# Patient Record
Sex: MALE | Race: WHITE | Employment: UNEMPLOYED | URBAN - NONMETROPOLITAN AREA
[De-identification: names, ages, dates, MRNs, and addresses within clinical notes are randomized per-mention and may not be internally consistent; named-entity substitution may affect disease eponyms.]

---

## 2017-03-29 ENCOUNTER — HOSPITAL ENCOUNTER (OUTPATIENT)
Dept: PHARMACY | Age: 42
Setting detail: THERAPIES SERIES
Discharge: HOME | End: 2017-03-29
Payer: MEDICARE

## 2017-03-29 VITALS
DIASTOLIC BLOOD PRESSURE: 90 MMHG | WEIGHT: 315 LBS | BODY MASS INDEX: 48.88 KG/M2 | HEART RATE: 81 BPM | SYSTOLIC BLOOD PRESSURE: 119 MMHG

## 2017-03-29 DIAGNOSIS — D68.61 ANTIPHOSPHOLIPID ANTIBODY SYNDROME (HCC): ICD-10-CM

## 2017-03-29 DIAGNOSIS — Z79.01 LONG TERM CURRENT USE OF ANTICOAGULANT THERAPY: ICD-10-CM

## 2017-03-29 LAB — INR BLD: 3.1

## 2017-03-29 PROCEDURE — 85610 PROTHROMBIN TIME: CPT

## 2017-03-29 PROCEDURE — G0463 HOSPITAL OUTPT CLINIC VISIT: HCPCS

## 2017-05-18 ENCOUNTER — HOSPITAL ENCOUNTER (OUTPATIENT)
Dept: PHARMACY | Age: 42
Setting detail: THERAPIES SERIES
Discharge: HOME | End: 2017-05-18
Payer: MEDICARE

## 2017-05-18 VITALS
SYSTOLIC BLOOD PRESSURE: 124 MMHG | BODY MASS INDEX: 49.23 KG/M2 | DIASTOLIC BLOOD PRESSURE: 74 MMHG | HEART RATE: 74 BPM | WEIGHT: 315 LBS

## 2017-05-18 DIAGNOSIS — Z79.01 LONG TERM CURRENT USE OF ANTICOAGULANT THERAPY: ICD-10-CM

## 2017-05-18 DIAGNOSIS — D68.61 ANTIPHOSPHOLIPID ANTIBODY SYNDROME (HCC): ICD-10-CM

## 2017-05-18 LAB — INR BLD: 3.9

## 2017-05-18 PROCEDURE — 99211 OFF/OP EST MAY X REQ PHY/QHP: CPT

## 2017-05-18 PROCEDURE — 85610 PROTHROMBIN TIME: CPT

## 2017-07-31 ENCOUNTER — OFFICE VISIT (OUTPATIENT)
Dept: PRIMARY CARE CLINIC | Age: 42
End: 2017-07-31
Payer: MEDICARE

## 2017-07-31 VITALS
DIASTOLIC BLOOD PRESSURE: 101 MMHG | HEIGHT: 72 IN | HEART RATE: 72 BPM | TEMPERATURE: 99.2 F | RESPIRATION RATE: 20 BRPM | SYSTOLIC BLOOD PRESSURE: 147 MMHG | BODY MASS INDEX: 42.66 KG/M2 | OXYGEN SATURATION: 95 % | WEIGHT: 315 LBS

## 2017-07-31 DIAGNOSIS — J20.9 ACUTE BRONCHITIS, UNSPECIFIED ORGANISM: Primary | ICD-10-CM

## 2017-07-31 PROCEDURE — G8417 CALC BMI ABV UP PARAM F/U: HCPCS | Performed by: NURSE PRACTITIONER

## 2017-07-31 PROCEDURE — G8598 ASA/ANTIPLAT THER USED: HCPCS | Performed by: NURSE PRACTITIONER

## 2017-07-31 PROCEDURE — 99202 OFFICE O/P NEW SF 15 MIN: CPT | Performed by: NURSE PRACTITIONER

## 2017-07-31 PROCEDURE — 4004F PT TOBACCO SCREEN RCVD TLK: CPT | Performed by: NURSE PRACTITIONER

## 2017-07-31 PROCEDURE — G8427 DOCREV CUR MEDS BY ELIG CLIN: HCPCS | Performed by: NURSE PRACTITIONER

## 2017-07-31 RX ORDER — AZITHROMYCIN 250 MG/1
TABLET, FILM COATED ORAL
Qty: 6 TABLET | Refills: 0 | Status: SHIPPED | OUTPATIENT
Start: 2017-07-31 | End: 2017-08-04 | Stop reason: ALTCHOICE

## 2017-07-31 ASSESSMENT — ENCOUNTER SYMPTOMS
SORE THROAT: 0
COUGH: 1
DIARRHEA: 1
RHINORRHEA: 1
HEARTBURN: 0
NAUSEA: 0
WHEEZING: 1
VOMITING: 0
HEMOPTYSIS: 0
SHORTNESS OF BREATH: 1

## 2017-08-04 ENCOUNTER — HOSPITAL ENCOUNTER (OUTPATIENT)
Dept: PHARMACY | Age: 42
Setting detail: THERAPIES SERIES
Discharge: HOME | End: 2017-08-04
Payer: MEDICARE

## 2017-08-04 VITALS
SYSTOLIC BLOOD PRESSURE: 138 MMHG | WEIGHT: 315 LBS | HEART RATE: 76 BPM | BODY MASS INDEX: 49.99 KG/M2 | DIASTOLIC BLOOD PRESSURE: 98 MMHG

## 2017-08-04 DIAGNOSIS — Z79.01 LONG TERM (CURRENT) USE OF ANTICOAGULANTS: ICD-10-CM

## 2017-08-04 DIAGNOSIS — Z79.01 LONG TERM CURRENT USE OF ANTICOAGULANT THERAPY: ICD-10-CM

## 2017-08-04 DIAGNOSIS — D68.61 ANTIPHOSPHOLIPID ANTIBODY SYNDROME (HCC): ICD-10-CM

## 2017-08-04 LAB — INR BLD: 4.1

## 2017-08-04 PROCEDURE — 99211 OFF/OP EST MAY X REQ PHY/QHP: CPT

## 2017-08-04 PROCEDURE — 85610 PROTHROMBIN TIME: CPT

## 2017-08-04 RX ORDER — WARFARIN SODIUM 10 MG/1
TABLET ORAL
Qty: 30 TABLET | Refills: 2 | Status: SHIPPED
Start: 2017-08-04 | End: 2017-09-14 | Stop reason: SDUPTHER

## 2017-08-04 RX ORDER — WARFARIN SODIUM 7.5 MG/1
TABLET ORAL
Qty: 90 TABLET | Refills: 2 | Status: SHIPPED
Start: 2017-08-04 | End: 2017-09-14 | Stop reason: DRUGHIGH

## 2017-09-14 ENCOUNTER — HOSPITAL ENCOUNTER (OUTPATIENT)
Dept: PHARMACY | Age: 42
Setting detail: THERAPIES SERIES
Discharge: HOME | End: 2017-09-14
Payer: MEDICARE

## 2017-09-14 VITALS — HEART RATE: 87 BPM | SYSTOLIC BLOOD PRESSURE: 158 MMHG | DIASTOLIC BLOOD PRESSURE: 113 MMHG

## 2017-09-14 DIAGNOSIS — D68.61 ANTIPHOSPHOLIPID ANTIBODY SYNDROME (HCC): ICD-10-CM

## 2017-09-14 DIAGNOSIS — Z79.01 LONG TERM CURRENT USE OF ANTICOAGULANT THERAPY: ICD-10-CM

## 2017-09-14 DIAGNOSIS — Z79.01 LONG TERM (CURRENT) USE OF ANTICOAGULANTS: ICD-10-CM

## 2017-09-14 LAB — INR BLD: 5.5

## 2017-09-14 PROCEDURE — 85610 PROTHROMBIN TIME: CPT

## 2017-09-14 PROCEDURE — 99211 OFF/OP EST MAY X REQ PHY/QHP: CPT

## 2017-09-14 RX ORDER — WARFARIN SODIUM 7.5 MG/1
TABLET ORAL
Qty: 90 TABLET | Refills: 2 | Status: SHIPPED
Start: 2017-09-14 | End: 2017-11-30 | Stop reason: DRUGHIGH

## 2017-09-14 RX ORDER — WARFARIN SODIUM 10 MG/1
TABLET ORAL
Qty: 90 TABLET | Refills: 1 | OUTPATIENT
Start: 2017-09-14 | End: 2017-11-30 | Stop reason: DRUGHIGH

## 2017-09-16 ENCOUNTER — HOSPITAL ENCOUNTER (EMERGENCY)
Age: 42
Discharge: HOME | End: 2017-09-16
Attending: FAMILY MEDICINE
Payer: MEDICARE

## 2017-09-16 ENCOUNTER — OFFICE VISIT (OUTPATIENT)
Dept: PRIMARY CARE CLINIC | Age: 42
End: 2017-09-16

## 2017-09-16 VITALS
HEIGHT: 72 IN | HEART RATE: 80 BPM | WEIGHT: 315 LBS | RESPIRATION RATE: 22 BRPM | TEMPERATURE: 98.2 F | DIASTOLIC BLOOD PRESSURE: 96 MMHG | OXYGEN SATURATION: 97 % | BODY MASS INDEX: 42.66 KG/M2 | SYSTOLIC BLOOD PRESSURE: 141 MMHG

## 2017-09-16 VITALS
SYSTOLIC BLOOD PRESSURE: 133 MMHG | DIASTOLIC BLOOD PRESSURE: 88 MMHG | HEIGHT: 72 IN | WEIGHT: 315 LBS | BODY MASS INDEX: 42.66 KG/M2 | OXYGEN SATURATION: 92 % | HEART RATE: 89 BPM | TEMPERATURE: 98.5 F

## 2017-09-16 DIAGNOSIS — R06.02 SHORTNESS OF BREATH: Primary | ICD-10-CM

## 2017-09-16 DIAGNOSIS — J20.9 ACUTE BRONCHITIS, UNSPECIFIED ORGANISM: Primary | ICD-10-CM

## 2017-09-16 PROCEDURE — 99282 EMERGENCY DEPT VISIT SF MDM: CPT

## 2017-09-16 PROCEDURE — 4004F PT TOBACCO SCREEN RCVD TLK: CPT | Performed by: NURSE PRACTITIONER

## 2017-09-16 PROCEDURE — 99999 PR OFFICE/OUTPT VISIT,PROCEDURE ONLY: CPT | Performed by: NURSE PRACTITIONER

## 2017-09-16 PROCEDURE — 6370000000 HC RX 637 (ALT 250 FOR IP): Performed by: FAMILY MEDICINE

## 2017-09-16 RX ORDER — PROMETHAZINE HYDROCHLORIDE AND CODEINE PHOSPHATE 6.25; 1 MG/5ML; MG/5ML
5 SYRUP ORAL ONCE
Status: COMPLETED | OUTPATIENT
Start: 2017-09-16 | End: 2017-09-16

## 2017-09-16 RX ORDER — AZITHROMYCIN 250 MG/1
500 TABLET, FILM COATED ORAL ONCE
Status: COMPLETED | OUTPATIENT
Start: 2017-09-16 | End: 2017-09-16

## 2017-09-16 RX ORDER — PROMETHAZINE HYDROCHLORIDE AND CODEINE PHOSPHATE 6.25; 1 MG/5ML; MG/5ML
5 SYRUP ORAL 4 TIMES DAILY PRN
Qty: 60 ML | Refills: 0 | Status: SHIPPED | OUTPATIENT
Start: 2017-09-16 | End: 2017-10-05 | Stop reason: ALTCHOICE

## 2017-09-16 RX ORDER — AZITHROMYCIN 250 MG/1
TABLET, FILM COATED ORAL
Qty: 4 TABLET | Refills: 0 | Status: SHIPPED | OUTPATIENT
Start: 2017-09-16 | End: 2017-09-26

## 2017-09-16 RX ADMIN — Medication 5 ML: at 20:38

## 2017-09-16 RX ADMIN — AZITHROMYCIN 500 MG: 250 TABLET, FILM COATED ORAL at 20:38

## 2017-09-16 ASSESSMENT — ENCOUNTER SYMPTOMS
HEARTBURN: 0
CHEST TIGHTNESS: 0
SORE THROAT: 1
HEMOPTYSIS: 0
WHEEZING: 1
SHORTNESS OF BREATH: 1
COUGH: 1
RHINORRHEA: 1

## 2017-09-16 ASSESSMENT — PAIN SCALES - GENERAL: PAINLEVEL_OUTOF10: 7

## 2017-09-16 ASSESSMENT — PAIN DESCRIPTION - PAIN TYPE: TYPE: ACUTE PAIN

## 2017-09-18 ENCOUNTER — TELEPHONE (OUTPATIENT)
Dept: PRIMARY CARE CLINIC | Age: 42
End: 2017-09-18

## 2017-10-05 ENCOUNTER — HOSPITAL ENCOUNTER (OUTPATIENT)
Dept: PHARMACY | Age: 42
Setting detail: THERAPIES SERIES
Discharge: HOME | End: 2017-10-05
Payer: MEDICARE

## 2017-10-05 VITALS — SYSTOLIC BLOOD PRESSURE: 109 MMHG | HEART RATE: 83 BPM | DIASTOLIC BLOOD PRESSURE: 68 MMHG

## 2017-10-05 DIAGNOSIS — D68.61 ANTIPHOSPHOLIPID ANTIBODY SYNDROME (HCC): ICD-10-CM

## 2017-10-05 DIAGNOSIS — Z79.01 LONG TERM CURRENT USE OF ANTICOAGULANT THERAPY: ICD-10-CM

## 2017-10-05 LAB — INR BLD: 4.8

## 2017-10-05 PROCEDURE — 99211 OFF/OP EST MAY X REQ PHY/QHP: CPT

## 2017-10-05 PROCEDURE — 85610 PROTHROMBIN TIME: CPT

## 2017-10-05 NOTE — PROGRESS NOTES
Fingerstick INR drawn per clinic protocol. Patient states no visible blood in urine and no black tarry stool. Jeferson Duenas missed 1 dose of warfarin on 17 as instructed because of supratherapeutic INR. Jeferson Duenas states he \"followed our directions\" for the first 2 days then went back to the way he used to which is 10 mg on  and  and warfarin 7.5mg daily all other days (9.5% higher weekly dosage than I recommended). Jeferson Duenas was scheduled for a recheck of INR 1 week after last appointment but has cancelled and rescheduled multiple times, so it has been 3 weeks since our last INR check. No change in diet. Jeferson Duenas states he is still having \"a lot of pain,\" and stopped taking ibuprofen, but is taking \"bottles of tylenol. \" Jeferson Duenas is also talking about having \"increased anxiety,\" as his dad  recently. He states he gets \"shortness of breath\" and has had a \"headache for a week\" and \"never\" gets headaches. I recommended he see his PCP or go to the emergency room or urgent care if he felt it was necessary. Since Merrick's INR is supratherapeutic again today, we will HOLD today's dosage, have him take warfarin 3.75 mg tomorrow then decrease current weekly warfarin regimen by 8.7% (warfarin 7.5 mg daily) and recheck INR in 1 week(s).

## 2017-11-30 ENCOUNTER — HOSPITAL ENCOUNTER (OUTPATIENT)
Dept: PHARMACY | Age: 42
Setting detail: THERAPIES SERIES
Discharge: HOME | End: 2017-11-30
Payer: MEDICARE

## 2017-11-30 VITALS
DIASTOLIC BLOOD PRESSURE: 88 MMHG | BODY MASS INDEX: 48.36 KG/M2 | SYSTOLIC BLOOD PRESSURE: 130 MMHG | HEART RATE: 90 BPM | WEIGHT: 315 LBS

## 2017-11-30 DIAGNOSIS — D68.61 ANTIPHOSPHOLIPID ANTIBODY SYNDROME (HCC): ICD-10-CM

## 2017-11-30 DIAGNOSIS — Z79.01 LONG TERM CURRENT USE OF ANTICOAGULANT THERAPY: ICD-10-CM

## 2017-11-30 LAB — INR BLD: 3.6

## 2017-11-30 PROCEDURE — 85610 PROTHROMBIN TIME: CPT

## 2017-11-30 PROCEDURE — 99211 OFF/OP EST MAY X REQ PHY/QHP: CPT

## 2017-11-30 RX ORDER — WARFARIN SODIUM 10 MG/1
TABLET ORAL
Qty: 90 TABLET | Refills: 1 | Status: SHIPPED
Start: 2017-11-30 | End: 2018-01-25 | Stop reason: DRUGHIGH

## 2017-11-30 RX ORDER — IBUPROFEN 200 MG
200-800 TABLET ORAL EVERY 6 HOURS PRN
COMMUNITY
End: 2018-03-23

## 2017-11-30 RX ORDER — WARFARIN SODIUM 7.5 MG/1
TABLET ORAL
Qty: 90 TABLET | Refills: 2 | Status: SHIPPED
Start: 2017-11-30 | End: 2018-01-25 | Stop reason: DRUGHIGH

## 2017-11-30 RX ORDER — OXYCODONE HYDROCHLORIDE 15 MG/1
15 TABLET ORAL 4 TIMES DAILY PRN
COMMUNITY
End: 2018-01-25 | Stop reason: DRUGHIGH

## 2017-11-30 RX ORDER — FLUTICASONE PROPIONATE 110 UG/1
1 AEROSOL, METERED RESPIRATORY (INHALATION) PRN
COMMUNITY
End: 2018-03-23 | Stop reason: ALTCHOICE

## 2017-11-30 NOTE — PROGRESS NOTES
Fingerstick INR drawn per clinic protocol. Patient states no visible blood in urine and no black tarry stool. Denies any missed doses of warfarin. Hosea Kehr was instructed to take warfarin 7.5 mg daily, but on his own decided to go back on what he is \"used to\" which is warfarin 10 mg on Monday and Friday and 7.5 mg daily all other days (9.5% higher weekly warfarin regimen). All medications reviewed for accuracy without the bottles. Hosea Kehr saw Dr. Ronald Patterson and was prescribed oxycodone 15 mg 4 times daily as needed for pain and gabapentin 400 mg was increased from twice to 4 times daily. Hosea Kehr is unsure but states he uses an orange steroid inhaler as needed (flovent? And he is unsure of the dosage). Hosea Kehr has been taking ibuprofen for shoulder pain (even though he states he knows it thins his blood). Hosea Kehr will see Dr. Ronald Patterson again on 12/10/17. Will continue current weekly warfarin regimen and recheck INR in 4 week(s).

## 2018-01-25 ENCOUNTER — HOSPITAL ENCOUNTER (OUTPATIENT)
Dept: PHARMACY | Age: 43
Setting detail: THERAPIES SERIES
Discharge: HOME | End: 2018-01-25
Payer: MEDICARE

## 2018-01-25 VITALS
DIASTOLIC BLOOD PRESSURE: 96 MMHG | HEART RATE: 92 BPM | SYSTOLIC BLOOD PRESSURE: 138 MMHG | BODY MASS INDEX: 47.22 KG/M2 | WEIGHT: 315 LBS

## 2018-01-25 DIAGNOSIS — Z79.01 LONG TERM CURRENT USE OF ANTICOAGULANT THERAPY: ICD-10-CM

## 2018-01-25 DIAGNOSIS — D68.61 ANTIPHOSPHOLIPID ANTIBODY SYNDROME (HCC): ICD-10-CM

## 2018-01-25 LAB — INR BLD: 5.9

## 2018-01-25 PROCEDURE — 85610 PROTHROMBIN TIME: CPT

## 2018-01-25 PROCEDURE — 99211 OFF/OP EST MAY X REQ PHY/QHP: CPT

## 2018-01-25 RX ORDER — OXYCODONE HYDROCHLORIDE 20 MG/1
20 TABLET ORAL 4 TIMES DAILY
COMMUNITY
End: 2019-05-16 | Stop reason: ALTCHOICE

## 2018-01-25 RX ORDER — WARFARIN SODIUM 7.5 MG/1
TABLET ORAL
Qty: 90 TABLET | Refills: 2 | Status: SHIPPED
Start: 2018-01-25 | End: 2018-06-13 | Stop reason: SDUPTHER

## 2018-03-06 ENCOUNTER — HOSPITAL ENCOUNTER (OUTPATIENT)
Dept: PHARMACY | Age: 43
Setting detail: THERAPIES SERIES
Discharge: HOME | End: 2018-03-06
Payer: MEDICARE

## 2018-03-06 VITALS
BODY MASS INDEX: 46.74 KG/M2 | DIASTOLIC BLOOD PRESSURE: 92 MMHG | SYSTOLIC BLOOD PRESSURE: 138 MMHG | HEART RATE: 88 BPM | WEIGHT: 315 LBS

## 2018-03-06 DIAGNOSIS — Z79.01 LONG TERM CURRENT USE OF ANTICOAGULANT THERAPY: ICD-10-CM

## 2018-03-06 DIAGNOSIS — D68.61 ANTIPHOSPHOLIPID ANTIBODY SYNDROME (HCC): ICD-10-CM

## 2018-03-06 LAB — INR BLD: 1.6

## 2018-03-06 PROCEDURE — 85610 PROTHROMBIN TIME: CPT

## 2018-03-06 PROCEDURE — 99211 OFF/OP EST MAY X REQ PHY/QHP: CPT

## 2018-03-23 ENCOUNTER — HOSPITAL ENCOUNTER (OUTPATIENT)
Dept: PHARMACY | Age: 43
Setting detail: THERAPIES SERIES
Discharge: HOME | End: 2018-03-23
Payer: MEDICARE

## 2018-03-23 VITALS
BODY MASS INDEX: 45.46 KG/M2 | HEART RATE: 92 BPM | SYSTOLIC BLOOD PRESSURE: 134 MMHG | WEIGHT: 315 LBS | DIASTOLIC BLOOD PRESSURE: 86 MMHG

## 2018-03-23 DIAGNOSIS — Z79.01 LONG TERM CURRENT USE OF ANTICOAGULANT THERAPY: ICD-10-CM

## 2018-03-23 DIAGNOSIS — D68.61 ANTIPHOSPHOLIPID ANTIBODY SYNDROME (HCC): ICD-10-CM

## 2018-03-23 LAB — INR BLD: 2.6

## 2018-03-23 PROCEDURE — 99211 OFF/OP EST MAY X REQ PHY/QHP: CPT

## 2018-03-23 PROCEDURE — 85610 PROTHROMBIN TIME: CPT

## 2018-03-23 NOTE — PROGRESS NOTES
Fingerstick INR drawn per clinic protocol. Patient states no visible blood in urine and no black tarry stool. Upon reviewing his warfarin dosing, Surja Green says he has been taking \"10 mg on Mondays\" and \"7.5 mg all other days of the week\". He denies any missed doses of warfarin over the course of the past 2 and 1/2 weeks since his last visit in the clinic. No change in other maintenance medications or in diet. Given his therapeutic INR today, we will continue current warfarin regimen and recheck INR in 5 weeks.

## 2018-06-04 ENCOUNTER — TELEPHONE (OUTPATIENT)
Dept: PHARMACY | Age: 43
End: 2018-06-04

## 2018-06-13 ENCOUNTER — HOSPITAL ENCOUNTER (OUTPATIENT)
Dept: PHARMACY | Age: 43
Setting detail: THERAPIES SERIES
Discharge: HOME | End: 2018-06-13
Payer: MEDICARE

## 2018-06-13 VITALS
BODY MASS INDEX: 45.49 KG/M2 | DIASTOLIC BLOOD PRESSURE: 90 MMHG | SYSTOLIC BLOOD PRESSURE: 149 MMHG | WEIGHT: 315 LBS | HEART RATE: 82 BPM

## 2018-06-13 DIAGNOSIS — D68.61 ANTIPHOSPHOLIPID ANTIBODY SYNDROME (HCC): ICD-10-CM

## 2018-06-13 DIAGNOSIS — Z79.01 LONG TERM CURRENT USE OF ANTICOAGULANT THERAPY: ICD-10-CM

## 2018-06-13 LAB — INR BLD: 1.4

## 2018-06-13 RX ORDER — WARFARIN SODIUM 7.5 MG/1
TABLET ORAL
Qty: 90 TABLET | Refills: 1 | Status: ON HOLD | OUTPATIENT
Start: 2018-06-13 | End: 2018-08-13 | Stop reason: HOSPADM

## 2018-06-13 RX ORDER — SULFAMETHOXAZOLE AND TRIMETHOPRIM 800; 160 MG/1; MG/1
1 TABLET ORAL 2 TIMES DAILY
COMMUNITY
Start: 2018-06-12 | End: 2018-06-26

## 2018-06-13 RX ORDER — ATENOLOL 50 MG/1
50 TABLET ORAL DAILY
COMMUNITY
End: 2018-07-31 | Stop reason: DRUGHIGH

## 2018-06-13 RX ORDER — GABAPENTIN 600 MG/1
600 TABLET ORAL 4 TIMES DAILY
COMMUNITY
End: 2019-05-16 | Stop reason: ALTCHOICE

## 2018-06-13 RX ORDER — WARFARIN SODIUM 10 MG/1
TABLET ORAL
Qty: 30 TABLET | Refills: 3 | Status: SHIPPED
Start: 2018-06-13 | End: 2018-07-31 | Stop reason: DRUGHIGH

## 2018-06-27 ENCOUNTER — TELEPHONE (OUTPATIENT)
Dept: PHARMACY | Age: 43
End: 2018-06-27

## 2018-06-27 LAB — INR BLD: 3.6

## 2018-06-28 LAB — INR BLD: 4.1

## 2018-06-29 LAB — INR BLD: 3.2

## 2018-06-30 LAB — INR BLD: 2.7

## 2018-07-01 LAB — INR BLD: 2.7

## 2018-07-31 ENCOUNTER — HOSPITAL ENCOUNTER (OUTPATIENT)
Dept: PHARMACY | Age: 43
Setting detail: THERAPIES SERIES
Discharge: HOME | End: 2018-07-31
Payer: MEDICARE

## 2018-07-31 DIAGNOSIS — Z79.01 LONG TERM CURRENT USE OF ANTICOAGULANT THERAPY: ICD-10-CM

## 2018-07-31 DIAGNOSIS — D68.61 ANTIPHOSPHOLIPID ANTIBODY SYNDROME (HCC): ICD-10-CM

## 2018-07-31 LAB — INR BLD: 1.6

## 2018-07-31 PROCEDURE — 85610 PROTHROMBIN TIME: CPT

## 2018-07-31 PROCEDURE — 99211 OFF/OP EST MAY X REQ PHY/QHP: CPT

## 2018-07-31 RX ORDER — WARFARIN SODIUM 10 MG/1
TABLET ORAL
Qty: 30 TABLET | Refills: 3 | Status: ON HOLD
Start: 2018-07-31 | End: 2018-08-13 | Stop reason: HOSPADM

## 2018-07-31 RX ORDER — FAMOTIDINE 40 MG/1
40 TABLET, FILM COATED ORAL DAILY PRN
COMMUNITY
End: 2019-02-08

## 2018-07-31 RX ORDER — FERROUS SULFATE 325(65) MG
325 TABLET ORAL DAILY
COMMUNITY
Start: 2018-07-01 | End: 2019-05-16 | Stop reason: ALTCHOICE

## 2018-07-31 RX ORDER — ATENOLOL 25 MG/1
25 TABLET ORAL DAILY
COMMUNITY

## 2018-08-12 ENCOUNTER — HOSPITAL ENCOUNTER (EMERGENCY)
Age: 43
Discharge: ANOTHER HOSPITAL | End: 2018-08-12
Payer: MEDICARE

## 2018-08-12 ENCOUNTER — APPOINTMENT (OUTPATIENT)
Dept: CT IMAGING | Age: 43
DRG: 917 | End: 2018-08-12
Payer: MEDICARE

## 2018-08-12 ENCOUNTER — APPOINTMENT (OUTPATIENT)
Dept: GENERAL RADIOLOGY | Age: 43
End: 2018-08-12
Payer: MEDICARE

## 2018-08-12 ENCOUNTER — HOSPITAL ENCOUNTER (INPATIENT)
Age: 43
LOS: 1 days | Discharge: HOME | DRG: 917 | End: 2018-08-13
Attending: EMERGENCY MEDICINE | Admitting: INTERNAL MEDICINE
Payer: MEDICARE

## 2018-08-12 ENCOUNTER — APPOINTMENT (OUTPATIENT)
Dept: GENERAL RADIOLOGY | Age: 43
DRG: 917 | End: 2018-08-12
Payer: MEDICARE

## 2018-08-12 VITALS — OXYGEN SATURATION: 99 %

## 2018-08-12 DIAGNOSIS — R41.82 ALTERED MENTAL STATUS, UNSPECIFIED ALTERED MENTAL STATUS TYPE: ICD-10-CM

## 2018-08-12 DIAGNOSIS — D68.61 ANTIPHOSPHOLIPID ANTIBODY SYNDROME (HCC): ICD-10-CM

## 2018-08-12 DIAGNOSIS — T50.904A DRUG OVERDOSE, UNDETERMINED INTENT, INITIAL ENCOUNTER: Primary | ICD-10-CM

## 2018-08-12 PROBLEM — R41.89 UNRESPONSIVE: Status: ACTIVE | Noted: 2018-08-12

## 2018-08-12 LAB
-: NORMAL
ABSOLUTE EOS #: 0 K/UL (ref 0–0.4)
ABSOLUTE EOS #: 0 K/UL (ref 0–0.4)
ABSOLUTE EOS #: 0.22 K/UL (ref 0–0.4)
ABSOLUTE IMMATURE GRANULOCYTE: 0 K/UL (ref 0–0.3)
ABSOLUTE IMMATURE GRANULOCYTE: 0 K/UL (ref 0–0.3)
ABSOLUTE IMMATURE GRANULOCYTE: ABNORMAL K/UL (ref 0–0.3)
ABSOLUTE LYMPH #: 0.59 K/UL (ref 1–4.8)
ABSOLUTE LYMPH #: 0.65 K/UL (ref 1–4.8)
ABSOLUTE LYMPH #: 1.03 K/UL (ref 1–4.8)
ABSOLUTE MONO #: 0.26 K/UL (ref 0.1–0.8)
ABSOLUTE MONO #: 0.65 K/UL (ref 0–1)
ABSOLUTE MONO #: 0.74 K/UL (ref 0.1–0.8)
ACETAMINOPHEN LEVEL: <5 UG/ML (ref 10–30)
ALBUMIN SERPL-MCNC: 3.6 G/DL (ref 3.5–5.2)
ALBUMIN SERPL-MCNC: 3.7 G/DL (ref 3.5–5.2)
ALBUMIN/GLOBULIN RATIO: 1.1 (ref 1–2.5)
ALBUMIN/GLOBULIN RATIO: ABNORMAL (ref 1–2.5)
ALP BLD-CCNC: 86 U/L (ref 40–129)
ALP BLD-CCNC: 91 U/L (ref 40–129)
ALT SERPL-CCNC: 22 U/L (ref 5–41)
ALT SERPL-CCNC: 23 U/L (ref 5–41)
AMORPHOUS: NORMAL
AMPHETAMINE SCREEN URINE: POSITIVE
AMPHETAMINE SCREEN URINE: POSITIVE
ANION GAP SERPL CALCULATED.3IONS-SCNC: 12 MMOL/L (ref 9–17)
ANION GAP SERPL CALCULATED.3IONS-SCNC: 15 MMOL/L (ref 9–17)
AST SERPL-CCNC: 30 U/L
AST SERPL-CCNC: 38 U/L
BACTERIA: NORMAL
BARBITURATE SCREEN URINE: NEGATIVE
BARBITURATE SCREEN URINE: NEGATIVE
BASOPHILS # BLD: 0 % (ref 0–2)
BASOPHILS ABSOLUTE: 0 K/UL (ref 0–0.2)
BENZODIAZEPINE SCREEN, URINE: NEGATIVE
BENZODIAZEPINE SCREEN, URINE: POSITIVE
BILIRUB SERPL-MCNC: 0.23 MG/DL (ref 0.3–1.2)
BILIRUB SERPL-MCNC: 0.29 MG/DL (ref 0.3–1.2)
BILIRUBIN DIRECT: 0.09 MG/DL
BILIRUBIN URINE: NEGATIVE
BILIRUBIN, INDIRECT: 0.2 MG/DL (ref 0–1)
BUN BLDV-MCNC: 16 MG/DL (ref 6–20)
BUN BLDV-MCNC: 18 MG/DL (ref 6–20)
BUN/CREAT BLD: 13 (ref 9–20)
BUN/CREAT BLD: ABNORMAL (ref 9–20)
BUPRENORPHINE URINE: ABNORMAL
BUPRENORPHINE URINE: ABNORMAL
CALCIUM IONIZED: 1.23 MMOL/L (ref 1.13–1.33)
CALCIUM SERPL-MCNC: 8.5 MG/DL (ref 8.6–10.4)
CALCIUM SERPL-MCNC: 8.5 MG/DL (ref 8.6–10.4)
CANNABINOID SCREEN URINE: NEGATIVE
CANNABINOID SCREEN URINE: NEGATIVE
CASTS UA: NORMAL /LPF (ref 0–8)
CHLORIDE BLD-SCNC: 107 MMOL/L (ref 98–107)
CHLORIDE BLD-SCNC: 112 MMOL/L (ref 98–107)
CO2: 17 MMOL/L (ref 20–31)
CO2: 23 MMOL/L (ref 20–31)
COCAINE METABOLITE, URINE: NEGATIVE
COCAINE METABOLITE, URINE: NEGATIVE
COLOR: YELLOW
COMMENT UA: ABNORMAL
CREAT SERPL-MCNC: 1.43 MG/DL (ref 0.7–1.2)
CREAT SERPL-MCNC: 1.48 MG/DL (ref 0.7–1.2)
CRYSTALS, UA: NORMAL /HPF
DIFFERENTIAL TYPE: ABNORMAL
EKG ATRIAL RATE: 91 BPM
EKG P AXIS: 63 DEGREES
EKG P-R INTERVAL: 172 MS
EKG Q-T INTERVAL: 388 MS
EKG QRS DURATION: 92 MS
EKG QTC CALCULATION (BAZETT): 477 MS
EKG R AXIS: 25 DEGREES
EKG T AXIS: 38 DEGREES
EKG VENTRICULAR RATE: 91 BPM
EOSINOPHILS RELATIVE PERCENT: 0 % (ref 1–4)
EOSINOPHILS RELATIVE PERCENT: 0 % (ref 1–4)
EOSINOPHILS RELATIVE PERCENT: 2 % (ref 0–5)
EPITHELIAL CELLS UA: NORMAL /HPF (ref 0–5)
ETHANOL PERCENT: <0.01 %
ETHANOL: <10 MG/DL
FIO2: 100
GFR AFRICAN AMERICAN: >60 ML/MIN
GFR AFRICAN AMERICAN: >60 ML/MIN
GFR NON-AFRICAN AMERICAN: 52 ML/MIN
GFR NON-AFRICAN AMERICAN: 54 ML/MIN
GFR SERPL CREATININE-BSD FRML MDRD: ABNORMAL ML/MIN/{1.73_M2}
GLOBULIN: ABNORMAL G/DL (ref 1.5–3.8)
GLUCOSE BLD-MCNC: 106 MG/DL (ref 70–99)
GLUCOSE BLD-MCNC: 78 MG/DL (ref 70–99)
GLUCOSE URINE: NEGATIVE
HCT VFR BLD CALC: 37.8 % (ref 41–53)
HCT VFR BLD CALC: 42 % (ref 40.7–50.3)
HCT VFR BLD CALC: 44.1 % (ref 40.7–50.3)
HEMOGLOBIN: 11.9 G/DL (ref 13.5–17.5)
HEMOGLOBIN: 12.1 G/DL (ref 13–17)
HEMOGLOBIN: 12.8 G/DL (ref 13–17)
IMMATURE GRANULOCYTES: 0 %
IMMATURE GRANULOCYTES: 0 %
IMMATURE GRANULOCYTES: ABNORMAL %
INR BLD: 1.5
KETONES, URINE: NEGATIVE
LACTIC ACID: 2.1 MMOL/L (ref 0.5–2.2)
LEUKOCYTE ESTERASE, URINE: NEGATIVE
LYMPHOCYTES # BLD: 4 % (ref 24–44)
LYMPHOCYTES # BLD: 6 % (ref 13–44)
LYMPHOCYTES # BLD: 8 % (ref 24–44)
MAGNESIUM: 2.1 MG/DL (ref 1.6–2.6)
MAGNESIUM: 2.4 MG/DL (ref 1.6–2.6)
MCH RBC QN AUTO: 23.3 PG (ref 25.2–33.5)
MCH RBC QN AUTO: 23.4 PG (ref 25.2–33.5)
MCH RBC QN AUTO: 23.8 PG (ref 26–34)
MCHC RBC AUTO-ENTMCNC: 28.8 G/DL (ref 28.4–34.8)
MCHC RBC AUTO-ENTMCNC: 29 G/DL (ref 28.4–34.8)
MCHC RBC AUTO-ENTMCNC: 31.5 G/DL (ref 31–37)
MCV RBC AUTO: 75.6 FL (ref 80–100)
MCV RBC AUTO: 80.5 FL (ref 82.6–102.9)
MCV RBC AUTO: 80.8 FL (ref 82.6–102.9)
MDMA URINE: ABNORMAL
MDMA URINE: ABNORMAL
METHADONE SCREEN, URINE: NEGATIVE
METHADONE SCREEN, URINE: NEGATIVE
METHAMPHETAMINE, URINE: ABNORMAL
METHAMPHETAMINE, URINE: POSITIVE
MONOCYTES # BLD: 2 % (ref 1–7)
MONOCYTES # BLD: 5 % (ref 1–7)
MONOCYTES # BLD: 6 % (ref 5–9)
MORPHOLOGY: ABNORMAL
MRSA, DNA, NASAL: NORMAL
MUCUS: NORMAL
NEGATIVE BASE EXCESS, ART: 6 (ref 0–2)
NITRITE, URINE: NEGATIVE
NRBC AUTOMATED: 0 PER 100 WBC
NRBC AUTOMATED: 0 PER 100 WBC
NRBC AUTOMATED: ABNORMAL PER 100 WBC
O2 DEVICE/FLOW/%: ABNORMAL
OPIATES, URINE: NEGATIVE
OPIATES, URINE: NEGATIVE
OTHER OBSERVATIONS UA: NORMAL
OXYCODONE SCREEN URINE: POSITIVE
OXYCODONE SCREEN URINE: POSITIVE
PARTIAL THROMBOPLASTIN TIME: 31.3 SEC (ref 20.5–30.5)
PATIENT TEMP: ABNORMAL
PDW BLD-RTO: 18.9 % (ref 11.8–14.4)
PDW BLD-RTO: 19 % (ref 11.8–14.4)
PDW BLD-RTO: 19.9 % (ref 12.1–15.2)
PH UA: 5 (ref 5–8)
PHENCYCLIDINE, URINE: NEGATIVE
PHENCYCLIDINE, URINE: NEGATIVE
PHOSPHORUS: 2.2 MG/DL (ref 2.5–4.5)
PLATELET # BLD: 204 K/UL (ref 138–453)
PLATELET # BLD: 231 K/UL (ref 138–453)
PLATELET # BLD: 235 K/UL (ref 140–450)
PLATELET ESTIMATE: ABNORMAL
PMV BLD AUTO: 11 FL (ref 8.1–13.5)
PMV BLD AUTO: 11.6 FL (ref 8.1–13.5)
PMV BLD AUTO: ABNORMAL FL (ref 6–12)
POC HCO3: 22 MMOL/L (ref 22–26)
POC O2 SATURATION: 100 % (ref 95–98)
POC PCO2 TEMP: ABNORMAL MM HG
POC PCO2: 57 MM HG (ref 35–45)
POC PH TEMP: ABNORMAL
POC PH: 7.2 (ref 7.35–7.45)
POC PO2 TEMP: ABNORMAL MM HG
POC PO2: 385 MM HG (ref 80–105)
POSITIVE BASE EXCESS, ART: ABNORMAL (ref 0–2)
POTASSIUM SERPL-SCNC: 3.9 MMOL/L (ref 3.7–5.3)
POTASSIUM SERPL-SCNC: 4.5 MMOL/L (ref 3.7–5.3)
PROPOXYPHENE, URINE: ABNORMAL
PROPOXYPHENE, URINE: NEGATIVE
PROTEIN UA: ABNORMAL
PROTHROMBIN TIME: 15.3 SEC (ref 9–12)
RBC # BLD: 4.99 M/UL (ref 4.5–5.9)
RBC # BLD: 5.2 M/UL (ref 4.21–5.77)
RBC # BLD: 5.48 M/UL (ref 4.21–5.77)
RBC # BLD: ABNORMAL 10*6/UL
RBC UA: NORMAL /HPF (ref 0–4)
RENAL EPITHELIAL, UA: NORMAL /HPF
SALICYLATE LEVEL: <1 MG/DL (ref 3–10)
SEG NEUTROPHILS: 86 % (ref 39–75)
SEG NEUTROPHILS: 90 % (ref 36–66)
SEG NEUTROPHILS: 91 % (ref 36–66)
SEGMENTED NEUTROPHILS ABSOLUTE COUNT: 11.61 K/UL (ref 1.8–7.7)
SEGMENTED NEUTROPHILS ABSOLUTE COUNT: 13.37 K/UL (ref 1.8–7.7)
SEGMENTED NEUTROPHILS ABSOLUTE COUNT: 9.38 K/UL (ref 2.1–6.5)
SODIUM BLD-SCNC: 142 MMOL/L (ref 135–144)
SODIUM BLD-SCNC: 144 MMOL/L (ref 135–144)
SPECIFIC GRAVITY UA: 1.02 (ref 1–1.03)
SPECIMEN DESCRIPTION: NORMAL
TCO2 (CALC), ART: 24 MMOL/L (ref 24–30)
TEST INFORMATION: ABNORMAL
TEST INFORMATION: ABNORMAL
TOTAL PROTEIN: 6.3 G/DL (ref 6.4–8.3)
TOTAL PROTEIN: 7 G/DL (ref 6.4–8.3)
TOXIC TRICYCLIC SC,BLOOD: NEGATIVE
TRICHOMONAS: NORMAL
TRICYCLIC ANTIDEPRESSANTS, UR: ABNORMAL
TRICYCLIC ANTIDEPRESSANTS, UR: NEGATIVE
TROPONIN INTERP: NORMAL
TROPONIN T: <0.03 NG/ML
TURBIDITY: ABNORMAL
URINE HGB: ABNORMAL
UROBILINOGEN, URINE: NORMAL
WBC # BLD: 10.9 K/UL (ref 3.5–11)
WBC # BLD: 12.9 K/UL (ref 3.5–11.3)
WBC # BLD: 14.7 K/UL (ref 3.5–11.3)
WBC # BLD: ABNORMAL 10*3/UL
WBC UA: NORMAL /HPF (ref 0–5)
YEAST: NORMAL

## 2018-08-12 PROCEDURE — 80053 COMPREHEN METABOLIC PANEL: CPT

## 2018-08-12 PROCEDURE — 31720 CLEARANCE OF AIRWAYS: CPT

## 2018-08-12 PROCEDURE — 6360000002 HC RX W HCPCS: Performed by: EMERGENCY MEDICINE

## 2018-08-12 PROCEDURE — 81001 URINALYSIS AUTO W/SCOPE: CPT

## 2018-08-12 PROCEDURE — 85025 COMPLETE CBC W/AUTO DIFF WBC: CPT

## 2018-08-12 PROCEDURE — 96365 THER/PROPH/DIAG IV INF INIT: CPT

## 2018-08-12 PROCEDURE — 87641 MR-STAPH DNA AMP PROBE: CPT

## 2018-08-12 PROCEDURE — 94770 HC ETCO2 MONITOR DAILY: CPT

## 2018-08-12 PROCEDURE — 6360000002 HC RX W HCPCS

## 2018-08-12 PROCEDURE — 2580000003 HC RX 258: Performed by: STUDENT IN AN ORGANIZED HEALTH CARE EDUCATION/TRAINING PROGRAM

## 2018-08-12 PROCEDURE — 2580000003 HC RX 258: Performed by: INTERNAL MEDICINE

## 2018-08-12 PROCEDURE — 80048 BASIC METABOLIC PNL TOTAL CA: CPT

## 2018-08-12 PROCEDURE — 85730 THROMBOPLASTIN TIME PARTIAL: CPT

## 2018-08-12 PROCEDURE — 94002 VENT MGMT INPAT INIT DAY: CPT

## 2018-08-12 PROCEDURE — 94762 N-INVAS EAR/PLS OXIMTRY CONT: CPT

## 2018-08-12 PROCEDURE — 83735 ASSAY OF MAGNESIUM: CPT

## 2018-08-12 PROCEDURE — 36600 WITHDRAWAL OF ARTERIAL BLOOD: CPT

## 2018-08-12 PROCEDURE — 82803 BLOOD GASES ANY COMBINATION: CPT

## 2018-08-12 PROCEDURE — 71045 X-RAY EXAM CHEST 1 VIEW: CPT

## 2018-08-12 PROCEDURE — 2500000003 HC RX 250 WO HCPCS

## 2018-08-12 PROCEDURE — 84484 ASSAY OF TROPONIN QUANT: CPT

## 2018-08-12 PROCEDURE — 31500 INSERT EMERGENCY AIRWAY: CPT

## 2018-08-12 PROCEDURE — 5A1935Z RESPIRATORY VENTILATION, LESS THAN 24 CONSECUTIVE HOURS: ICD-10-PCS | Performed by: INTERNAL MEDICINE

## 2018-08-12 PROCEDURE — 94640 AIRWAY INHALATION TREATMENT: CPT

## 2018-08-12 PROCEDURE — 83605 ASSAY OF LACTIC ACID: CPT

## 2018-08-12 PROCEDURE — 2580000003 HC RX 258: Performed by: EMERGENCY MEDICINE

## 2018-08-12 PROCEDURE — 2000000000 HC ICU R&B

## 2018-08-12 PROCEDURE — 6360000002 HC RX W HCPCS: Performed by: INTERNAL MEDICINE

## 2018-08-12 PROCEDURE — 6370000000 HC RX 637 (ALT 250 FOR IP): Performed by: INTERNAL MEDICINE

## 2018-08-12 PROCEDURE — 99291 CRITICAL CARE FIRST HOUR: CPT

## 2018-08-12 PROCEDURE — 6370000000 HC RX 637 (ALT 250 FOR IP): Performed by: PSYCHIATRY & NEUROLOGY

## 2018-08-12 PROCEDURE — 96376 TX/PRO/DX INJ SAME DRUG ADON: CPT

## 2018-08-12 PROCEDURE — 80307 DRUG TEST PRSMV CHEM ANLYZR: CPT

## 2018-08-12 PROCEDURE — 2580000003 HC RX 258

## 2018-08-12 PROCEDURE — 70450 CT HEAD/BRAIN W/O DYE: CPT

## 2018-08-12 PROCEDURE — 94003 VENT MGMT INPAT SUBQ DAY: CPT

## 2018-08-12 PROCEDURE — 93005 ELECTROCARDIOGRAM TRACING: CPT

## 2018-08-12 PROCEDURE — 36415 COLL VENOUS BLD VENIPUNCTURE: CPT

## 2018-08-12 PROCEDURE — 84100 ASSAY OF PHOSPHORUS: CPT

## 2018-08-12 PROCEDURE — 85610 PROTHROMBIN TIME: CPT

## 2018-08-12 PROCEDURE — 96375 TX/PRO/DX INJ NEW DRUG ADDON: CPT

## 2018-08-12 PROCEDURE — 6360000002 HC RX W HCPCS: Performed by: STUDENT IN AN ORGANIZED HEALTH CARE EDUCATION/TRAINING PROGRAM

## 2018-08-12 PROCEDURE — 80076 HEPATIC FUNCTION PANEL: CPT

## 2018-08-12 PROCEDURE — 82330 ASSAY OF CALCIUM: CPT

## 2018-08-12 PROCEDURE — 80306 DRUG TEST PRSMV INSTRMNT: CPT

## 2018-08-12 PROCEDURE — 51702 INSERT TEMP BLADDER CATH: CPT

## 2018-08-12 PROCEDURE — 99223 1ST HOSP IP/OBS HIGH 75: CPT | Performed by: PSYCHIATRY & NEUROLOGY

## 2018-08-12 PROCEDURE — 99291 CRITICAL CARE FIRST HOUR: CPT | Performed by: INTERNAL MEDICINE

## 2018-08-12 PROCEDURE — G0480 DRUG TEST DEF 1-7 CLASSES: HCPCS

## 2018-08-12 PROCEDURE — 6370000000 HC RX 637 (ALT 250 FOR IP): Performed by: STUDENT IN AN ORGANIZED HEALTH CARE EDUCATION/TRAINING PROGRAM

## 2018-08-12 PROCEDURE — 92950 HEART/LUNG RESUSCITATION CPR: CPT

## 2018-08-12 PROCEDURE — 36680 INSERT NEEDLE BONE CAVITY: CPT

## 2018-08-12 RX ORDER — IPRATROPIUM BROMIDE AND ALBUTEROL SULFATE 2.5; .5 MG/3ML; MG/3ML
1 SOLUTION RESPIRATORY (INHALATION) EVERY 4 HOURS PRN
Status: DISCONTINUED | OUTPATIENT
Start: 2018-08-12 | End: 2018-08-13 | Stop reason: HOSPADM

## 2018-08-12 RX ORDER — WARFARIN SODIUM 7.5 MG/1
7.5 TABLET ORAL
Status: DISCONTINUED | OUTPATIENT
Start: 2018-08-12 | End: 2018-08-12

## 2018-08-12 RX ORDER — WARFARIN SODIUM 10 MG/1
10 TABLET ORAL
Status: DISCONTINUED | OUTPATIENT
Start: 2018-08-13 | End: 2018-08-12

## 2018-08-12 RX ORDER — SODIUM CHLORIDE 0.9 % (FLUSH) 0.9 %
10 SYRINGE (ML) INJECTION EVERY 12 HOURS SCHEDULED
Status: DISCONTINUED | OUTPATIENT
Start: 2018-08-12 | End: 2018-08-13 | Stop reason: HOSPADM

## 2018-08-12 RX ORDER — WARFARIN SODIUM 10 MG/1
10 TABLET ORAL
Status: COMPLETED | OUTPATIENT
Start: 2018-08-12 | End: 2018-08-12

## 2018-08-12 RX ORDER — ONDANSETRON 2 MG/ML
4 INJECTION INTRAMUSCULAR; INTRAVENOUS EVERY 6 HOURS PRN
Status: DISCONTINUED | OUTPATIENT
Start: 2018-08-12 | End: 2018-08-13 | Stop reason: HOSPADM

## 2018-08-12 RX ORDER — ALBUTEROL SULFATE 2.5 MG/3ML
2.5 SOLUTION RESPIRATORY (INHALATION) EVERY 6 HOURS PRN
Status: DISCONTINUED | OUTPATIENT
Start: 2018-08-12 | End: 2018-08-13

## 2018-08-12 RX ORDER — ALBUTEROL SULFATE 2.5 MG/3ML
2.5 SOLUTION RESPIRATORY (INHALATION) 2 TIMES DAILY
Status: DISCONTINUED | OUTPATIENT
Start: 2018-08-13 | End: 2018-08-12

## 2018-08-12 RX ORDER — NALOXONE HYDROCHLORIDE 1 MG/ML
INJECTION INTRAMUSCULAR; INTRAVENOUS; SUBCUTANEOUS
Status: DISCONTINUED
Start: 2018-08-12 | End: 2018-08-12 | Stop reason: HOSPADM

## 2018-08-12 RX ORDER — ALBUTEROL SULFATE 2.5 MG/3ML
2.5 SOLUTION RESPIRATORY (INHALATION) EVERY 6 HOURS PRN
Status: DISCONTINUED | OUTPATIENT
Start: 2018-08-12 | End: 2018-08-13 | Stop reason: HOSPADM

## 2018-08-12 RX ORDER — SODIUM CHLORIDE 9 MG/ML
INJECTION, SOLUTION INTRAVENOUS CONTINUOUS
Status: DISCONTINUED | OUTPATIENT
Start: 2018-08-12 | End: 2018-08-13

## 2018-08-12 RX ORDER — SODIUM CHLORIDE 0.9 % (FLUSH) 0.9 %
10 SYRINGE (ML) INJECTION PRN
Status: DISCONTINUED | OUTPATIENT
Start: 2018-08-12 | End: 2018-08-13 | Stop reason: HOSPADM

## 2018-08-12 RX ORDER — TOPIRAMATE 100 MG/1
100 TABLET, FILM COATED ORAL EVERY 12 HOURS
Status: DISCONTINUED | OUTPATIENT
Start: 2018-08-12 | End: 2018-08-13

## 2018-08-12 RX ORDER — PROPOFOL 10 MG/ML
10 INJECTION, EMULSION INTRAVENOUS
Status: DISCONTINUED | OUTPATIENT
Start: 2018-08-12 | End: 2018-08-12

## 2018-08-12 RX ORDER — TOPIRAMATE 25 MG/1
50 TABLET ORAL EVERY 12 HOURS
Status: DISCONTINUED | OUTPATIENT
Start: 2018-08-12 | End: 2018-08-12

## 2018-08-12 RX ORDER — IPRATROPIUM BROMIDE AND ALBUTEROL SULFATE 2.5; .5 MG/3ML; MG/3ML
1 SOLUTION RESPIRATORY (INHALATION) 4 TIMES DAILY
Status: DISCONTINUED | OUTPATIENT
Start: 2018-08-12 | End: 2018-08-12

## 2018-08-12 RX ADMIN — IPRATROPIUM BROMIDE AND ALBUTEROL SULFATE 1 AMPULE: .5; 3 SOLUTION RESPIRATORY (INHALATION) at 08:30

## 2018-08-12 RX ADMIN — PROPOFOL 25 MCG/KG/MIN: 10 INJECTION, EMULSION INTRAVENOUS at 07:57

## 2018-08-12 RX ADMIN — Medication 10 ML: at 19:39

## 2018-08-12 RX ADMIN — SODIUM CHLORIDE: 9 INJECTION, SOLUTION INTRAVENOUS at 10:48

## 2018-08-12 RX ADMIN — ENOXAPARIN SODIUM 135 MG: 150 INJECTION SUBCUTANEOUS at 08:51

## 2018-08-12 RX ADMIN — ENOXAPARIN SODIUM 140 MG: 150 INJECTION SUBCUTANEOUS at 19:50

## 2018-08-12 RX ADMIN — AMPICILLIN SODIUM AND SULBACTAM SODIUM 3 G: 2; 1 INJECTION, POWDER, FOR SOLUTION INTRAMUSCULAR; INTRAVENOUS at 17:14

## 2018-08-12 RX ADMIN — TOPIRAMATE 100 MG: 100 TABLET, FILM COATED ORAL at 14:50

## 2018-08-12 RX ADMIN — SODIUM CHLORIDE 3 G: 900 INJECTION INTRAVENOUS at 06:00

## 2018-08-12 RX ADMIN — AMPICILLIN SODIUM AND SULBACTAM SODIUM 3 G: 2; 1 INJECTION, POWDER, FOR SOLUTION INTRAMUSCULAR; INTRAVENOUS at 23:25

## 2018-08-12 RX ADMIN — SODIUM CHLORIDE: 9 INJECTION, SOLUTION INTRAVENOUS at 23:20

## 2018-08-12 RX ADMIN — WARFARIN SODIUM 10 MG: 10 TABLET ORAL at 19:52

## 2018-08-12 RX ADMIN — ALBUTEROL SULFATE 2.5 MG: 2.5 SOLUTION RESPIRATORY (INHALATION) at 06:30

## 2018-08-12 RX ADMIN — AMPICILLIN SODIUM AND SULBACTAM SODIUM 3 G: 2; 1 INJECTION, POWDER, FOR SOLUTION INTRAMUSCULAR; INTRAVENOUS at 10:46

## 2018-08-12 ASSESSMENT — PULMONARY FUNCTION TESTS
PIF_VALUE: 14
PIF_VALUE: 13
PIF_VALUE: 25
PIF_VALUE: 19

## 2018-08-12 ASSESSMENT — PAIN DESCRIPTION - LOCATION: LOCATION: SHOULDER

## 2018-08-12 ASSESSMENT — PAIN SCALES - GENERAL: PAINLEVEL_OUTOF10: 6

## 2018-08-12 NOTE — H&P
Provider, MD   warfarin (COUMADIN) 10 MG tablet Take 1 tablet on Mondays, Wednesdays, and Fridays and take 7.5 mg daily all other days of the week or as directed by [facility]. 7/31/18   Jose Major MD   gabapentin (NEURONTIN) 600 MG tablet Take 600 mg by mouth 4 times daily. Mis Millan Historical Provider, MD   warfarin (COUMADIN) 7.5 MG tablet Take 1 tablet daily or as directed by [facility]. 6/13/18   Jose Major MD   oxyCODONE (ROXICODONE) 20 MG immediate release tablet Take 20 mg by mouth 4 times daily. Historical Provider, MD   COLCRYS 0.6 MG tablet Take 2 tablets by mouth as needed. 9/28/14   Jasbir Finney MD   betamethasone acetate-betamethasone sodium phosphate (CELESTONE) 6 (3-3) MG/ML injection Inject 1 mg into the articular space as needed. Prn right foot pain. Gets injections from his podiatrist, Dr. Mallory Hoff in [facility]. Historical Provider, MD   predniSONE (DELTASONE) 10 MG tablet Take 10 mg by mouth daily. Historical Provider, MD   topiramate (TOPAMAX) 200 MG tablet Take 200 mg by mouth 2 times daily     Historical Provider, MD   albuterol (PROVENTIL HFA;VENTOLIN HFA) 108 (90 BASE) MCG/ACT inhaler Inhale 1-2 puffs into the lungs every 6 hours as needed. Historical Provider, MD       Social History:   TOBACCO:   reports that he has never smoked. His smokeless tobacco use includes Chew. ETOH:   reports that he drinks about 1.2 oz of alcohol per week . DRUGS:  reports that he uses drugs, including Marijuana and Cocaine.   OCCUPATION:      Family History:   Family History   Problem Relation Age of Onset    Heart Disease Maternal Grandmother     Heart Disease Maternal Grandfather     Stroke Maternal Grandfather            REVIEW OF SYSTEMS (ROS):    General ROS: negative for - fever                              ENT ROS: negative  Respiratory ROS: no cough, shortness of breath, or wheezing    Cardiovascular ROS: no chest pain or

## 2018-08-12 NOTE — PROGRESS NOTES
amount of thin secretions []  Moderate amount of viscous secretions []  Copius, Viscious Yellow/ Secretions   CXR as reported by physician []  clear  []  Unavailable []  Infiltrates and/or consolidation  []  Unavailable []  Mucus Plugging and or lobar consolidation  []  Unavailable   Cough []  Strong, productive cough []  Weak productive cough []  No cough or weak non-productive cough   TIMI CURRY ALE  4:34 PM                            FEMALE                                  MALE                            FEV1 Predicted Normal Values                        FEV1 Predicted Normal Values          Age                                     Height in Feet and Inches       Age                                     Height in Feet and Inches       4' 11\" 5' 1\" 5' 3\" 5' 5\" 5' 7\" 5' 9\" 5' 11\" 6' 1\"  4' 11\" 5' 1\" 5' 3\" 5' 5\" 5' 7\" 5' 9\" 5' 11\" 6' 1\"   42 - 45 2.49 2.66 2.84 3.03 3.22 3.42 3.62 3.83 42 - 45 2.82 3.03 3.26 3.49 3.72 3.96 4.22 4.47   46 - 49 2.40 2.57 2.76 2.94 3.14 3.33 3.54 3.75 46 - 49 2.70 2.92 3.14 3.37 3.61 3.85 4.10 4.36   50 - 53 2.31 2.48 2.66 2.85 3.04 3.24 3.45 3.66 50 - 53 2.58 2.80 3.02 3.25 3.49 3.73 3.98 4.24   54 - 57 2.21 2.38 2.57 2.75 2.95 3.14 3.35 3.56 54 - 57 2.46 2.67 2.89 3.12 3.36 3.60 3.85 4.11   58 - 61 2.10 2.28 2.46 2.65 2.84 3.04 3.24 3.45 58 - 61 2.32 2.54 2.76 2.99 3.23 3.47 3.72 3.98   62 - 65 1.99 2.17 2.35 2.54 2.73 2.93 3.13 3.34 62 - 65 2.19 2.40 2.62 2.85 3.09 3.33 3.58 3.84   66 - 69 1.88 2.05 2.23 2.42 2.61 2.81 3.02 3.23 66 - 69 2.04 2.26 2.48 2.71 2.95 3.19 3.44 3.70   70+ 1.82 1.99 2.17 2.36 2.55 2.75 2.95 3.16 70+ 1.97 2.19 2.41 2.64 2.87 3.12 3.37 3.62             Predicted Peak Expiratory Flow Rate                                       Height (in)  Female       Height (in) Male           Age 64 62 64 63 57 71 78 74 Age            86 257 966 139 047 532 276 822 026  75 93 31 11 10 29 31 63 14   25 337 352 366 381 396 411 426 441 25 584 476 505 533 562 184 692 734 230 08 373 926 021 775 777 751 386 777 13 095 505 018 435 534 764 081 193 933   89 932 761 196 117 095 069 574 950 32 961 359 046 808 633 387 571 262 859   87 534 827 417 859 945 372 830 716 74 020 718 650 371 121 421 317 051 056   56 565 528 657 451 036 070 464 432 65 349 879 441 619 775 098 874 476 376   09 516 442 668 308 585 535 222 289 06 568 892 370 621 982 919 543 093 675   64 738 447 893 001 778 806 751 138 36 556 630 156 108 767 884 010 352 973   59 093 044 462 341 294 256 402 159 16 696 128 852 392 128 825 626 705 229   20 612 869 484 083 489 514 822 639 99 715 388 380 655 462 173 995 638 934   66 649 078 128 546 747 978 211 971 41 044 680 725 641 414 075 635 979 475   47 076 910 815 444 026 443 864 943 78 099 336 027 977 310 514 439 334 196   54 243 980 545 865 100 724 868 175 83 551 343 795 104 968 101 887 683 177

## 2018-08-12 NOTE — PLAN OF CARE
Problem: OXYGENATION/RESPIRATORY FUNCTION  Goal: Patient will maintain patent airway  Outcome: Ongoing    Goal: Patient will achieve/maintain normal respiratory rate/effort  Respiratory rate and effort will be within normal limits for the patient   Outcome: Ongoing      Problem: MECHANICAL VENTILATION  Goal: Patient will maintain patent airway  Outcome: Ongoing    Goal: Oral health is maintained or improved  Outcome: Ongoing    Goal: ET tube will be managed safely  Outcome: Ongoing    Goal: Ability to express needs and understand communication  Outcome: Ongoing    Goal: Mobility/activity is maintained at optimum level for patient  Outcome: Ongoing      Problem: SKIN INTEGRITY  Goal: Skin integrity is maintained or improved  Outcome: Ongoing      Problem: Restraint Use - Nonviolent/Non-Self-Destructive Behavior:  Goal: Absence of restraint indications  Absence of restraint indications   Outcome: Met This Shift    Goal: Absence of restraint-related injury  Absence of restraint-related injury   Outcome: Met This Shift      Problem: Risk for Impaired Skin Integrity  Goal: Tissue integrity - skin and mucous membranes  Structural intactness and normal physiological function of skin and  mucous membranes.    Outcome: Ongoing      Problem: Falls - Risk of:  Goal: Will remain free from falls  Will remain free from falls   Outcome: Ongoing    Goal: Absence of physical injury  Absence of physical injury   Outcome: Ongoing

## 2018-08-12 NOTE — CARE COORDINATION
Case Management Initial Discharge Plan  Nancy Velasquez,         Readmission Risk              Risk of Unplanned Readmission:        21               Met with:family member mother to discuss discharge plans. Information verified: address, contacts, phone number, , insurance Yes  PCP: Damien Butterfield MD  Date of last visit: Michael Rodgers (took over practice for [place]). .  Office in [place]. Insurance Provider: [facility] - mom to bring in copy of insurance card. Discharge Planning    Living Arrangements:  Spouse/Significant Other   Support Systems:  Family Members      Patient able to perform ADL's:Independent    Current Services (outpatient & in home) coumadin clinic at [facility]  DME equipment: none  DME provider:     Pharmacy: [facility] on [address] Medications:  No  Does patient want to participate in local refill/ meds to beds program?  No    Potential Assistance Needed:  Jordan Mann    Patient agreeable to home care: No  Malin of choice provided:  n/a    Prior SNF/Rehab Placement and Facility: no  Agreeable to SNF/Rehab: unsure  Malin of choice provided: yes   Evaluation: yes    Expected Discharge date:  18  Patient expects to be discharged to:  unsure ? SNF  Follow Up Appointment: Best Day/ Time:      Transportation provider: family  Transportation arrangements needed for discharge: No    Discharge Plan: Pt intubated. Spoke with pt's mom Woo Hook at bedside. Address is   [facility]  She will bring in copy of insurance card-states it is [place] of discharge needs at this time. Will see how pt progresses. ?SNF.         Electronically signed by Brantley Prader, RN on 18 at 11:19 AM

## 2018-08-12 NOTE — CONSULTS
History:        Diagnosis Date    Antiphospholipid antibody with hemorrhagic disorder (HCC)     Arthritis     Asthma     Bronchitis     [facility]erebral artery occlusion with cerebral infarction ([facility] [address]     Disease of blood and blood forming organ     Hypertension     Long-term (current) use of anticoagulants, INR goal 2.5-3.5     Pleurisy     Pneumonia     Seizures (HCC)        Past Surgical History:        Procedure Laterality Date    CARDIAC CATHETERIZATION         Social History:   TOBACCO:  Denies use of cigarettes/chewing tobacco   ETOH:  1.2 oz/week  DRUGS: marijuana, cocaine. Family History:   Family History   Problem Relation Age of Onset    Heart Disease Maternal Grandmother     Heart Disease Maternal Grandfather     Stroke Maternal Grandfather        Allergies:  Patient has no known allergies. Home Medications:  Prior to Admission medications    Medication Sig Start Date End Date Taking? Authorizing Provider   famotidine (PEPCID) 40 MG tablet Take 40 mg by mouth daily as needed    Historical Provider, MD   ferrous sulfate 325 (65 Fe) MG tablet Take 325 mg by mouth daily 7/1/18   Historical Provider, MD   atenolol (TENORMIN) 25 MG tablet Take 25 mg by mouth every evening    Historical Provider, MD   warfarin (COUMADIN) 10 MG tablet Take 1 tablet on Mondays, Wednesdays, and Fridays and take 7.5 mg daily all other days of the week or as directed by [facility]ication [facility]. 7/31/18   Amirah Hernandez MD   gabapentin (NEURONTIN) 600 MG tablet Take 600 mg by mouth 4 times daily. Guillermo Huerta Historical Provider, MD   warfarin (COUMADIN) 7.5 MG tablet Take 1 tablet daily or as directed by [facility]ication [facility]. 6/13/18   Amirah Hernandez MD   oxyCODONE (ROXICODONE) 20 MG immediate release tablet Take 20 mg by mouth 4 times daily. Historical Provider, MD   COLCRYS 0.6 MG tablet Take 2 tablets by mouth as needed.  9/28/14   Armin Sandhu MD   betamethasone other diagnostic tests with the residents. I agree with the assessment, plan and orders as documented by the resident.      Diaz Marcum MD 8/12/2018 6:40 PM

## 2018-08-12 NOTE — FLOWSHEET NOTE
SPIRITUAL CARE DEPARTMENT - [facility] [address]  PROGRESS NOTE    Shift date: 8/11/2018  Shift day: Saturday   Shift # 3    Room # 0124/0124-01   Name: Jacquelin Retana            Age: 39 y.o. Gender: male          Congregation: [employer]: Unknown    Referral: \"Unresponsive\" - ED HUC    Admit Date & Time: 8/12/2018  4:18 AM    PATIENT/EVENT DESCRIPTION:  Jacquelin Retana is a 39 y.o. male who was a transfer from [facility] to [place] due to being \"found unresponsive by his spouse. \" Per report, patient \"required intubation due to impending respiratory distress\" at [facility]. Patient arrived to [place] via [facility]. Patient remained intubated and not responsive, during  visit. SPIRITUAL ASSESSMENT/INTERVENTION:   responded to ED [facility] referral and gathered patient information from ED Nurse.  made attempts to contact patient's spouse, with no success.  called line again at [place] [address] and connected with patient's wife, Rosario Johnson ([phone]). Patient's spouse expressed feelings of anxiety and distress during call. Per spouse, she was \"unable to come with patient on flight. \" Patient stated that she will be arriving to the hospital this morning, as she is traveling with her sister. Patient's spouse indicated that she is \"too worked up\" to drive herself.  provided comfort and support to spouse over the phone.  shared ED main line phone number for her reference and encouraged patient's spouse to request  support upon her arrival to the hospital. Patient's spouse shared thankfulness for 's support over phone and expressed a relief in anxiety by end of call. SPIRITUAL CARE FOLLOW-UP PLAN:  Chaplains will remain available to offer spiritual and emotional support as needed.       Electronically signed by Ailyn Humphrey on 8/12/2018 at [address]  [phone]

## 2018-08-13 ENCOUNTER — APPOINTMENT (OUTPATIENT)
Dept: GENERAL RADIOLOGY | Age: 43
DRG: 917 | End: 2018-08-13
Payer: MEDICARE

## 2018-08-13 VITALS
RESPIRATION RATE: 17 BRPM | BODY MASS INDEX: 41.75 KG/M2 | OXYGEN SATURATION: 96 % | TEMPERATURE: 98 F | HEART RATE: 85 BPM | HEIGHT: 73 IN | WEIGHT: 315 LBS | DIASTOLIC BLOOD PRESSURE: 104 MMHG | SYSTOLIC BLOOD PRESSURE: 160 MMHG

## 2018-08-13 LAB
ABSOLUTE EOS #: <0.03 K/UL (ref 0–0.44)
ABSOLUTE IMMATURE GRANULOCYTE: 0.1 K/UL (ref 0–0.3)
ABSOLUTE LYMPH #: 1.11 K/UL (ref 1.1–3.7)
ABSOLUTE MONO #: 0.87 K/UL (ref 0.1–1.2)
ALLEN TEST: POSITIVE
ANION GAP SERPL CALCULATED.3IONS-SCNC: 11 MMOL/L (ref 9–17)
BASOPHILS # BLD: 0 % (ref 0–2)
BASOPHILS ABSOLUTE: 0.03 K/UL (ref 0–0.2)
BUN BLDV-MCNC: 18 MG/DL (ref 6–20)
BUN/CREAT BLD: ABNORMAL (ref 9–20)
CALCIUM IONIZED: 1.07 MMOL/L (ref 1.13–1.33)
CALCIUM SERPL-MCNC: 8 MG/DL (ref 8.6–10.4)
CHLORIDE BLD-SCNC: 110 MMOL/L (ref 98–107)
CO2: 21 MMOL/L (ref 20–31)
CREAT SERPL-MCNC: 1.2 MG/DL (ref 0.7–1.2)
DIFFERENTIAL TYPE: ABNORMAL
EOSINOPHILS RELATIVE PERCENT: 0 % (ref 1–4)
FIO2: 40
GFR AFRICAN AMERICAN: >60 ML/MIN
GFR NON-AFRICAN AMERICAN: >60 ML/MIN
GFR SERPL CREATININE-BSD FRML MDRD: ABNORMAL ML/MIN/{1.73_M2}
GFR SERPL CREATININE-BSD FRML MDRD: ABNORMAL ML/MIN/{1.73_M2}
GLUCOSE BLD-MCNC: 140 MG/DL (ref 70–99)
HCT VFR BLD CALC: 37.9 % (ref 40.7–50.3)
HEMOGLOBIN: 10.8 G/DL (ref 13–17)
IMMATURE GRANULOCYTES: 1 %
INR BLD: 1.2
LYMPHOCYTES # BLD: 8 % (ref 24–43)
MAGNESIUM: 1.8 MG/DL (ref 1.6–2.6)
MCH RBC QN AUTO: 23.1 PG (ref 25.2–33.5)
MCHC RBC AUTO-ENTMCNC: 28.5 G/DL (ref 28.4–34.8)
MCV RBC AUTO: 81 FL (ref 82.6–102.9)
MODE: ABNORMAL
MONOCYTES # BLD: 7 % (ref 3–12)
NEGATIVE BASE EXCESS, ART: 4 (ref 0–2)
NRBC AUTOMATED: 0 PER 100 WBC
O2 DEVICE/FLOW/%: ABNORMAL
PATIENT TEMP: ABNORMAL
PDW BLD-RTO: 19 % (ref 11.8–14.4)
PHOSPHORUS: 2.5 MG/DL (ref 2.5–4.5)
PLATELET # BLD: 207 K/UL (ref 138–453)
PLATELET ESTIMATE: ABNORMAL
PMV BLD AUTO: 11.3 FL (ref 8.1–13.5)
POC HCO3: 22.6 MMOL/L (ref 21–28)
POC O2 SATURATION: 96 % (ref 94–98)
POC PCO2 TEMP: ABNORMAL MM HG
POC PCO2: 44 MM HG (ref 35–48)
POC PH TEMP: ABNORMAL
POC PH: 7.32 (ref 7.35–7.45)
POC PO2 TEMP: ABNORMAL MM HG
POC PO2: 93.7 MM HG (ref 83–108)
POSITIVE BASE EXCESS, ART: ABNORMAL (ref 0–3)
POTASSIUM SERPL-SCNC: 3.6 MMOL/L (ref 3.7–5.3)
PROTHROMBIN TIME: 12.6 SEC (ref 9–12)
RBC # BLD: 4.68 M/UL (ref 4.21–5.77)
RBC # BLD: ABNORMAL 10*6/UL
SAMPLE SITE: ABNORMAL
SEG NEUTROPHILS: 84 % (ref 36–65)
SEGMENTED NEUTROPHILS ABSOLUTE COUNT: 11.28 K/UL (ref 1.5–8.1)
SODIUM BLD-SCNC: 142 MMOL/L (ref 135–144)
TCO2 (CALC), ART: 24 MMOL/L (ref 22–29)
WBC # BLD: 13.4 K/UL (ref 3.5–11.3)
WBC # BLD: ABNORMAL 10*3/UL

## 2018-08-13 PROCEDURE — 94762 N-INVAS EAR/PLS OXIMTRY CONT: CPT

## 2018-08-13 PROCEDURE — 36415 COLL VENOUS BLD VENIPUNCTURE: CPT

## 2018-08-13 PROCEDURE — 6360000002 HC RX W HCPCS: Performed by: INTERNAL MEDICINE

## 2018-08-13 PROCEDURE — 94640 AIRWAY INHALATION TREATMENT: CPT

## 2018-08-13 PROCEDURE — 2580000003 HC RX 258: Performed by: HOSPITALIST

## 2018-08-13 PROCEDURE — 85610 PROTHROMBIN TIME: CPT

## 2018-08-13 PROCEDURE — 71045 X-RAY EXAM CHEST 1 VIEW: CPT

## 2018-08-13 PROCEDURE — 6370000000 HC RX 637 (ALT 250 FOR IP): Performed by: STUDENT IN AN ORGANIZED HEALTH CARE EDUCATION/TRAINING PROGRAM

## 2018-08-13 PROCEDURE — 85025 COMPLETE CBC W/AUTO DIFF WBC: CPT

## 2018-08-13 PROCEDURE — 99239 HOSP IP/OBS DSCHRG MGMT >30: CPT | Performed by: INTERNAL MEDICINE

## 2018-08-13 PROCEDURE — 99232 SBSQ HOSP IP/OBS MODERATE 35: CPT | Performed by: PSYCHIATRY & NEUROLOGY

## 2018-08-13 PROCEDURE — 83735 ASSAY OF MAGNESIUM: CPT

## 2018-08-13 PROCEDURE — 2580000003 HC RX 258: Performed by: INTERNAL MEDICINE

## 2018-08-13 PROCEDURE — 6370000000 HC RX 637 (ALT 250 FOR IP): Performed by: PSYCHIATRY & NEUROLOGY

## 2018-08-13 PROCEDURE — 6360000002 HC RX W HCPCS: Performed by: HOSPITALIST

## 2018-08-13 PROCEDURE — 6370000000 HC RX 637 (ALT 250 FOR IP): Performed by: HOSPITALIST

## 2018-08-13 PROCEDURE — 6360000002 HC RX W HCPCS: Performed by: STUDENT IN AN ORGANIZED HEALTH CARE EDUCATION/TRAINING PROGRAM

## 2018-08-13 PROCEDURE — 84100 ASSAY OF PHOSPHORUS: CPT

## 2018-08-13 PROCEDURE — 82330 ASSAY OF CALCIUM: CPT

## 2018-08-13 PROCEDURE — 80048 BASIC METABOLIC PNL TOTAL CA: CPT

## 2018-08-13 RX ORDER — WARFARIN SODIUM 7.5 MG/1
15 TABLET ORAL
Status: DISCONTINUED | OUTPATIENT
Start: 2018-08-13 | End: 2018-08-13 | Stop reason: HOSPADM

## 2018-08-13 RX ORDER — TOPIRAMATE 100 MG/1
100 TABLET, FILM COATED ORAL 2 TIMES DAILY
Qty: 60 TABLET | Refills: 3 | Status: SHIPPED | OUTPATIENT
Start: 2018-08-13 | End: 2019-03-08 | Stop reason: DRUGHIGH

## 2018-08-13 RX ORDER — ALBUTEROL SULFATE 90 UG/1
2 AEROSOL, METERED RESPIRATORY (INHALATION)
Status: DISCONTINUED | OUTPATIENT
Start: 2018-08-13 | End: 2018-08-13 | Stop reason: HOSPADM

## 2018-08-13 RX ORDER — GABAPENTIN 600 MG/1
600 TABLET ORAL 4 TIMES DAILY
Status: DISCONTINUED | OUTPATIENT
Start: 2018-08-13 | End: 2018-08-13 | Stop reason: HOSPADM

## 2018-08-13 RX ORDER — ATENOLOL 25 MG/1
25 TABLET ORAL EVERY EVENING
Status: DISCONTINUED | OUTPATIENT
Start: 2018-08-13 | End: 2018-08-13

## 2018-08-13 RX ORDER — ATENOLOL 25 MG/1
25 TABLET ORAL EVERY EVENING
Status: DISCONTINUED | OUTPATIENT
Start: 2018-08-13 | End: 2018-08-13 | Stop reason: HOSPADM

## 2018-08-13 RX ORDER — PREDNISONE 10 MG/1
10 TABLET ORAL DAILY
Status: DISCONTINUED | OUTPATIENT
Start: 2018-08-13 | End: 2018-08-13 | Stop reason: HOSPADM

## 2018-08-13 RX ORDER — WARFARIN SODIUM 7.5 MG/1
TABLET ORAL
Qty: 30 TABLET | Refills: 3 | Status: SHIPPED | OUTPATIENT
Start: 2018-08-13 | End: 2018-08-22 | Stop reason: DRUGHIGH

## 2018-08-13 RX ORDER — AMOXICILLIN AND CLAVULANATE POTASSIUM 875; 125 MG/1; MG/1
1 TABLET, FILM COATED ORAL 2 TIMES DAILY
Qty: 10 TABLET | Refills: 0 | Status: SHIPPED | OUTPATIENT
Start: 2018-08-13 | End: 2018-08-18

## 2018-08-13 RX ORDER — OXYCODONE HYDROCHLORIDE 5 MG/1
20 TABLET ORAL EVERY 4 HOURS PRN
Status: DISCONTINUED | OUTPATIENT
Start: 2018-08-13 | End: 2018-08-13

## 2018-08-13 RX ORDER — IPRATROPIUM BROMIDE AND ALBUTEROL SULFATE 2.5; .5 MG/3ML; MG/3ML
3 SOLUTION RESPIRATORY (INHALATION) EVERY 4 HOURS PRN
Qty: 360 ML | Refills: 3 | Status: SHIPPED | OUTPATIENT
Start: 2018-08-13 | End: 2019-05-16 | Stop reason: ALTCHOICE

## 2018-08-13 RX ORDER — OXYCODONE HYDROCHLORIDE 5 MG/1
20 TABLET ORAL EVERY 6 HOURS PRN
Status: DISCONTINUED | OUTPATIENT
Start: 2018-08-13 | End: 2018-08-13 | Stop reason: HOSPADM

## 2018-08-13 RX ADMIN — GABAPENTIN 600 MG: 600 TABLET, FILM COATED ORAL at 13:03

## 2018-08-13 RX ADMIN — OXYCODONE HYDROCHLORIDE 20 MG: 5 TABLET ORAL at 11:07

## 2018-08-13 RX ADMIN — TOPIRAMATE 100 MG: 100 TABLET, FILM COATED ORAL at 02:32

## 2018-08-13 RX ADMIN — TOPIRAMATE 200 MG: 100 TABLET, FILM COATED ORAL at 08:03

## 2018-08-13 RX ADMIN — AMPICILLIN SODIUM AND SULBACTAM SODIUM 3 G: 2; 1 INJECTION, POWDER, FOR SOLUTION INTRAMUSCULAR; INTRAVENOUS at 05:00

## 2018-08-13 RX ADMIN — ENOXAPARIN SODIUM 140 MG: 150 INJECTION SUBCUTANEOUS at 08:03

## 2018-08-13 RX ADMIN — ALBUTEROL SULFATE 2 PUFF: 90 AEROSOL, METERED RESPIRATORY (INHALATION) at 08:43

## 2018-08-13 RX ADMIN — ATENOLOL 25 MG: 25 TABLET ORAL at 10:21

## 2018-08-13 RX ADMIN — AMPICILLIN SODIUM AND SULBACTAM SODIUM 3 G: 2; 1 INJECTION, POWDER, FOR SOLUTION INTRAMUSCULAR; INTRAVENOUS at 11:07

## 2018-08-13 RX ADMIN — CALCIUM GLUCONATE 2 G: 98 INJECTION, SOLUTION INTRAVENOUS at 08:03

## 2018-08-13 RX ADMIN — GABAPENTIN 600 MG: 600 TABLET, FILM COATED ORAL at 08:14

## 2018-08-13 RX ADMIN — PREDNISONE 10 MG: 10 TABLET ORAL at 09:53

## 2018-08-13 ASSESSMENT — PAIN SCALES - GENERAL
PAINLEVEL_OUTOF10: 6
PAINLEVEL_OUTOF10: 4
PAINLEVEL_OUTOF10: 0

## 2018-08-13 NOTE — CARE COORDINATION
Spoke with pt. He states he thinks he has regular Medicare but is not sure. He does not have card. States he has a wife but stays with his mom. He declines need for home care at this time and states his family will assist him at discharge.

## 2018-08-13 NOTE — PROGRESS NOTES
no masses or organomegaly  Neurological - alert, oriented, normal speech, no focal findings or movement disorder noted}  Extremities - peripheral pulses normal, no pedal edema, no clubbing or cyanosis  Skin - normal coloration and turgor, no rashes, no suspicious skin lesions noted      Any additional physical findings:    MEDICATIONS:    Scheduled Meds:   calcium gluconate IVPB  2 g Intravenous Once    atenolol  25 mg Oral QPM    gabapentin  600 mg Oral 4x Daily    predniSONE  10 mg Oral Daily    topiramate  100 mg Oral BID    warfarin  15 mg Oral Once    ampicillin-sulbactam  3 g Intravenous Q6H    sodium chloride flush  10 mL Intravenous 2 times per day    warfarin (COUMADIN) daily dosing (placeholder)   Other RX Placeholder    enoxaparin  140 mg Subcutaneous BID     Continuous Infusions:    PRN Meds:     albuterol sulfate HFA 2 puff As Directed RT PRN   sodium chloride flush 10 mL PRN   magnesium hydroxide 30 mL Daily PRN   ondansetron 4 mg Q6H PRN   sodium phosphate IVPB 20 mmol PRN   Or     sodium phosphate IVPB 40 mmol PRN   ipratropium-albuterol 1 ampule Q4H PRN   albuterol 2.5 mg Q6H PRN         VENT SETTINGS (Comprehensive) (if applicable):  Vent Information  Ventilator Started: Yes  Ventilator Stopped: Yes  Ventilation Day(s): 1  Vent Type: C2G5 [place]  Vent Mode: CPAP  Vt Ordered: 640 mL  Rate Set: 18 bmp  Pressure Support: 8 cmH20  FiO2 : 60 %  Sensitivity: 5  PEEP/CPAP: 5  I Time/ I Time %: 0.9 s  Additional Respiratory  Assessments  Pulse: 97  Resp: 18  SpO2: 100 %  End Tidal CO2: 34 (%)  Oral Care Completed?: Yes  Oral Care: Mouth moisturizer, Mouth suctioned, Suction toothette    ABGs:     Laboratory findings:    Complete Blood Count: Recent Labs      08/12/18   0354  08/12/18   0623  08/13/18   0441   WBC  14.7*  12.9*  13.4*   HGB  12.1*  12.8*  10.8*   HCT  42.0  44.1  37.9*   PLT  231  204  207        Last 3 Blood Glucose:   Recent Labs      08/12/18   0154  08/12/18   0354 08/13/18   0441   GLUCOSE  106*  78  140*        PT/INR:    Lab Results   Component Value Date    PROTIME 12.6 08/13/2018    INR 1.2 08/13/2018     PTT:    Lab Results   Component Value Date    APTT 31.3 08/12/2018       Comprehensive Metabolic Profile:   Recent Labs      08/12/18   0154  08/12/18   0354  08/13/18   0441   NA  144  142  142   K  3.9  4.5  3.6*   CL  112*  107  110*   CO2  17*  23  21   BUN  18  16  18   CREATININE  1.43*  1.48*  1.20   GLUCOSE  106*  78  140*   CALCIUM  8.5*  8.5*  8.0*   PROT  6.3*  7.0   --    LABALBU  3.6  3.7   --    BILITOT  0.23*  0.29*   --    ALKPHOS  91  86   --    AST  30  38   --    ALT  22  23   --       Magnesium:   Lab Results   Component Value Date    MG 1.8 08/13/2018     Phosphorus:   Lab Results   Component Value Date    PHOS 2.5 08/13/2018     Ionized Calcium:   Lab Results   Component Value Date    CAION 1.07 08/13/2018        Urinalysis:     Troponin: No results for input(s): TROPONINI in the last 72 hours. Microbiology:    Cultures during this admission:     Blood cultures:                 [] None drawn      [] Negative             []  Positive (Details:  )  Urine Culture:                   [] None drawn      [] Negative             []  Positive (Details:  )  Sputum Culture:               [] None drawn       [] Negative             []  Positive (Details:  )   Endotracheal aspirate:     [] None drawn       [] Negative             []  Positive (Details:  )     Other pertinent Labs:       Radiology/Imaging:     Chest Xray (8/13/2018):    ASSESSMENT:     · Active Problems:  ·   Unresponsive  ·   Drug overdose  · Resolved Problems:  ·   * No resolved hospital problems. *  ·   ·         PLAN:     WEAN PER PROTOCOL:  [] No   [] Yes  [x] N/A    DISCONTINUE ANY LABS:   [x] No   [] Yes    ICU PROPHYLAXIS:  Stress ulcer:  [] PPI Agent  [] [id number] [] Sucralfate  [] Other:  VTE:   [] Enoxaparin  [] Unfract.  Heparin Subcut  [] EPC Cuffs    NUTRITION:  [] NPO [] Tube Feeding (Specify: ) [] TPN  [] PO (Diet: DIET GENERAL;)    HOME MEDICATIONS RECONCILED: [] No  [x] Yes    INSULIN DRIP:   [x] No   [] Yes    CONSULTATION NEEDED:  [x] No   [] Yes    FAMILY UPDATED:    [] No   [x] Yes    TRANSFER OUT OF ICU:   [] No   [x] Yes    ADDITIONAL PLAN:    Patient eating and drinking appropriate  Discharge on PO augmentin for total of 7 days course  Patient on lovenox and coumadin bridging for APLA  Resumed home prednisone for APLA  Resumed gabapentin 600 mg 4x daily - taking for seizures per patient. Confirm dosage with pharmacy. Confirm roxycodone 20 mg 4 x daily with pharmacy. Patient having repetitive admissions with unresponsiveness and drug overdose. Checked for KIRA multiple times in the past and says has been negative per patient   Will discharge today to home. FU with PCP in office        Jeffery Marcial M.D.              Critical care resident,  Department of Internal Medicine/ Critical care  [facility]             8/13/2018, 9:20 AM

## 2018-08-13 NOTE — ED PROVIDER NOTES
[facility] ED     Emergency Department     Faculty Attestation        I performed a history and physical examination of the patient and discussed management with the resident. I reviewed the residents note and agree with the documented findings and plan of care. Any areas of disagreement are noted on the chart. I was personally present for the key portions of any procedures. I have documented in the chart those procedures where I was not present during the key portions. I have reviewed the emergency nurses triage note. I agree with the chief complaint, past medical history, past surgical history, allergies, medications, social and family history as documented unless otherwise noted below. For Physician Assistant/ Nurse Practitioner cases/documentation I have personally evaluated this patient and have completed at least one if not all key elements of the E/M (history, physical exam, and MDM). Additional findings are as noted. PCP:  Yolande Daley MD    Pertinent Comments:     Patient is a 41-year-old male transfer from [facility] emergency room status post being found unresponsive likely overdose given long history of drug use apparently. Patient received IO line upon arrival in [facility] and had regaining of consciousness however complained of shortness of breath as well as then having a seizure. Was very \"tight\" and required intubation with apparently history of asthma and there was vomiting involved with probable aspiration. He received Ativan but no other antiepileptic given. Here chest x-ray shows adequate placement and possible early infiltrate on the right consistent with aspiration. CT head done and no obvious acute but only preliminary read so far.     EKG with no obvious ischemia this time and appears unchanged from previous EKG prior to arrival.   Consulting critical care for admission      This patient was seen during an [address] [facility]
partners. REVIEW OF SYSTEMS    (2-9 systems for level 4, 10 or more for level 5)      Review of Systems   Unable to perform ROS: Intubated       PHYSICAL EXAM   (up to 7 for level 4, 8 or more for level 5)      Initial Vitals   ED Triage Vitals   BP Temp Temp Source Pulse Resp SpO2 Height Weight   08/12/18 0424 08/12/18 0600 08/12/18 0600 08/12/18 0424 08/12/18 0424 08/12/18 0424 08/12/18 0429 08/12/18 0554   125/81 97.9 °F (36.6 °C) Oral 79 18 100 % 6' 1\" (1.854 m) (!) 312 lb 13.3 oz (141.9 kg)       Physical Exam   Constitutional:   Obese male nondiaphoretic   HENT:   Head: Normocephalic and atraumatic. Right Ear: External ear normal.   Left Ear: External ear normal.   Mouth/Throat: Oropharynx is clear and moist.   Eyes:   Pupils are 3 mm equal and reactive bilaterally. Neck: Neck supple. No tracheal deviation present. Cardiovascular: Normal rate and regular rhythm. Exam reveals no gallop and no friction rub. No murmur heard. Pulmonary/Chest:   Intubated with bilateral breath sounds, intermittent wheezing, but no rhonchi or rales. Abdominal: Soft. He exhibits no distension. There is no tenderness. There is no rebound. Musculoskeletal: He exhibits no edema or deformity. Lymphadenopathy:     He has no cervical adenopathy. Neurological:   Patient is intubated. On propofol. However does have strong gag reflex and is moving all extremities within doing suctioning. Skin: Skin is warm and dry. Nursing note and vitals reviewed. DIFFERENTIAL DIAGNOSIS/IMPRESSION     DDX: drug overdose, intracranial bleed, electrolyte abnormality     Impression: 39 y.o. male who presents with evidence of unresponsiveness. Patient was subsequently intubated. Question will seizure-like activity and did receive Ativan. Patient's currently intubated on propofol. Patient has a IO in place. We'll need to get other axis.   Nursing working on IVs.  We'll get full lab workup, cardiac workup, CT head, and chest

## 2018-08-13 NOTE — DISCHARGE SUMMARY
Internal Medicine   Discharge Summary         Patient Identification:  Ailyn Monroy is a 39 y.o. male. :  1975  MRN: [id number]     Acct: [de-identified]   Admit Date:  2018  Discharge date and time: 18     Attending Provider: Elias Kamara DO                                     Reason for Admission: unresponsive     Discharge Diagnoses: Active Problems:    Unresponsive    Drug overdose  Resolved Problems:    * No resolved hospital problems. *        Consults:  Neurology     Procedures: None     Hospital Course:   39 y. o. male patient who has past history of asthma antiphospholipid antibody disorder, cerebral artery occlusion with infarction, hypertension, who was found down and was seen at [facility] ED. Patient regained consciousness while at [facility] ED but required intubation due to impending respiratory failure. Patient was sedated using propofol. Patient placed on Unasyn for suspected aspiration. Extubated on 18. Has repetitive admissions for unresponsiveness and overdose. Getting high doses of neurontin and oxycodone. Needs to follow OP with PCP for dosage adjustment.       Disposition:   Home     Discharged Condition:  Stable     Discharge Medications:       Dillon Rico   Home Medication Instructions ORK:[id number]    Printed on:18 114   Medication Information                      albuterol (PROVENTIL HFA;VENTOLIN HFA) 108 (90 BASE) MCG/ACT inhaler  Inhale 1-2 puffs into the lungs every 6 hours as needed. amoxicillin-clavulanate (AUGMENTIN) 875-125 MG per tablet  Take 1 tablet by mouth 2 times daily for 5 days             atenolol (TENORMIN) 25 MG tablet  Take 25 mg by mouth every evening             COLCRYS 0.6 MG tablet  Take 2 tablets by mouth as needed.              enoxaparin (LOVENOX) 150 MG/ML injection  Inject 0.93 mLs into the skin 2 times daily for 7 days             famotidine (PEPCID) 40 MG tablet  Take 40 mg by mouth daily as needed

## 2018-08-13 NOTE — PROGRESS NOTES
Neurology Resident Progress Note      SUBJECTIVE:  This is a 39 y.o.  male admitted 8/12/2018 for Unresponsive [R41.89]  We are c/s for seizure like event. No new seizures  Sleeping unless waken, but off intubation, alert and oriented to person place and time      HPI:  The patient is a 39 y.o. 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 We are c/s for seizure like event. Vague report of another  seizure at [facility] that can no be further qualified at this time. Went about 15 years seizure free, then 3 in the lat 8 months. Admiits noncompliance, and UDS positive amphetamines,methamphetamine, h/o cocaine use. At [facility], regained consciousness, developed respiratory insufficiency with chest tightness thought to be assoc. with his asthma and vomiting and required intubation. CXR revealed possible aspiration. Was put on Unasyn for aspiration pna coverage. WBC 12.9 (14.7). INR subtherapeutic 1.5, and he is being bridged with Lovenox. Of note, was on bactrim for 6 mos h/o left scrotal pain assoc with pain top of left first 3 toes. Has weakness associated with this foot pain, and started using a walker at home last couple months.     MRI Brain 2002 - small remote infarcts b/l thalami, R PICA distribution. White matter changes suggest watershed infarcts.   EEG 1999 at [address] [place] [address] - left frontal epileptogenic focus, no EEG seizures      calcium gluconate IVPB  2 g Intravenous Once    atenolol  25 mg Oral QPM    gabapentin  600 mg Oral 4x Daily    predniSONE  10 mg Oral Daily    topiramate  200 mg Oral BID    ampicillin-sulbactam  3 g Intravenous Q6H    sodium chloride flush  10 mL Intravenous 2 times per day    warfarin (COUMADIN) daily dosing (placeholder)   Other RX Placeholder    enoxaparin  140 mg assessment, plan and orders as documented by the resident.      [address] [place] [address], DO 8/13/2018 2:12 PM

## 2018-08-15 LAB
EKG ATRIAL RATE: 84 BPM
EKG P AXIS: 62 DEGREES
EKG P-R INTERVAL: 176 MS
EKG Q-T INTERVAL: 398 MS
EKG QRS DURATION: 104 MS
EKG QTC CALCULATION (BAZETT): 470 MS
EKG R AXIS: 23 DEGREES
EKG T AXIS: 46 DEGREES
EKG VENTRICULAR RATE: 84 BPM

## 2018-08-16 NOTE — PROGRESS NOTES
For purpose of clinical documentation clarification  - Patient had ELIE - treated with IVF  - patient was treated with antibiotics for aspiration pneumonia    Electronically signed by Muriel Simon MD on 8/16/2018 at 10:01 AM

## 2018-08-22 ENCOUNTER — HOSPITAL ENCOUNTER (OUTPATIENT)
Dept: PHARMACY | Age: 43
Setting detail: THERAPIES SERIES
Discharge: HOME | End: 2018-08-22
Payer: MEDICARE

## 2018-08-22 VITALS
SYSTOLIC BLOOD PRESSURE: 135 MMHG | DIASTOLIC BLOOD PRESSURE: 85 MMHG | WEIGHT: 315 LBS | HEART RATE: 72 BPM | BODY MASS INDEX: 41.93 KG/M2

## 2018-08-22 DIAGNOSIS — D68.61 ANTIPHOSPHOLIPID ANTIBODY SYNDROME (HCC): ICD-10-CM

## 2018-08-22 DIAGNOSIS — Z79.01 LONG TERM CURRENT USE OF ANTICOAGULANT THERAPY: ICD-10-CM

## 2018-08-22 LAB — INR BLD: 3.1

## 2018-08-22 PROCEDURE — 85610 PROTHROMBIN TIME: CPT

## 2018-08-22 PROCEDURE — 99211 OFF/OP EST MAY X REQ PHY/QHP: CPT

## 2018-08-22 RX ORDER — WARFARIN SODIUM 10 MG/1
TABLET ORAL
Qty: 30 TABLET | Refills: 3 | Status: SHIPPED
Start: 2018-08-22 | End: 2018-09-27 | Stop reason: DRUGHIGH

## 2018-08-22 RX ORDER — IBUPROFEN 200 MG
600 TABLET ORAL EVERY 8 HOURS PRN
COMMUNITY
End: 2018-12-08

## 2018-08-22 RX ORDER — WARFARIN SODIUM 7.5 MG/1
TABLET ORAL
Qty: 30 TABLET | Refills: 3 | Status: SHIPPED
Start: 2018-08-22 | End: 2018-09-27 | Stop reason: DRUGHIGH

## 2018-09-25 ENCOUNTER — TELEPHONE (OUTPATIENT)
Dept: PHARMACY | Age: 43
End: 2018-09-25

## 2018-09-27 ENCOUNTER — HOSPITAL ENCOUNTER (OUTPATIENT)
Dept: PHARMACY | Age: 43
Setting detail: THERAPIES SERIES
Discharge: HOME | End: 2018-09-27
Payer: MEDICARE

## 2018-09-27 VITALS — SYSTOLIC BLOOD PRESSURE: 158 MMHG | DIASTOLIC BLOOD PRESSURE: 99 MMHG | HEART RATE: 112 BPM

## 2018-09-27 DIAGNOSIS — D68.61 ANTIPHOSPHOLIPID ANTIBODY SYNDROME (HCC): ICD-10-CM

## 2018-09-27 DIAGNOSIS — Z79.01 LONG TERM CURRENT USE OF ANTICOAGULANT THERAPY: ICD-10-CM

## 2018-09-27 LAB — INR BLD: 5.7

## 2018-09-27 PROCEDURE — 85610 PROTHROMBIN TIME: CPT

## 2018-09-27 PROCEDURE — 99212 OFFICE O/P EST SF 10 MIN: CPT

## 2018-09-27 RX ORDER — WARFARIN SODIUM 10 MG/1
TABLET ORAL
Qty: 30 TABLET | Refills: 3 | Status: SHIPPED
Start: 2018-09-27 | End: 2018-10-29 | Stop reason: DRUGHIGH

## 2018-09-27 RX ORDER — WARFARIN SODIUM 7.5 MG/1
TABLET ORAL
Qty: 30 TABLET | Refills: 3 | Status: SHIPPED
Start: 2018-09-27 | End: 2018-10-29 | Stop reason: DRUGHIGH

## 2018-09-27 NOTE — PATIENT INSTRUCTIONS
HOLD today and take 1/2 tablet tomorrow, then continue current dose of warfarin as instructed on dosing calendar provided. Continue to monitor urine and stool for signs and symptoms of bleeding. Please notify the clinic of any medication changes. Please remember to bring all medications (both prescription and OTC) to your next visit. Kindly notify the clinic if you are unable to make to your next appointment.

## 2018-10-29 ENCOUNTER — HOSPITAL ENCOUNTER (OUTPATIENT)
Dept: PHARMACY | Age: 43
Setting detail: THERAPIES SERIES
Discharge: HOME | End: 2018-10-29
Payer: MEDICARE

## 2018-10-29 VITALS
HEART RATE: 98 BPM | BODY MASS INDEX: 42.9 KG/M2 | DIASTOLIC BLOOD PRESSURE: 100 MMHG | SYSTOLIC BLOOD PRESSURE: 149 MMHG | WEIGHT: 315 LBS

## 2018-10-29 DIAGNOSIS — D68.61 ANTIPHOSPHOLIPID ANTIBODY SYNDROME (HCC): ICD-10-CM

## 2018-10-29 DIAGNOSIS — Z79.01 LONG TERM CURRENT USE OF ANTICOAGULANT THERAPY: ICD-10-CM

## 2018-10-29 LAB — INR BLD: 1.5

## 2018-10-29 PROCEDURE — 85610 PROTHROMBIN TIME: CPT

## 2018-10-29 PROCEDURE — 99212 OFFICE O/P EST SF 10 MIN: CPT

## 2018-10-29 RX ORDER — WARFARIN SODIUM 7.5 MG/1
TABLET ORAL
Qty: 30 TABLET | Refills: 3 | Status: SHIPPED
Start: 2018-10-29 | End: 2019-01-11 | Stop reason: SDUPTHER

## 2018-10-29 RX ORDER — WARFARIN SODIUM 10 MG/1
TABLET ORAL
Qty: 30 TABLET | Refills: 3 | Status: SHIPPED
Start: 2018-10-29 | End: 2019-04-26

## 2018-11-03 ENCOUNTER — HOSPITAL ENCOUNTER (OUTPATIENT)
Age: 43
Discharge: HOME | End: 2018-11-05
Payer: MEDICARE

## 2018-11-03 ENCOUNTER — HOSPITAL ENCOUNTER (OUTPATIENT)
Dept: GENERAL RADIOLOGY | Age: 43
Discharge: HOME | End: 2018-11-05
Payer: MEDICARE

## 2018-11-03 DIAGNOSIS — G89.29 CHRONIC RIGHT-SIDED LOW BACK PAIN WITH RIGHT-SIDED SCIATICA: ICD-10-CM

## 2018-11-03 DIAGNOSIS — M54.41 CHRONIC RIGHT-SIDED LOW BACK PAIN WITH RIGHT-SIDED SCIATICA: ICD-10-CM

## 2018-11-03 PROCEDURE — 73502 X-RAY EXAM HIP UNI 2-3 VIEWS: CPT

## 2018-11-03 PROCEDURE — 72110 X-RAY EXAM L-2 SPINE 4/>VWS: CPT

## 2018-12-08 ENCOUNTER — APPOINTMENT (OUTPATIENT)
Dept: CT IMAGING | Age: 43
End: 2018-12-08
Payer: MEDICARE

## 2018-12-08 ENCOUNTER — HOSPITAL ENCOUNTER (EMERGENCY)
Age: 43
Discharge: HOME | End: 2018-12-08
Attending: FAMILY MEDICINE
Payer: MEDICARE

## 2018-12-08 VITALS
HEART RATE: 95 BPM | TEMPERATURE: 98.6 F | OXYGEN SATURATION: 99 % | RESPIRATION RATE: 18 BRPM | SYSTOLIC BLOOD PRESSURE: 133 MMHG | WEIGHT: 315 LBS | DIASTOLIC BLOOD PRESSURE: 79 MMHG | BODY MASS INDEX: 42.22 KG/M2

## 2018-12-08 DIAGNOSIS — L03.115 CELLULITIS OF RIGHT LOWER LEG: ICD-10-CM

## 2018-12-08 DIAGNOSIS — M54.41 ACUTE LEFT-SIDED LOW BACK PAIN WITH RIGHT-SIDED SCIATICA: ICD-10-CM

## 2018-12-08 DIAGNOSIS — M79.604 RIGHT LEG PAIN: Primary | ICD-10-CM

## 2018-12-08 LAB
ABSOLUTE EOS #: 0.3 K/UL (ref 0–0.4)
ABSOLUTE IMMATURE GRANULOCYTE: ABNORMAL K/UL (ref 0–0.3)
ABSOLUTE LYMPH #: 0.8 K/UL (ref 1–4.8)
ABSOLUTE MONO #: 0.7 K/UL (ref 0–1)
ALBUMIN SERPL-MCNC: 3.8 G/DL (ref 3.5–5.2)
ALBUMIN/GLOBULIN RATIO: ABNORMAL (ref 1–2.5)
ALP BLD-CCNC: 71 U/L (ref 40–129)
ALT SERPL-CCNC: 17 U/L (ref 5–41)
AMYLASE: 82 U/L (ref 28–100)
ANION GAP SERPL CALCULATED.3IONS-SCNC: 11 MMOL/L (ref 9–17)
AST SERPL-CCNC: 31 U/L
BASOPHILS # BLD: 0 % (ref 0–2)
BASOPHILS ABSOLUTE: 0 K/UL (ref 0–0.2)
BILIRUB SERPL-MCNC: 0.35 MG/DL (ref 0.3–1.2)
BUN BLDV-MCNC: 30 MG/DL (ref 6–20)
BUN/CREAT BLD: 19 (ref 9–20)
CALCIUM SERPL-MCNC: 9 MG/DL (ref 8.6–10.4)
CHLORIDE BLD-SCNC: 107 MMOL/L (ref 98–107)
CO2: 20 MMOL/L (ref 20–31)
CREAT SERPL-MCNC: 1.57 MG/DL (ref 0.7–1.2)
D-DIMER QUANTITATIVE: 0.92 MG/L FEU (ref 0–0.5)
DIFFERENTIAL TYPE: YES
EOSINOPHILS RELATIVE PERCENT: 4 % (ref 0–5)
GFR AFRICAN AMERICAN: 58 ML/MIN
GFR NON-AFRICAN AMERICAN: 48 ML/MIN
GFR SERPL CREATININE-BSD FRML MDRD: ABNORMAL ML/MIN/{1.73_M2}
GFR SERPL CREATININE-BSD FRML MDRD: ABNORMAL ML/MIN/{1.73_M2}
GLUCOSE BLD-MCNC: 95 MG/DL (ref 70–99)
HCT VFR BLD CALC: 35.1 % (ref 41–53)
HEMOGLOBIN: 10.7 G/DL (ref 13.5–17.5)
IMMATURE GRANULOCYTES: ABNORMAL %
INR BLD: 1.4
LIPASE: 30 U/L (ref 13–60)
LYMPHOCYTES # BLD: 11 % (ref 13–44)
MCH RBC QN AUTO: 22.8 PG (ref 26–34)
MCHC RBC AUTO-ENTMCNC: 30.6 G/DL (ref 31–37)
MCV RBC AUTO: 74.6 FL (ref 80–100)
MONOCYTES # BLD: 9 % (ref 5–9)
NRBC AUTOMATED: ABNORMAL PER 100 WBC
PARTIAL THROMBOPLASTIN TIME: 43.1 SEC (ref 21–33)
PDW BLD-RTO: 17.5 % (ref 12.1–15.2)
PLATELET # BLD: 162 K/UL (ref 140–450)
PLATELET ESTIMATE: ABNORMAL
PMV BLD AUTO: ABNORMAL FL (ref 6–12)
POTASSIUM SERPL-SCNC: 4.1 MMOL/L (ref 3.7–5.3)
PROTHROMBIN TIME: 13.1 SEC (ref 9–11.6)
RBC # BLD: 4.7 M/UL (ref 4.5–5.9)
RBC # BLD: ABNORMAL 10*6/UL
SEG NEUTROPHILS: 76 % (ref 39–75)
SEGMENTED NEUTROPHILS ABSOLUTE COUNT: 6.1 K/UL (ref 2.1–6.5)
SODIUM BLD-SCNC: 138 MMOL/L (ref 135–144)
TOTAL PROTEIN: 6.8 G/DL (ref 6.4–8.3)
WBC # BLD: 7.9 K/UL (ref 3.5–11)
WBC # BLD: ABNORMAL 10*3/UL

## 2018-12-08 PROCEDURE — 82150 ASSAY OF AMYLASE: CPT

## 2018-12-08 PROCEDURE — 83690 ASSAY OF LIPASE: CPT

## 2018-12-08 PROCEDURE — 80053 COMPREHEN METABOLIC PANEL: CPT

## 2018-12-08 PROCEDURE — 85379 FIBRIN DEGRADATION QUANT: CPT

## 2018-12-08 PROCEDURE — 6360000002 HC RX W HCPCS: Performed by: FAMILY MEDICINE

## 2018-12-08 PROCEDURE — 99284 EMERGENCY DEPT VISIT MOD MDM: CPT

## 2018-12-08 PROCEDURE — 36415 COLL VENOUS BLD VENIPUNCTURE: CPT

## 2018-12-08 PROCEDURE — 96372 THER/PROPH/DIAG INJ SC/IM: CPT

## 2018-12-08 PROCEDURE — 85610 PROTHROMBIN TIME: CPT

## 2018-12-08 PROCEDURE — 72131 CT LUMBAR SPINE W/O DYE: CPT

## 2018-12-08 PROCEDURE — 85025 COMPLETE CBC W/AUTO DIFF WBC: CPT

## 2018-12-08 PROCEDURE — 85730 THROMBOPLASTIN TIME PARTIAL: CPT

## 2018-12-08 PROCEDURE — 74176 CT ABD & PELVIS W/O CONTRAST: CPT

## 2018-12-08 RX ORDER — ORPHENADRINE CITRATE 30 MG/ML
60 INJECTION INTRAMUSCULAR; INTRAVENOUS ONCE
Status: COMPLETED | OUTPATIENT
Start: 2018-12-08 | End: 2018-12-08

## 2018-12-08 RX ORDER — KETOROLAC TROMETHAMINE 30 MG/ML
30 INJECTION, SOLUTION INTRAMUSCULAR; INTRAVENOUS ONCE
Status: COMPLETED | OUTPATIENT
Start: 2018-12-08 | End: 2018-12-08

## 2018-12-08 RX ORDER — ORPHENADRINE CITRATE 100 MG/1
100 TABLET, EXTENDED RELEASE ORAL 2 TIMES DAILY
Qty: 20 TABLET | Refills: 0 | Status: SHIPPED | OUTPATIENT
Start: 2018-12-08 | End: 2018-12-18

## 2018-12-08 RX ORDER — CEPHALEXIN 500 MG/1
500 CAPSULE ORAL 2 TIMES DAILY
Qty: 20 CAPSULE | Refills: 0 | Status: SHIPPED | OUTPATIENT
Start: 2018-12-08 | End: 2019-02-08 | Stop reason: ALTCHOICE

## 2018-12-08 RX ADMIN — ORPHENADRINE CITRATE 60 MG: 30 INJECTION INTRAMUSCULAR; INTRAVENOUS at 11:56

## 2018-12-08 RX ADMIN — KETOROLAC TROMETHAMINE 30 MG: 30 INJECTION INTRAMUSCULAR; INTRAVENOUS at 11:57

## 2018-12-08 RX ADMIN — ENOXAPARIN SODIUM 150 MG: 150 INJECTION SUBCUTANEOUS at 15:15

## 2018-12-08 ASSESSMENT — PAIN DESCRIPTION - PROGRESSION: CLINICAL_PROGRESSION: GRADUALLY WORSENING

## 2018-12-08 ASSESSMENT — PAIN DESCRIPTION - PAIN TYPE
TYPE: CHRONIC PAIN
TYPE: CHRONIC PAIN

## 2018-12-08 ASSESSMENT — PAIN SCALES - GENERAL
PAINLEVEL_OUTOF10: 10
PAINLEVEL_OUTOF10: 10
PAINLEVEL_OUTOF10: 2

## 2018-12-08 ASSESSMENT — PAIN DESCRIPTION - ONSET: ONSET: ON-GOING

## 2018-12-08 ASSESSMENT — PAIN DESCRIPTION - FREQUENCY: FREQUENCY: CONTINUOUS

## 2018-12-08 ASSESSMENT — PAIN DESCRIPTION - ORIENTATION: ORIENTATION: RIGHT

## 2018-12-08 ASSESSMENT — PAIN DESCRIPTION - LOCATION: LOCATION: LEG

## 2018-12-08 ASSESSMENT — PAIN DESCRIPTION - DESCRIPTORS: DESCRIPTORS: CONSTANT

## 2018-12-08 NOTE — ED PROVIDER NOTES
ipratropium-albuterol (DUONEB) 0.5-2.5 (3) MG/3ML SOLN nebulizer solution Inhale 3 mLs into the lungs every 4 hours as needed for Shortness of Breath 360 mL 3    topiramate (TOPAMAX) 100 MG tablet Take 1 tablet by mouth 2 times daily 60 tablet 3    famotidine (PEPCID) 40 MG tablet Take 40 mg by mouth daily as needed      ferrous sulfate 325 (65 Fe) MG tablet Take 325 mg by mouth daily      atenolol (TENORMIN) 25 MG tablet Take 25 mg by mouth daily       gabapentin (NEURONTIN) 600 MG tablet Take 600 mg by mouth 4 times daily. Kristine Butts oxyCODONE (ROXICODONE) 20 MG immediate release tablet Take 20 mg by mouth 4 times daily.  COLCRYS 0.6 MG tablet Take 2 tablets by mouth as needed. 0    predniSONE (DELTASONE) 10 MG tablet Take 10 mg by mouth daily.  albuterol (PROVENTIL HFA;VENTOLIN HFA) 108 (90 BASE) MCG/ACT inhaler Inhale 1-2 puffs into the lungs every 6 hours as needed.            ALLERGIES    No Known Allergies    FAMILY HISTORY    Family History   Problem Relation Age of Onset    Heart Disease Maternal Grandmother     Heart Disease Maternal Grandfather     Stroke Maternal Grandfather        SOCIAL HISTORY    Social History     Social History    Marital status:      Spouse name: N/A    Number of children: N/A    Years of education: N/A     Social History Main Topics    Smoking status: Never Smoker    Smokeless tobacco: Current User     Types: Chew    Alcohol use 1.2 oz/week     2 Cans of beer per week      Comment: occasional    Drug use: No    Sexual activity: Not Currently     Partners: Female     Other Topics Concern    None     Social History Narrative    None       PHYSICAL EXAM    VITAL SIGNS: /79   Pulse 95   Temp 98.6 °F (37 °C) (Temporal)   Resp 18   Wt (!) 320 lb (145.2 kg)   SpO2 99%   BMI 42.22 kg/m²    Constitutional:  Well developed, well nourished, moderate acute distress, non-toxic appearance HENT:  Atraumatic, external ears normal, nose normal, lower leg    3.  Acute left-sided low back pain with right-sided sciatica               (Please note that portions of this note were completed with a voice recognition program.  Efforts were made to edit the dictations but occasionally words are mis-transcribed.)       Mike Wright MD  12/08/18 7046

## 2018-12-09 ENCOUNTER — HOSPITAL ENCOUNTER (OUTPATIENT)
Dept: VASCULAR LAB | Age: 43
Discharge: HOME | End: 2018-12-11
Payer: MEDICARE

## 2018-12-09 ENCOUNTER — HOSPITAL ENCOUNTER (OUTPATIENT)
Age: 43
Discharge: HOME | End: 2018-12-11
Payer: MEDICARE

## 2018-12-09 DIAGNOSIS — M79.604 RIGHT LEG PAIN: ICD-10-CM

## 2018-12-09 PROCEDURE — 93971 EXTREMITY STUDY: CPT

## 2018-12-10 ENCOUNTER — TELEPHONE (OUTPATIENT)
Dept: PHARMACY | Age: 43
End: 2018-12-10

## 2018-12-11 ENCOUNTER — HOSPITAL ENCOUNTER (OUTPATIENT)
Dept: PHARMACY | Age: 43
Setting detail: THERAPIES SERIES
Discharge: HOME | End: 2018-12-11
Payer: MEDICARE

## 2018-12-11 VITALS — BODY MASS INDEX: 41.59 KG/M2 | DIASTOLIC BLOOD PRESSURE: 73 MMHG | SYSTOLIC BLOOD PRESSURE: 123 MMHG | WEIGHT: 315 LBS

## 2018-12-11 DIAGNOSIS — D68.61 ANTIPHOSPHOLIPID ANTIBODY SYNDROME (HCC): ICD-10-CM

## 2018-12-11 DIAGNOSIS — Z79.01 LONG TERM CURRENT USE OF ANTICOAGULANT THERAPY: ICD-10-CM

## 2018-12-11 LAB — INR BLD: 2.8

## 2018-12-11 PROCEDURE — 99211 OFF/OP EST MAY X REQ PHY/QHP: CPT

## 2018-12-11 PROCEDURE — 85610 PROTHROMBIN TIME: CPT

## 2018-12-13 ENCOUNTER — HOSPITAL ENCOUNTER (OUTPATIENT)
Dept: MRI IMAGING | Age: 43
Discharge: HOME | End: 2018-12-15
Payer: MEDICARE

## 2018-12-13 DIAGNOSIS — M54.16 LUMBAR RADICULOPATHY: ICD-10-CM

## 2018-12-13 PROCEDURE — 72148 MRI LUMBAR SPINE W/O DYE: CPT

## 2019-01-11 RX ORDER — WARFARIN SODIUM 7.5 MG/1
TABLET ORAL
Qty: 30 TABLET | Refills: 0 | OUTPATIENT
Start: 2019-01-11 | End: 2019-03-08

## 2019-02-08 ENCOUNTER — HOSPITAL ENCOUNTER (OUTPATIENT)
Dept: PHARMACY | Age: 44
Setting detail: THERAPIES SERIES
Discharge: HOME | End: 2019-02-08
Payer: MEDICARE

## 2019-02-08 VITALS
WEIGHT: 313.6 LBS | SYSTOLIC BLOOD PRESSURE: 133 MMHG | HEART RATE: 93 BPM | DIASTOLIC BLOOD PRESSURE: 90 MMHG | BODY MASS INDEX: 41.37 KG/M2

## 2019-02-08 DIAGNOSIS — D68.61 ANTIPHOSPHOLIPID ANTIBODY SYNDROME (HCC): ICD-10-CM

## 2019-02-08 DIAGNOSIS — Z79.01 LONG TERM CURRENT USE OF ANTICOAGULANT THERAPY: ICD-10-CM

## 2019-02-08 LAB — INR BLD: 2

## 2019-02-08 PROCEDURE — 85610 PROTHROMBIN TIME: CPT

## 2019-02-08 PROCEDURE — 99211 OFF/OP EST MAY X REQ PHY/QHP: CPT

## 2019-02-24 ENCOUNTER — APPOINTMENT (OUTPATIENT)
Dept: GENERAL RADIOLOGY | Age: 44
End: 2019-02-24
Payer: MEDICARE

## 2019-02-24 ENCOUNTER — HOSPITAL ENCOUNTER (EMERGENCY)
Age: 44
Discharge: HOME | End: 2019-02-24
Attending: EMERGENCY MEDICINE
Payer: MEDICARE

## 2019-02-24 VITALS
WEIGHT: 310.6 LBS | OXYGEN SATURATION: 98 % | TEMPERATURE: 98.6 F | BODY MASS INDEX: 40.98 KG/M2 | RESPIRATION RATE: 18 BRPM | SYSTOLIC BLOOD PRESSURE: 134 MMHG | HEART RATE: 78 BPM | DIASTOLIC BLOOD PRESSURE: 65 MMHG

## 2019-02-24 DIAGNOSIS — R07.89 CHEST WALL PAIN: Primary | ICD-10-CM

## 2019-02-24 LAB
ABSOLUTE EOS #: 0.5 K/UL (ref 0–0.4)
ABSOLUTE IMMATURE GRANULOCYTE: ABNORMAL K/UL (ref 0–0.3)
ABSOLUTE LYMPH #: 1 K/UL (ref 1–4.8)
ABSOLUTE MONO #: 0.7 K/UL (ref 0–1)
ANION GAP SERPL CALCULATED.3IONS-SCNC: 13 MMOL/L (ref 9–17)
BASOPHILS # BLD: 0 % (ref 0–2)
BASOPHILS ABSOLUTE: 0 K/UL (ref 0–0.2)
BUN BLDV-MCNC: 27 MG/DL (ref 6–20)
BUN/CREAT BLD: 16 (ref 9–20)
CALCIUM SERPL-MCNC: 9.2 MG/DL (ref 8.6–10.4)
CHLORIDE BLD-SCNC: 102 MMOL/L (ref 98–107)
CO2: 23 MMOL/L (ref 20–31)
CREAT SERPL-MCNC: 1.67 MG/DL (ref 0.7–1.2)
D-DIMER QUANTITATIVE: 0.44 MG/L FEU (ref 0–0.5)
DIFFERENTIAL TYPE: ABNORMAL
EOSINOPHILS RELATIVE PERCENT: 4 % (ref 0–5)
GFR AFRICAN AMERICAN: 54 ML/MIN
GFR NON-AFRICAN AMERICAN: 45 ML/MIN
GFR SERPL CREATININE-BSD FRML MDRD: ABNORMAL ML/MIN/{1.73_M2}
GFR SERPL CREATININE-BSD FRML MDRD: ABNORMAL ML/MIN/{1.73_M2}
GLUCOSE BLD-MCNC: 137 MG/DL (ref 70–99)
HCT VFR BLD CALC: 33.4 % (ref 41–53)
HEMOGLOBIN: 10.1 G/DL (ref 13.5–17.5)
IMMATURE GRANULOCYTES: ABNORMAL %
INR BLD: 1.8
LYMPHOCYTES # BLD: 10 % (ref 13–44)
MCH RBC QN AUTO: 21.8 PG (ref 26–34)
MCHC RBC AUTO-ENTMCNC: 30.4 G/DL (ref 31–37)
MCV RBC AUTO: 71.8 FL (ref 80–100)
MONOCYTES # BLD: 7 % (ref 5–9)
MORPHOLOGY: ABNORMAL
MORPHOLOGY: ABNORMAL
NRBC AUTOMATED: ABNORMAL PER 100 WBC
PDW BLD-RTO: 17.2 % (ref 12.1–15.2)
PLATELET # BLD: 151 K/UL (ref 140–450)
PLATELET ESTIMATE: ABNORMAL
PMV BLD AUTO: ABNORMAL FL (ref 6–12)
POTASSIUM SERPL-SCNC: 4.1 MMOL/L (ref 3.7–5.3)
PROTHROMBIN TIME: 16.7 SEC (ref 9–11.6)
RBC # BLD: 4.65 M/UL (ref 4.5–5.9)
RBC # BLD: ABNORMAL 10*6/UL
SEG NEUTROPHILS: 79 % (ref 39–75)
SEGMENTED NEUTROPHILS ABSOLUTE COUNT: 8.3 K/UL (ref 2.1–6.5)
SODIUM BLD-SCNC: 138 MMOL/L (ref 135–144)
TROPONIN INTERP: NORMAL
TROPONIN T: <0.03 NG/ML
TROPONIN, HIGH SENSITIVITY: NORMAL NG/L (ref 0–22)
WBC # BLD: 10.4 K/UL (ref 3.5–11)
WBC # BLD: ABNORMAL 10*3/UL

## 2019-02-24 PROCEDURE — 93005 ELECTROCARDIOGRAM TRACING: CPT

## 2019-02-24 PROCEDURE — 84484 ASSAY OF TROPONIN QUANT: CPT

## 2019-02-24 PROCEDURE — 6360000002 HC RX W HCPCS: Performed by: EMERGENCY MEDICINE

## 2019-02-24 PROCEDURE — 85610 PROTHROMBIN TIME: CPT

## 2019-02-24 PROCEDURE — 71046 X-RAY EXAM CHEST 2 VIEWS: CPT

## 2019-02-24 PROCEDURE — 96374 THER/PROPH/DIAG INJ IV PUSH: CPT

## 2019-02-24 PROCEDURE — 85379 FIBRIN DEGRADATION QUANT: CPT

## 2019-02-24 PROCEDURE — 80048 BASIC METABOLIC PNL TOTAL CA: CPT

## 2019-02-24 PROCEDURE — 85025 COMPLETE CBC W/AUTO DIFF WBC: CPT

## 2019-02-24 PROCEDURE — 2580000003 HC RX 258: Performed by: EMERGENCY MEDICINE

## 2019-02-24 PROCEDURE — 99285 EMERGENCY DEPT VISIT HI MDM: CPT

## 2019-02-24 RX ORDER — KETOROLAC TROMETHAMINE 30 MG/ML
15 INJECTION, SOLUTION INTRAMUSCULAR; INTRAVENOUS ONCE
Status: COMPLETED | OUTPATIENT
Start: 2019-02-24 | End: 2019-02-24

## 2019-02-24 RX ORDER — 0.9 % SODIUM CHLORIDE 0.9 %
500 INTRAVENOUS SOLUTION INTRAVENOUS ONCE
Status: COMPLETED | OUTPATIENT
Start: 2019-02-24 | End: 2019-02-24

## 2019-02-24 RX ADMIN — KETOROLAC TROMETHAMINE 15 MG: 30 INJECTION, SOLUTION INTRAMUSCULAR at 15:09

## 2019-02-24 RX ADMIN — SODIUM CHLORIDE 500 ML: 9 INJECTION, SOLUTION INTRAVENOUS at 15:08

## 2019-02-24 ASSESSMENT — PAIN DESCRIPTION - PAIN TYPE: TYPE: ACUTE PAIN

## 2019-02-24 ASSESSMENT — ENCOUNTER SYMPTOMS
VOMITING: 0
COUGH: 0
COLOR CHANGE: 0
NAUSEA: 0
ABDOMINAL PAIN: 0
SHORTNESS OF BREATH: 0
EYE PAIN: 0
SORE THROAT: 0
TROUBLE SWALLOWING: 0
DIARRHEA: 0
BACK PAIN: 0
EYE REDNESS: 0

## 2019-02-24 ASSESSMENT — PAIN SCALES - GENERAL
PAINLEVEL_OUTOF10: 3
PAINLEVEL_OUTOF10: 8
PAINLEVEL_OUTOF10: 8

## 2019-02-24 ASSESSMENT — PAIN DESCRIPTION - LOCATION: LOCATION: CHEST;BACK

## 2019-02-24 ASSESSMENT — PAIN DESCRIPTION - ORIENTATION: ORIENTATION: LEFT;UPPER

## 2019-02-24 ASSESSMENT — PAIN DESCRIPTION - DESCRIPTORS: DESCRIPTORS: SHARP

## 2019-02-24 ASSESSMENT — PAIN DESCRIPTION - FREQUENCY: FREQUENCY: INTERMITTENT

## 2019-02-25 LAB
EKG ATRIAL RATE: 72 BPM
EKG ATRIAL RATE: 87 BPM
EKG P AXIS: 31 DEGREES
EKG P AXIS: 37 DEGREES
EKG P-R INTERVAL: 164 MS
EKG P-R INTERVAL: 168 MS
EKG Q-T INTERVAL: 376 MS
EKG Q-T INTERVAL: 400 MS
EKG QRS DURATION: 90 MS
EKG QRS DURATION: 94 MS
EKG QTC CALCULATION (BAZETT): 438 MS
EKG QTC CALCULATION (BAZETT): 452 MS
EKG R AXIS: 19 DEGREES
EKG R AXIS: 8 DEGREES
EKG T AXIS: 42 DEGREES
EKG T AXIS: 51 DEGREES
EKG VENTRICULAR RATE: 72 BPM
EKG VENTRICULAR RATE: 87 BPM

## 2019-03-08 ENCOUNTER — HOSPITAL ENCOUNTER (OUTPATIENT)
Dept: PHARMACY | Age: 44
Setting detail: THERAPIES SERIES
Discharge: HOME | End: 2019-03-08
Payer: MEDICARE

## 2019-03-08 VITALS
SYSTOLIC BLOOD PRESSURE: 169 MMHG | BODY MASS INDEX: 42.22 KG/M2 | WEIGHT: 315 LBS | HEART RATE: 96 BPM | DIASTOLIC BLOOD PRESSURE: 97 MMHG

## 2019-03-08 DIAGNOSIS — D68.61 ANTIPHOSPHOLIPID ANTIBODY SYNDROME (HCC): ICD-10-CM

## 2019-03-08 DIAGNOSIS — Z79.01 LONG TERM CURRENT USE OF ANTICOAGULANT THERAPY: ICD-10-CM

## 2019-03-08 LAB — INR BLD: 3.8

## 2019-03-08 PROCEDURE — 85610 PROTHROMBIN TIME: CPT

## 2019-03-08 PROCEDURE — 99211 OFF/OP EST MAY X REQ PHY/QHP: CPT

## 2019-03-08 RX ORDER — WARFARIN SODIUM 7.5 MG/1
TABLET ORAL
Qty: 30 TABLET | Refills: 2 | OUTPATIENT
Start: 2019-03-08 | End: 2019-05-16 | Stop reason: DRUGHIGH

## 2019-04-26 ENCOUNTER — TELEPHONE (OUTPATIENT)
Dept: PHARMACY | Age: 44
End: 2019-04-26

## 2019-04-26 RX ORDER — WARFARIN SODIUM 10 MG/1
TABLET ORAL
Qty: 30 TABLET | Refills: 2 | OUTPATIENT
Start: 2019-04-26 | End: 2019-05-16 | Stop reason: DRUGHIGH

## 2019-05-16 ENCOUNTER — HOSPITAL ENCOUNTER (OUTPATIENT)
Dept: PHARMACY | Age: 44
Setting detail: THERAPIES SERIES
Discharge: HOME | End: 2019-05-16
Payer: MEDICARE

## 2019-05-16 VITALS
HEART RATE: 94 BPM | DIASTOLIC BLOOD PRESSURE: 112 MMHG | WEIGHT: 309.2 LBS | BODY MASS INDEX: 40.79 KG/M2 | SYSTOLIC BLOOD PRESSURE: 159 MMHG

## 2019-05-16 DIAGNOSIS — Z79.01 LONG TERM CURRENT USE OF ANTICOAGULANT THERAPY: ICD-10-CM

## 2019-05-16 DIAGNOSIS — D68.61 ANTIPHOSPHOLIPID ANTIBODY SYNDROME (HCC): ICD-10-CM

## 2019-05-16 LAB — INR BLD: 2.1

## 2019-05-16 PROCEDURE — 85610 PROTHROMBIN TIME: CPT

## 2019-05-16 PROCEDURE — 99212 OFFICE O/P EST SF 10 MIN: CPT

## 2019-05-16 RX ORDER — WARFARIN SODIUM 10 MG/1
TABLET ORAL
Qty: 30 TABLET | Refills: 2 | Status: ON HOLD
Start: 2019-05-16 | End: 2019-06-21 | Stop reason: SDUPTHER

## 2019-05-16 RX ORDER — WARFARIN SODIUM 7.5 MG/1
TABLET ORAL
Qty: 30 TABLET | Refills: 2 | Status: ON HOLD
Start: 2019-05-16 | End: 2019-06-21 | Stop reason: HOSPADM

## 2019-06-11 ENCOUNTER — APPOINTMENT (OUTPATIENT)
Dept: ULTRASOUND IMAGING | Age: 44
DRG: 557 | End: 2019-06-11
Attending: INTERNAL MEDICINE
Payer: MEDICARE

## 2019-06-11 ENCOUNTER — APPOINTMENT (OUTPATIENT)
Dept: CT IMAGING | Age: 44
End: 2019-06-11
Payer: MEDICARE

## 2019-06-11 ENCOUNTER — HOSPITAL ENCOUNTER (INPATIENT)
Age: 44
LOS: 10 days | Discharge: HOME | DRG: 557 | End: 2019-06-21
Attending: INTERNAL MEDICINE | Admitting: INTERNAL MEDICINE
Payer: MEDICARE

## 2019-06-11 ENCOUNTER — APPOINTMENT (OUTPATIENT)
Dept: GENERAL RADIOLOGY | Age: 44
DRG: 557 | End: 2019-06-11
Attending: INTERNAL MEDICINE
Payer: MEDICARE

## 2019-06-11 ENCOUNTER — HOSPITAL ENCOUNTER (EMERGENCY)
Age: 44
Discharge: ANOTHER HOSPITAL | End: 2019-06-11
Attending: EMERGENCY MEDICINE
Payer: MEDICARE

## 2019-06-11 ENCOUNTER — APPOINTMENT (OUTPATIENT)
Dept: GENERAL RADIOLOGY | Age: 44
End: 2019-06-11
Payer: MEDICARE

## 2019-06-11 VITALS
WEIGHT: 302 LBS | TEMPERATURE: 98.2 F | DIASTOLIC BLOOD PRESSURE: 48 MMHG | HEART RATE: 87 BPM | OXYGEN SATURATION: 98 % | RESPIRATION RATE: 11 BRPM | SYSTOLIC BLOOD PRESSURE: 117 MMHG | HEIGHT: 72 IN | BODY MASS INDEX: 40.9 KG/M2

## 2019-06-11 DIAGNOSIS — N17.9 ACUTE RENAL FAILURE, UNSPECIFIED ACUTE RENAL FAILURE TYPE (HCC): ICD-10-CM

## 2019-06-11 DIAGNOSIS — D68.61 ANTIPHOSPHOLIPID ANTIBODY SYNDROME (HCC): Primary | ICD-10-CM

## 2019-06-11 DIAGNOSIS — A41.9 SEPTICEMIA (HCC): ICD-10-CM

## 2019-06-11 DIAGNOSIS — F19.10 POLYSUBSTANCE ABUSE (HCC): ICD-10-CM

## 2019-06-11 DIAGNOSIS — R41.82 ALTERED MENTAL STATUS, UNSPECIFIED ALTERED MENTAL STATUS TYPE: Primary | ICD-10-CM

## 2019-06-11 DIAGNOSIS — N39.0 URINARY TRACT INFECTION WITHOUT HEMATURIA, SITE UNSPECIFIED: ICD-10-CM

## 2019-06-11 DIAGNOSIS — M62.82 NON-TRAUMATIC RHABDOMYOLYSIS: ICD-10-CM

## 2019-06-11 LAB
-: ABNORMAL
ABO/RH: NORMAL
ABSOLUTE BANDS #: 2.61 K/UL (ref 0–1)
ABSOLUTE EOS #: 0 K/UL (ref 0–0.44)
ABSOLUTE EOS #: ABNORMAL K/UL (ref 0–0.4)
ABSOLUTE IMMATURE GRANULOCYTE: 0 K/UL (ref 0–0.3)
ABSOLUTE IMMATURE GRANULOCYTE: ABNORMAL K/UL (ref 0–0.3)
ABSOLUTE LYMPH #: 0.16 K/UL (ref 1.1–3.7)
ABSOLUTE LYMPH #: ABNORMAL K/UL (ref 1–4.8)
ABSOLUTE MONO #: 1.39 K/UL (ref 0–1)
ABSOLUTE MONO #: 1.43 K/UL (ref 0.1–1.2)
ACETAMINOPHEN LEVEL: <5 UG/ML (ref 10–30)
ALBUMIN SERPL-MCNC: 2.1 G/DL (ref 3.5–5.2)
ALBUMIN SERPL-MCNC: 2.6 G/DL (ref 3.5–5.2)
ALBUMIN SERPL-MCNC: 3.4 G/DL (ref 3.5–5.2)
ALBUMIN/GLOBULIN RATIO: 0.8 (ref 1–2.5)
ALBUMIN/GLOBULIN RATIO: 0.8 (ref 1–2.5)
ALBUMIN/GLOBULIN RATIO: ABNORMAL (ref 1–2.5)
ALP BLD-CCNC: 54 U/L (ref 40–129)
ALP BLD-CCNC: 74 U/L (ref 40–129)
ALP BLD-CCNC: 83 U/L (ref 40–129)
ALT SERPL-CCNC: 122 U/L (ref 5–41)
ALT SERPL-CCNC: 83 U/L (ref 5–41)
ALT SERPL-CCNC: 94 U/L (ref 5–41)
AMMONIA: 54 UMOL/L (ref 16–60)
AMORPHOUS: ABNORMAL
AMPHETAMINE SCREEN URINE: POSITIVE
ANION GAP SERPL CALCULATED.3IONS-SCNC: 18 MMOL/L (ref 9–17)
ANION GAP SERPL CALCULATED.3IONS-SCNC: 21 MMOL/L (ref 9–17)
ANION GAP SERPL CALCULATED.3IONS-SCNC: 23 MMOL/L (ref 9–17)
ANION GAP SERPL CALCULATED.3IONS-SCNC: 28 MMOL/L (ref 9–17)
ANTIBODY SCREEN: NEGATIVE
ARM BAND NUMBER: NORMAL
AST SERPL-CCNC: 166 U/L
AST SERPL-CCNC: 189 U/L
AST SERPL-CCNC: 296 U/L
BACTERIA: ABNORMAL
BANDS: 15 % (ref 0–10)
BARBITURATE SCREEN URINE: NEGATIVE
BASOPHILS # BLD: 0 % (ref 0–2)
BASOPHILS # BLD: ABNORMAL % (ref 0–2)
BASOPHILS ABSOLUTE: 0 K/UL (ref 0–0.2)
BASOPHILS ABSOLUTE: ABNORMAL K/UL (ref 0–0.2)
BENZODIAZEPINE SCREEN, URINE: NEGATIVE
BILIRUB SERPL-MCNC: 0.41 MG/DL (ref 0.3–1.2)
BILIRUB SERPL-MCNC: 0.45 MG/DL (ref 0.3–1.2)
BILIRUB SERPL-MCNC: 0.61 MG/DL (ref 0.3–1.2)
BILIRUBIN URINE: ABNORMAL
BNP INTERPRETATION: ABNORMAL
BUN BLDV-MCNC: 102 MG/DL (ref 6–20)
BUN BLDV-MCNC: 111 MG/DL (ref 6–20)
BUN BLDV-MCNC: 114 MG/DL (ref 6–20)
BUN BLDV-MCNC: 125 MG/DL (ref 6–20)
BUN/CREAT BLD: ABNORMAL (ref 9–20)
BUPRENORPHINE URINE: ABNORMAL
CALCIUM IONIZED: 0.87 MMOL/L (ref 1.13–1.33)
CALCIUM SERPL-MCNC: 5.6 MG/DL (ref 8.6–10.4)
CALCIUM SERPL-MCNC: 6.2 MG/DL (ref 8.6–10.4)
CALCIUM SERPL-MCNC: 6.4 MG/DL (ref 8.6–10.4)
CALCIUM SERPL-MCNC: 7.6 MG/DL (ref 8.6–10.4)
CANNABINOID SCREEN URINE: NEGATIVE
CASTS UA: ABNORMAL /LPF
CHLORIDE BLD-SCNC: 103 MMOL/L (ref 98–107)
CHLORIDE BLD-SCNC: 93 MMOL/L (ref 98–107)
CHLORIDE BLD-SCNC: 97 MMOL/L (ref 98–107)
CHLORIDE BLD-SCNC: 98 MMOL/L (ref 98–107)
CK MB: 101.9 NG/ML
CK MB: 121.2 NG/ML
CO2: 11 MMOL/L (ref 20–31)
CO2: 12 MMOL/L (ref 20–31)
CO2: 23 MMOL/L (ref 20–31)
CO2: 9 MMOL/L (ref 20–31)
COCAINE METABOLITE, URINE: POSITIVE
COLOR: ABNORMAL
COMMENT UA: ABNORMAL
COMPLEMENT C3: 88 MG/DL (ref 90–180)
COMPLEMENT C4: 16 MG/DL (ref 10–40)
CORTISOL COLLECTION INFO: NORMAL
CORTISOL COLLECTION INFO: NORMAL
CORTISOL: 10.3 UG/DL (ref 2.7–18.4)
CORTISOL: 11.8 UG/DL (ref 2.7–18.4)
CREAT SERPL-MCNC: 7 MG/DL (ref 0.7–1.2)
CREAT SERPL-MCNC: 8.17 MG/DL (ref 0.7–1.2)
CREAT SERPL-MCNC: 8.32 MG/DL (ref 0.7–1.2)
CREAT SERPL-MCNC: 9.6 MG/DL (ref 0.7–1.2)
CREATININE URINE: 240.2 MG/DL (ref 39–259)
CRYSTALS, UA: ABNORMAL /HPF
CULTURE: NORMAL
CULTURE: NORMAL
D-DIMER QUANTITATIVE: 1.46 MG/L FEU
DIFFERENTIAL TYPE: ABNORMAL
DIFFERENTIAL TYPE: ABNORMAL
EKG ATRIAL RATE: 96 BPM
EKG P AXIS: 58 DEGREES
EKG P-R INTERVAL: 166 MS
EKG Q-T INTERVAL: 376 MS
EKG QRS DURATION: 98 MS
EKG QTC CALCULATION (BAZETT): 475 MS
EKG R AXIS: 1 DEGREES
EKG T AXIS: 26 DEGREES
EKG VENTRICULAR RATE: 96 BPM
EOSINOPHILS RELATIVE PERCENT: 0 % (ref 1–4)
EOSINOPHILS RELATIVE PERCENT: ABNORMAL % (ref 0–5)
EPITHELIAL CELLS UA: ABNORMAL /HPF
ETHANOL PERCENT: <0.01 %
ETHANOL PERCENT: <0.01 %
ETHANOL: <10 MG/DL
ETHANOL: <10 MG/DL
EXPIRATION DATE: NORMAL
FIBRINOGEN: 788 MG/DL (ref 140–420)
FREE KAPPA/LAMBDA RATIO: 1.6 (ref 0.26–1.65)
GFR AFRICAN AMERICAN: 10 ML/MIN
GFR AFRICAN AMERICAN: 7 ML/MIN
GFR AFRICAN AMERICAN: 9 ML/MIN
GFR AFRICAN AMERICAN: 9 ML/MIN
GFR NON-AFRICAN AMERICAN: 6 ML/MIN
GFR NON-AFRICAN AMERICAN: 7 ML/MIN
GFR NON-AFRICAN AMERICAN: 7 ML/MIN
GFR NON-AFRICAN AMERICAN: 9 ML/MIN
GFR SERPL CREATININE-BSD FRML MDRD: ABNORMAL ML/MIN/{1.73_M2}
GLUCOSE BLD-MCNC: 100 MG/DL (ref 75–110)
GLUCOSE BLD-MCNC: 105 MG/DL (ref 75–110)
GLUCOSE BLD-MCNC: 125 MG/DL (ref 70–99)
GLUCOSE BLD-MCNC: 127 MG/DL (ref 70–99)
GLUCOSE BLD-MCNC: 184 MG/DL (ref 70–99)
GLUCOSE BLD-MCNC: 493 MG/DL (ref 70–99)
GLUCOSE URINE: ABNORMAL
HCT VFR BLD CALC: 37.4 % (ref 40.7–50.3)
HCT VFR BLD CALC: 41 % (ref 41–53)
HEMOGLOBIN: 11.7 G/DL (ref 13–17)
HEMOGLOBIN: 13.5 G/DL (ref 13.5–17.5)
IMMATURE GRANULOCYTES: 0 %
IMMATURE GRANULOCYTES: ABNORMAL %
INR BLD: 6.2
INR BLD: 6.2
KAPPA FREE LIGHT CHAINS QNT: 12.45 MG/DL (ref 0.37–1.94)
KETONES, URINE: ABNORMAL
LACTIC ACID, SEPSIS WHOLE BLOOD: 1.2 MMOL/L (ref 0.5–1.9)
LACTIC ACID, SEPSIS WHOLE BLOOD: 1.6 MMOL/L (ref 0.5–1.9)
LACTIC ACID, SEPSIS: NORMAL MMOL/L (ref 0.5–1.9)
LACTIC ACID, SEPSIS: NORMAL MMOL/L (ref 0.5–1.9)
LACTIC ACID, WHOLE BLOOD: 1.6 MMOL/L (ref 0.7–2.1)
LACTIC ACID, WHOLE BLOOD: ABNORMAL MMOL/L (ref 0.7–2.1)
LACTIC ACID, WHOLE BLOOD: NORMAL MMOL/L (ref 0.7–2.1)
LACTIC ACID: 2.1 MMOL/L (ref 0.5–2.2)
LACTIC ACID: 3.2 MMOL/L (ref 0.5–2.2)
LACTIC ACID: NORMAL MMOL/L
LAMBDA FREE LIGHT CHAINS QNT: 7.76 MG/DL (ref 0.57–2.63)
LEUKOCYTE ESTERASE, URINE: ABNORMAL
LYMPHOCYTES # BLD: 1 % (ref 24–43)
LYMPHOCYTES # BLD: ABNORMAL % (ref 13–44)
Lab: NORMAL
MAGNESIUM: 2.8 MG/DL (ref 1.6–2.6)
MCH RBC QN AUTO: 24.8 PG (ref 25.2–33.5)
MCH RBC QN AUTO: 25.3 PG (ref 26–34)
MCHC RBC AUTO-ENTMCNC: 31.3 G/DL (ref 28.4–34.8)
MCHC RBC AUTO-ENTMCNC: 32.9 G/DL (ref 31–37)
MCV RBC AUTO: 76.9 FL (ref 80–100)
MCV RBC AUTO: 79.2 FL (ref 82.6–102.9)
MDMA URINE: ABNORMAL
METHADONE SCREEN, URINE: NEGATIVE
METHAMPHETAMINE, URINE: POSITIVE
MONOCYTES # BLD: 8 % (ref 5–9)
MONOCYTES # BLD: 9 % (ref 3–12)
MORPHOLOGY: ABNORMAL
MUCUS: ABNORMAL
MYOGLOBIN: 9150 NG/ML (ref 28–72)
MYOGLOBIN: ABNORMAL NG/ML (ref 28–72)
NITRITE, URINE: POSITIVE
NRBC AUTOMATED: 0 PER 100 WBC
NRBC AUTOMATED: ABNORMAL PER 100 WBC
OPIATES, URINE: NEGATIVE
OTHER OBSERVATIONS UA: ABNORMAL
OXYCODONE SCREEN URINE: POSITIVE
PARTIAL THROMBOPLASTIN TIME: 57.1 SEC (ref 20.5–30.5)
PDW BLD-RTO: 17.3 % (ref 11.8–14.4)
PDW BLD-RTO: 18.3 % (ref 12.1–15.2)
PH UA: 5 (ref 5–8)
PHENCYCLIDINE, URINE: NEGATIVE
PHOSPHORUS: 9.5 MG/DL (ref 2.5–4.5)
PLATELET # BLD: 111 K/UL (ref 138–453)
PLATELET # BLD: 138 K/UL (ref 140–450)
PLATELET ESTIMATE: ABNORMAL
PLATELET ESTIMATE: ABNORMAL
PMV BLD AUTO: 10.6 FL (ref 8.1–13.5)
PMV BLD AUTO: ABNORMAL FL (ref 6–12)
POTASSIUM SERPL-SCNC: 4.4 MMOL/L (ref 3.7–5.3)
POTASSIUM SERPL-SCNC: 5.2 MMOL/L (ref 3.7–5.3)
POTASSIUM SERPL-SCNC: 5.3 MMOL/L (ref 3.7–5.3)
POTASSIUM SERPL-SCNC: 5.5 MMOL/L (ref 3.7–5.3)
PRO-BNP: 4237 PG/ML
PROPOXYPHENE, URINE: NEGATIVE
PROTEIN UA: ABNORMAL
PROTHROMBIN TIME: 55.9 SEC (ref 9–11.6)
PROTHROMBIN TIME: 56.9 SEC (ref 9–12)
RBC # BLD: 4.72 M/UL (ref 4.21–5.77)
RBC # BLD: 5.34 M/UL (ref 4.5–5.9)
RBC # BLD: ABNORMAL 10*6/UL
RBC # BLD: ABNORMAL 10*6/UL
RBC UA: ABNORMAL /HPF (ref 0–2)
RENAL EPITHELIAL, UA: ABNORMAL /HPF
SALICYLATE LEVEL: <1 MG/DL (ref 3–10)
SEG NEUTROPHILS: 77 % (ref 39–75)
SEG NEUTROPHILS: 90 % (ref 36–65)
SEGMENTED NEUTROPHILS ABSOLUTE COUNT: 13.4 K/UL (ref 2.1–6.5)
SEGMENTED NEUTROPHILS ABSOLUTE COUNT: 14.31 K/UL (ref 1.5–8.1)
SODIUM BLD-SCNC: 133 MMOL/L (ref 135–144)
SODIUM BLD-SCNC: 134 MMOL/L (ref 135–144)
SODIUM BLD-SCNC: 134 MMOL/L (ref 135–144)
SODIUM BLD-SCNC: 135 MMOL/L (ref 135–144)
SODIUM,UR: 21 MMOL/L
SPECIFIC GRAVITY UA: 1.02 (ref 1–1.03)
SPECIMEN DESCRIPTION: NORMAL
TEST INFORMATION: ABNORMAL
TOTAL CK: ABNORMAL U/L (ref 39–308)
TOTAL CK: ABNORMAL U/L (ref 39–308)
TOTAL PROTEIN, URINE: 119 MG/DL
TOTAL PROTEIN: 4.9 G/DL (ref 6.4–8.3)
TOTAL PROTEIN: 5.7 G/DL (ref 6.4–8.3)
TOTAL PROTEIN: 7.1 G/DL (ref 6.4–8.3)
TOXIC TRICYCLIC SC,BLOOD: NEGATIVE
TRICHOMONAS: ABNORMAL
TRICYCLIC ANTIDEPRESSANTS, UR: NEGATIVE
TROPONIN INTERP: ABNORMAL
TROPONIN T: 0.1 NG/ML
TROPONIN T: ABNORMAL NG/ML
TROPONIN T: ABNORMAL NG/ML
TROPONIN, HIGH SENSITIVITY: 126 NG/L (ref 0–22)
TROPONIN, HIGH SENSITIVITY: 86 NG/L (ref 0–22)
TROPONIN, HIGH SENSITIVITY: ABNORMAL NG/L (ref 0–22)
TURBIDITY: ABNORMAL
URINE HGB: ABNORMAL
UROBILINOGEN, URINE: NORMAL
WBC # BLD: 15.9 K/UL (ref 3.5–11.3)
WBC # BLD: 17.4 K/UL (ref 3.5–11)
WBC # BLD: ABNORMAL 10*3/UL
WBC # BLD: ABNORMAL 10*3/UL
WBC UA: ABNORMAL /HPF
YEAST: ABNORMAL

## 2019-06-11 PROCEDURE — 2580000003 HC RX 258: Performed by: EMERGENCY MEDICINE

## 2019-06-11 PROCEDURE — 85379 FIBRIN DEGRADATION QUANT: CPT

## 2019-06-11 PROCEDURE — G0480 DRUG TEST DEF 1-7 CLASSES: HCPCS

## 2019-06-11 PROCEDURE — 70450 CT HEAD/BRAIN W/O DYE: CPT

## 2019-06-11 PROCEDURE — 99291 CRITICAL CARE FIRST HOUR: CPT | Performed by: INTERNAL MEDICINE

## 2019-06-11 PROCEDURE — 6360000002 HC RX W HCPCS: Performed by: EMERGENCY MEDICINE

## 2019-06-11 PROCEDURE — 80307 DRUG TEST PRSMV CHEM ANLYZR: CPT

## 2019-06-11 PROCEDURE — 87641 MR-STAPH DNA AMP PROBE: CPT

## 2019-06-11 PROCEDURE — 87070 CULTURE OTHR SPECIMN AEROBIC: CPT

## 2019-06-11 PROCEDURE — 2580000003 HC RX 258: Performed by: NURSE PRACTITIONER

## 2019-06-11 PROCEDURE — 83874 ASSAY OF MYOGLOBIN: CPT

## 2019-06-11 PROCEDURE — 2500000003 HC RX 250 WO HCPCS: Performed by: EMERGENCY MEDICINE

## 2019-06-11 PROCEDURE — 85610 PROTHROMBIN TIME: CPT

## 2019-06-11 PROCEDURE — 84484 ASSAY OF TROPONIN QUANT: CPT

## 2019-06-11 PROCEDURE — 85730 THROMBOPLASTIN TIME PARTIAL: CPT

## 2019-06-11 PROCEDURE — 80048 BASIC METABOLIC PNL TOTAL CA: CPT

## 2019-06-11 PROCEDURE — 93005 ELECTROCARDIOGRAM TRACING: CPT | Performed by: EMERGENCY MEDICINE

## 2019-06-11 PROCEDURE — 6360000002 HC RX W HCPCS: Performed by: STUDENT IN AN ORGANIZED HEALTH CARE EDUCATION/TRAINING PROGRAM

## 2019-06-11 PROCEDURE — 81001 URINALYSIS AUTO W/SCOPE: CPT

## 2019-06-11 PROCEDURE — 86901 BLOOD TYPING SEROLOGIC RH(D): CPT

## 2019-06-11 PROCEDURE — 96375 TX/PRO/DX INJ NEW DRUG ADDON: CPT

## 2019-06-11 PROCEDURE — 80053 COMPREHEN METABOLIC PANEL: CPT

## 2019-06-11 PROCEDURE — 85025 COMPLETE CBC W/AUTO DIFF WBC: CPT

## 2019-06-11 PROCEDURE — 93005 ELECTROCARDIOGRAM TRACING: CPT | Performed by: NURSE PRACTITIONER

## 2019-06-11 PROCEDURE — 84100 ASSAY OF PHOSPHORUS: CPT

## 2019-06-11 PROCEDURE — 71045 X-RAY EXAM CHEST 1 VIEW: CPT

## 2019-06-11 PROCEDURE — 36415 COLL VENOUS BLD VENIPUNCTURE: CPT

## 2019-06-11 PROCEDURE — 82550 ASSAY OF CK (CPK): CPT

## 2019-06-11 PROCEDURE — 87205 SMEAR GRAM STAIN: CPT

## 2019-06-11 PROCEDURE — 85384 FIBRINOGEN ACTIVITY: CPT

## 2019-06-11 PROCEDURE — 87040 BLOOD CULTURE FOR BACTERIA: CPT

## 2019-06-11 PROCEDURE — 82947 ASSAY GLUCOSE BLOOD QUANT: CPT

## 2019-06-11 PROCEDURE — 82140 ASSAY OF AMMONIA: CPT

## 2019-06-11 PROCEDURE — 80306 DRUG TEST PRSMV INSTRMNT: CPT

## 2019-06-11 PROCEDURE — 82570 ASSAY OF URINE CREATININE: CPT

## 2019-06-11 PROCEDURE — 84165 PROTEIN E-PHORESIS SERUM: CPT

## 2019-06-11 PROCEDURE — 96365 THER/PROPH/DIAG IV INF INIT: CPT

## 2019-06-11 PROCEDURE — 86334 IMMUNOFIX E-PHORESIS SERUM: CPT

## 2019-06-11 PROCEDURE — 83605 ASSAY OF LACTIC ACID: CPT

## 2019-06-11 PROCEDURE — 83883 ASSAY NEPHELOMETRY NOT SPEC: CPT

## 2019-06-11 PROCEDURE — 82330 ASSAY OF CALCIUM: CPT

## 2019-06-11 PROCEDURE — 93010 ELECTROCARDIOGRAM REPORT: CPT | Performed by: INTERNAL MEDICINE

## 2019-06-11 PROCEDURE — 51702 INSERT TEMP BLADDER CATH: CPT

## 2019-06-11 PROCEDURE — 2500000003 HC RX 250 WO HCPCS

## 2019-06-11 PROCEDURE — 76770 US EXAM ABDO BACK WALL COMP: CPT

## 2019-06-11 PROCEDURE — 2000000000 HC ICU R&B

## 2019-06-11 PROCEDURE — 2500000003 HC RX 250 WO HCPCS: Performed by: STUDENT IN AN ORGANIZED HEALTH CARE EDUCATION/TRAINING PROGRAM

## 2019-06-11 PROCEDURE — 86160 COMPLEMENT ANTIGEN: CPT

## 2019-06-11 PROCEDURE — 84156 ASSAY OF PROTEIN URINE: CPT

## 2019-06-11 PROCEDURE — 86850 RBC ANTIBODY SCREEN: CPT

## 2019-06-11 PROCEDURE — 83735 ASSAY OF MAGNESIUM: CPT

## 2019-06-11 PROCEDURE — 84155 ASSAY OF PROTEIN SERUM: CPT

## 2019-06-11 PROCEDURE — 84300 ASSAY OF URINE SODIUM: CPT

## 2019-06-11 PROCEDURE — 86038 ANTINUCLEAR ANTIBODIES: CPT

## 2019-06-11 PROCEDURE — 83880 ASSAY OF NATRIURETIC PEPTIDE: CPT

## 2019-06-11 PROCEDURE — 96367 TX/PROPH/DG ADDL SEQ IV INF: CPT

## 2019-06-11 PROCEDURE — 2580000003 HC RX 258: Performed by: STUDENT IN AN ORGANIZED HEALTH CARE EDUCATION/TRAINING PROGRAM

## 2019-06-11 PROCEDURE — 6370000000 HC RX 637 (ALT 250 FOR IP): Performed by: EMERGENCY MEDICINE

## 2019-06-11 PROCEDURE — 99285 EMERGENCY DEPT VISIT HI MDM: CPT

## 2019-06-11 PROCEDURE — 94640 AIRWAY INHALATION TREATMENT: CPT

## 2019-06-11 PROCEDURE — 82553 CREATINE MB FRACTION: CPT

## 2019-06-11 PROCEDURE — 86900 BLOOD TYPING SEROLOGIC ABO: CPT

## 2019-06-11 PROCEDURE — 87086 URINE CULTURE/COLONY COUNT: CPT

## 2019-06-11 PROCEDURE — 82533 TOTAL CORTISOL: CPT

## 2019-06-11 RX ORDER — SODIUM CHLORIDE, SODIUM LACTATE, POTASSIUM CHLORIDE, CALCIUM CHLORIDE 600; 310; 30; 20 MG/100ML; MG/100ML; MG/100ML; MG/100ML
INJECTION, SOLUTION INTRAVENOUS ONCE
Status: DISCONTINUED | OUTPATIENT
Start: 2019-06-11 | End: 2019-06-11

## 2019-06-11 RX ORDER — ONDANSETRON 2 MG/ML
4 INJECTION INTRAMUSCULAR; INTRAVENOUS EVERY 6 HOURS PRN
Status: DISCONTINUED | OUTPATIENT
Start: 2019-06-11 | End: 2019-06-21 | Stop reason: HOSPADM

## 2019-06-11 RX ORDER — 0.9 % SODIUM CHLORIDE 0.9 %
30 INTRAVENOUS SOLUTION INTRAVENOUS ONCE
Status: COMPLETED | OUTPATIENT
Start: 2019-06-11 | End: 2019-06-11

## 2019-06-11 RX ORDER — SODIUM CHLORIDE 9 MG/ML
INJECTION, SOLUTION INTRAVENOUS CONTINUOUS
Status: DISCONTINUED | OUTPATIENT
Start: 2019-06-11 | End: 2019-06-11

## 2019-06-11 RX ORDER — SODIUM CHLORIDE 9 MG/ML
INJECTION, SOLUTION INTRAVENOUS CONTINUOUS
Status: DISCONTINUED | OUTPATIENT
Start: 2019-06-11 | End: 2019-06-11 | Stop reason: HOSPADM

## 2019-06-11 RX ORDER — SODIUM CHLORIDE, SODIUM LACTATE, POTASSIUM CHLORIDE, AND CALCIUM CHLORIDE .6; .31; .03; .02 G/100ML; G/100ML; G/100ML; G/100ML
1000 INJECTION, SOLUTION INTRAVENOUS ONCE
Status: DISCONTINUED | OUTPATIENT
Start: 2019-06-11 | End: 2019-06-11

## 2019-06-11 RX ORDER — 0.9 % SODIUM CHLORIDE 0.9 %
1000 INTRAVENOUS SOLUTION INTRAVENOUS ONCE
Status: COMPLETED | OUTPATIENT
Start: 2019-06-11 | End: 2019-06-11

## 2019-06-11 RX ORDER — GABAPENTIN 600 MG/1
600 TABLET ORAL 3 TIMES DAILY
Status: ON HOLD | COMMUNITY
End: 2019-06-21 | Stop reason: HOSPADM

## 2019-06-11 RX ORDER — DEXTROSE MONOHYDRATE 50 MG/ML
100 INJECTION, SOLUTION INTRAVENOUS PRN
Status: DISCONTINUED | OUTPATIENT
Start: 2019-06-11 | End: 2019-06-21 | Stop reason: HOSPADM

## 2019-06-11 RX ORDER — SODIUM CHLORIDE 0.9 % (FLUSH) 0.9 %
10 SYRINGE (ML) INJECTION PRN
Status: DISCONTINUED | OUTPATIENT
Start: 2019-06-11 | End: 2019-06-17

## 2019-06-11 RX ORDER — ALBUTEROL SULFATE 2.5 MG/3ML
2.5 SOLUTION RESPIRATORY (INHALATION)
Status: DISCONTINUED | OUTPATIENT
Start: 2019-06-11 | End: 2019-06-21 | Stop reason: HOSPADM

## 2019-06-11 RX ORDER — SODIUM CHLORIDE 0.9 % (FLUSH) 0.9 %
10 SYRINGE (ML) INJECTION EVERY 12 HOURS SCHEDULED
Status: DISCONTINUED | OUTPATIENT
Start: 2019-06-11 | End: 2019-06-21 | Stop reason: HOSPADM

## 2019-06-11 RX ORDER — SODIUM CHLORIDE 0.9 % (FLUSH) 0.9 %
10 SYRINGE (ML) INJECTION EVERY 12 HOURS SCHEDULED
Status: DISCONTINUED | OUTPATIENT
Start: 2019-06-11 | End: 2019-06-17

## 2019-06-11 RX ORDER — NICOTINE POLACRILEX 4 MG
15 LOZENGE BUCCAL PRN
Status: DISCONTINUED | OUTPATIENT
Start: 2019-06-11 | End: 2019-06-21 | Stop reason: HOSPADM

## 2019-06-11 RX ORDER — SODIUM CHLORIDE 0.9 % (FLUSH) 0.9 %
10 SYRINGE (ML) INJECTION PRN
Status: DISCONTINUED | OUTPATIENT
Start: 2019-06-11 | End: 2019-06-21 | Stop reason: HOSPADM

## 2019-06-11 RX ORDER — DEXTROSE MONOHYDRATE 25 G/50ML
12.5 INJECTION, SOLUTION INTRAVENOUS PRN
Status: DISCONTINUED | OUTPATIENT
Start: 2019-06-11 | End: 2019-06-21 | Stop reason: HOSPADM

## 2019-06-11 RX ORDER — IPRATROPIUM BROMIDE AND ALBUTEROL SULFATE 2.5; .5 MG/3ML; MG/3ML
1 SOLUTION RESPIRATORY (INHALATION) EVERY 4 HOURS PRN
Status: DISCONTINUED | OUTPATIENT
Start: 2019-06-11 | End: 2019-06-21 | Stop reason: HOSPADM

## 2019-06-11 RX ORDER — ACETAMINOPHEN 325 MG/1
650 TABLET ORAL EVERY 4 HOURS PRN
Status: DISCONTINUED | OUTPATIENT
Start: 2019-06-11 | End: 2019-06-21 | Stop reason: HOSPADM

## 2019-06-11 RX ADMIN — PIPERACILLIN SODIUM,TAZOBACTAM SODIUM 2.25 G: 2; .25 INJECTION, POWDER, FOR SOLUTION INTRAVENOUS at 13:44

## 2019-06-11 RX ADMIN — SODIUM CHLORIDE: 9 INJECTION, SOLUTION INTRAVENOUS at 04:36

## 2019-06-11 RX ADMIN — PIPERACILLIN SODIUM,TAZOBACTAM SODIUM 2.25 G: 2; .25 INJECTION, POWDER, FOR SOLUTION INTRAVENOUS at 21:19

## 2019-06-11 RX ADMIN — IPRATROPIUM BROMIDE AND ALBUTEROL SULFATE 1 AMPULE: .5; 3 SOLUTION RESPIRATORY (INHALATION) at 20:32

## 2019-06-11 RX ADMIN — Medication: at 12:51

## 2019-06-11 RX ADMIN — SODIUM CHLORIDE 4338 ML: 9 INJECTION, SOLUTION INTRAVENOUS at 11:20

## 2019-06-11 RX ADMIN — VANCOMYCIN HYDROCHLORIDE 1000 MG: 1 INJECTION, POWDER, LYOPHILIZED, FOR SOLUTION INTRAVENOUS at 05:14

## 2019-06-11 RX ADMIN — Medication 10 ML: at 21:24

## 2019-06-11 RX ADMIN — SODIUM CHLORIDE 1000 ML: 9 INJECTION, SOLUTION INTRAVENOUS at 03:10

## 2019-06-11 RX ADMIN — CALCIUM GLUCONATE 2 G: 98 INJECTION, SOLUTION INTRAVENOUS at 14:33

## 2019-06-11 RX ADMIN — FAMOTIDINE 20 MG: 10 INJECTION, SOLUTION INTRAVENOUS at 12:06

## 2019-06-11 RX ADMIN — Medication: at 19:09

## 2019-06-11 RX ADMIN — PIPERACILLIN AND TAZOBACTAM 3.38 G: 3; .375 INJECTION, POWDER, FOR SOLUTION INTRAVENOUS at 04:36

## 2019-06-11 RX ADMIN — CALCIUM GLUCONATE 2 G: 98 INJECTION, SOLUTION INTRAVENOUS at 21:19

## 2019-06-11 RX ADMIN — SODIUM BICARBONATE 100 MEQ: 84 INJECTION, SOLUTION INTRAVENOUS at 06:06

## 2019-06-11 ASSESSMENT — PAIN SCALES - GENERAL
PAINLEVEL_OUTOF10: 0
PAINLEVEL_OUTOF10: 0
PAINLEVEL_OUTOF10: 5
PAINLEVEL_OUTOF10: 0
PAINLEVEL_OUTOF10: 0

## 2019-06-11 ASSESSMENT — PAIN DESCRIPTION - PAIN TYPE: TYPE: OTHER (COMMENT)

## 2019-06-11 NOTE — ED NOTES
Pt makes eye contact, no facial droop noted, no tongue deviation noted, strong hand grasps and push/pulls, RLE slightly weaker than left and per squad report this is pt's normal;      Fadi Cooley RN  06/11/19 9090

## 2019-06-11 NOTE — PROGRESS NOTES
Smoking Cessation - topics covered   []  Health Risks  []  Benefits of Quitting   []  Smoking Cessation  [x]  Patient has no history of tobacco use  []  Patient is former smoker. [x]  No need for tobacco cessation education. []  Booklet given  []  Patient verbalizes understanding. []  Patient denies need for tobacco cessation education. []  Unable to meet with patient today. Will follow up as able.   AMADO ALAN  1:42 PM

## 2019-06-11 NOTE — CARE COORDINATION
Case Management Initial Discharge Plan  Lori Rosenberg,             Met with:family member mother to discuss discharge plans. Information verified: address, contacts, phone number, , insurance Yes  PCP: Marzella Homans, CNP, APRN - CNP  Date of last visit:     Insurance Provider: medicare and medicaid listed. Mom states he has [employer] medicare choice and no medicaid. Discharge Planning    Living Arrangements:  (lives with spouse or mom)   Support Systems:  Family Members    Home has  stories   stairs to climb to get into front door, stairs to climb to reach second floor  Location of bedroom/bathroom in home     Patient able to perform ADL's:Independent    Current Services (outpatient & in home) [facility]  DME equipment: none  DME provider:     Ph[facility] in [address] [facility] Medications:  No  Does patient want to participate in local refill/ meds to beds program?  No    Potential Assistance Needed:  Navneet Mcgregor (Comment)    Patient agreeable to home care:   [place] of choice provided:  yes    Prior SNF/Rehab Placement and Facility: has been at drug rehabs  Agreeable to SNF/Rehab: not sure  [place] of choice provided: no   Evaluation: yes    Expected Discharge date:  19  Patient expects to be discharged to:  unknown at this time  Follow Up Appointment: Best Day/ Time:      Transportation provider: family  Transportation arrangements needed for discharge: No    Readmission Risk              Risk of Unplanned Readmission:        21             Does patient have a readmission risk score greater than 14?: Yes  If yes, follow-up appointment must be made within 7 days of discharge. Discharge Plan: Pt unable to answer questions at this time. Spoke with pt's mom Marcos Capellan. She states she has POA. Pt is , but . Has 21 yr and 15 yr old daughters. Pt just got out of a rehab facility.   Was staying with wife. He also stays with his mom some times. Multiple medical issues at this time. Requested SW consult. Mom states pt was just clean for 30 days while in facility but then relapsed and used again. States he has been in several programs. Unknown dc needs at this time. Will follow.           Electronically signed by Kailash Lainez RN on 6/11/19 at 12:39 PM

## 2019-06-11 NOTE — PROGRESS NOTES
Pt arrived from [facility] via EMS. He is obtunded but will answer to his name and grimaces to pain. Noted to have two 20g PIV in left arm and wrist. He also has a open abrasion to right knee and a large bruise on posterior thigh that seems to be very painful. Arrival weight 144.6kg via bed scale and afebrile. He is on o2 at 2L. Tolerating well. Bilateral breath sounds diminished. Browne patent with 20ml of tea/brown sediment urine in bag. Will admit into .

## 2019-06-11 NOTE — PROGRESS NOTES
Pharmacy Note  Warfarin Consult    Kim Dooley is a 39 y.o. male for whom pharmacy has been consulted to manage warfarin therapy. Consulting Physician: Thao Corrigan MD  Reason for Admission: altered mental status    Warfarin dose prior to admission: 10mg on Mon, Wed, Fri only and 7.5mg all other days of the week. Warfarin indication: Antiphospholipid antibody syndrome  Target INR range: 2-3     Past Medical History:   Diagnosis Date    Antiphospholipid antibody with hemorrhagic disorder (HCC)     Arthritis     Asthma     Bronchitis     Cerebral artery occlusion with cerebral infarction [facility])     Disease of blood and blood forming organ     Hypertension     Long-term (current) use of anticoagulants, INR goal 2.5-3.5     Pleurisy     Pneumonia     Seizures (HCC)      Recent Labs     06/11/19  1154   INR 6.2*     Recent Labs     06/11/19  0315 06/11/19  1152   HGB 13.5 11.7*   HCT 41.0 37.4*   * 111*     Current warfarin drug-drug interactions:  acetaminophen    Date INR Dose   6/11/19 6.2 HOLD     Daily PT/INR while inpatient. Thank you for the consult. Will continue to follow.      Mini Newman RP, CACP  Clinical Pharmacist Medication Management  6/11/2019  1:38 PM

## 2019-06-11 NOTE — ED NOTES
Note that some abnormal pulse ox readings noted d/t BP cuff and pulse ox is on the same extremity;      Shahab Johnson RN  06/11/19 2069

## 2019-06-11 NOTE — PLAN OF CARE
oxygenation will improve  Outcome: Not Met This Shift     Problem: Mental Status - Impaired:  Goal: Mental status will be restored to baseline  Description  Mental status will be restored to baseline  Outcome: Not Met This Shift     Problem: Nutrition Deficit:  Goal: Ability to achieve adequate nutritional intake will improve  Description  Ability to achieve adequate nutritional intake will improve  Outcome: Not Met This Shift     Problem: Pain:  Description  Pain management should include both nonpharmacologic and pharmacologic interventions.   Goal: Recognizes and communicates pain  Description  Recognizes and communicates pain  Outcome: Not Met This Shift  Goal: Control of acute pain  Description  Control of acute pain  Outcome: Not Met This Shift  Goal: Control of chronic pain  Description  Control of chronic pain  Outcome: Not Met This Shift     Problem: Skin Integrity - Impaired:  Goal: Will show no infection signs and symptoms  Description  Will show no infection signs and symptoms  Outcome: Not Met This Shift  Goal: Absence of new skin breakdown  Description  Absence of new skin breakdown  Outcome: Not Met This Shift     Problem: Tissue Perfusion, Altered:  Goal: Circulatory function within specified parameters  Description  Circulatory function within specified parameters  Outcome: Not Met This Shift

## 2019-06-11 NOTE — ED NOTES
Report called to [facility], pt leaves in stable condition with [facility].       Amrik Luther RN  06/11/19 7564

## 2019-06-11 NOTE — ED PROVIDER NOTES
 predniSONE (DELTASONE) 10 MG tablet Take 10 mg by mouth daily.            ALLERGIES    Allergies   Allergen Reactions    Uncaria Tomentosa (Cats Claw) Other (See Comments)     itch,sneezing       FAMILY HISTORY    Family History   Problem Relation Age of Onset    Heart Disease Maternal Grandmother     Heart Disease Maternal Grandfather     Stroke Maternal Grandfather        SOCIAL HISTORY    Social History     Socioeconomic History    Marital status:      Spouse name: None    Number of children: None    Years of education: None    Highest education level: None   Occupational History    None   Social Needs    Financial resource strain: None    Food insecurity:     Worry: None     Inability: None    Transportation needs:     Medical: None     Non-medical: None   Tobacco Use    Smoking status: Never Smoker    Smokeless tobacco: Current User     Types: Chew   Substance and Sexual Activity    Alcohol use: Not Currently     Alcohol/week: 1.2 oz     Types: 2 Cans of beer per week     Comment: occasional    Drug use: No    Sexual activity: Not Currently     Partners: Female   Lifestyle    Physical activity:     Days per week: None     Minutes per session: None    Stress: None   Relationships    Social connections:     Talks on phone: None     Gets together: None     Attends Baptism service: None     Active member of club or organization: None     Attends meetings of clubs or organizations: None     Relationship status: None    Intimate partner violence:     Fear of current or ex partner: None     Emotionally abused: None     Physically abused: None     Forced sexual activity: None   Other Topics Concern    None   Social History Narrative    None       REVIEW OF SYSTEMS    Constitutional: Generalized weakness eyes:  Denies photophobia or discharge   HENT:  Denies sore throat or ear pain   Respiratory:  Denies cough or shortness of breath   Cardiovascular: Cough  GI:  Denies abdominal pain, nausea, vomiting, or diarrhea   Musculoskeletal:  Denies back pain   Skin:  Denies rash   Neurologic:  Denies headache, focal weakness or sensory changes   Endocrine:  Denies polyuria or polydypsia   Lymphatic:  Denies swollen glands   Psychiatric:  Denies depression, suicidal ideation or homicidal ideation   All systems negative except as marked. PHYSICAL EXAM    VITAL SIGNS: BP (!) 117/48   Pulse 87   Temp 98.2 °F (36.8 °C) (Oral)   Resp 11   Ht 6' (1.829 m)   Wt (!) 302 lb (137 kg)   SpO2 98%   BMI 40.96 kg/m²    Constitutional: Ill-appearing male hENT:  Normocephalic, Atraumatic, Bilateral external ears normal, Oropharynx moist, No oral exudates, Nose normal. Neck- Normal range of motion, No tenderness, Supple, No stridor. Eyes:  PERRL, EOMI, Conjunctiva normal, No discharge. Respiratory: Nonlabored respirations, rhonchi  Cardiovascular: regular tachycardic rhythm GI:  Bowel sounds normal, Soft, No tenderness, No masses, No pulsatile masses. : External genitalia appear normal, No masses or lesions. No discharge. No CVA tenderness. Musculoskeletal:  Intact distal pulses, No edema, No tenderness, No cyanosis, No clubbing. Good range of motion in all major joints. No tenderness to palpation or major deformities noted. Back- No tenderness. Integument: Mottling of the skin of his legs   lymphatic:  No lymphadenopathy noted. Neurologic: Patient answers some questions and follows commands. Patient appears to be confused to time patient is oriented to person and place No focal deficits. Psychiatric:  Affect normal, Judgment normal, Mood normal.     EKG    Sinus tachycardia rate of 96    RADIOLOGY    CT Head WO Contrast   Final Result      1. Limited examination due to patient motion artifact. Within the limitations    of the examination, no acute intracranial abnormality is seen. 2. Sinus disease. Please correlate for acute sinusitis.          XR CHEST PORTABLE   Final Result Hypoinflation with mild streaky lower lung atelectasis. The lungs are otherwise    grossly clear.                    PROCEDURES        Labs  Labs Reviewed   CBC WITH AUTO DIFFERENTIAL - Abnormal; Notable for the following components:       Result Value    WBC 17.4 (*)     MCV 76.9 (*)     MCH 25.3 (*)     RDW 18.3 (*)     Platelets 423 (*)     Seg Neutrophils 77 (*)     Bands 15 (*)     Segs Absolute 13.40 (*)     Absolute Mono # 1.39 (*)     Absolute Bands # 2.61 (*)     All other components within normal limits   COMPREHENSIVE METABOLIC PANEL - Abnormal; Notable for the following components:    Glucose 184 (*)      (*)     CREATININE 9.60 (*)     Calcium 7.6 (*)     Sodium 134 (*)     Chloride 97 (*)     CO2 9 (*)     Anion Gap 28 (*)      (*)      (*)     Alb 3.4 (*)     GFR Non- 6 (*)     GFR  7 (*)     All other components within normal limits   URINE DRUG SCREEN - Abnormal; Notable for the following components:    Amphetamine Screen, Ur POSITIVE (*)     Cocaine Metabolite, Urine POSITIVE (*)     Oxycodone Screen, Ur POSITIVE (*)     Methamphetamine, Urine POSITIVE (*)     All other components within normal limits   LACTIC ACID, PLASMA - Abnormal; Notable for the following components:    Lactic Acid 3.2 (*)     All other components within normal limits   URINALYSIS - Abnormal; Notable for the following components:    Color, UA ANANT (*)     Turbidity UA HAZY (*)     Glucose, Ur 50 mg/dL (*)     Bilirubin Urine 2+ (*)     Ketones, Urine TRACE (*)     Urine Hgb 3+ (*)     Protein, UA 2+ (*)     Nitrite, Urine POSITIVE (*)     Leukocyte Esterase, Urine 1+ (*)     All other components within normal limits   PROTIME-INR - Abnormal; Notable for the following components:    Protime 55.9 (*)     INR 6.2 (*)     All other components within normal limits   MYOGLOBIN, SERUM - Abnormal; Notable for the following components:    Myoglobin 13,187 (*)     All other components within normal limits   MICROSCOPIC URINALYSIS - Abnormal; Notable for the following components:    Bacteria, UA RARE (*)     Amorphous, UA 2+ (*)     All other components within normal limits   TROPONIN - Abnormal; Notable for the following components:    Troponin T 0.10 (*)     All other components within normal limits   CK - Abnormal; Notable for the following components: Total CK 23,000 (*)     All other components within normal limits   CULTURE BLOOD #1   URINE CULTURE   ETHANOL   LACTIC ACID, PLASMA             Summation      Patient Course: IV started. Browne catheter is placed. Patient given Zosyn. Transferred to [facility]. Vitals are stable. Patient had 500 cc of  immediate return of dark brown-colored urine  Discussed the patient with Olga Peterson. Patient will be transferred to [place] ICU. Patient will given 2 amps of bicarb in D5 NS. ED Medications administered this visit:    Medications   0.9 % sodium chloride infusion ( Intravenous Stopped 6/11/19 0616)   dextrose 5 % and 0.9 % nacl bolus ( Intravenous Canceled Entry 6/11/19 0608)   0.9 % sodium chloride bolus (0 mLs Intravenous Stopped 6/11/19 0420)   piperacillin-tazobactam (ZOSYN) 3.375 g in dextrose 5 % 50 mL IVPB (mini-bag) (0 g Intravenous Stopped 6/11/19 0510)   vancomycin 1000 mg IVPB in 250 mL D5W addavial (0 mg Intravenous Stopped 6/11/19 0615)   sodium bicarbonate 8.4 % injection 100 mEq (100 mEq Intravenous Given 6/11/19 0606)       New Prescriptions from this visit:    New Prescriptions    No medications on file       Follow-up:  No follow-up provider specified. Final Impression:   1. Altered mental status, unspecified altered mental status type    2. Septicemia ([address]    3. Acute renal failure, unspecified acute renal failure type ([address]    4. Polysubstance abuse ([address]    5. Urinary tract infection without hematuria, site unspecified    6.  Non-traumatic rhabdomyolysis (Please note that portions of this note were completed with a voice recognition program.  Efforts were made to edit the dictations but occasionally words are mis-transcribed.)        Serenity Escobar MD  06/11/19 Arsalan Shelby MD  06/13/19 6651

## 2019-06-11 NOTE — ED NOTES
Pt's wife arrived and this nurse asked what happened and she stated that pt came home Friday from doing a sentence in a community center instead of [facility] d/t his medical conditions, she stated that pt c/o weakness, she also stated that pt has fallen 3-4 times since coming home, stated that pt slept all day Sunday, this AM pt was in bathroom having difficulty standing and keeping his balance, tried to take a shower and then was crawling out of the tub/shower, family doesn't know if pt hit is head with any of his falls or not;  Pt denies any drug/alcohol use;      Kevin Douglas, RN  06/11/19 4059

## 2019-06-11 NOTE — CONSULTS
NEPHROLOGY     REASON FOR CONSULT:     ELIE (BUN/Creat  125/ 9.60 on admission     With baseline creatinine 1.4 1-.6          With   Metabolic Acidosis    Risk Factors for ELIE:rhabdomyolysis,drug abuse and poor oral intake with dehydration. HISTORY OF PRESENTING ILLNESS                 This is a 39 y.o. male who was brought to the ED with confusion and Altered mental status since the last couple of days. History was provided by patient's family, primarily mother. She stated that since the last couple of days patient has been feeling drowsy and is very somnolent. A couple of days back when she called her son treated that he was fine and then progressively had a decline in the mental status, being somnolent most of the time and poor oral intake. It is not known the last couple of days whether he had any access to IV drug abuse but his drug screen has been positive. Patient has been reportedly visiting a pain doctor from where he is procuring oxycodone and getting shots in the back. It was also told by his mother that he has a history of overdosing on 3 prior occasions. Patient's mother stated that he has used cocaine, amphetamine, benzodiazepines on past occasions also. Patient's mother also stated that he has a history of antiphospholipid antibody syndrome since his childhood and has been on prednisone for last 25 years. Patient's mother also stated that he has had history of mini strokes/TIA in the past.  Patient's mother stated that prior to being home in the last couple of days he was at a correctional facility/alf. After being released from the [facility] his health has been on the decline. As per his mother it was not known that after being released from the [facility] how and why and from where did he have access to IV drugs. On examination, patient has been responsive to commands, intermittently AAO x1-2, arousable to pain.   Patient had multiple bruises on the right lower extremity, bluish-purple toes.    In the emergency department on lab work-up, it was found that BUN of 125, creatinine 9.60, anion gap of 28, total CK 23,000, myoglobin 13, 187, proBNP 4237. Imaging of the head unremarkable for any acute trauma. , . Urine output: Scant  No history of contrast exposure  No history of hypotension  No history of NSAID/ACE/ARB/nephrotoxic agents : History of polysubstance abuse, UDS positive for cocaine, amphetamine, oxycodone, methamphetamine. No history suggestive of obstruction  No history of nausea/vomiting/diarrhoea  No history of ELIE in past.  Unknown   no history of recurrent UTI infection/surgeries to [facility]          PAST MEDICAL HISTORY         Diagnosis Date    Antiphospholipid antibody with hemorrhagic disorder ([facility]     Arthritis     Asthma     Bronchitis     Cerebral artery occlusion with cerebral infarction ([facility]     Disease of blood and blood forming organ     Hypertension     Long-term (current) use of anticoagulants, INR goal 2.5-3.5     Pleurisy     Pneumonia     Seizures ([facility]        PAST SURGICAL HISTORY          Procedure Laterality Date    CARDIAC CATHETERIZATION         MEDICATIONS     Home Meds:                Medications Prior to Admission: warfarin (COUMADIN) 10 MG tablet, Take 1 tablet on Mondays, Wednesdays and Fridays or as directed. Managed by [facility]  warfarin (COUMADIN) 7.5 MG tablet, Take 1 tablet 4 days weekly in the evening on Sundays, Tuesdays, Thursdays, and Saturdays or as directed. Managed by [facility]  topiramate (TOPAMAX) 200 MG tablet, Take 100 mg by mouth 2 times daily  atenolol (TENORMIN) 25 MG tablet, Take 25 mg by mouth daily   predniSONE (DELTASONE) 10 MG tablet, Take 10 mg by mouth daily.     Scheduled Meds:    sodium chloride flush  10 mL Intravenous 2 times per day    famotidine (PEPCID) injection  20 mg Intravenous Daily    sodium chloride flush  10 mL Intravenous 2 times per day    piperacillin-tazobactam  2.25 g Intravenous Q8H     Continuous Infusions:    IV infusion builder       PRN Meds:  sodium chloride flush, magnesium hydroxide, ondansetron, acetaminophen, sodium chloride flush    ALLERGY     Uncaria tomentosa (cats claw)       SOCIAL HISTORY     Social History     Socioeconomic History    Marital status:      Spouse name: Not on file    Number of children: Not on file    Years of education: Not on file    Highest education level: Not on file   Occupational History    Not on file   Social Needs    Financial resource strain: Not on file    Food insecurity:     Worry: Not on file     Inability: Not on file    Transportation needs:     Medical: Not on file     Non-medical: Not on file   Tobacco Use    Smoking status: Never Smoker    Smokeless tobacco: Current User     Types: Chew   Substance and Sexual Activity    Alcohol use: Not Currently     Alcohol/week: 1.2 oz     Types: 2 Cans of beer per week     Comment: occasional    Drug use: No    Sexual activity: Not Currently     Partners: Female   Lifestyle    Physical activity:     Days per week: Not on file     Minutes per session: Not on file    Stress: Not on file   Relationships    Social connections:     Talks on phone: Not on file     Gets together: Not on file     Attends [employer] service: Not on file     Active member of club or organization: Not on file     Attends meetings of clubs or organizations: Not on file     Relationship status: Not on file    Intimate partner violence:     Fear of current or ex partner: Not on file     Emotionally abused: Not on file     Physically abused: Not on file     Forced sexual activity: Not on file   Other Topics Concern    Not on file   Social History Narrative    Not on file       FAMILY HISTORY     Family History   Problem Relation Age of Onset    Heart Disease Maternal Grandmother     Heart Disease Maternal Grandfather     Stroke Maternal Grandfather REVIEW OF SYSTEM     ROS could not be performed as patient is not responsive. PHYSICAL EXAM     Vitals:    06/11/19 1115 06/11/19 1130 06/11/19 1145   BP: (!) 103/54  (!) 78/48   Pulse: 97 87 83   Resp: 17 12 17   Temp: 98.8 °F (37.1 °C)     TempSrc: Oral     SpO2:  98% 97%   Weight: (!) 318 lb 12.6 oz (144.6 kg)     Height: 6' (1.829 m)       24HR INTAKE/OUTPUT:  No intake or output data in the 24 hours ending 06/11/19 1217    General appearance: AAO x 0-1.  very somnolent  Skin: warm and dry, no rash or erythema  Eyes: conjunctivae normal and sclera anicteric  ENT: no thrush no pharyngeal congestion  orodental hygiene   Neck: No JVD, Lymphadenopathty or thyromegaly  Respiratory: vesicular breath sounds,no wheeze/crackles  Cardiovascular: S1 S2 normal,no gallop or organic murmur. No carotid bruit  Abdomen:Non tender/non distended. Bowel sounds present, Browne catheter  Extremities: Bruise marks on right lower extremity. Suggestive of trauma. Neurological:Alert and oriented X 0-1. INVESTIGATIONS      CBC:   Recent Labs     06/11/19  0315 06/11/19  1152   WBC 17.4* 15.9*   RBC 5.34 4.72   HGB 13.5 11.7*   HCT 41.0 37.4*   MCV 76.9* 79.2*   MCH 25.3* 24.8*   MCHC 32.9 31.3   RDW 18.3* 17.3*   * 111*   MPV NOT REPORTED 10.6      BMP:   Recent Labs     06/11/19  0315   *   K 5.3   CL 97*   CO2 9*   *   CREATININE 9.60*   GLUCOSE 184*   CALCIUM 7.6*        Phosphorus:  No results for input(s): PHOS in the last 72 hours. Magnesium: No results for input(s): MG in the last 72 hours. Albumin:   Recent Labs     06/11/19  0315   LABALBU 3.4*       Radiology:  Reviewed as available. ASSESSMENT     1. ELIE secondary to nephrotoxic (rhabdomyolysis) and ischemic ATN (, poor oral intake with dehydrationcontributed by drug abuse) - improving  2. Anion gap metabolic acidosis from ELIE  3.  Antiphospholipid antibody syndrome - primary  Prednisone use for the last 25 years and on anticoagulation  4. History of TIA  5. Polysubstance abuse   Positive for cocaine/amphetamine  6. Rhabdomyolysis from volume depletion/substance abuse    . PLAN:     1.T,P,RR,BP & I/O monitoring, Browne catheter  2. Avoid Nephrotoxic agents  3. Keep MAP >60 /SBP >110 mm Hg  4. Antibiotics dosed as per eGFR  5. Urine routine analysis/Random Urine protein/sodium/creatinine/albumin  6. GAGE,Complement levels,Acute hepatitis panel to exclude occult connective tissue dis  7. SPEP/UPEP/sFLC to rule out occult  Paraproteinemia  8. USS Kidneys for size,echogenicity and exclude obstruction  9. Fluids 0.9 % NS at    150 mL/h with IV bicarbonate at 200 mL/h.  10.  Sodium gluconate IV once per primary  11. Patient might need dialysis if the kidney function continues to decline or does not improve with fluid resuscitation. Same has been communicated with family. Patient's family voiced understanding and is willing to proceed with dialysis as and when needed. Thank you for the consultation. Will discuss with the attending for further recommendations. Please do not hesitate to call with questions. This note is created with the assistance of a speech-recognition program. While intending to generate a document that actually reflects the content of the visit, no guarantees can be provided that every mistake has been identified and corrected by editing. Rafael Meza M.D. Internal Medicine Resident , PGY-1  [address] [place] [address]  06/11/19 1:36 PM  Attending Physician Statement  I have discussed the care of Radha Garcia, including pertinent history and exam findings,  with the Fellow/Residentt. I have reviewed the key elements of all parts of the encounter with the Fellow/ Resident. I agree with the assessment, plan and orders as documented by the resident.     Hui Franco MD, MRCP Lisa Avilez, FACP   6/11/2019 1:45 PM    Nephrology [address] [place] [address]

## 2019-06-11 NOTE — PROGRESS NOTES
NS bolus initated following the sepsis protocol as directed in orders. Total to be given is 4338ml. Alexandro Helton paged for consult on pt. Dr. Mere Roberts arrived to bedside. Labs have been drawn and sent to lab. Will continue to monitor.

## 2019-06-11 NOTE — H&P
Critical Care - History and Physical Examination    Patient's name:  Merrick Santana  Medical Record Number: [id number]  Patient's account/billing number: [de-identified]  Patient's YOB: 1975  Age: 39 y.o. Date of Admission: 6/11/2019 11:01 AM  Reason of ICU admission:   Date of History and Physical Examination: 6/11/2019      Primary Care Physician: Adithya Avery CNP, KIESHA - CNP  Attending Physician:    Code Status: Full Code    Chief complaint: AMS    Reason for ICU admission: rhabdo, AMS      History Of Present Illness:   History was obtained from mother and chart review. Merrick Santana is a 39 y.o. Hx of antiphospholipid disorder per family and was found down. EMS originally called to assist pt due to inability to \"get up\". Pt altered on EMS exam and brought into outlying hospital. Found to be in rhabdomyolysis and continued AMS so transferred to [place] [id number]. Mother states pt has Hx of drug abuse, was clean for some time since getting out of rehab but must have gotten back into using drugs given +urine and blood tox screen. Unknown last well time and how long down for. Daughter of pt at bedside but sleeping. No Hx obtained from pt due to his condition. Past Medical History:        Diagnosis Date    [facility]iphospholipid antibody with hemorrhagic disorder ([facility] [address] [facility]     Arthritis     Asthma     Bronchitis     Cerebral artery occlusion with cerebral infarction ([facility] [address] [facility]     Disease of blood and blood forming organ     Hypertension     Long-term (current) use of anticoagulants, INR goal 2.5-3.5     Pleurisy     Pneumonia     Seizures (HCC)        Past Surgical History:        Procedure Laterality Date    CARDIAC CATHETERIZATION         Allergies: Allergies   Allergen Reactions    Uncaria Tomentosa (Cats Claw) Other (See Comments)     itch,sneezing         Home Medications:   Prior to Admission medications    Medication Sig Start Date End Date Taking?  Authorizing Provider   warfarin (COUMADIN) 10 MG tablet Take 1 tablet on Mondays, Wednesdays and Fridays or as directed. Managed by [address] 5/16/19   Taty Devries MD   warfarin (COUMADIN) 7.5 MG tablet Take 1 tablet 4 days weekly in the evening on Sundays, Tuesdays, Thursdays, and Saturdays or as directed. Managed by [address] 5/16/19   Taty Devries MD   topiramate (TOPAMAX) 200 MG tablet Take 100 mg by mouth 2 times daily    Historical Provider, MD   atenolol (TENORMIN) 25 MG tablet Take 25 mg by mouth daily     Historical Provider, MD   predniSONE (DELTASONE) 10 MG tablet Take 10 mg by mouth daily. Historical Provider, MD       Social History:   TOBACCO:   reports that he has never smoked. His smokeless tobacco use includes chew. ETOH:   reports that he drank about 1.2 oz of alcohol per week. DRUGS:  reports that he does not use drugs. OCCUPATION:      Family History:       Problem Relation Age of Onset    Heart Disease Maternal Grandmother     Heart Disease Maternal Grandfather     Stroke Maternal Grandfather      REVIEW OF SYSTEMS (ROS):  Unable to obtain due to condition       Physical Exam:    Vitals: BP (!) 78/48   Pulse 83   Temp 98.8 °F (37.1 °C) (Oral)   Resp 17   Ht 6' (1.829 m)   Wt (!) 318 lb 12.6 oz (144.6 kg)   SpO2 97%   BMI 43.24 kg/m²     Body weight:   Wt Readings from Last 3 Encounters:   06/11/19 (!) 318 lb 12.6 oz (144.6 kg)   06/11/19 (!) 302 lb (137 kg)   05/16/19 (!) 309 lb 3.2 oz (140.3 kg)     Body Mass Index : Body mass index is 43.24 kg/m². PHYSICAL EXAMINATION :  Constitutional: moderate distress, only follows simple commands, altered   EENT: PERRLA, EOMI, sclera clear, anicteric, oropharynx clear, no lesions, neck supple with midline trachea.   Neck: Supple, symmetrical, trachea midline, no adenopathy, thyroid symmetric, no jvd skin normal  Respiratory: equal breath sounds b/l, no wheezes or rales, No intercostal tenderness  Cardiovascular: regular rate and rhythm  Abdomen: soft, nontender, nondistended, no masses or organomegaly  Neurological: altered, minimally responsive, maintaining airway appropriately but confused    Extremities:  peripheral pulses normal, no pedal edema, no clubbing or cyanosis    Laboratory findings:-    CBC:   Recent Labs     06/11/19  1152   WBC 15.9*   HGB 11.7*   *     BMP:    Recent Labs     06/11/19  0315   *   K 5.3   CL 97*   CO2 9*   *   CREATININE 9.60*   GLUCOSE 184*     S. Calcium:  Recent Labs     06/11/19  0315   CALCIUM 7.6*     S. Ionized Calcium:No results for input(s): IONCA in the last 72 hours. S. Magnesium:No results for input(s): MG in the last 72 hours. S. Phosphorus:No results for input(s): PHOS in the last 72 hours. S. Glucose:No results for input(s): POCGLU in the last 72 hours. Glycosylated hemoglobin A1C: No results for input(s): LABA1C in the last 72 hours. INR:   Recent Labs     06/11/19  1154   INR 6.2*     Hepatic functions:   Recent Labs     06/11/19  0315   ALKPHOS 83   *   *   PROT 7.1   BILITOT 0.61   LABALBU 3.4*     Pancreatic functions:  Recent Labs     06/11/19  1154   LACTA NOT REPORTED     S. Lactic Acid:   Recent Labs     06/11/19  1154   LACTA NOT REPORTED     Cardiac enzymes:  Recent Labs     06/11/19  0315   CKTOTAL 23,000*     BNP:No results for input(s): BNP in the last 72 hours. Lipid profile: No results for input(s): CHOL, TRIG, HDL, LDLCALC in the last 72 hours.     Invalid input(s): LDL  Blood Gases: No results found for: PH, PCO2, PO2, HCO3, O2SAT  Thyroid functions: No results found for: TSH     Urinalysis:   Cultures during this admission:     Blood cultures:                 [] None drawn      [] Negative             []  Positive (Details:  )    Urine Culture:                   [] None drawn      [] Negative             []  Positive (Details:  )  Color, UA AMBERAbnormal   YELLOW Final 06/11/2019  4:12 AM - [facility]   Turbidity UA HAZYAbnormal   CLEAR Final 06/11/2019  4:12 AM [facility]   Glucose, Ur 50 mg/dLAbnormal   NEGATIVE Final 06/11/2019  4:12 AM [facility]   Bilirubin Urine 2+Abnormal   NEGATIVE Final 06/11/2019  4:12 AM [address] [facility]   Ketones, Urine TRACEAbnormal   NEGATIVE Final 06/11/2019  4:12 AM [facility]   Specific [place], UA 1.025  1.005 - 1.030 Final 06/11/2019  4:12 AM [address] [facility]   Urine Hgb 3+Abnormal   NEGATIVE Final 06/11/2019  4:12 AM [address] [facility]   pH, UA 5.0  5.0 - 8.0 Final 06/11/2019  4:12 AM [facility]   Protein, UA 2+Abnormal   NEGATIVE Final 06/11/2019  4:12 AM [address] [facility]   Urobilinogen, Urine Normal  Normal Final 06/11/2019  4:12  [address] [facility]   Nitrite, Urine POSITIVEAbnormal   NEGATIVE Final 06/11/2019  4:12 AM [facility]   Leukocyte Esterase, Urine 1+Abnormal   NEGATIVE Final 06/11/2019  4:12 AM [facility]   Urinalysis Comments        Final 06/11/2019  4:12  [address] [facility]     Urine drug   Amphetamine Screen, Ur POSITIVEAbnormal   NEGATIVE Final 06/11/2019  4:12 AM [facility]          (Positive cutoff 500 ng/mL)                Barbiturate Screen, Ur NEGATIVE  NEGATIVE Final 06/11/2019  4:12 AM [facility]          (Positive cutoff 200 ng/mL)                Benzodiazepine Screen, Urine NEGATIVE  NEGATIVE Final 06/11/2019  4:12 AM [facility]          (Positive cutoff 150 ng/mL)                Cocaine Metabolite, Urine POSITIVEAbnormal   NEGATIVE Final 06/11/2019  4:12 AM [facility]          (Positive cutoff 150 ng/mL)                Methadone Screen, Urine NEGATIVE  NEGATIVE Final 06/11/2019  4:12 AM [facility]          (Positive cutoff 200 ng/mL)                Opiates, Urine NEGATIVE  NEGATIVE Final 06/11/2019  4:12 AM [facility] technique.           FINDINGS: Of note, the examination is limited due to motion artifact. The    cerebral sulci and ventricles are normal in size and shape.  The density of the    cerebrum, brainstem, and cerebellum is unremarkable. Vicie Pastel is no evidence of    intracranial hemorrhage, mass, or midline shift.  No extra-axial fluid    collection is seen. The brainstem and cerebellum are normal in appearance. There is complete opacification of the right maxillary sinus with mucosal    thickening in the frontal, bilateral ethmoid, and bilateral sphenoid sinuses. Otherwise, the visualized paranasal sinuses and mastoid air cells are clear. No    skull abnormalities are identified.               Impression       1. Limited examination due to patient motion artifact. Within the limitations    of the examination, no acute intracranial abnormality is seen.       2. Sinus disease. Please correlate for acute sinusitis.          Electrocardiogram   Ventricular Rate 85  BPM Preliminary 06/11/2019 12:24    Atrial Rate 85  BPM Preliminary 06/11/2019 12:24    P-R Interval 166  ms Preliminary 06/11/2019 12:24    QRS Duration 96  ms Preliminary 06/11/2019 12:24    Q-T Interval 416  ms Preliminary 06/11/2019 12:24    QTc Calculation (Bazett) 495  ms Preliminary 06/11/2019 12:24    P Axis 56  degrees Preliminary 06/11/2019 12:24    R Axis 22  degrees Preliminary 06/11/2019 12:24    T Axis 11  degrees Preliminary 06/11/2019 12:24      Normal sinus rhythm  Inferior infarct (cited on or before 02-APR-2016)  Abnormal ECG  When compared with ECG of 12-AUG-2018 03:27,  T wave inversion now evident in Inferior leads         Last Echocardiogram findings: 2016  Order: [id number]   Status:  Edited Result - FINAL   Visible to patient:  No (Not Released) Next appt:  06/13/2019 at 03:00 PM in Pharmacy ([address] [facility])   Details     Reading Physician Reading Date Result Priority Myriam Robert MD 4/5/2016    Unknown Provider Result 4/4/2016       Narrative     Transthoracic Echocardiography Report (TTE)     Patient Name [facility]   Date of Study               04/04/2016               Lizette Luna      Date of      1975  Gender                      Male   Birth      Age          40 year(s)  Race                              Room Number  5603        Height:                     71 inch, 180.34 cm      Corporate ID [id number]  Weight:                     332 pounds, 150.6 kg   #      Patient Acct [de-identified]    BSA:          2.62 m^2      BMI:       46.3   #                                                               kg/m^2      MR #         [id number]     Sonographer                 Katheryn Huerta      Accession #  [id number]   Interpreting Physician      Jose Rafael Bellamy      Fellow                   Referring Nurse                            Practitioner      Interpreting             Referring Physician         RACHEAL SAMANO,   Ashtyn     Type of Study      TTE procedure:2D Echocardiogram, M-Mode, Doppler, Color Doppler.     Procedure Date  Date: 04/04/2016 Start: 03:37 PM    Study Location: [facility]  Technical Quality: Adequate visualization    Indications:Elevated troponin. History / Tech. Comments:  Procedure explained to patient. Morbid obesity, polysubstance abuse    Patient Status: Inpatient    Height: 71 inches Weight: 332 pounds BSA: 2.62 m^2 BMI: 46.3 kg/m^2    Allergies    - *No Known Allergies. CONCLUSIONS    Summary  Dilated left ventricle with preserved systolic function. Estimated ejection fraction is 50-55%. Mildly increased left ventricular wall thickness. Aortic valve sclerosis without stenosis. Mild tricuspid regurgitation. Trivial pericardial effusion.     Signature  ----------------------------------------------------------------------------   Electronically signed by Mary Martinez) on 04/04/2016 04:22 PM  ----------------------------------------------------------------------------    ----------------------------------------------------------------------------   Electronically signed by Jose Rafael Bellamy(Interpreting physician) on   2016 03:30 PM  ----------------------------------------------------------------------------  FINDINGS  Left Atrium  Left atrium is mildly dilated. Left Ventricle  Dilated left ventricle with preserved systolic function. Estimated ejection fraction is 50-55%. Mildly increased left ventricular wall thickness. Right Atrium  Right atrium is normal in size. Right Ventricle  Normal right ventricular size and function. Mitral Valve  Trivial mitral regurgitation. Normal mitral valve structure. Aortic Valve  No aortic insufficiency. Aortic valve sclerosis without stenosis. Tricuspid Valve  Mild tricuspid regurgitation. Estimated right ventricular systolic pressure is 35 mm Hg. Pulmonic Valve  The pulmonic valve is normal in structure. Pericardial Effusion  Trivial pericardial effusion. Miscellaneous  Normal aortic root dimension.   E/E'' = 7 .    M-mode / 2D Measurements & Calculations:      LVIDd:6.39 cm(3.7 - 5.6 cm)      Diastolic EMSBRL:039 ml   HMWBU:0.62 cm(2.2 - 4.0 cm)      Systolic TYRJBJ:92.4 ml   PJKR:9.40 cm(0.6 - 1.1 cm)       Aortic Root:3.6 cm(2.0 - 3.7 cm)   LVPWd:1.4 cm(0.6 - 1.1 cm)       LA Dimension: 4.5 cm(1.9 - 4.0 cm)   Fractional Shortenin.07 %    LA volume/Index: 67 ml /26m^2   Calculated LVEF (%): 83.45 %      Mitral:                                   Aortic      Valve Area (P1/2-Time): 3.49 cm^2         Peak Velocity: 1.41 m/s   Peak E-Wave: 0.69 m/s                     Peak Gradient: 7.95 mmHg   Peak A-Wave: 0.65 m/s   E/A Ratio: 1.05   Peak Gradient: 1.88 mmHg   Mean Gradient: 2 mmHg   Deceleration Time: 187 msec   P1/2t: 63 msec      Mean Velocity: 0.59 m/s      Tricuspid:                                Pulmonic:      Estimated RVSP: 35 mmHg                   Peak Velocity: 1.43 m/s   Peak TR Velocity: 2.52 m/s                Peak Gradient: 8.18 mmHg   Peak TR Gradient: 25.4016 mmHg   Estimated RA Pressure: 10 mmHg                                                Estimated PASP: 35.4 mmHg     Diastology / Tissue Doppler  Septal Wall E' velocity:0.11 m/s  Septal Wall E/E':7  Lateral Wall E' velocity:0.13 m/s  Lateral Wall E/E':6      Component 3yr ago   Left Ventricular Ejection Fraction 53    LVEF MODALITY ECHO               Assessment and Plan     Patient Active Problem List   Diagnosis    Antiphospholipid antibody syndrome    Long term current use of anticoagulant therapy    Polysubstance abuse [facility] [address]    ELIE (acute kidney injury) ([facility] [address]    Elevated transaminase level    Leukocytosis    Seizures (HCC)    Hypertension    Asthma    Cerebral artery occlusion with cerebral infarction (HCC)    Unresponsive episode    Seizure disorder (HCC)    Lupus anticoagulant syndrome (HCC)    Unresponsive    Drug overdose    Rhabdomyolysis       Plan:    1. Neuro- altered at this time, monitor off any sedation, neuro checks per unit, pain control PRN tylenol     2. CV- monitor, NSR currently, Hx cocaine abuse avoid unopposed beta blocker use, Hx cardiac cath, Keep MAP >60 /SBP >110 mm Hg    3. Resp- monitor, supplement as needed     4. GI- NPO, monitor bowel function, IV pepcid     5. Renal- monitor UOP, nephro following, GAGE,Complement levels,Acute hepatitis panel to exclude occult connective tissue disease, Hx of antiphospholipid disorder per family, SPEP/UPEP/sFLC to rule out occult  Paraproteinemia, USS Kidneys for size,echogenicity and exclude obstruction, Fluids 0.9 % NS at    150 mL/h with IV bicarbonate at 200 mL/h, Sodium gluconate IV once  Patient might need dialysis if the kidney function continues to decline or does not improve with fluid resuscitation. Same has been communicated with family.   Patient's family voiced understanding and is

## 2019-06-12 ENCOUNTER — APPOINTMENT (OUTPATIENT)
Dept: INTERVENTIONAL RADIOLOGY/VASCULAR | Age: 44
DRG: 557 | End: 2019-06-12
Attending: INTERNAL MEDICINE
Payer: MEDICARE

## 2019-06-12 ENCOUNTER — APPOINTMENT (OUTPATIENT)
Dept: GENERAL RADIOLOGY | Age: 44
DRG: 557 | End: 2019-06-12
Attending: INTERNAL MEDICINE
Payer: MEDICARE

## 2019-06-12 LAB
ABSOLUTE EOS #: 0.17 K/UL (ref 0–0.4)
ABSOLUTE IMMATURE GRANULOCYTE: 0 K/UL (ref 0–0.3)
ABSOLUTE LYMPH #: 0.25 K/UL (ref 1–4.8)
ABSOLUTE MONO #: 0.58 K/UL (ref 0.1–0.8)
ACTION: NORMAL
ALBUMIN SERPL-MCNC: 2.2 G/DL (ref 3.5–5.2)
ALBUMIN/GLOBULIN RATIO: 0.7 (ref 1–2.5)
ALLEN TEST: ABNORMAL
ALLEN TEST: ABNORMAL
ALP BLD-CCNC: 64 U/L (ref 40–129)
ALT SERPL-CCNC: 93 U/L (ref 5–41)
ANION GAP SERPL CALCULATED.3IONS-SCNC: 19 MMOL/L (ref 9–17)
ANION GAP SERPL CALCULATED.3IONS-SCNC: 22 MMOL/L (ref 9–17)
ANION GAP SERPL CALCULATED.3IONS-SCNC: 23 MMOL/L (ref 9–17)
ANTI-NUCLEAR ANTIBODY (ANA): ABNORMAL
AST SERPL-CCNC: 183 U/L
BASOPHILS # BLD: 0 % (ref 0–2)
BASOPHILS ABSOLUTE: 0 K/UL (ref 0–0.2)
BILIRUB SERPL-MCNC: 0.58 MG/DL (ref 0.3–1.2)
BILIRUBIN DIRECT: 0.42 MG/DL
BILIRUBIN, INDIRECT: 0.16 MG/DL (ref 0–1)
BUN BLDV-MCNC: 112 MG/DL (ref 6–20)
BUN BLDV-MCNC: 123 MG/DL (ref 6–20)
BUN BLDV-MCNC: 94 MG/DL (ref 6–20)
BUN/CREAT BLD: ABNORMAL (ref 9–20)
CALCIUM IONIZED: 0.87 MMOL/L (ref 1.13–1.33)
CALCIUM SERPL-MCNC: 6.4 MG/DL (ref 8.6–10.4)
CALCIUM SERPL-MCNC: 6.8 MG/DL (ref 8.6–10.4)
CALCIUM SERPL-MCNC: 7.3 MG/DL (ref 8.6–10.4)
CARBOXYHEMOGLOBIN: 1 % (ref 0–5)
CHLORIDE BLD-SCNC: 93 MMOL/L (ref 98–107)
CHLORIDE BLD-SCNC: 94 MMOL/L (ref 98–107)
CHLORIDE BLD-SCNC: 97 MMOL/L (ref 98–107)
CK MB: 160.8 NG/ML
CO2: 15 MMOL/L (ref 20–31)
CO2: 16 MMOL/L (ref 20–31)
CO2: 22 MMOL/L (ref 20–31)
CREAT SERPL-MCNC: 6.98 MG/DL (ref 0.7–1.2)
CREAT SERPL-MCNC: 8.33 MG/DL (ref 0.7–1.2)
CREAT SERPL-MCNC: 8.83 MG/DL (ref 0.7–1.2)
CULTURE: NO GROWTH
D-DIMER QUANTITATIVE: 2.05 MG/L FEU
DATE AND TIME: NORMAL
DIFFERENTIAL TYPE: ABNORMAL
EKG ATRIAL RATE: 85 BPM
EKG P AXIS: 56 DEGREES
EKG P-R INTERVAL: 166 MS
EKG Q-T INTERVAL: 416 MS
EKG QRS DURATION: 96 MS
EKG QTC CALCULATION (BAZETT): 495 MS
EKG R AXIS: 22 DEGREES
EKG T AXIS: 11 DEGREES
EKG VENTRICULAR RATE: 85 BPM
EOSINOPHILS RELATIVE PERCENT: 2 % (ref 1–4)
FDP: <5 UG/ML
FIBRINOGEN: 687 MG/DL (ref 140–420)
FIO2: 60
FIO2: ABNORMAL
GFR AFRICAN AMERICAN: 10 ML/MIN
GFR AFRICAN AMERICAN: 8 ML/MIN
GFR AFRICAN AMERICAN: 9 ML/MIN
GFR NON-AFRICAN AMERICAN: 7 ML/MIN
GFR NON-AFRICAN AMERICAN: 7 ML/MIN
GFR NON-AFRICAN AMERICAN: 9 ML/MIN
GFR SERPL CREATININE-BSD FRML MDRD: ABNORMAL ML/MIN/{1.73_M2}
GLOBULIN: ABNORMAL G/DL (ref 1.5–3.8)
GLUCOSE BLD-MCNC: 103 MG/DL (ref 74–100)
GLUCOSE BLD-MCNC: 105 MG/DL (ref 75–110)
GLUCOSE BLD-MCNC: 108 MG/DL (ref 70–99)
GLUCOSE BLD-MCNC: 125 MG/DL (ref 70–99)
GLUCOSE BLD-MCNC: 158 MG/DL (ref 70–99)
GLUCOSE BLD-MCNC: 91 MG/DL (ref 75–110)
GLUCOSE BLD-MCNC: 93 MG/DL (ref 75–110)
GLUCOSE BLD-MCNC: 96 MG/DL (ref 75–110)
HAV IGM SER IA-ACNC: NONREACTIVE
HCO3 VENOUS: 15.6 MMOL/L (ref 24–30)
HCT VFR BLD CALC: 35.1 % (ref 40.7–50.3)
HEMOGLOBIN: 10.6 G/DL (ref 13–17)
HEPATITIS B CORE IGM ANTIBODY: NONREACTIVE
HEPATITIS B SURFACE ANTIGEN: NONREACTIVE
HEPATITIS C ANTIBODY: NONREACTIVE
IMMATURE GRANULOCYTES: 0 %
INR BLD: 1.6
INR BLD: 1.7
INR BLD: 9.9
LV EF: 57 %
LVEF MODALITY: NORMAL
LYMPHOCYTES # BLD: 3 % (ref 24–44)
Lab: NORMAL
MAGNESIUM: 2.4 MG/DL (ref 1.6–2.6)
MCH RBC QN AUTO: 24.3 PG (ref 25.2–33.5)
MCHC RBC AUTO-ENTMCNC: 30.2 G/DL (ref 28.4–34.8)
MCV RBC AUTO: 80.3 FL (ref 82.6–102.9)
METHEMOGLOBIN: ABNORMAL % (ref 0–1.5)
MODE: ABNORMAL
MODE: ABNORMAL
MONOCYTES # BLD: 7 % (ref 1–7)
MORPHOLOGY: ABNORMAL
MRSA, DNA, NASAL: NORMAL
MYOGLOBIN: 2316 NG/ML (ref 28–72)
MYOGLOBIN: 3766 NG/ML (ref 28–72)
NEGATIVE BASE EXCESS, ART: 8 (ref 0–2)
NEGATIVE BASE EXCESS, VEN: 12.4 MMOL/L (ref 0–2)
NOTIFICATION TIME: ABNORMAL
NOTIFICATION: ABNORMAL
NOTIFY: NORMAL
NRBC AUTOMATED: 0 PER 100 WBC
O2 DEVICE/FLOW/%: ABNORMAL
O2 DEVICE/FLOW/%: ABNORMAL
O2 SAT, VEN: 54.1 % (ref 60–85)
OXYHEMOGLOBIN: ABNORMAL % (ref 95–98)
PARTIAL THROMBOPLASTIN TIME: 56.4 SEC (ref 20.5–30.5)
PATIENT TEMP: 37
PATIENT TEMP: ABNORMAL
PCO2, VEN, TEMP ADJ: ABNORMAL MMHG (ref 39–55)
PCO2, VEN: 45.6 (ref 39–55)
PDW BLD-RTO: 17.5 % (ref 11.8–14.4)
PEEP/CPAP: ABNORMAL
PH VENOUS: 7.16 (ref 7.32–7.42)
PH, VEN, TEMP ADJ: ABNORMAL (ref 7.32–7.42)
PHOSPHORUS: 9.3 MG/DL (ref 2.5–4.5)
PLATELET # BLD: 104 K/UL (ref 138–453)
PLATELET ESTIMATE: ABNORMAL
PMV BLD AUTO: 10.7 FL (ref 8.1–13.5)
PO2, VEN, TEMP ADJ: ABNORMAL MMHG (ref 30–50)
PO2, VEN: 35.3 (ref 30–50)
POC HCO3: 22 MMOL/L (ref 21–28)
POC O2 SATURATION: 95 % (ref 94–98)
POC PCO2 TEMP: ABNORMAL MM HG
POC PCO2: 68.1 MM HG (ref 35–48)
POC PH TEMP: ABNORMAL
POC PH: 7.12 (ref 7.35–7.45)
POC PO2 TEMP: ABNORMAL MM HG
POC PO2: 99.5 MM HG (ref 83–108)
POSITIVE BASE EXCESS, ART: ABNORMAL (ref 0–3)
POSITIVE BASE EXCESS, VEN: ABNORMAL MMOL/L (ref 0–2)
POTASSIUM SERPL-SCNC: 4.3 MMOL/L (ref 3.7–5.3)
PROTHROMBIN TIME: 16.5 SEC (ref 9–12)
PROTHROMBIN TIME: 16.8 SEC (ref 9–12)
PROTHROMBIN TIME: 87.2 SEC (ref 9–12)
PSV: ABNORMAL
PT. POSITION: ABNORMAL
RBC # BLD: 4.37 M/UL (ref 4.21–5.77)
RBC # BLD: ABNORMAL 10*6/UL
READ BACK: YES
RESPIRATORY RATE: ABNORMAL
SAMPLE SITE: ABNORMAL
SAMPLE SITE: ABNORMAL
SEG NEUTROPHILS: 88 % (ref 36–66)
SEGMENTED NEUTROPHILS ABSOLUTE COUNT: 7.3 K/UL (ref 1.8–7.7)
SET RATE: ABNORMAL
SODIUM BLD-SCNC: 131 MMOL/L (ref 135–144)
SODIUM BLD-SCNC: 135 MMOL/L (ref 135–144)
SODIUM BLD-SCNC: 135 MMOL/L (ref 135–144)
SPECIMEN DESCRIPTION: NORMAL
SPECIMEN DESCRIPTION: NORMAL
TCO2 (CALC), ART: 24 MMOL/L (ref 22–29)
TEXT FOR RESPIRATORY: ABNORMAL
TOTAL CK: ABNORMAL U/L (ref 39–308)
TOTAL HB: ABNORMAL G/DL (ref 12–16)
TOTAL PROTEIN: 5.4 G/DL (ref 6.4–8.3)
TOTAL RATE: ABNORMAL
TSH SERPL DL<=0.05 MIU/L-ACNC: 0.89 MIU/L (ref 0.3–5)
VT: ABNORMAL
WBC # BLD: 8.3 K/UL (ref 3.5–11.3)
WBC # BLD: ABNORMAL 10*3/UL

## 2019-06-12 PROCEDURE — 80048 BASIC METABOLIC PNL TOTAL CA: CPT

## 2019-06-12 PROCEDURE — 82947 ASSAY GLUCOSE BLOOD QUANT: CPT

## 2019-06-12 PROCEDURE — 2000000000 HC ICU R&B

## 2019-06-12 PROCEDURE — 2580000003 HC RX 258: Performed by: STUDENT IN AN ORGANIZED HEALTH CARE EDUCATION/TRAINING PROGRAM

## 2019-06-12 PROCEDURE — 71045 X-RAY EXAM CHEST 1 VIEW: CPT

## 2019-06-12 PROCEDURE — 83735 ASSAY OF MAGNESIUM: CPT

## 2019-06-12 PROCEDURE — C1894 INTRO/SHEATH, NON-LASER: HCPCS

## 2019-06-12 PROCEDURE — 36430 TRANSFUSION BLD/BLD COMPNT: CPT

## 2019-06-12 PROCEDURE — 36415 COLL VENOUS BLD VENIPUNCTURE: CPT

## 2019-06-12 PROCEDURE — 5A1945Z RESPIRATORY VENTILATION, 24-96 CONSECUTIVE HOURS: ICD-10-PCS | Performed by: INTERNAL MEDICINE

## 2019-06-12 PROCEDURE — 37799 UNLISTED PX VASCULAR SURGERY: CPT

## 2019-06-12 PROCEDURE — 99291 CRITICAL CARE FIRST HOUR: CPT | Performed by: INTERNAL MEDICINE

## 2019-06-12 PROCEDURE — C1769 GUIDE WIRE: HCPCS

## 2019-06-12 PROCEDURE — 84100 ASSAY OF PHOSPHORUS: CPT

## 2019-06-12 PROCEDURE — 93306 TTE W/DOPPLER COMPLETE: CPT

## 2019-06-12 PROCEDURE — 94770 HC ETCO2 MONITOR DAILY: CPT

## 2019-06-12 PROCEDURE — 76937 US GUIDE VASCULAR ACCESS: CPT

## 2019-06-12 PROCEDURE — 02HV33Z INSERTION OF INFUSION DEVICE INTO SUPERIOR VENA CAVA, PERCUTANEOUS APPROACH: ICD-10-PCS | Performed by: RADIOLOGY

## 2019-06-12 PROCEDURE — 86927 PLASMA FRESH FROZEN: CPT

## 2019-06-12 PROCEDURE — 85384 FIBRINOGEN ACTIVITY: CPT

## 2019-06-12 PROCEDURE — 85610 PROTHROMBIN TIME: CPT

## 2019-06-12 PROCEDURE — 0BH17EZ INSERTION OF ENDOTRACHEAL AIRWAY INTO TRACHEA, VIA NATURAL OR ARTIFICIAL OPENING: ICD-10-PCS | Performed by: INTERNAL MEDICINE

## 2019-06-12 PROCEDURE — 82553 CREATINE MB FRACTION: CPT

## 2019-06-12 PROCEDURE — 6360000002 HC RX W HCPCS: Performed by: INTERNAL MEDICINE

## 2019-06-12 PROCEDURE — 94761 N-INVAS EAR/PLS OXIMETRY MLT: CPT

## 2019-06-12 PROCEDURE — 6360000002 HC RX W HCPCS: Performed by: EMERGENCY MEDICINE

## 2019-06-12 PROCEDURE — 31500 INSERT EMERGENCY AIRWAY: CPT

## 2019-06-12 PROCEDURE — 2500000003 HC RX 250 WO HCPCS: Performed by: EMERGENCY MEDICINE

## 2019-06-12 PROCEDURE — 80076 HEPATIC FUNCTION PANEL: CPT

## 2019-06-12 PROCEDURE — 94002 VENT MGMT INPAT INIT DAY: CPT

## 2019-06-12 PROCEDURE — 83874 ASSAY OF MYOGLOBIN: CPT

## 2019-06-12 PROCEDURE — P9017 PLASMA 1 DONOR FRZ W/IN 8 HR: HCPCS

## 2019-06-12 PROCEDURE — 82805 BLOOD GASES W/O2 SATURATION: CPT

## 2019-06-12 PROCEDURE — 6360000002 HC RX W HCPCS: Performed by: STUDENT IN AN ORGANIZED HEALTH CARE EDUCATION/TRAINING PROGRAM

## 2019-06-12 PROCEDURE — 99222 1ST HOSP IP/OBS MODERATE 55: CPT | Performed by: INTERNAL MEDICINE

## 2019-06-12 PROCEDURE — 99213 OFFICE O/P EST LOW 20 MIN: CPT

## 2019-06-12 PROCEDURE — 85362 FIBRIN DEGRADATION PRODUCTS: CPT

## 2019-06-12 PROCEDURE — 82803 BLOOD GASES ANY COMBINATION: CPT

## 2019-06-12 PROCEDURE — 85730 THROMBOPLASTIN TIME PARTIAL: CPT

## 2019-06-12 PROCEDURE — 82330 ASSAY OF CALCIUM: CPT

## 2019-06-12 PROCEDURE — 2580000003 HC RX 258: Performed by: NURSE PRACTITIONER

## 2019-06-12 PROCEDURE — B548ZZA ULTRASONOGRAPHY OF SUPERIOR VENA CAVA, GUIDANCE: ICD-10-PCS | Performed by: RADIOLOGY

## 2019-06-12 PROCEDURE — 84443 ASSAY THYROID STIM HORMONE: CPT

## 2019-06-12 PROCEDURE — 80074 ACUTE HEPATITIS PANEL: CPT

## 2019-06-12 PROCEDURE — 36620 INSERTION CATHETER ARTERY: CPT

## 2019-06-12 PROCEDURE — 2580000003 HC RX 258: Performed by: EMERGENCY MEDICINE

## 2019-06-12 PROCEDURE — 93010 ELECTROCARDIOGRAM REPORT: CPT | Performed by: INTERNAL MEDICINE

## 2019-06-12 PROCEDURE — C1752 CATH,HEMODIALYSIS,SHORT-TERM: HCPCS

## 2019-06-12 PROCEDURE — 82550 ASSAY OF CK (CPK): CPT

## 2019-06-12 PROCEDURE — 2700000000 HC OXYGEN THERAPY PER DAY

## 2019-06-12 PROCEDURE — 36556 INSERT NON-TUNNEL CV CATH: CPT

## 2019-06-12 PROCEDURE — 85025 COMPLETE CBC W/AUTO DIFF WBC: CPT

## 2019-06-12 PROCEDURE — 85379 FIBRIN DEGRADATION QUANT: CPT

## 2019-06-12 PROCEDURE — 74018 RADEX ABDOMEN 1 VIEW: CPT

## 2019-06-12 PROCEDURE — 2709999900 HC NON-CHARGEABLE SUPPLY

## 2019-06-12 RX ORDER — 0.9 % SODIUM CHLORIDE 0.9 %
250 INTRAVENOUS SOLUTION INTRAVENOUS ONCE
Status: DISCONTINUED | OUTPATIENT
Start: 2019-06-12 | End: 2019-06-17

## 2019-06-12 RX ORDER — HEPARIN SODIUM 1000 [USP'U]/ML
1700 INJECTION, SOLUTION INTRAVENOUS; SUBCUTANEOUS PRN
Status: DISCONTINUED | OUTPATIENT
Start: 2019-06-12 | End: 2019-06-21 | Stop reason: HOSPADM

## 2019-06-12 RX ORDER — HEPARIN SODIUM 1000 [USP'U]/ML
1600 INJECTION, SOLUTION INTRAVENOUS; SUBCUTANEOUS ONCE
Status: COMPLETED | OUTPATIENT
Start: 2019-06-12 | End: 2019-06-12

## 2019-06-12 RX ORDER — HEPARIN SODIUM 1000 [USP'U]/ML
1600 INJECTION, SOLUTION INTRAVENOUS; SUBCUTANEOUS PRN
Status: DISCONTINUED | OUTPATIENT
Start: 2019-06-12 | End: 2019-06-21 | Stop reason: HOSPADM

## 2019-06-12 RX ORDER — FUROSEMIDE 10 MG/ML
40 INJECTION INTRAMUSCULAR; INTRAVENOUS ONCE
Status: COMPLETED | OUTPATIENT
Start: 2019-06-12 | End: 2019-06-12

## 2019-06-12 RX ORDER — 0.9 % SODIUM CHLORIDE 0.9 %
500 INTRAVENOUS SOLUTION INTRAVENOUS ONCE
Status: COMPLETED | OUTPATIENT
Start: 2019-06-12 | End: 2019-06-12

## 2019-06-12 RX ORDER — HEPARIN SODIUM 1000 [USP'U]/ML
1700 INJECTION, SOLUTION INTRAVENOUS; SUBCUTANEOUS ONCE
Status: COMPLETED | OUTPATIENT
Start: 2019-06-12 | End: 2019-06-12

## 2019-06-12 RX ORDER — FENTANYL CITRATE 50 UG/ML
100 INJECTION, SOLUTION INTRAMUSCULAR; INTRAVENOUS
Status: DISCONTINUED | OUTPATIENT
Start: 2019-06-12 | End: 2019-06-16

## 2019-06-12 RX ORDER — 0.9 % SODIUM CHLORIDE 0.9 %
250 INTRAVENOUS SOLUTION INTRAVENOUS ONCE
Status: COMPLETED | OUTPATIENT
Start: 2019-06-12 | End: 2019-06-12

## 2019-06-12 RX ADMIN — CALCIUM GLUCONATE 1 G: 98 INJECTION, SOLUTION INTRAVENOUS at 02:14

## 2019-06-12 RX ADMIN — HEPARIN SODIUM 1600 UNITS: 1000 INJECTION INTRAVENOUS; SUBCUTANEOUS at 21:46

## 2019-06-12 RX ADMIN — SODIUM CHLORIDE 250 ML: 9 INJECTION, SOLUTION INTRAVENOUS at 10:06

## 2019-06-12 RX ADMIN — HEPARIN SODIUM 1700 UNITS: 1000 INJECTION INTRAVENOUS; SUBCUTANEOUS at 21:46

## 2019-06-12 RX ADMIN — FUROSEMIDE 40 MG: 10 INJECTION, SOLUTION INTRAMUSCULAR; INTRAVENOUS at 11:58

## 2019-06-12 RX ADMIN — Medication: at 13:48

## 2019-06-12 RX ADMIN — SODIUM BICARBONATE 50 MEQ: 84 INJECTION INTRAVENOUS at 04:25

## 2019-06-12 RX ADMIN — CALCIUM GLUCONATE 2 G: 98 INJECTION, SOLUTION INTRAVENOUS at 03:47

## 2019-06-12 RX ADMIN — HEPARIN SODIUM 1700 UNITS: 1000 INJECTION, SOLUTION INTRAVENOUS; SUBCUTANEOUS at 18:22

## 2019-06-12 RX ADMIN — Medication 8 MCG/MIN: at 17:39

## 2019-06-12 RX ADMIN — Medication: at 02:25

## 2019-06-12 RX ADMIN — PHYTONADIONE 5 MG: 10 INJECTION, EMULSION INTRAMUSCULAR; INTRAVENOUS; SUBCUTANEOUS at 06:21

## 2019-06-12 RX ADMIN — PIPERACILLIN SODIUM,TAZOBACTAM SODIUM 2.25 G: 2; .25 INJECTION, POWDER, FOR SOLUTION INTRAVENOUS at 21:00

## 2019-06-12 RX ADMIN — Medication 10 ML: at 08:51

## 2019-06-12 RX ADMIN — Medication 10 ML: at 08:47

## 2019-06-12 RX ADMIN — Medication 10 ML: at 21:08

## 2019-06-12 RX ADMIN — SODIUM CHLORIDE 500 ML: 9 INJECTION, SOLUTION INTRAVENOUS at 01:40

## 2019-06-12 RX ADMIN — FENTANYL CITRATE 100 MCG: 50 INJECTION INTRAMUSCULAR; INTRAVENOUS at 22:44

## 2019-06-12 RX ADMIN — Medication 10 ML: at 21:00

## 2019-06-12 RX ADMIN — PIPERACILLIN SODIUM,TAZOBACTAM SODIUM 2.25 G: 2; .25 INJECTION, POWDER, FOR SOLUTION INTRAVENOUS at 12:30

## 2019-06-12 RX ADMIN — Medication 8 MCG/MIN: at 01:14

## 2019-06-12 RX ADMIN — SODIUM BICARBONATE 50 MEQ: 84 INJECTION INTRAVENOUS at 04:19

## 2019-06-12 RX ADMIN — HEPARIN SODIUM 1600 UNITS: 1000 INJECTION, SOLUTION INTRAVENOUS; SUBCUTANEOUS at 18:21

## 2019-06-12 RX ADMIN — FAMOTIDINE 20 MG: 10 INJECTION, SOLUTION INTRAVENOUS at 08:47

## 2019-06-12 RX ADMIN — PIPERACILLIN SODIUM,TAZOBACTAM SODIUM 2.25 G: 2; .25 INJECTION, POWDER, FOR SOLUTION INTRAVENOUS at 04:49

## 2019-06-12 ASSESSMENT — PAIN SCALES - GENERAL
PAINLEVEL_OUTOF10: 4
PAINLEVEL_OUTOF10: 0

## 2019-06-12 ASSESSMENT — PAIN DESCRIPTION - PAIN TYPE: TYPE: OTHER (COMMENT)

## 2019-06-12 ASSESSMENT — PULMONARY FUNCTION TESTS
PIF_VALUE: 27
PIF_VALUE: 26
PIF_VALUE: 27

## 2019-06-12 NOTE — PROGRESS NOTES
11:30 pm: Discussed with Dr. Bruce Medina, knows that there is minimal urine production and the patient is 5L positive. Dr Bruce Medina would like the patient's bicarb infusion decreased to 100 cc/hr. 3:58 am: Discussed with Dr. Aditi Toth with nephrology, he believes the patient will need dialysis, he would like the patient's INR to be reversed so that patient can get a marisol catheter placed, will call Dr. Bruce Medina to see how he would like the INR reversed. Otherwise he would like to give 2 Amps of bicarb for the acidosis.

## 2019-06-12 NOTE — PROGRESS NOTES
Insert Arterial Line  Date/Time:  06/12/19, 4:36 PM  Performed by: Thelma Angel    Patient identity confirmed: arm band and provided demographic data   Time out: Immediately prior to procedure a \"time out\" was called to verify the correct patient, procedure, equipment, support staff. Preparation: Patient was prepped and draped in the usual sterile fashion.     Location:left radial    Milton's test normal: yes  Needle gauge: 20     Number of attempts: 1  Post-procedure: transparent dressing applied and line secured    Patient tolerance: well

## 2019-06-12 NOTE — PROGRESS NOTES
Discussed with Dr. Valorie Pyle regarding the INR of 9.9. He would like to get fibrin split products, D dimer to be added to make sure patient is not in DIC. He would also like the patient to be given 5 IV vitamin K so that the INR can be reversed for the marisol procedure for his renal failure. He also stated that the patient may need FFP before the Marisol procedure. He would like the patient to go to IR for the cath procedure, and he would like coordination with IR for performing the procedure given risk of bleeding. Vitamin K 5 mg IV order placed for reversal. Will recheck INR in 6 hours after vitamin K reversal around noon.

## 2019-06-12 NOTE — PROGRESS NOTES
Pharmacy Note  Warfarin Consult follow-up    Recent Labs     06/12/19  0216   INR 9.9*     Recent Labs     06/11/19  0315 06/11/19  1152 06/12/19  0216   HGB 13.5 11.7* 10.6*   HCT 41.0 37.4* 35.1*   * 111* 104*     Current warfarin drug-drug interactions:  acetaminophen    Date INR Dose   6/11/19 6.2 HOLD   6/12/19 9.9 Vitamin K 5mg IV given, HOLD WARFARIN     Notes:  INR with increase to 9.9 today, vitamin K 5mg IV given. Continue to hold warfarin until INR below 3. Daily PT/INR while inpatient.      Charles Newell RPh, CACP  Clinical Pharmacist Medication Management  6/12/2019  8:14 AM

## 2019-06-12 NOTE — PROGRESS NOTES
[facility]  Occupational Therapy Not Seen Note    DATE: 2019  Name: Janet Chaudhary  : 1975  MRN: [id number]    Patient not available for Occupational Therapy due to:    [] Testing:    [] Hemodialysis    [] Blood Transfusion in Progress    []Refusal by Patient:    [] Surgery/Procedure:    [] Strict Bedrest    [] Sedation    [] Spine Precautions     [] Pt being transferred to palliative care at this time. Spoke with pt/family and OT services to be defered. [] Pt independent with functional mobility and functional tasks. Pt with no OT acute care needs at this time, will defer OT eval.    [x] Other: Patient not following commands this date per RN, Check back in p.m.  As able    Next Scheduled Treatment:      Signature: Mariana Plasencia OTR/L

## 2019-06-12 NOTE — CONSULTS
failure        Past Medical History:   has a past medical history of Antiphospholipid antibody with hemorrhagic disorder ([facility] [address] Arthritis, Asthma, Bronchitis, Cerebral artery occlusion with cerebral infarction [facility]), Disease of blood and blood forming organ, Hypertension, Long-term (current) use of anticoagulants, INR goal 2.5-3.5, Pleurisy, Pneumonia, and Seizures ([facility] [address] Past Surgical History:   has a past surgical history that includes Cardiac catheterization. Home Medications:    Prior to Admission medications    Medication Sig Start Date End Date Taking? Authorizing Provider   gabapentin (NEURONTIN) 600 MG tablet Take 600 mg by mouth 3 times daily. Yes Historical Provider, MD   warfarin (COUMADIN) 7.5 MG tablet Take 1 tablet 4 days weekly in the evening on Sundays, Tuesdays, Thursdays, and Saturdays or as directed. Managed by [employer] [address] 5/16/19  Yes Marko Reza MD   topiramate (TOPAMAX) 200 MG tablet Take 100 mg by mouth 2 times daily   Yes Historical Provider, MD   atenolol (TENORMIN) 25 MG tablet Take 25 mg by mouth daily    Yes Historical Provider, MD   predniSONE (DELTASONE) 10 MG tablet Take 10 mg by mouth daily. Yes Historical Provider, MD   warfarin (COUMADIN) 10 MG tablet Take 1 tablet on Mondays, Wednesdays and Fridays or as directed.  Managed by [employer] [address] 5/16/19   Marko Reza MD     Current Facility-Administered Medications   Medication Dose Route Frequency Provider Last Rate Last Dose    0.9 % sodium chloride bolus  250 mL Intravenous Once Lesley Ricketts MD        sodium chloride flush 0.9 % injection 10 mL  10 mL Intravenous 2 times per day Augustus Miramontes MD   10 mL at 06/12/19 0851    sodium chloride flush 0.9 % injection 10 mL  10 mL Intravenous PRN Augustus Miramontes MD        magnesium hydroxide (MILK OF MAGNESIA) 400 MG/5ML suspension 30 mL  30 mL Oral Daily PRN Augustus Miramontes MD        ondansetron [facility] Heart Disease in his maternal grandfather and maternal grandmother; Stroke in his maternal grandfather.     REVIEW OF SYSTEMS:    Unable to obtain due to patient's mental status    PHYSICAL EXAM:        Vitals:  /67   Pulse 84   Temp 97.9 °F (36.6 °C) (Oral)   Resp 11   Ht 6' (1.829 m)   Wt (!) 340 lb 13.3 oz (154.6 kg)   SpO2 93%   BMI 46.23 kg/m²     General appearance - lethargic and drowsy  Mental status - lethargic, opens eyes on sternal rub and calling loudly   Eyes - pupils equal and reactive, extraocular eye movements intact   Neck - supple, no palpable  adenopathy , trach midline   Lymphatics - no palpable lymphadenopathy, no hepatosplenomegaly   Chest - clear to auscultation, but crackles at bases, no wheezes, rales or rhonchi, symmetric air entry , normal chest expansion  Heart - normal rate, regular rhythm, normal S1, S2, no murmurs,  Abdomen - soft, nontender, mildly distended, no masses or organomegaly   Neurological -lethargic, drowsy  Extremities - peripheral pulses normal, no pedal edema, no clubbing or cyanosis   Skin - normal coloration and turgor, fine macular rash on upper chest+    DATA:    Labs:     CBC:   Recent Labs     06/11/19  1152 06/12/19  0216   WBC 15.9* 8.3   HGB 11.7* 10.6*   HCT 37.4* 35.1*   * 104*     BMP:   Recent Labs     06/12/19  0213 06/12/19  0759   * 135   K 4.3 4.3   CO2 15* 16*   * 123*   CREATININE 8.33* 8.83*   LABGLOM 7* 7*   GLUCOSE 158* 108*     PT/INR:   Recent Labs     06/12/19  0216 06/12/19  1232   PROTIME 87.2* 16.8*   INR 9.9* 1.7     APTT:  Recent Labs     06/11/19  1154 06/12/19  0216   APTT 57.1* 56.4*     LIVER PROFILE:  Recent Labs     06/11/19  1717 06/12/19  0213   * 183*   ALT 83* 93*   LABALBU 2.1* 2.2*     Labs:  General Labs:    CBC with Differential:    Lab Results   Component Value Date    WBC 8.3 06/12/2019    RBC 4.37 06/12/2019    HGB 10.6 06/12/2019    HCT 35.1 06/12/2019     06/12/2019    MCV 80.3 06/12/2019    MCH 24.3 06/12/2019    MCHC 30.2 06/12/2019    RDW 17.5 06/12/2019    LYMPHOPCT 3 06/12/2019    MONOPCT 7 06/12/2019    BASOPCT 0 06/12/2019    MONOSABS 0.58 06/12/2019    LYMPHSABS 0.25 06/12/2019    EOSABS 0.17 06/12/2019    BASOSABS 0.00 06/12/2019    DIFFTYPE NOT REPORTED 06/12/2019     BMP:    Lab Results   Component Value Date     06/12/2019    K 4.3 06/12/2019    CL 97 06/12/2019    CO2 16 06/12/2019     06/12/2019    LABALBU 2.2 06/12/2019    CREATININE 8.83 06/12/2019    CALCIUM 6.8 06/12/2019    GFRAA 8 06/12/2019    LABGLOM 7 06/12/2019    GLUCOSE 108 06/12/2019     Hepatic Function Panel:    Lab Results   Component Value Date    ALKPHOS 64 06/12/2019    ALT 93 06/12/2019     06/12/2019    PROT 5.4 06/12/2019    BILITOT 0.58 06/12/2019    BILIDIR 0.42 06/12/2019    IBILI 0.16 06/12/2019    LABALBU 2.2 06/12/2019     PT/INR:    Lab Results   Component Value Date    PROTIME 16.5 06/12/2019    INR 1.6 06/12/2019     PTT:    Lab Results   Component Value Date    APTT 56.4 06/12/2019   [APTT}  Last 3 Troponin:    Lab Results   Component Value Date    TROPONINI 0.27 04/02/2016    TROPONINI 0.25 04/02/2016    TROPONINI <0.01 11/07/2012     Urine Toxicology:  No components found for: IAMMENTA, IBARBIT, IBENZO, ICOCAINE, IMARTHC, IOPIATES, IPHENCYC    IMAGING DATA:  Ct Head Wo Contrast    1. Limited examination due to patient motion artifact. Within the limitations of the examination, no acute intracranial abnormality is seen. 2. Sinus disease. Please correlate for acute sinusitis. Us Renal Complete    Result Date: 6/11/2019  1. Unremarkable bilateral renal ultrasound. 2. Biliary sludge. 3. Nonvisualization of the bladder secondary to a Browne catheter in place. Xr Chest Portable    Result Date: 6/12/2019  Enteric tube terminates above the diaphragm. Recommend advancing at 10-20 cm and reimaging with a KUB. New ET tube as above. Severe airspace disease slightly increased. 9.9.  No evidence of active bleeding. 2. Plan for hemodialysis today for rhabdomyolysis and acute kidney injury as per nephrology. On Sodium bicarbonate drip. 3. Vent management as per critical care  4. Wean off pressors as tolerated. 5. We will continue to follow      Discussed with family and Nurse. Attending Physician Statement   I have discussed the care of this patient, including pertinent history and exam findings, with the resident. I have seen and examined the patient and the key elements of all parts of the encounter have been performed by me. I agree with the assessment, plan and orders as documented by the resident and with changes made to the note.     Patient has history of antiphospholipid antibody syndrome  INR is better  There is no active bleeding  Monitor coags tomorrow  Other management as per critical care team    Liz Winter MD  Hematology/Oncology    Cell: [phone]

## 2019-06-12 NOTE — PROGRESS NOTES
[facility]annon Rehman, [facility] ([facility] [address] [A41.9]  Rhabdomyolysis [M62.82] . Vitals:    06/11/19 1600   BP:    Pulse:    Resp:    Temp: 96.5 °F (35.8 °C)   SpO2:    . Patients home meds are   Prior to Admission medications    Medication Sig Start Date End Date Taking? Authorizing Provider   gabapentin (NEURONTIN) 600 MG tablet Take 600 mg by mouth 3 times daily. Yes Historical Provider, MD   warfarin (COUMADIN) 7.5 MG tablet Take 1 tablet 4 days weekly in the evening on Sundays, Tuesdays, Thursdays, and Saturdays or as directed. Managed by [address] 5/16/19  Yes Cherre Ormond, MD   topiramate (TOPAMAX) 200 MG tablet Take 100 mg by mouth 2 times daily   Yes Historical Provider, MD   atenolol (TENORMIN) 25 MG tablet Take 25 mg by mouth daily    Yes Historical Provider, MD   predniSONE (DELTASONE) 10 MG tablet Take 10 mg by mouth daily. Yes Historical Provider, MD   warfarin (COUMADIN) 10 MG tablet Take 1 tablet on Mondays, Wednesdays and Fridays or as directed.  Managed by [address] 5/16/19   Cherre Ormond, MD   .  Recent Surgical History: None = 0     Assessment: resting comfortably without distress  RR 16  Breath Sounds: clear with diminished bases      · Bronchodilator assessment at level  1  · Hyperinflation assessment at level   · Secretion Management assessment at level    ·   · []    Bronchodilator Assessment  BRONCHODILATOR ASSESSMENT SCORE  Score 0 1 2 3 4 5   Breath Sounds   []  Patient Baseline [x]  No Wheeze good aeration []  Faint, scattered wheezing, good aeration []  Expiratory Wheezing and or moderately diminished []  Insp/Exp wheeze and/or very diminished []  Insp/Exp and/ or marked distress   Respiratory Rate   []  Patient Baseline [x]  Less than 20 []  Less than 20 []  20-25 []  Greater than 25 []  Greater than 25   Peak flow % of Pred or PB [x]  NA   []  Greater than 90%  []  81-90% []  71-80% []  Less than or equal to 70%  or unable to perform []  Unable due to Respiratory Distress   Dyspnea re []  Patient Baseline [x]  No SOB []  No SOB []  SOB on exertion []  SOB min activity []  At rest/acute   e FEV% Predicted       [x]  NA []  Above 69%  []  Unable []  Above 60-69%  []  Unable []  Above 50-59%  []  Unable []  Above 35-49%  []  Unable []  Less than 35%  []  Unable

## 2019-06-12 NOTE — PROGRESS NOTES
ABG performed ~ 30 mins post intubation. See result tabs. Vent rate increased to 20.  Dr. Ankur Scott notified of critical gas and Dialysis RN Chucho Falcon

## 2019-06-12 NOTE — PROGRESS NOTES
[id number] [facility] Wound Ostomy Continence Nurse  Consult Note       NAME:  [id number] RECORD NUMBER:  [id number]  AGE: 39 y.o. GENDER: male  : 1975  TODAY'S DATE:  2019    Subjective:     Reason for WOCN Evaluation and Assessment: multiple deep tissue injuries      Candice Sandoval is a 39 y.o. male referred by:   [x] Physician  [] Nursing  [] Other:     Wound Identification:  Wound Type: pressure  Contributing Factors: chronic pressure, decreased mobility, shear force, obesity, decreased tissue oxygenation, immunosuppression, anticoagulation therapy, non-adherence and substance abuse   Down in a chair for estimated 3-4 days  Older right knee wound from reported fall in shower.          PAST MEDICAL HISTORY        Diagnosis Date    Antiphospholipid antibody with hemorrhagic disorder ([facility] [address]     Arthritis     Asthma     Bronchitis     Cerebral artery occlusion with cerebral infarction ([facility] [address]     Disease of blood and blood forming organ     Hypertension     Long-term (current) use of anticoagulants, INR goal 2.5-3.5     Pleurisy     Pneumonia     Seizures (HCC)        PAST SURGICAL HISTORY    Past Surgical History:   Procedure Laterality Date    CARDIAC CATHETERIZATION         FAMILY HISTORY    Family History   Problem Relation Age of Onset    Heart Disease Maternal Grandmother     Heart Disease Maternal Grandfather     Stroke Maternal Grandfather        SOCIAL HISTORY    Social History     Tobacco Use    Smoking status: Never Smoker    Smokeless tobacco: Current User     Types: Chew   Substance Use Topics    Alcohol use: Not Currently     Alcohol/week: 1.2 oz     Types: 2 Cans of beer per week     Comment: occasional    Drug use: No       ALLERGIES    Allergies   Allergen Reactions    Uncaria Tomentosa (Cats Claw) Other (See Comments)     itch,sneezing           Objective:      /67   Pulse 84   Temp 97.9 °F (36.6 °C) (Oral)   Resp 11   Ht 6' (1.829 m)   Wt (!) 340 lb 13.3 oz (154.6 kg)   SpO2 93%   BMI 46.23 kg/m²   Almas Risk Score: Almas Scale Score: 14    LABS    CBC:   Lab Results   Component Value Date    WBC 8.3 06/12/2019    RBC 4.37 06/12/2019    HGB 10.6 06/12/2019     CMP:  Albumin:    Lab Results   Component Value Date    LABALBU 2.2 06/12/2019     PT/INR:    Lab Results   Component Value Date    PROTIME 16.8 06/12/2019    INR 1.7 06/12/2019     HgBA1c:  No results found for: LABA1C  PTT: No components found for: LABPTT      Assessment:     Patient Active Problem List   Diagnosis    Antiphospholipid antibody syndrome    Long term current use of anticoagulant therapy    Substance abuse ([facility] [address]    ELIE (acute kidney injury) ([facility] [address]    Elevated transaminase level    Leukocytosis    Seizures (HCC)    Hypertension    Asthma    Cerebral artery occlusion with cerebral infarction ([facility] [address]    Unresponsive episode    Seizure disorder ([facility] [address]    Lupus anticoagulant syndrome (HCC)    Unresponsive    Drug overdose    Rhabdomyolysis    Morbid obesity ([facility] [address]       Measurements:     06/12/19 1345   Wound 06/11/19 Knee Right   Date First Assessed/Time First Assessed: 06/11/19 0345   Present on Hospital Admission: Yes  Primary Wound Type: (c) Other (comment)  Location: Knee  Wound Location Orientation: Right   Wound Image    Wound Traumatic   Dressing/Treatment Open to air   Wound Assessment Dry;Purple;Pink   Drainage Amount None   Odor None   Louise-wound Assessment Blanchable erythema; Intact   Wound 06/11/19 Thigh Distal;Right;Posterior Large swollen bruised area   Date First Assessed/Time First Assessed: 06/11/19 1125   Present on Hospital Admission: Yes  Primary Wound Type: (c) Other (comment)  Location: Thigh  Wound Location Orientation: Distal;Right;Posterior  Wound Description (Comments): Large swollen brui. .. Wound Image    Wound Deep tissue/Injury   Dressing Status Clean;Dry; Intact   Dressing/Treatment Foam   Wound Length (cm) 3.5 cm   Wound Width (cm) 14 cm   Wound Depth (cm) 0 cm   Wound Surface Area (cm^2) 49 cm^2   Wound Volume (cm^3) 0 cm^3   Wound Assessment Fluid filled blister; Purple   Drainage Amount Scant   Drainage Description Serous   Odor None   Louise-wound Assessment Blanchable erythema; Intact   Wound 06/12/19 Foot Lateral   Date First Assessed/Time First Assessed: 06/12/19 1350   Present on Hospital Admission: Yes  Primary Wound Type: Pressure Injury  Location: Foot  Wound Location Orientation: Lateral   Wound Deep tissue/Injury   Dressing/Treatment Protective barrier  (offload pressure)   Wound Length (cm) 2 cm   Wound Width (cm) 10 cm   Wound Depth (cm) 0 cm   Wound Surface Area (cm^2) 20 cm^2   Wound Volume (cm^3) 0 cm^3   Wound Assessment Dry; Intact; Red;Purple   Drainage Amount None   Odor None   Wound 06/12/19 Ischium Right   Date First Assessed/Time First Assessed: 06/12/19 1350   Present on Hospital Admission: Yes  Primary Wound Type: Pressure Injury  Location: Ischium  Wound Location Orientation: Right   Wound Deep tissue/Injury   Dressing/Treatment Protective barrier   Wound Length (cm) 8 cm   Wound Width (cm) 8 cm   Wound Depth (cm) 0 cm   Wound Surface Area (cm^2) 64 cm^2   Wound Volume (cm^3) 0 cm^3   Wound Assessment Dry;Red;Non-blanchable erythema;Maroon   Drainage Amount None   Odor None   Louise-wound Assessment Dry; Intact; Blanchable erythema       Response to treatment:  With complaints of pain. Plan:     Plan of Care: Turn every 2 hours  Float heels off of bed with pillows under calves    Use lift sling to reposition patient to minimize potential for shear injury. Foam sacrum dressing to sacrococcygeal area. Peel back dressing, inspect skin beneath, re-secure. Change every 72 hours and prn wrinkles, soilage. Discontinue Sacral dressing if repeatedly soiled by incontinence. Routine incontinence care with incontinence barrier cloths and zinc oxide cream.   Apply zinc oxide cream BID and prn incontinence.    Moisture wicking under pads vs cloth backed briefs    mepilex foam to the right posterior thigh wound. Change every 72 hours  Liquid skin barrier or zinc oxide cream to the right ischium BID  Offload pressure carefully from the feet. Watch for additional areas of deep tissue injury evolving over the next 72 hours.    Specialty Bed Required : Yes   [x] Low Air Loss   [x] Pressure Redistribution  [] Fluid Immersion  [x] Bariatric  [] Total Pressure Relief  [] Other:     Discharge Plan:  TBD    Patient/Caregiver Teaching:  Reviewed with his mother at bedside  [] Indicates understanding       [] Needs reinforcement  [] Unsuccessful      [x] Verbal Understanding  [] Demonstrated understanding       [] No evidence of learning  [] Refused teaching         [] N/A       Electronically signed by Alesha Roberts RN, CWON on 6/12/2019 at 1:52 PM

## 2019-06-12 NOTE — PROGRESS NOTES
Critical Care Team - Daily Progress Note      Date and time: 6/12/2019 3:40 AM  Patient's name:  Cooper Loja  Medical Record Number: [id number]  Patient's account/billing number: [de-identified]  Patient's YOB: 1975  Age: 39 y.o. Date of Admission: 6/11/2019 11:01 AM  Length of stay during current admission: 1      Primary Care Physician: Anjel Morin CNP, APRN - CNP  ICU Attending Physician:      Code Status: Full Code     Reason for ICU admission: Rhabdo, ELIE      SUBJECTIVE:     OVERNIGHT EVENTS:       Decreased urine output overnight, worsening INR and patient was slower in responses, but still responding to questions and following commands. Afebrile. No emesis, diarrhea.      AWAKE & FOLLOWING COMMANDS:  [] No   [x] Yes    CURRENT VENTILATION STATUS:     [] Ventilator  [] BIPAP  [x] Nasal Cannula [] Room Air      IF INTUBATED, ET TUBE MARKING AT LOWER LIP:       cms    SECRETIONS Amount:  [x] Small [] Moderate  [] Large  [] None  Color:     [] White [] Colored  [] Bloody    SEDATION:  RAAS Score:  [] Propofol gtt  [] Versed gtt  [] Ativan gtt   [x] No Sedation    PARALYZED:  [x] No    [] Yes    DIARRHEA:                [x] No                [] Yes  (C. Difficile status: [] positive                                                                                                                       [] negative                                                                                                                     [] pending)    VASOPRESSORS:  [] No    [x] Yes    If yes -   [x] Levophed       [] Dopamine     [] Vasopressin       [] Dobutamine  [] Phenylephrine         [] Epinephrine    CENTRAL LINES:     [] No   [] Yes   (Date of Insertion:   )           If yes -     [] Right IJ     [] Left IJ [] Right Femoral [] Left Femoral                   [] Right Subclavian [] Left Subclavian       VAUGHN'S CATHETER:   [] No   [x] Yes  (Date of Insertion:   )     URINE OUTPUT: Intravenous Once    sodium chloride flush  10 mL Intravenous 2 times per day    famotidine (PEPCID) injection  20 mg Intravenous Daily    sodium chloride flush  10 mL Intravenous 2 times per day    piperacillin-tazobactam  2.25 g Intravenous Q8H    warfarin (COUMADIN) daily dosing (placeholder)   Other RX Placeholder     Continuous Infusions:   IV infusion builder 125 mL/hr at 06/12/19 0259    norepinephrine 4 mcg/min (06/12/19 0140)    dextrose       PRN Meds:     sodium chloride flush 10 mL PRN   magnesium hydroxide 30 mL Daily PRN   ondansetron 4 mg Q6H PRN   acetaminophen 650 mg Q4H PRN   sodium chloride flush 10 mL PRN   glucose 15 g PRN   dextrose 12.5 g PRN   glucagon (rDNA) 1 mg PRN   dextrose 100 mL/hr PRN   albuterol 2.5 mg As Directed RT PRN   ipratropium-albuterol 1 ampule Q4H PRN         VENT SETTINGS (Comprehensive) (if applicable):      Additional Respiratory  Assessments  Pulse: 92  Resp: 17  SpO2: 100 %  Oral Care: Mouth moisturizer, Mouth swabbed, Lip moisturizer applied    ABGs:     Laboratory findings:    Complete Blood Count: Recent Labs     06/11/19  0315 06/11/19  1152 06/12/19  0216   WBC 17.4* 15.9* 8.3   HGB 13.5 11.7* 10.6*   HCT 41.0 37.4* 35.1*   * 111* 104*        Last 3 Blood Glucose:   Recent Labs     06/11/19  1357 06/11/19  1717 06/12/19  0213   GLUCOSE 125* 493* 158*        PT/INR:    Lab Results   Component Value Date    PROTIME 87.2 06/12/2019    INR 9.9 06/12/2019     PTT:    Lab Results   Component Value Date    APTT 56.4 06/12/2019       Comprehensive Metabolic Profile:   Recent Labs     06/11/19  1357 06/11/19  1717 06/12/19  0213    134* 131*   K 5.5* 4.4 4.3    93* 93*   CO2 11* 23 15*   * 102* 112*   CREATININE 8.17* 7.00* 8.33*   GLUCOSE 125* 493* 158*   CALCIUM 6.2* 5.6* 6.4*   PROT  --  4.9*  --    LABALBU  --  2.1*  --    BILITOT  --  0.41  --    ALKPHOS  --  54  --    AST  --  166*  --    ALT  --  83*  --       Magnesium:   Lab Results mild streaky lower lung atelectasis. The lungs are otherwise grossly clear. ASSESSMENT:     Patient Active Problem List    Diagnosis Date Noted    Rhabdomyolysis 06/11/2019    Unresponsive 08/12/2018    Drug overdose     Unresponsive episode 04/03/2016    Seizure disorder ([facility] 04/03/2016    Lupus anticoagulant syndrome ([facility] 04/03/2016    Seizures ([facility]     Hypertension     Asthma     C[facility] with [facility]     Polysubstance abuse ([facility] 04/02/2016    ELIE (acute kidney injury) ([facility] 04/02/2016    Elevated transaminase level 04/02/2016    Leukocytosis 04/02/2016    Antiphospholipid antibody syndrome 11/07/2012    Long term current use of anticoagulant therapy 11/07/2012       Additional assessment:     Antiphospholipid antibody syndrome    Long term current use of anticoagulant therapy    Polysubstance abuse (HCC)    ELIE (acute kidney injury) (HCC)    Elevated transaminase level    Leukocytosis    Seizures (HCC)    Hypertension    Asthma    Cerebral artery occlusion with cerebral infarction (HCC)    Unresponsive episode    Seizure disorder (HCC)    Lupus anticoagulant syndrome (HCC)    Unresponsive    Drug overdose    Rhabdomyolysis         PLAN:     WEAN PER PROTOCOL:  [] No   [] Yes  [x] N/A    DISCONTINUE ANY LABS:   [] No   [] Yes    ICU PROPHYLAXIS:  Stress ulcer:  [] PPI Agent  [x] [id number] [] Sucralfate  [] Other:  VTE:   [] Enoxaparin  [] Unfract. Heparin Subcut  [] EPC Cuffs    NUTRITION:  [x] NPO [] Tube Feeding (Specify: ) [] TPN  [] PO (Diet: Diet NPO Effective Now Exceptions are: Other (See Comment))    HOME MEDICATIONS RECONCILED: [x] No  [] Yes    INSULIN DRIP:   [x] No   [] Yes    CONSULTATION NEEDED:  [] No   [x] Yes    FAMILY UPDATED:    [x] No   [] Yes    TRANSFER OUT OF ICU:   [x] No   [] Yes    ADDITIONAL PLAN:    1. Neuro  - Elevated uremia, answers simple questions, but unable to answer complex questions.    - Multiple drug abuse     2. CV- monitor, NSR currently, Hx cocaine abuse avoid unopposed beta blocker use, Hx cardiac cath, Keep MAP >60 /SBP >110 mm Hg  - Levophed at low dose started over night for hemodynamic support. Wean as tolerated. - Echo ordered.     3. Resp- monitor, supplement as needed. Breathing comfortably, Normal PCO2 and work of breathing. Some mild crackles in the lungs     4. GI- NPO, monitor bowel function, IV pepcid      5. Renal- monitor UOP, nephro on board, follow up with their recommendatiosn. Following, GAGE,Complement levels,Acute hepatitis panel to exclude occult connective tissue disease, Hx of antiphospholipid disorder per family, SPEP/UPEP/sFLC to rule out occult  Paraproteinemia, USS Kidneys for size,echogenicity and exclude obstruction  - 2 Amps of bicarb push. - Bicarb drip decreased to 100 cc/hr. - Reverse INR for marisol placement. Very high risk of bleeding with marisol placement otherwise with uremia and INR of 9.9      6. Msk- roll pt, avoid pressure sores      7. Endo- Follow glucose. - Will consider steriod supplementation given hx of daily steroid use. 8. ID- zosyn 2.25g Q8h, monitor for fevers and trend WBC     9 Heme: INR 9.9 - Plan to reverse. Vit 5 mg IV ordered. - Repeat INR at noon.  + Uremia.  - Continue to follow Hg. - No obvious sources of bleeding. 10. Cont Supportive care, SCDs      11. Maggie Cutler M.D. Critical care resident,  Department of Internal Medicine/ Critical care  [facility])             6/12/2019, 3:40 AM      Critical Care Attending Physician Addendum:     I have personally seen and examined Luis Pepper with the resident and the key elements of all parts of the encounter were performed by me. Patient was reassessed on more than one occasion, when required.  I reviewed the interval history, interpreted all available radiographic, laboratory and physiologic data at the time of service. I agree with the assessment and plan as documented by resident with following amendments.     Patient history of morbid obesity, antiphospholipid antibody syndrome, polysubstance abuse admitted with mental status. Found to have rhabdomyolysis acute kidney injury. Tox screen was positive for amphetamine, cocaine, oxycodone. Mother at bedside reports recent suicide attempts. She remains anuric, plan for placement of dialysis catheter. Received vitamin K for supratherapeutic INR, plan to get FFP's, recheck PT/INR and IR guided dialysis catheter placement.     He remains critically ill with illness/injury that acutely impairs one or more vital organ systems, such that there is a high probability of imminent or life threatening deterioration in the patient's condition. Critical care time (excluding procedures) of more than 35 minutes was spent, in coordination of care during bedside multidisciplinary rounds and discussion of patient care in detail, and recommendations of the team were adopted in the plan. Additionally, the necessity of all invasive devices was reviewed.   Tanesha Gallagher M.D.   Pulmonary and Critical Care Medicine

## 2019-06-12 NOTE — PROCEDURES
Intubation Procedure Note    Performed by: Paramjit Goldberg MD    Indication: impending respiratory failure, airway compromise, comatose state and airway protection    Consent: The spouse was counseled regarding the procedure, its indications, risks, potential complications and alternatives, and any questions were answered. Consent was obtained to proceed. Time out performed: Immediately prior to the procedure a \"time out\" was called to verify the correct patient, the correct procedure, equipment, support staff and site/side marked as required. Medications Used: etomidate intravenously and rocuronium intravenously    Procedure: The patient was placed in the appropriate position. Intubation was performed by direct laryngoscopy using a laryngoscope and a 8.0 cuffed endotracheal tube. The cuff was then inflated and the tube was secured appropriately at a distance of 25 cm to the dental ridge. Initial confirmation of placement included bilateral breath sounds, an end tidal CO2 detector, absence of sounds over the stomach, tube fogging, adequate chest rise, adequate pulse oximetry reading and improved skin color. A chest x-ray to verify correct placement of the tube has been ordered but is still pending. The patient tolerated the procedure well.      Complications: None

## 2019-06-12 NOTE — FLOWSHEET NOTE
[facility] (mother) and Erick Hernandez (wife) at bedside to visit patient. RN gave patient update. All questions answered. Lety, pts wife, states if consent or decisions need to be made that she will defer her decision making to pts mother Farhana Killer). [facility] at bedside during conversation and is in agreement.      Electronically signed by Dallas Hernández RN on 6/12/19 at 6:46 PM

## 2019-06-12 NOTE — BRIEF OP NOTE
Brief Postoperative Note Non Tunneled HD Catheter    Bebeto William  YOB: 1975  [id number]    Pre-operative Diagnosis: ELIE      Post-operative Diagnosis: Same    Procedure: Nontunneled Dialysis Catheter    Anesthesia: 1% Lidocaine Local Injection     Surgeons/Assistants: Dr. Merlyn Willett    Estimated Blood Loss: Minimal    Complications: none    Findings: 15.5 Fr x 20 cm non tunneled triple lumen HD Catheter placed Site:  Right Internal Jugular Vein. Tip in RA. Good blood flow. Dressing applied. May use HD cath for dialysis.     Electronically signed by Sagar Knutson PA-C on 6/12/2019 at 3:50 PM

## 2019-06-12 NOTE — PROGRESS NOTES
2018 - CC res Nahed Mayorga made aware of critical labs, current IVF intake, poor UOP, bilat rhonchi;  stated to page nephro  2021 - Dr Sonya Regan paged  2030 - RT at bedside to eval/start neb  2046 - Dr Sonya Regan returned page, updated on labs; stated no need for emergent HD tonight, most likely will need HD in the morning; no new orders  2055 - Dr Nahed Mayorga at bedside to reassess  2120 - bladder scan revealed 18-21mL  2145 - bedside US in progress by CC res  [address] - PCXR in progress

## 2019-06-12 NOTE — PROGRESS NOTES
Assisted with intubation of patient. Patient tolerated procedure well.  Intubated with the following by Dr. Zoey Aponte:  ETT Size  8  ETT Placement (e.g. 23 cm at lips) 26 teeth  ETT secured with judson    Tube placement verified by:  Auscultation BBS +  Positive color change on end tidal CO2 detector (yes/no) yes  CXR (yes/no) yes    Reason for intubation  Impending respiratory failure and airway protection        Salud Canales  6:03 PM

## 2019-06-12 NOTE — PLAN OF CARE
Problem: Pain:  Goal: Pain level will decrease  Description  Pain level will decrease  Outcome: Ongoing  Goal: Control of acute pain  Description  Control of acute pain  Outcome: Ongoing  Goal: Control of chronic pain  Description  Control of chronic pain  Outcome: Ongoing     Problem: Risk for Impaired Skin Integrity  Goal: Tissue integrity - skin and mucous membranes  Description  Structural intactness and normal physiological function of skin and  mucous membranes.   Outcome: Ongoing     Problem: Falls - Risk of:  Goal: Will remain free from falls  Description  Will remain free from falls  Outcome: Ongoing  Goal: Absence of physical injury  Description  Absence of physical injury  Outcome: Ongoing     Problem: Discharge Planning:  Goal: Participates in care planning  Description  Participates in care planning  Outcome: Ongoing  Goal: Discharged to appropriate level of care  Description  Discharged to appropriate level of care  Outcome: Ongoing  Goal: Ability to perform activities of daily living will improve  Description  Ability to perform activities of daily living will improve  Outcome: Ongoing     Problem: Airway Clearance - Ineffective:  Goal: Ability to maintain a clear airway will improve  Description  Ability to maintain a clear airway will improve  6/12/2019 1841 by Nicholas Farias RN  Outcome: Ongoing  6/12/2019 1734 by Maria C Anders RCP  Outcome: Ongoing     Problem: Anxiety/Stress:  Goal: Level of anxiety will decrease  Description  Level of anxiety will decrease  Outcome: Ongoing     Problem: Cardiac Output - Decreased:  Goal: Hemodynamic stability will improve  Description  Hemodynamic stability will improve  Outcome: Ongoing     Problem: Fluid Volume - Imbalance:  Goal: Absence of imbalanced fluid volume signs and symptoms  Description  Absence of imbalanced fluid volume signs and symptoms  Outcome: Ongoing     Problem: Gas Exchange - Impaired:  Goal: Levels of oxygenation will improve  Description  Levels of oxygenation will improve  Outcome: Ongoing     Problem: Mental Status - Impaired:  Goal: Mental status will be restored to baseline  Description  Mental status will be restored to baseline  Outcome: Ongoing     Problem: Nutrition Deficit:  Goal: Ability to achieve adequate nutritional intake will improve  Description  Ability to achieve adequate nutritional intake will improve  Outcome: Ongoing     Problem: Pain:  Goal: Recognizes and communicates pain  Description  Recognizes and communicates pain  Outcome: Ongoing  Goal: Control of acute pain  Description  Control of acute pain  Outcome: Ongoing  Goal: Control of chronic pain  Description  Control of chronic pain  Outcome: Ongoing     Problem: Skin Integrity - Impaired:  Goal: Will show no infection signs and symptoms  Description  Will show no infection signs and symptoms  Outcome: Ongoing  Goal: Absence of new skin breakdown  Description  Absence of new skin breakdown  Outcome: Ongoing     Problem: Tissue Perfusion, Altered:  Goal: Circulatory function within specified parameters  Description  Circulatory function within specified parameters  Outcome: Ongoing     Problem: MECHANICAL VENTILATION  Goal: Patient will maintain patent airway  Outcome: Ongoing  Goal: Oral health is maintained or improved  Outcome: Ongoing  Goal: ET tube will be managed safely  Outcome: Ongoing  Goal: Ability to express needs and understand communication  Outcome: Ongoing  Goal: Mobility/activity is maintained at optimum level for patient  Outcome: Ongoing     Problem: Respiratory:  Goal: Ability to maintain a clear airway will improve  Description  Ability to maintain a clear airway will improve  6/12/2019 1841 by Dakotah Damon RN  Outcome: Ongoing  6/12/2019 1734 by Jeffrey Schuler RCP  Outcome: Ongoing  Goal: Ability to maintain adequate ventilation will improve  Description  Ability to maintain adequate ventilation will improve  6/12/2019 1841 by Moe Bledsoe RN  Outcome: Ongoing  6/12/2019 1734 by Bibi Crespo RCP  Outcome: Ongoing  Goal: Complications related to the disease process, condition or treatment will be avoided or minimized  Description  Complications related to the disease process, condition or treatment will be avoided or minimized  6/12/2019 1841 by Moe Bledsoe RN  Outcome: Ongoing  6/12/2019 1734 by Bibi Crespo RCP  Outcome: Ongoing     Problem: Skin Integrity:  Goal: Risk for impaired skin integrity will decrease  Description  Risk for impaired skin integrity will decrease  6/12/2019 1841 by Moe Bledsoe RN  Outcome: Ongoing  6/12/2019 1734 by Bibi Crespo RCP  Outcome: Ongoing     Problem: OXYGENATION/RESPIRATORY FUNCTION  Goal: Patient will maintain patent airway  Outcome: Ongoing  Goal: Patient will achieve/maintain normal respiratory rate/effort  Description  Respiratory rate and effort will be within normal limits for the patient  Outcome: Ongoing     Problem: SKIN INTEGRITY  Goal: Skin integrity is maintained or improved  Outcome: Ongoing     Problem: Confusion - Acute:  Goal: Mental status will be restored to baseline  Description  Mental status will be restored to baseline  Outcome: Ongoing  Goal: Absence of continued neurological deterioration signs and symptoms  Description  Absence of continued neurological deterioration signs and symptoms  Outcome: Ongoing     Problem: Injury - Risk of, Physical Injury:  Goal: Will remain free from falls  Description  Will remain free from falls  Outcome: Ongoing  Goal: Absence of physical injury  Description  Absence of physical injury  Outcome: Ongoing     Problem: Mood - Altered:  Goal: Mood stable  Description  Mood stable  Outcome: Ongoing  Goal: Absence of abusive behavior  Description  Absence of abusive behavior  Outcome: Ongoing  Goal: Verbalizations of feeling emotionally comfortable while being cared for will increase  Description  Verbalizations of feeling emotionally comfortable while being cared for will increase  Outcome: Ongoing     Problem: Psychomotor Activity - Altered:  Goal: Absence of psychomotor disturbance signs and symptoms  Description  Absence of psychomotor disturbance signs and symptoms  Outcome: Ongoing     Problem: Sensory Perception - Impaired:  Goal: Demonstrations of improved sensory functioning will increase  Description  Demonstrations of improved sensory functioning will increase  Outcome: Ongoing  Goal: Decrease in sensory misperception frequency  Description  Decrease in sensory misperception frequency  Outcome: Ongoing  Goal: Able to refrain from responding to false sensory perceptions  Description  Able to refrain from responding to false sensory perceptions  Outcome: Ongoing  Goal: Demonstrates accurate environmental perceptions  Description  Demonstrates accurate environmental perceptions  Outcome: Ongoing  Goal: Able to distinguish between reality-based and nonreality-based thinking  Description  Able to distinguish between reality-based and nonreality-based thinking  Outcome: Ongoing  Goal: Able to interrupt nonreality-based thinking  Description  Able to interrupt nonreality-based thinking  Outcome: Ongoing     Problem: Sleep Pattern Disturbance:  Goal: Appears well-rested  Description  Appears well-rested  Outcome: Ongoing

## 2019-06-12 NOTE — CARE COORDINATION
SW was consulted to see pt regarding drug abuse. Pt is currently dealing with considerable medical issues and not appropriate to discuss drug abuse/rehabs at this time. SW will follow and meet with pt at more appropriate time.

## 2019-06-12 NOTE — PROGRESS NOTES
NEPHROLOGY PROGRESS NOTE      SUBJECTIVE     Is on fluids containing bicarb.uo 238 CC since admission. Overall in positive balance. Currently also on pressors. Cultures so far neg. Not much improvement in mentation /renal function  Will initiate dialysis    OBJECTIVE     Vitals:    06/12/19 0845 06/12/19 0900 06/12/19 0915 06/12/19 0930   BP: (!) 85/55 85/69 (!) 118/59 (!) 98/58   Pulse: 81 84 84 84   Resp: 9 13 10 15   Temp:       TempSrc:       SpO2: 96% 96% 94% 93%   Weight:       Height:         24HR INTAKE/OUTPUT:      Intake/Output Summary (Last 24 hours) at 6/12/2019 1016  Last data filed at 6/12/2019 0900  Gross per 24 hour   Intake 7938.98 ml   Output 323 ml   Net 7615.98 ml       General appearance:AMS  Respiratory::vesicular breath sounds,no wheeze/crackles  Cardiovascular:S1 S2 normal,no gallop or organic murmur. Abdomen:Non tender/non distended. Bowel sounds present  Extremities: No Cyanosis or Clubbing,present Lower extremity edema  Neurological:AMS      MEDICATIONS     Scheduled Meds:    sodium chloride  250 mL Intravenous Once    sodium chloride  250 mL Intravenous Once    sodium chloride flush  10 mL Intravenous 2 times per day    famotidine (PEPCID) injection  20 mg Intravenous Daily    sodium chloride flush  10 mL Intravenous 2 times per day    piperacillin-tazobactam  2.25 g Intravenous Q8H    warfarin (COUMADIN) daily dosing (placeholder)   Other RX Placeholder     Continuous Infusions:    IV infusion builder 100 mL/hr at 06/12/19 0358    norepinephrine 8 mcg/min (06/12/19 0428)    dextrose       PRN Meds:  sodium chloride flush, magnesium hydroxide, ondansetron, acetaminophen, sodium chloride flush, glucose, dextrose, glucagon (rDNA), dextrose, albuterol, ipratropium-albuterol  Home Meds:                Medications Prior to Admission: gabapentin (NEURONTIN) 600 MG tablet, Take 600 mg by mouth 3 times daily.   warfarin (COUMADIN) 7.5 MG tablet, Take 1 tablet 4 days weekly in the evening on Sundays, Tuesdays, Thursdays, and Saturdays or as directed. Managed by [facility]  topiramate (TOPAMAX) 200 MG tablet, Take 100 mg by mouth 2 times daily  atenolol (TENORMIN) 25 MG tablet, Take 25 mg by mouth daily   predniSONE (DELTASONE) 10 MG tablet, Take 10 mg by mouth daily. warfarin (COUMADIN) 10 MG tablet, Take 1 tablet on Mondays, Wednesdays and Fridays or as directed. Managed by [facility]    INVESTIGATIONS     Last 3 CMP:    Recent Labs     06/11/19  1152  06/11/19  1717 06/12/19  0213 06/12/19  0759   *   < > 134* 131* 135   K 5.2   < > 4.4 4.3 4.3   CL 98   < > 93* 93* 97*   CO2 12*   < > 23 15* 16*   *   < > 102* 112* 123*   CREATININE 8.32*   < > 7.00* 8.33* 8.83*   CALCIUM 6.4*   < > 5.6* 6.4* 6.8*   PROT 5.7*  --  4.9* 5.4*  --    LABALBU 2.6*  --  2.1* 2.2*  --    BILITOT 0.45  --  0.41 0.58  --    ALKPHOS 74  --  54 64  --    *  --  166* 183*  --    ALT 94*  --  83* 93*  --     < > = values in this interval not displayed. Last 3 CBC:  Recent Labs     06/11/19  0315 06/11/19  1152 06/12/19  0216   WBC 17.4* 15.9* 8.3   RBC 5.34 4.72 4.37   HGB 13.5 11.7* 10.6*   HCT 41.0 37.4* 35.1*   MCV 76.9* 79.2* 80.3*   MCH 25.3* 24.8* 24.3*   MCHC 32.9 31.3 30.2   RDW 18.3* 17.3* 17.5*   * 111* 104*   MPV NOT REPORTED 10.6 10.7       ASSESSMENT     1. ELIE secondary to nephrotoxic (rhabdomyolysis) and ischemic ATN (, poor oral intake with dehydrationcontributed by drug abuse) - EVOLVING  2. Anion gap metabolic acidosis from ELIE  3. Shock on pressors exclude sepsis  4. Encephalopathy - ELIE and polysubstance use  Rhabdomyolysis from volume depletion/substance abuse    . 5. Antiphospholipid antibody syndrome - primary  Prednisone use for the last 25 years and on anticoagulation  6. History of TIA  7. Polysubstance abuse   Positive for cocaine/amphetamine  8. CKD 3 with baseline creat 1.4-1. 6         PLAN     1.  One dose

## 2019-06-13 LAB
ABSOLUTE EOS #: 0.19 K/UL (ref 0–0.4)
ABSOLUTE IMMATURE GRANULOCYTE: 0 K/UL (ref 0–0.3)
ABSOLUTE LYMPH #: 0.13 K/UL (ref 1–4.8)
ABSOLUTE MONO #: 0.51 K/UL (ref 0.1–0.8)
ALBUMIN (CALCULATED): 2.7 G/DL (ref 3.2–5.2)
ALBUMIN PERCENT: 55 % (ref 45–65)
ALLEN TEST: ABNORMAL
ALPHA 1 PERCENT: 7 % (ref 3–6)
ALPHA 2 PERCENT: 13 % (ref 6–13)
ALPHA-1-GLOBULIN: 0.3 G/DL (ref 0.1–0.4)
ALPHA-2-GLOBULIN: 0.6 G/DL (ref 0.5–0.9)
ANION GAP SERPL CALCULATED.3IONS-SCNC: 13 MMOL/L (ref 9–17)
ANION GAP SERPL CALCULATED.3IONS-SCNC: 20 MMOL/L (ref 9–17)
ANION GAP SERPL CALCULATED.3IONS-SCNC: 20 MMOL/L (ref 9–17)
BASOPHILS # BLD: 0 % (ref 0–2)
BASOPHILS ABSOLUTE: 0 K/UL (ref 0–0.2)
BETA GLOBULIN: 0.6 G/DL (ref 0.5–1.1)
BETA PERCENT: 12 % (ref 11–19)
BLD PROD TYP BPU: NORMAL
BLD PROD TYP BPU: NORMAL
BUN BLDV-MCNC: 58 MG/DL (ref 6–20)
BUN BLDV-MCNC: 98 MG/DL (ref 6–20)
BUN BLDV-MCNC: 98 MG/DL (ref 6–20)
BUN/CREAT BLD: ABNORMAL (ref 9–20)
CALCIUM IONIZED: 0.89 MMOL/L (ref 1.13–1.33)
CALCIUM SERPL-MCNC: 6.9 MG/DL (ref 8.6–10.4)
CALCIUM SERPL-MCNC: 7 MG/DL (ref 8.6–10.4)
CALCIUM SERPL-MCNC: 8 MG/DL (ref 8.6–10.4)
CHLORIDE BLD-SCNC: 91 MMOL/L (ref 98–107)
CHLORIDE BLD-SCNC: 93 MMOL/L (ref 98–107)
CHLORIDE BLD-SCNC: 96 MMOL/L (ref 98–107)
CO2: 22 MMOL/L (ref 20–31)
CO2: 22 MMOL/L (ref 20–31)
CO2: 26 MMOL/L (ref 20–31)
CREAT SERPL-MCNC: 5.05 MG/DL (ref 0.7–1.2)
CREAT SERPL-MCNC: 7.97 MG/DL (ref 0.7–1.2)
CREAT SERPL-MCNC: 8.35 MG/DL (ref 0.7–1.2)
DIFFERENTIAL TYPE: ABNORMAL
DIRECT EXAM: NORMAL
DISPENSE STATUS BLOOD BANK: NORMAL
DISPENSE STATUS BLOOD BANK: NORMAL
EOSINOPHILS RELATIVE PERCENT: 3 % (ref 1–4)
FIO2: 40
GAMMA GLOBULIN %: 14 % (ref 9–20)
GAMMA GLOBULIN: 0.7 G/DL (ref 0.5–1.5)
GFR AFRICAN AMERICAN: 15 ML/MIN
GFR AFRICAN AMERICAN: 8 ML/MIN
GFR AFRICAN AMERICAN: 9 ML/MIN
GFR NON-AFRICAN AMERICAN: 13 ML/MIN
GFR NON-AFRICAN AMERICAN: 7 ML/MIN
GFR NON-AFRICAN AMERICAN: 7 ML/MIN
GFR SERPL CREATININE-BSD FRML MDRD: ABNORMAL ML/MIN/{1.73_M2}
GLUCOSE BLD-MCNC: 100 MG/DL (ref 75–110)
GLUCOSE BLD-MCNC: 100 MG/DL (ref 75–110)
GLUCOSE BLD-MCNC: 102 MG/DL (ref 70–99)
GLUCOSE BLD-MCNC: 103 MG/DL (ref 70–99)
GLUCOSE BLD-MCNC: 103 MG/DL (ref 74–100)
GLUCOSE BLD-MCNC: 107 MG/DL (ref 70–99)
GLUCOSE BLD-MCNC: 109 MG/DL (ref 75–110)
GLUCOSE BLD-MCNC: 123 MG/DL (ref 75–110)
HCT VFR BLD CALC: 32.5 % (ref 40.7–50.3)
HEMOGLOBIN: 10.1 G/DL (ref 13–17)
IMMATURE GRANULOCYTES: 0 %
INR BLD: 1.2
LYMPHOCYTES # BLD: 2 % (ref 24–44)
Lab: NORMAL
MCH RBC QN AUTO: 24.5 PG (ref 25.2–33.5)
MCHC RBC AUTO-ENTMCNC: 31.1 G/DL (ref 28.4–34.8)
MCV RBC AUTO: 78.9 FL (ref 82.6–102.9)
MODE: ABNORMAL
MONOCYTES # BLD: 8 % (ref 1–7)
MORPHOLOGY: ABNORMAL
MYOGLOBIN: 1363 NG/ML (ref 28–72)
NEGATIVE BASE EXCESS, ART: 2 (ref 0–2)
NRBC AUTOMATED: 0 PER 100 WBC
O2 DEVICE/FLOW/%: ABNORMAL
PARTIAL THROMBOPLASTIN TIME: 34.3 SEC (ref 20.5–30.5)
PARTIAL THROMBOPLASTIN TIME: 35.5 SEC (ref 20.5–30.5)
PARTIAL THROMBOPLASTIN TIME: 38.3 SEC (ref 20.5–30.5)
PARTIAL THROMBOPLASTIN TIME: 41.9 SEC (ref 20.5–30.5)
PATHOLOGIST: ABNORMAL
PATHOLOGIST: NORMAL
PATIENT TEMP: ABNORMAL
PDW BLD-RTO: 17.2 % (ref 11.8–14.4)
PLATELET # BLD: 74 K/UL (ref 138–453)
PLATELET ESTIMATE: ABNORMAL
PMV BLD AUTO: 9.8 FL (ref 8.1–13.5)
POC HCO3: 24.6 MMOL/L (ref 21–28)
POC O2 SATURATION: 97 % (ref 94–98)
POC PCO2 TEMP: ABNORMAL MM HG
POC PCO2: 51.1 MM HG (ref 35–48)
POC PH TEMP: ABNORMAL
POC PH: 7.29 (ref 7.35–7.45)
POC PO2 TEMP: ABNORMAL MM HG
POC PO2: 103.5 MM HG (ref 83–108)
POSITIVE BASE EXCESS, ART: ABNORMAL (ref 0–3)
POTASSIUM SERPL-SCNC: 3.6 MMOL/L (ref 3.7–5.3)
POTASSIUM SERPL-SCNC: 4.1 MMOL/L (ref 3.7–5.3)
POTASSIUM SERPL-SCNC: 4.1 MMOL/L (ref 3.7–5.3)
PROTEIN ELECTROPHORESIS, SERUM: ABNORMAL
PROTHROMBIN TIME: 12.8 SEC (ref 9–12)
RBC # BLD: 4.12 M/UL (ref 4.21–5.77)
RBC # BLD: ABNORMAL 10*6/UL
SAMPLE SITE: ABNORMAL
SEG NEUTROPHILS: 87 % (ref 36–66)
SEGMENTED NEUTROPHILS ABSOLUTE COUNT: 5.57 K/UL (ref 1.8–7.7)
SERUM IFX INTERP: NORMAL
SODIUM BLD-SCNC: 133 MMOL/L (ref 135–144)
SODIUM BLD-SCNC: 135 MMOL/L (ref 135–144)
SODIUM BLD-SCNC: 135 MMOL/L (ref 135–144)
SPECIMEN DESCRIPTION: NORMAL
TCO2 (CALC), ART: 26 MMOL/L (ref 22–29)
TOTAL PROT. SUM,%: 101 % (ref 98–102)
TOTAL PROT. SUM: 4.9 G/DL (ref 6.3–8.2)
TOTAL PROTEIN: 4.9 G/DL (ref 6.4–8.3)
TRANSFUSION STATUS: NORMAL
TRANSFUSION STATUS: NORMAL
UNIT DIVISION: 0
UNIT DIVISION: 0
UNIT NUMBER: NORMAL
UNIT NUMBER: NORMAL
WBC # BLD: 6.4 K/UL (ref 3.5–11.3)
WBC # BLD: ABNORMAL 10*3/UL

## 2019-06-13 PROCEDURE — 6360000002 HC RX W HCPCS: Performed by: STUDENT IN AN ORGANIZED HEALTH CARE EDUCATION/TRAINING PROGRAM

## 2019-06-13 PROCEDURE — 83874 ASSAY OF MYOGLOBIN: CPT

## 2019-06-13 PROCEDURE — 82947 ASSAY GLUCOSE BLOOD QUANT: CPT

## 2019-06-13 PROCEDURE — 6360000002 HC RX W HCPCS: Performed by: INTERNAL MEDICINE

## 2019-06-13 PROCEDURE — 94762 N-INVAS EAR/PLS OXIMTRY CONT: CPT

## 2019-06-13 PROCEDURE — 6360000002 HC RX W HCPCS

## 2019-06-13 PROCEDURE — 99232 SBSQ HOSP IP/OBS MODERATE 35: CPT | Performed by: INTERNAL MEDICINE

## 2019-06-13 PROCEDURE — 80048 BASIC METABOLIC PNL TOTAL CA: CPT

## 2019-06-13 PROCEDURE — 2580000003 HC RX 258: Performed by: NURSE PRACTITIONER

## 2019-06-13 PROCEDURE — 97161 PT EVAL LOW COMPLEX 20 MIN: CPT

## 2019-06-13 PROCEDURE — 97110 THERAPEUTIC EXERCISES: CPT

## 2019-06-13 PROCEDURE — 6370000000 HC RX 637 (ALT 250 FOR IP): Performed by: STUDENT IN AN ORGANIZED HEALTH CARE EDUCATION/TRAINING PROGRAM

## 2019-06-13 PROCEDURE — 85025 COMPLETE CBC W/AUTO DIFF WBC: CPT

## 2019-06-13 PROCEDURE — 2500000003 HC RX 250 WO HCPCS: Performed by: EMERGENCY MEDICINE

## 2019-06-13 PROCEDURE — 90935 HEMODIALYSIS ONE EVALUATION: CPT

## 2019-06-13 PROCEDURE — 2580000003 HC RX 258: Performed by: STUDENT IN AN ORGANIZED HEALTH CARE EDUCATION/TRAINING PROGRAM

## 2019-06-13 PROCEDURE — 82803 BLOOD GASES ANY COMBINATION: CPT

## 2019-06-13 PROCEDURE — 2000000000 HC ICU R&B

## 2019-06-13 PROCEDURE — 99291 CRITICAL CARE FIRST HOUR: CPT | Performed by: INTERNAL MEDICINE

## 2019-06-13 PROCEDURE — 85730 THROMBOPLASTIN TIME PARTIAL: CPT

## 2019-06-13 PROCEDURE — 89220 SPUTUM SPECIMEN COLLECTION: CPT

## 2019-06-13 PROCEDURE — 5A1D70Z PERFORMANCE OF URINARY FILTRATION, INTERMITTENT, LESS THAN 6 HOURS PER DAY: ICD-10-PCS | Performed by: INTERNAL MEDICINE

## 2019-06-13 PROCEDURE — 94770 HC ETCO2 MONITOR DAILY: CPT

## 2019-06-13 PROCEDURE — 94003 VENT MGMT INPAT SUBQ DAY: CPT

## 2019-06-13 PROCEDURE — 2700000000 HC OXYGEN THERAPY PER DAY

## 2019-06-13 PROCEDURE — 85610 PROTHROMBIN TIME: CPT

## 2019-06-13 PROCEDURE — 82330 ASSAY OF CALCIUM: CPT

## 2019-06-13 PROCEDURE — 36600 WITHDRAWAL OF ARTERIAL BLOOD: CPT

## 2019-06-13 PROCEDURE — 2580000003 HC RX 258: Performed by: EMERGENCY MEDICINE

## 2019-06-13 RX ORDER — WARFARIN SODIUM 10 MG/1
10 TABLET ORAL
Status: COMPLETED | OUTPATIENT
Start: 2019-06-13 | End: 2019-06-13

## 2019-06-13 RX ORDER — HEPARIN SODIUM 10000 [USP'U]/100ML
1000 INJECTION, SOLUTION INTRAVENOUS CONTINUOUS
Status: DISCONTINUED | OUTPATIENT
Start: 2019-06-13 | End: 2019-06-15

## 2019-06-13 RX ORDER — HEPARIN SODIUM 1000 [USP'U]/ML
4000 INJECTION, SOLUTION INTRAVENOUS; SUBCUTANEOUS PRN
Status: DISCONTINUED | OUTPATIENT
Start: 2019-06-13 | End: 2019-06-15

## 2019-06-13 RX ORDER — HEPARIN SODIUM 1000 [USP'U]/ML
2000 INJECTION, SOLUTION INTRAVENOUS; SUBCUTANEOUS PRN
Status: DISCONTINUED | OUTPATIENT
Start: 2019-06-13 | End: 2019-06-15

## 2019-06-13 RX ORDER — HEPARIN SODIUM 10000 [USP'U]/100ML
INJECTION, SOLUTION INTRAVENOUS
Status: COMPLETED
Start: 2019-06-13 | End: 2019-06-13

## 2019-06-13 RX ORDER — WARFARIN SODIUM 10 MG/1
10 TABLET ORAL
Status: DISCONTINUED | OUTPATIENT
Start: 2019-06-13 | End: 2019-06-13

## 2019-06-13 RX ORDER — MIDODRINE HYDROCHLORIDE 5 MG/1
5 TABLET ORAL
Status: DISCONTINUED | OUTPATIENT
Start: 2019-06-13 | End: 2019-06-18

## 2019-06-13 RX ORDER — HEPARIN SODIUM 1000 [USP'U]/ML
4000 INJECTION, SOLUTION INTRAVENOUS; SUBCUTANEOUS ONCE
Status: COMPLETED | OUTPATIENT
Start: 2019-06-13 | End: 2019-06-13

## 2019-06-13 RX ADMIN — WARFARIN SODIUM 10 MG: 10 TABLET ORAL at 17:30

## 2019-06-13 RX ADMIN — FENTANYL CITRATE 100 MCG: 50 INJECTION INTRAMUSCULAR; INTRAVENOUS at 06:40

## 2019-06-13 RX ADMIN — FENTANYL CITRATE 100 MCG: 50 INJECTION INTRAMUSCULAR; INTRAVENOUS at 12:22

## 2019-06-13 RX ADMIN — CALCIUM GLUCONATE 2 G: 98 INJECTION, SOLUTION INTRAVENOUS at 10:45

## 2019-06-13 RX ADMIN — FENTANYL CITRATE 100 MCG: 50 INJECTION INTRAMUSCULAR; INTRAVENOUS at 16:34

## 2019-06-13 RX ADMIN — HEPARIN SODIUM 1700 UNITS: 1000 INJECTION INTRAVENOUS; SUBCUTANEOUS at 18:45

## 2019-06-13 RX ADMIN — MIDODRINE HYDROCHLORIDE 5 MG: 5 TABLET ORAL at 17:30

## 2019-06-13 RX ADMIN — HEPARIN SODIUM 2000 UNITS: 1000 INJECTION INTRAVENOUS; SUBCUTANEOUS at 17:02

## 2019-06-13 RX ADMIN — Medication 2 MG/HR: at 18:34

## 2019-06-13 RX ADMIN — MIDODRINE HYDROCHLORIDE 5 MG: 5 TABLET ORAL at 12:31

## 2019-06-13 RX ADMIN — PIPERACILLIN SODIUM,TAZOBACTAM SODIUM 2.25 G: 2; .25 INJECTION, POWDER, FOR SOLUTION INTRAVENOUS at 05:19

## 2019-06-13 RX ADMIN — HEPARIN SODIUM 2000 UNITS: 1000 INJECTION INTRAVENOUS; SUBCUTANEOUS at 23:43

## 2019-06-13 RX ADMIN — HEPARIN SODIUM 1600 UNITS: 1000 INJECTION INTRAVENOUS; SUBCUTANEOUS at 18:45

## 2019-06-13 RX ADMIN — FENTANYL CITRATE 100 MCG: 50 INJECTION INTRAMUSCULAR; INTRAVENOUS at 18:20

## 2019-06-13 RX ADMIN — HEPARIN SODIUM 4000 UNITS: 1000 INJECTION, SOLUTION INTRAVENOUS; SUBCUTANEOUS at 10:42

## 2019-06-13 RX ADMIN — HEPARIN SODIUM AND DEXTROSE 6.68 UNITS/KG/HR: 10000; 5 INJECTION INTRAVENOUS at 10:36

## 2019-06-13 RX ADMIN — FAMOTIDINE 20 MG: 10 INJECTION, SOLUTION INTRAVENOUS at 08:23

## 2019-06-13 RX ADMIN — PIPERACILLIN SODIUM,TAZOBACTAM SODIUM 2.25 G: 2; .25 INJECTION, POWDER, FOR SOLUTION INTRAVENOUS at 13:19

## 2019-06-13 RX ADMIN — FENTANYL CITRATE 100 MCG: 50 INJECTION INTRAMUSCULAR; INTRAVENOUS at 09:16

## 2019-06-13 RX ADMIN — FENTANYL CITRATE 100 MCG: 50 INJECTION INTRAMUSCULAR; INTRAVENOUS at 11:07

## 2019-06-13 RX ADMIN — FENTANYL CITRATE 100 MCG: 50 INJECTION INTRAMUSCULAR; INTRAVENOUS at 04:40

## 2019-06-13 RX ADMIN — PIPERACILLIN SODIUM,TAZOBACTAM SODIUM 2.25 G: 2; .25 INJECTION, POWDER, FOR SOLUTION INTRAVENOUS at 19:49

## 2019-06-13 RX ADMIN — Medication 10 ML: at 19:48

## 2019-06-13 RX ADMIN — FENTANYL CITRATE 100 MCG: 50 INJECTION INTRAMUSCULAR; INTRAVENOUS at 14:02

## 2019-06-13 RX ADMIN — Medication 16 MCG/MIN: at 12:53

## 2019-06-13 RX ADMIN — Medication 10 ML: at 08:24

## 2019-06-13 ASSESSMENT — PAIN SCALES - GENERAL
PAINLEVEL_OUTOF10: 0
PAINLEVEL_OUTOF10: 0

## 2019-06-13 ASSESSMENT — PAIN SCALES - WONG BAKER: WONGBAKER_NUMERICALRESPONSE: 8

## 2019-06-13 ASSESSMENT — PULMONARY FUNCTION TESTS
PIF_VALUE: 23
PIF_VALUE: 25
PIF_VALUE: 25
PIF_VALUE: 26
PIF_VALUE: 26
PIF_VALUE: 25

## 2019-06-13 ASSESSMENT — PAIN DESCRIPTION - LOCATION: LOCATION: LEG

## 2019-06-13 ASSESSMENT — PAIN DESCRIPTION - PAIN TYPE: TYPE: ACUTE PAIN

## 2019-06-13 ASSESSMENT — PAIN DESCRIPTION - ORIENTATION: ORIENTATION: RIGHT

## 2019-06-13 NOTE — CARE COORDINATION
Transition Planning:  Per bedside nurse report. Pt intubated on 6/12 , to have Hemodialysis today and is currently on Levo. Continue to monitor needs.

## 2019-06-13 NOTE — PROGRESS NOTES
Critical Care Team - Daily Progress Note    Date and time: 6/13/2019 6:32 AM  Patient's name:  Lori Rosenberg  Medical Record Number: [id number]  Patient's account/billing number: [de-identified]  Patient's YOB: 1975  Age: 39 y.o. Date of Admission: 6/11/2019 11:01 AM  Length of stay during current admission: 2    Primary Care Physician: Marzella Homans, CNP, KIESHA - CNP  ICU Attending Physician: Dr. Danitza Ac Status: Full Code     Reason for ICU admission: Rhabdo, ELIE, Respiratory Failure    SUBJECTIVE:     OVERNIGHT EVENTS: Decreased responsiveness with hypercapnia and hypoventilation. Required intubation. Will respond and follow commands intermittently. Levophed was titrated up.     AWAKE & FOLLOWING COMMANDS:  [x] No   [] Yes    CURRENT VENTILATION STATUS:     [x] Ventilator  [] BIPAP  [] Nasal Cannula [] Room Air      IF INTUBATED, ET TUBE MARKING AT LOWER LIP:       cms    SECRETIONS Amount:  [x] Small [] Moderate  [] Large  [] None  Color:     [] White [] Colored  [] Bloody    SEDATION:  RAAS Score:  [] Propofol gtt  [] Versed gtt  [x] Fentanyl pushes   [] No Sedation    PARALYZED:  [x] No    [] Yes    DIARRHEA:                [x] No                [] Yes  (C. Difficile status: [] positive                                                                                                                       [] negative                                                                                                                     [] pending)    VASOPRESSORS:  [] No    [x] Yes    If yes -   [x] Levophed       [] Dopamine     [] Vasopressin       [] Dobutamine  [] Phenylephrine         [] Epinephrine    CENTRAL LINES:     [] No   [x] Yes   (Date of Insertion: 6/12/19)           If yes -     [x] Right IJ     [] Left IJ [] Right Femoral [] Left Femoral                   [] Right Subclavian [] Left Subclavian     VAUGHN'S CATHETER:   [] No   [x] Yes     URINE OUTPUT:            [] Good symmetrical, trachea midline, no adenopathy, thyroid symmetric  Respiratory: equal breath sounds b/l, no wheezes or but scattered rales on the Right lung base  Cardiovascular: regular rate and rhythm  Abdomen: soft very mild epigastric tenderness on exam, nondistended, no masses or organomegaly  Neurological: altered, maintaining airway, answering questions, but very slow in responding to questions. Only able to answer simple questions. Moving bilateral upper and lower extremities, following commands. Extremities:  peripheral pulses normal, minimal pitting edema in the shins.     MEDICATIONS:  Scheduled Meds:   sodium chloride  250 mL Intravenous Once    sodium chloride flush  10 mL Intravenous 2 times per day    famotidine (PEPCID) injection  20 mg Intravenous Daily    sodium chloride flush  10 mL Intravenous 2 times per day    piperacillin-tazobactam  2.25 g Intravenous Q8H    warfarin (COUMADIN) daily dosing (placeholder)   Other RX Placeholder     Continuous Infusions:   IV infusion builder 100 mL/hr at 06/12/19 1348    norepinephrine 9 mcg/min (06/13/19 0609)    dextrose       PRN Meds:     fentanNYL 100 mcg Q1H PRN   heparin (porcine) 1,700 Units PRN   heparin (porcine) 1,600 Units PRN   sodium chloride flush 10 mL PRN   magnesium hydroxide 30 mL Daily PRN   ondansetron 4 mg Q6H PRN   acetaminophen 650 mg Q4H PRN   sodium chloride flush 10 mL PRN   glucose 15 g PRN   dextrose 12.5 g PRN   glucagon (rDNA) 1 mg PRN   dextrose 100 mL/hr PRN   albuterol 2.5 mg As Directed RT PRN   ipratropium-albuterol 1 ampule Q4H PRN       VENT SETTINGS (Comprehensive) (if applicable):  Vent Information  $Ventilation: (S) $Initial Day  Ventilator Started: Yes  Skin Assessment: Clean, dry, & intact  Equipment ID: [id number]  Vent Type: Servo i  Vent Mode: PRVC  Vt Ordered: 620 mL  Rate Set: (S) 22 bmp(increased per Dr Tanika Edmonds after ABG reviewPH [id number] )  FiO2 : 40 %  Sensitivity: 5  PEEP/CPAP: 5  I Time/ I Time %: 0.9 s  Cuff Pressure (cm H2O): 22 cm H2O  Humidification Source: HME  Nitric Oxide/Epoprostenol In Use?: No  Additional Respiratory  Assessments  Pulse: 76  Resp: 22  SpO2: 97 %  End Tidal CO2: 47 (%)  Position: Semi-Bowens's  Humidification Source: HME  Oral Care Completed?: Yes  Oral Care: Teeth brushed, Mouthwash, Mouth suctioned  Subglottic Suction Done?: Yes  Cuff Pressure (cm H2O): 22 cm H2O    ABGs:   Arterial Blood Gas result:  pO2 103.5; pCO2 51.1; pH 7.29;  HCO3 24.6, %O2 Sat 97.     Laboratory findings:    Complete Blood Count:   Recent Labs     06/11/19  1152 06/12/19  0216 06/13/19  0425   WBC 15.9* 8.3 6.4   HGB 11.7* 10.6* 10.1*   HCT 37.4* 35.1* 32.5*   * 104* 74*      Last 3 Blood Glucose:   Recent Labs     06/12/19  0759 06/12/19  1950 06/13/19  0128   GLUCOSE 108* 125* 102*      PT/INR:    Lab Results   Component Value Date    PROTIME 12.8 06/13/2019    INR 1.2 06/13/2019     PTT:    Lab Results   Component Value Date    APTT 35.5 06/13/2019     Comprehensive Metabolic Profile:   Recent Labs     06/12/19  0213 06/12/19  0759 06/12/19  1950 06/13/19  0128   * 135 135 133*   K 4.3 4.3 4.3 4.1   CL 93* 97* 94* 91*   CO2 15* 16* 22 22   * 123* 94* 98*   CREATININE 8.33* 8.83* 6.98* 7.97*   GLUCOSE 158* 108* 125* 102*   CALCIUM 6.4* 6.8* 7.3* 6.9*   PROT 5.4*  --   --   --    LABALBU 2.2*  --   --   --    BILITOT 0.58  --   --   --    ALKPHOS 64  --   --   --    *  --   --   --    ALT 93*  --   --   --       Magnesium:   Lab Results   Component Value Date    MG 2.4 06/12/2019     Phosphorus:   Lab Results   Component Value Date    PHOS 9.3 06/12/2019     Ionized Calcium:   Lab Results   Component Value Date    CAION 0.87 06/12/2019      Urinalysis:   Lab Results   Component Value Date    COLORU ANANT 06/11/2019    NITRU POSITIVE 06/11/2019    GLUCOSEU 50 mg/dL 06/11/2019    KETUA TRACE 06/11/2019    UROBILINOGEN Normal 06/11/2019    BILIRUBINUR 2+ 06/11/2019     Troponin: No results for input(s): TROPONINI in the last 72 hours. Microbiology:  Cultures during this admission:     Blood cultures:                 [] None drawn      [x] Negative             []  Positive (Details:  )  Urine Culture:                   [] None drawn      [x] Negative             []  Positive (Details:  )    Radiology/Imaging:     Ct Head Wo Contrast    Result Date: 6/11/2019  EXAM: CT HEAD WO CONTRAST CLINICAL STATEMENT: Altered mental status. COMPARISON: CT head examination dated 10/31/2018. TECHNIQUE: CT examination of the head without IV contrast.   Dose reduction techniques were achieved by using automated exposure control and/or adjustment of mA and/or kV according to patient size and/or use of iterative reconstruction technique. FINDINGS: Of note, the examination is limited due to motion artifact. The cerebral sulci and ventricles are normal in size and shape. The density of the cerebrum, brainstem, and cerebellum is unremarkable. There is no evidence of intracranial hemorrhage, mass, or midline shift. No extra-axial fluid collection is seen. The brainstem and cerebellum are normal in appearance. There is complete opacification of the right maxillary sinus with mucosal thickening in the frontal, bilateral ethmoid, and bilateral sphenoid sinuses. Otherwise, the visualized paranasal sinuses and mastoid air cells are clear. No skull abnormalities are identified. 1. Limited examination due to patient motion artifact. Within the limitations of the examination, no acute intracranial abnormality is seen. 2. Sinus disease. Please correlate for acute sinusitis. Us Renal Complete    Result Date: 6/11/2019  EXAMINATION: RETROPERITONEAL ULTRASOUND OF THE KIDNEYS AND URINARY BLADDER 6/11/2019 COMPARISON: 12/08/2018. HISTORY: ORDERING SYSTEM PROVIDED HISTORY: Renal failure, acute (kidney injury) TECHNOLOGIST PROVIDED HISTORY: ELIE Initial exam. FINDINGS: Kidneys:  The right kidney measures 10.3 cm in length and the left kidney measures 12.0 cm in length. The echogenicity of the kidneys is normal.  There is no hydronephrosis or mass. There is sludge noted in the gallbladder lumen. No stones are identified. The previously noted right-sided renal calculi on the CT from 12/08/2018 are not visualized on the current exam. Bladder: The bladder was not imaged secondary to it being empty. There is a Browne catheter present. 1. Unremarkable bilateral renal ultrasound. 2. Biliary sludge. 3. Nonvisualization of the bladder secondary to a Browne catheter in place. Xr Chest Portable    Result Date: 6/11/2019  EXAMINATION: ONE XRAY VIEW OF THE CHEST 6/11/2019 10:09 pm COMPARISON: Chest radiograph performed 06/11/2019. HISTORY: ORDERING SYSTEM PROVIDED HISTORY: Concern for fluid overload TECHNOLOGIST PROVIDED HISTORY: Concern for fluid overload Ordering Physician Provided Reason for Exam: portable upright concern for fluid overload Acuity: Unknown Type of Exam: Unknown FINDINGS: There is bilateral pulmonary congestion. There is no acute consolidation or effusion. There is no pneumothorax. The heart is enlarged. The upper abdomen is unremarkable. The extrathoracic soft tissues are unremarkable. Cardiomegaly and bilateral pulmonary congestion. Xr Chest Portable    Result Date: 6/11/2019  CHEST 1 VIEW, 6/11/2019: CLINICAL INDICATION: Mental status change. COMPARISON: Chest, 2/24/2019. FINDINGS: The heart is mildly to moderately enlarged, but exaggerated by mild patient rotation to the right and suboptimal inspiratory effort. The lungs are hypoinflated with mild streaky lower lung atelectasis, but appear otherwise grossly clear. Hypoinflation with mild streaky lower lung atelectasis. The lungs are otherwise grossly clear.      ASSESSMENT:     Patient Active Problem List    Diagnosis Date Noted    Morbid obesity ([address]     Rhabdomyolysis 06/11/2019    Unresponsive 08/12/2018    Drug overdose     Lottie Clayton episode 04/03/2016    Seizure disorder ([facility] 04/03/2016    Lupus anticoagulant syndrome ([facility] 04/03/2016    Seizures ([facility]     Hypertension     Asthma     Cerebral artery occlusion with cerebral infarction ([facility]     Substance abuse ([facility] 04/02/2016    ELIE (acute kidney injury) ([facility] 04/02/2016    Elevated transaminase level 04/02/2016    Leukocytosis 04/02/2016    Antiphospholipid antibody syndrome 11/07/2012    Long term current use of anticoagulant therapy 11/07/2012     Additional assessment:     Antiphospholipid antibody syndrome    Long term current use of anticoagulant therapy    Polysubstance abuse (HCC)    ELIE (acute kidney injury) (HCC)    Elevated transaminase level    Leukocytosis    Seizures (HCC)    Hypertension    Asthma    Cerebral artery occlusion with cerebral infarction (HCC)    Unresponsive episode    Seizure disorder (HCC)    Lupus anticoagulant syndrome (HCC)    Unresponsive    Drug overdose    Rhabdomyolysis     PLAN:     WEAN PER PROTOCOL:  [x] No   [] Yes  [] N/A    DISCONTINUE ANY LABS:   [x] No   [] Yes    ICU PROPHYLAXIS:  Stress ulcer:  [] PPI Agent  [x] [id number] [] Sucralfate  [] Other:  VTE:   [] Enoxaparin  [x] Unfract. Heparin gtt  [] EPC Cuffs    NUTRITION:  [x] NPO [] Tube Feeding (Specify: ) [] TPN  [] PO (Diet: Diet NPO Effective Now Exceptions are: Other (See Comment))    HOME MEDICATIONS RECONCILED: [x] No  [] Yes    INSULIN DRIP:   [x] No   [] Yes    CONSULTATION NEEDED:  [] No   [x] Yes    FAMILY UPDATED:    [] No   [x] Yes    TRANSFER OUT OF ICU:   [x] No   [] Yes    ADDITIONAL PLAN:    1. Neuro  · Elevated uremia, answers simple questions, but unable to answer complex questions. · Multiple drug abuse  2. CV  · monitor, NSR currently, Hx cocaine abuse avoid unopposed beta blocker use, Hx cardiac cath, Keep MAP >60 /SBP >110 mm Hg  · Levophed at low dose started over night for hemodynamic support. Wean as tolerated. · Echo ordered.   3. Resp  · on vent, persistent respiratory acidosis, improved over yesterday  4.  GI  · NPO, monitor bowel function, IV pepcid   5.  Renal  · monitor UOP, nephro on board, follow up with their recommendatiosn. Following, GAGE,Complement levels,Acute hepatitis panel to exclude occult connective tissue disease, Hx of antiphospholipid disorder per family, SPEP/UPEP/sFLC to rule out occult  Paraproteinemia, USS Kidneys for size,echogenicity and exclude obstruction  · 2 Amps of bicarb push. · Bicarb drip decreased to 100 cc/hr. · Reverse INR for marisol placed per IR. Dialysis yesterday  6.  Endo  · Follow glucose. · Will consider steriod supplementation given hx of daily steroid use. 7. ID  · zosyn 2.25g Q8h, monitor for fevers and trend WBC  8. Heme:   · INR 9.9 - effectively reversed with Vit K and FFP  · Repeat INR at noon. · Uremia. · Continue to follow Hg. · No obvious sources of bleeding. 9. Cont Supportive care, SCDs   10. Alphonsa Curling, M.D.              Emergency Medicine Resident  Critical Care Services        6/13/2019, 6:32 AM

## 2019-06-13 NOTE — PROGRESS NOTES
Occupational Therapy    Occupational Therapy Not Seen Note    DATE: 2019  Name: Radha Garcia  : 1975  MRN: [id number]    Patient not available for Occupational Therapy due to: Other: Pt vented, is following commands, however, on levophed and possible continuous dialysis based on medications. Writer deciding to check back on . Next Scheduled Treatment: Attempt on  as appropriate.     Electronically signed by Kelsy Rico OT on 2019 at 9:17 AM

## 2019-06-13 NOTE — PROGRESS NOTES
Physical Therapy    [address]  Initial Assessment    NAME: Madai Mackey  : 1975  MRN: [id number]    Date of Service: 2019  Madai Mackey is a 39 y.o. Hx of antiphospholipid disorder per family and was found down. EMS originally called to assist pt due to inability to \"get up\". Pt altered on EMS exam and brought into outlying hospital. Found to be in rhabdomyolysis and continued AMS so transferred to [facility]. Mother states pt has Hx of drug abuse, was clean for some time since getting out of rehab but must have gotten back into using drugs given +urine and blood tox screen. Unknown last well time and how long down for. Daughter of pt at bedside but sleeping. No Hx obtained from pt due to his condition.      Discharge Recommendations:  Continue to assess pending progress        Assessment   Body structures, Functions, Activity limitations: Decreased functional mobility ; Decreased cognition;Decreased endurance  Assessment: Pt did not follow any commands. Pt was not sedated. Prognosis: Good  Decision Making: Low Complexity  Patient Education: Family: PT POC  REQUIRES PT FOLLOW UP: Yes  Activity Tolerance  Activity Tolerance: Patient limited by pain;Treatment limited secondary to agitation       Patient Diagnosis(es): There were no encounter diagnoses. has a past medical history of Antiphospholipid antibody with hemorrhagic disorder ([facility] Arthritis, Asthma, Bronchitis, Cerebral artery occlusion with cerebral infarction [facility]), Disease of blood and blood forming organ, Hypertension, Long-term (current) use of anticoagulants, INR goal 2.5-3.5, Pleurisy, Pneumonia, and Seizures ([facility] has a past surgical history that includes Cardiac catheterization.     Restrictions  Restrictions/Precautions  Required Braces or Orthoses?: No  Vision/Hearing  Vision: (MIC)  Hearing: (MIC)     Subjective  General  Patient assessed for rehabilitation services?: Yes  Family / Caregiver Present: Wife, Mother but then left. Follows Commands: Within Functional Limits  Pain Screening  Patient Currently in Pain: Yes  Pain Assessment  Pain Assessment: Faces  Floyd-Baker Pain Rating: Hurts whole lot  Pain Type: Acute pain  Pain Location: Leg  Pain Orientation: Right  Pt had a strong facial grimace with movement of RLE. Vital Signs  Patient Currently in Pain: Yes       Orientation  Orientation  Overall Orientation Status: Impaired  Social/Functional History  Social/Functional History  Lives With: Spouse  Type of Home: House  Home Layout: Two level, Able to Live on Main level with bedroom/bathroom  Home Access: Stairs to enter with rails  Entrance Stairs - Number of Steps: 2  Home Equipment: Cane  ADL Assistance: Needs assistance  Ambulation Assistance: Independent  Transfer Assistance: Independent  Active : No  Occupation: On disability  Additional Comments: Wife states that the patient has been requiring more assist recently. Objective  PROM RLE (degrees)  RLE General PROM: ROM limited d/t resistence and body habitus. PROM LLE (degrees)  LLE General PROM: ROM limited d/t resistence and body habitus. PROM RUE (degrees)  RUE PROM: WFL  RUE General PROM: except shoulder to 90   AROM LUE (degrees)  LUE AROM : WFL  LUE General AROM: except shoulder to 90  Strength Other  Other: unable to assess     Sensation  Overall Sensation Status: (MIC)   PROM to all extremities as tolerated. Grimacing with ROM RLE.   Bilateral gastroc stretch x 3 reps  Plan   Plan  Times per week: 2-3x/week  Current Treatment Recommendations: Strengthening, ROM, Cognitive Reorientation  Safety Devices  Type of devices: Nurse notified, Left in bed    AM-PAC Score  AM-PAC Inpatient Mobility Raw Score : 6 (06/13/19 1312)  AM-PAC Inpatient T-Scale Score : 23.55 (06/13/19 1312)  Mobility Inpatient CMS 0-100% Score: 100 (06/13/19 1312)  Mobility Inpatient CMS G-Code Modifier : CN (06/13/19 1312)    Goals  Short term goals  Time Frame for Short term goals: 14 visits  Short term goal 1: Prevent contractures through ROM and stretching  Short term goal 2: Pt to follow some commands for active movement  Short term goal 3: Progress with mobility as appropriate.   Patient Goals   Patient goals : Unable to state       Therapy Time   Individual Concurrent Group Co-treatment   Time In 1138         Time Out 1200         Minutes 22         Timed Code Treatment Minutes: [address] [facility]  ROGER, PT

## 2019-06-13 NOTE — PROGRESS NOTES
Progress Note               Date:                           6/13/2019  Patient name:           Denisse Patten  Date of admission:  6/11/2019 11:01 AM  MRN:   [id number]  YOB: 1975  PCP:                           Shiva Lunsford CNP, APRN - CNP        Subjective:   Had 2 hrs of hemodialysis yesterday  INR stable 1.2, PT decreased to 12.8 and PTT to 35.5  platelet count trending down 74, Hb stable 10.1   CK and myoglobin trending down  Requiring pressors, support increasing     HPI in Brief:   The patient is a 39 y.o.  male with history of antiphospholipid antibody syndrome, multiple TIAs, history of PE as per mother, polysubstance abuse, epilepsy, hypertension is admitted to the hospital for altered mental status.     As per family, patient was found down for unknown duration of time. EMS was called and he was taken to an outlying facility. Laboratory evaluation showed acute kidney injury secondary to rhabdomyolysis, transferred to [facility] for further management.     Patient continued to be altered, opens eyes on sternal rub,   as per family patient was discharged from drug rehabilitation center around 6/7/2019, and since then patient was more lethargic and altered. Urine drug screen on admission positive for cocaine, amphetamines, methamphetamines, oxycodone. Creatinine on admission 9.60 with elevated CK and 23,000. INR elevated at 6.2 on admission, increased to 9.9 today. Fibrinogen elevated, PTT and PT elevated. Nephrology was consulted for acute kidney injury. ELIE work-up revealed elevated serum free light chains. CT head was negative. Patient was started on levophed due to hypotension on admission.      Hematology oncology was consulted because of coagulopathy. Patient was given 5 mg of IV vitamin K yesterday evening, 2 FFP's this morning as per critical care team.  INR came down to 1.7. Cheng catheter was placed by IR to initiate hemodialysis. rhabdomyolysis management as per nephrology. Got hemodialysis on 6/12/2019.   3. Other management as per primary   4. We will continue to follow       [address] [facility]  PGY2 Internal Medicine  [facility]    Attending Physician Statement   I have discussed the care of this patient, including pertinent history and exam findings, with the resident. I have seen and examined the patient and the key elements of all parts of the encounter have been performed by me. I agree with the assessment, plan and orders as documented by the resident and with changes made to the note.     Patient's INR is down to 1.2  Given his history of antiphospholipid antibody syndrome he needs to be bridged with heparin  At this point no additional recommendation from hematology  Will sign off for now and will be available for any questions    Jeannie Winter MD  Hematology/Oncology    Cell: [phone]

## 2019-06-13 NOTE — PROGRESS NOTES
(COUMADIN) daily dosing (placeholder)   Other RX Placeholder     Continuous Infusions:    IV infusion builder 100 mL/hr at 06/12/19 1348    norepinephrine 15 mcg/min (06/13/19 0734)    dextrose       PRN Meds:  fentanNYL, heparin (porcine), heparin (porcine), sodium chloride flush, magnesium hydroxide, ondansetron, acetaminophen, sodium chloride flush, glucose, dextrose, glucagon (rDNA), dextrose, albuterol, ipratropium-albuterol  Home Meds:                Medications Prior to Admission: gabapentin (NEURONTIN) 600 MG tablet, Take 600 mg by mouth 3 times daily. warfarin (COUMADIN) 7.5 MG tablet, Take 1 tablet 4 days weekly in the evening on Sundays, Tuesdays, Thursdays, and Saturdays or as directed. Managed by [facility]  topiramate (TOPAMAX) 200 MG tablet, Take 100 mg by mouth 2 times daily  atenolol (TENORMIN) 25 MG tablet, Take 25 mg by mouth daily   predniSONE (DELTASONE) 10 MG tablet, Take 10 mg by mouth daily. warfarin (COUMADIN) 10 MG tablet, Take 1 tablet on Mondays, Wednesdays and Fridays or as directed. Managed by [facility]    INVESTIGATIONS     Last 3 CMP:    Recent Labs     06/11/19  1152 06/11/19  1357 06/11/19  1717 06/12/19  0213  06/12/19  1950 06/13/19  0128 06/13/19  0825   * 135 134* 131*   < > 135 133* 135   K 5.2 5.5* 4.4 4.3   < > 4.3 4.1 4.1   CL 98 103 93* 93*   < > 94* 91* 93*   CO2 12* 11* 23 15*   < > 22 22 22   * 111* 102* 112*   < > 94* 98* PENDING   CREATININE 8.32* 8.17* 7.00* 8.33*   < > 6.98* 7.97* PENDING   CALCIUM 6.4* 6.2* 5.6* 6.4*   < > 7.3* 6.9* 7.0*   PROT 5.7* 4.9* 4.9* 5.4*  --   --   --   --    LABALBU 2.6*  --  2.1* 2.2*  --   --   --   --    BILITOT 0.45  --  0.41 0.58  --   --   --   --    ALKPHOS 74  --  54 64  --   --   --   --    *  --  166* 183*  --   --   --   --    ALT 94*  --  83* 93*  --   --   --   --     < > = values in this interval not displayed.        Last 3 CBC:  Recent Labs 06/11/19  1152 06/12/19  0216 06/13/19  0425   WBC 15.9* 8.3 6.4   RBC 4.72 4.37 4.12*   HGB 11.7* 10.6* 10.1*   HCT 37.4* 35.1* 32.5*   MCV 79.2* 80.3* 78.9*   MCH 24.8* 24.3* 24.5*   MCHC 31.3 30.2 31.1   RDW 17.3* 17.5* 17.2*   * 104* 74*   MPV 10.6 10.7 9.8       ASSESSMENT     1. ELIE secondary to nephrotoxic (rhabdomyolysis) and ischemic ATN (, poor oral intake with dehydrationcontributed by drug abuse) - HD dependent since 12/6  Status post placement of temporary Cheng catheter  2. VDRF  3. Shock on pressors exclude sepsis  4. Encephalopathy - ELIE and polysubstance use  5. Rhabdomyolysis from volume depletion/substance abuse    . 6. Antiphospholipid antibody syndrome - primary  7. Prednisone use for the last 25 years and on anticoagulation  8. History of TIA  9. Polysubstance abuse   Positive for cocaine/amphetamine  10. CKD 3 with baseline creat 1.4-1. 6       PLAN     1. Hemodialysis today also AND AM.DC Bicarb  2. Continue pressors and add Midodrine  3. Will follow    Please do not hesitate to call with questions. Will discuss with the attending for further recommendations    This note is created with the assistance of a speech-recognition program. While intending to generate a document that actually reflects the content of the visit, no guarantees can be provided that every mistake has been identified and corrected by editing    Graeme Sanchez M.D. Internal Medicine Resident , PGY-1  [facility]  06/13/19 9:32 AM  Attending Physician Statement  I have discussed the care of Anna Hurst, including pertinent history and exam findings,  with the Fellow/Residentt. I have reviewed the key elements of all parts of the encounter with the Fellow/ Resident. I agree with the assessment, plan and orders as documented by the resident.     Mick Karimi MD, MRCARNALDO Gardner, FACP   6/13/2019 11:19 AM    Nephrology [address] [place] [address]

## 2019-06-13 NOTE — PROGRESS NOTES
06/13/19 0418   Vent Information   Rate Set 22 bmp  (increased per Dr Pop Brewster after ABG reviewPH [id number] )

## 2019-06-13 NOTE — PROGRESS NOTES
Nutrition Assessment    Type and Reason for Visit: Initial, Consult    Nutrition Recommendations: Recommend Nepro (renal) tube feed at goal of 45 ml per hour to provide 1944 kcal, 87 g protein    Nutrition Assessment: Pt is nutritionally compromised due to npo/vent, recent release from [facility]. Plan is to start tube feed later today. See recommendations. Malnutrition Assessment:  · Malnutrition Status: At risk for malnutrition  · Context: Acute illness or injury  · Findings of the 6 clinical characteristics of malnutrition (Minimum of 2 out of 6 clinical characteristics is required to make the diagnosis of moderate or severe Protein Calorie Malnutrition based on AND/ASPEN Guidelines):  1. Energy Intake-Unable to assess,      2. Weight Loss-No significant weight loss,    3. Fat Loss-No significant subcutaneous fat loss,    4. Muscle Loss-No significant muscle mass loss,    5. Fluid Accumulation-Moderate to severe fluid accumulation, Generalized  6.   Strength-Not measured    Nutrition Risk Level: High    Nutrient Needs:  · Estimated Daily Total Kcal: 22-25 sonali/qv=9757-5397 kcal  · Estimated Daily Protein (g): 1.2-1.5 b=586-112 g    Nutrition Diagnosis:   · Problem: Inadequate oral intake  · Etiology: related to Impaired respiratory function-inability to consume food     Signs and symptoms:  as evidenced by Intubation    Objective Information:  · Nutrition-Focused Physical Findings: obese  · Wound Type: Deep Tissue Injury, Multiple(traumatic injuries)  · Current Nutrition Therapies:  · Oral Diet Orders: NPO   · Anthropometric Measures:  · Ht: 6' (182.9 cm)   · Current Body Wt: 330 lb (149.7 kg)  · Ideal Body Wt: 178 lb (80.7 kg), % Ideal Body 185%  · BMI Classification: BMI > or equal to 40.0 Obese Class III    Nutrition Interventions:   Start Tube Feeding  Education not appropriate at this time    Nutrition Evaluation:   · Evaluation: Goals set   · Goals: meet more than 75% of nutrition needs · Monitoring: TF Intake, TF Tolerance      Electronically signed by Amador Arreaga RD, LD on 6/13/19 at 10:53 AM    Contact Number: [phone]

## 2019-06-13 NOTE — PROGRESS NOTES
Dialysis Post Treatment Note  Patient tolerated treatment well. No complications noted. Vitals remained stable, pt resting comfortably.   Vitals:    06/13/19 1855   BP:    Pulse: 72   Resp: 19   Temp:    SpO2: 97%   BP: 133/72    Pre-Weight = 148.8 kg  Post-weight = Weight: (!) 329 lb 14.4 oz (149.6 kg)  Total Liters Processed = Total Liters Processed (l/min): 75.7 l/min  Rinseback Volume (mL) = Rinseback Volume (ml): 370 ml  Net Removal (mL) = 1140 mL  Length of treatment=3.5 hours

## 2019-06-13 NOTE — PLAN OF CARE
Problem: Restraint Use - Nonviolent/Non-Self-Destructive Behavior:  Goal: Absence of restraint-related injury  Description  Absence of restraint-related injury  Outcome: Met This Shift     Problem: Pain:  Goal: Pain level will decrease  Description  Pain level will decrease  6/13/2019 0142 by Suhail Owen RN  Outcome: Ongoing  6/12/2019 1841 by Verna Giraldo RN  Outcome: Ongoing  Goal: Control of acute pain  Description  Control of acute pain  6/13/2019 0142 by Suhail Owen RN  Outcome: Ongoing  6/12/2019 1841 by Verna Giraldo RN  Outcome: Ongoing  Goal: Control of chronic pain  Description  Control of chronic pain  6/13/2019 0142 by Suhail Owen RN  Outcome: Ongoing  6/12/2019 1841 by Verna Giraldo RN  Outcome: Ongoing     Problem: Risk for Impaired Skin Integrity  Goal: Tissue integrity - skin and mucous membranes  Description  Structural intactness and normal physiological function of skin and  mucous membranes.   6/13/2019 0142 by Suhail Owen RN  Outcome: Ongoing  6/12/2019 1841 by Verna Giraldo RN  Outcome: Ongoing     Problem: Falls - Risk of:  Goal: Will remain free from falls  Description  Will remain free from falls  6/13/2019 0142 by Suhail Owen RN  Outcome: Ongoing  6/12/2019 1841 by Verna Giraldo RN  Outcome: Ongoing  Goal: Absence of physical injury  Description  Absence of physical injury  6/13/2019 0142 by Suhail Owen RN  Outcome: Ongoing  6/12/2019 1841 by Verna Giraldo RN  Outcome: Ongoing     Problem: Discharge Planning:  Goal: Participates in care planning  Description  Participates in care planning  6/13/2019 0142 by Suhail Owne RN  Outcome: Ongoing  6/12/2019 1841 by Verna Giraldo RN  Outcome: Ongoing  Goal: Discharged to appropriate level of care  Description  Discharged to appropriate level of care  6/13/2019 0142 by Suhail Owen RN  Outcome: Ongoing  6/12/2019 1841 by Verna Giraldo RN  Outcome: Ongoing  Goal: Ability to perform activities of daily living will improve  Description  Ability to perform activities of daily living will improve  6/13/2019 0142 by Shahrzad Moeller RN  Outcome: Ongoing  6/12/2019 1841 by Jessica Joseph RN  Outcome: Ongoing     Problem: Airway Clearance - Ineffective:  Goal: Ability to maintain a clear airway will improve  Description  Ability to maintain a clear airway will improve  6/13/2019 0142 by Shahrzad Moeller RN  Outcome: Ongoing  6/12/2019 2005 by Susan Garcia  Outcome: Ongoing  6/12/2019 1841 by Jessica Joseph RN  Outcome: Ongoing  6/12/2019 1734 by Luis Guerrero RCP  Outcome: Ongoing     Problem: Anxiety/Stress:  Goal: Level of anxiety will decrease  Description  Level of anxiety will decrease  6/13/2019 0142 by Shahrzad Moeller RN  Outcome: Ongoing  6/12/2019 1841 by Jessica Joseph RN  Outcome: Ongoing     Problem: Cardiac Output - Decreased:  Goal: Hemodynamic stability will improve  Description  Hemodynamic stability will improve  6/13/2019 0142 by Shahrzad Moeller RN  Outcome: Ongoing  6/12/2019 1841 by Jessica Joseph RN  Outcome: Ongoing     Problem: Fluid Volume - Imbalance:  Goal: Absence of imbalanced fluid volume signs and symptoms  Description  Absence of imbalanced fluid volume signs and symptoms  6/13/2019 0142 by Shahrzad Moeller RN  Outcome: Ongoing  6/12/2019 1841 by Jessica Joseph RN  Outcome: Ongoing     Problem: Gas Exchange - Impaired:  Goal: Levels of oxygenation will improve  Description  Levels of oxygenation will improve  6/13/2019 0142 by Shahrzad Moeller RN  Outcome: Ongoing  6/12/2019 1841 by Jessica Joseph RN  Outcome: Ongoing     Problem: Mental Status - Impaired:  Goal: Mental status will be restored to baseline  Description  Mental status will be restored to baseline  6/13/2019 0142 by Shahrzad Moeller RN  Outcome: Ongoing  6/12/2019 1841 by Jessica Joseph RN  Outcome: Ongoing     Problem: Nutrition Deficit:  Goal: Ability to achieve adequate nutritional intake will Ongoing  Goal: ET tube will be managed safely  6/13/2019 0142 by No Torrez RN  Outcome: Ongoing  6/12/2019 2005 by Leann Carias  Outcome: Ongoing  6/12/2019 1841 by Renu Mcnamara RN  Outcome: Ongoing  Goal: Ability to express needs and understand communication  6/13/2019 0142 by No Torrez RN  Outcome: Ongoing  6/12/2019 2005 by Leann Carias  Outcome: Ongoing  6/12/2019 1841 by Renu Mcnamara RN  Outcome: Ongoing  Goal: Mobility/activity is maintained at optimum level for patient  6/13/2019 0142 by No Torrez RN  Outcome: Ongoing  6/12/2019 1841 by Renu Mcnamara RN  Outcome: Ongoing     Problem: Respiratory:  Goal: Ability to maintain a clear airway will improve  Description  Ability to maintain a clear airway will improve  6/13/2019 0142 by No Torrez RN  Outcome: Ongoing  6/12/2019 2005 by Leann Carias  Outcome: Ongoing  6/12/2019 1841 by Renu Mcnamara RN  Outcome: Ongoing  6/12/2019 1734 by Yael Gee RCP  Outcome: Ongoing  Goal: Ability to maintain adequate ventilation will improve  Description  Ability to maintain adequate ventilation will improve  6/13/2019 0142 by No Torrez RN  Outcome: Ongoing  6/12/2019 2005 by Leann Carias  Outcome: Ongoing  6/12/2019 1841 by Renu Mcnamara RN  Outcome: Ongoing  6/12/2019 1734 by Yael Gee RCP  Outcome: Ongoing  Goal: Complications related to the disease process, condition or treatment will be avoided or minimized  Description  Complications related to the disease process, condition or treatment will be avoided or minimized  6/13/2019 0142 by No Torrez RN  Outcome: Ongoing  6/12/2019 2005 by Leann Carias  Outcome: Ongoing  6/12/2019 1841 by Renu Mcnamara RN  Outcome: Ongoing  6/12/2019 1734 by Yael Gee RCP  Outcome: Ongoing     Problem: Skin Integrity:  Goal: Risk for impaired skin integrity will decrease  Description  Risk for impaired skin integrity will decrease  6/13/2019 0142 by Rick Villalba RN  Outcome: Ongoing  6/12/2019 2005 by Bright Landon  Outcome: Ongoing  6/12/2019 1841 by Sandra Sloan RN  Outcome: Ongoing  6/12/2019 1734 by Eugenio Osborne RCP  Outcome: Ongoing     Problem: OXYGENATION/RESPIRATORY FUNCTION  Goal: Patient will maintain patent airway  6/13/2019 0142 by Rick Villalba RN  Outcome: Ongoing  6/12/2019 2005 by Bright Landon  Outcome: Ongoing  6/12/2019 1841 by Sandra Sloan RN  Outcome: Ongoing  Goal: Patient will achieve/maintain normal respiratory rate/effort  Description  Respiratory rate and effort will be within normal limits for the patient  6/13/2019 0142 by Rick Villalba RN  Outcome: Ongoing  6/12/2019 2005 by Bright Landon  Outcome: Ongoing  6/12/2019 1841 by Sandra Sloan RN  Outcome: Ongoing     Problem: SKIN INTEGRITY  Goal: Skin integrity is maintained or improved  6/13/2019 0142 by Rick Villalba RN  Outcome: Ongoing  6/12/2019 2005 by Bright Landon  Outcome: Ongoing  6/12/2019 1841 by Sandra Sloan RN  Outcome: Ongoing     Problem: Confusion - Acute:  Goal: Mental status will be restored to baseline  Description  Mental status will be restored to baseline  6/13/2019 0142 by Rick Villalba RN  Outcome: Ongoing  6/12/2019 1841 by Sandra Sloan RN  Outcome: Ongoing  Goal: Absence of continued neurological deterioration signs and symptoms  Description  Absence of continued neurological deterioration signs and symptoms  6/13/2019 0142 by Rick Villalba RN  Outcome: Ongoing  6/12/2019 1841 by Sandra Sloan RN  Outcome: Ongoing     Problem: Injury - Risk of, Physical Injury:  Goal: Will remain free from falls  Description  Will remain free from falls  6/13/2019 0142 by Rick Villalba RN  Outcome: Ongoing  6/12/2019 1841 by Sandra Sloan RN  Outcome: Ongoing  Goal: Absence of physical injury  Description  Absence of physical injury  6/13/2019 0142 by Rick Villalba RN  Outcome: Ongoing  6/12/2019 1841 by Briana Ybarra DELROY De Jesus  Outcome: Ongoing     Problem: Mood - Altered:  Goal: Mood stable  Description  Mood stable  6/13/2019 0142 by Whit Jade RN  Outcome: Ongoing  6/12/2019 1841 by Liana Lee RN  Outcome: Ongoing  Goal: Absence of abusive behavior  Description  Absence of abusive behavior  6/13/2019 0142 by Whit Jade RN  Outcome: Ongoing  6/12/2019 1841 by Liana Lee RN  Outcome: Ongoing  Goal: Verbalizations of feeling emotionally comfortable while being cared for will increase  Description  Verbalizations of feeling emotionally comfortable while being cared for will increase  6/13/2019 0142 by Whit Jade RN  Outcome: Ongoing  6/12/2019 1841 by Liana Lee RN  Outcome: Ongoing     Problem: Psychomotor Activity - Altered:  Goal: Absence of psychomotor disturbance signs and symptoms  Description  Absence of psychomotor disturbance signs and symptoms  6/13/2019 0142 by Whit Jade RN  Outcome: Ongoing  6/12/2019 1841 by Liana Lee RN  Outcome: Ongoing     Problem: Sensory Perception - Impaired:  Goal: Demonstrations of improved sensory functioning will increase  Description  Demonstrations of improved sensory functioning will increase  6/13/2019 0142 by Whit Jade RN  Outcome: Ongoing  6/12/2019 1841 by Liana Lee RN  Outcome: Ongoing  Goal: Decrease in sensory misperception frequency  Description  Decrease in sensory misperception frequency  6/13/2019 0142 by Whit Jade RN  Outcome: Ongoing  6/12/2019 1841 by Liana Lee RN  Outcome: Ongoing  Goal: Able to refrain from responding to false sensory perceptions  Description  Able to refrain from responding to false sensory perceptions  6/13/2019 0142 by Whit Jade RN  Outcome: Ongoing  6/12/2019 1841 by Liana Lee RN  Outcome: Ongoing  Goal: Demonstrates accurate environmental perceptions  Description  Demonstrates accurate environmental perceptions  6/13/2019 0142 by Whit Jade RN  Outcome: Ongoing  6/12/2019 1841 by Jessica Joseph RN  Outcome: Ongoing  Goal: Able to distinguish between reality-based and nonreality-based thinking  Description  Able to distinguish between reality-based and nonreality-based thinking  6/13/2019 0142 by Shahrzad Moeller RN  Outcome: Ongoing  6/12/2019 1841 by Jessica Joseph RN  Outcome: Ongoing  Goal: Able to interrupt nonreality-based thinking  Description  Able to interrupt nonreality-based thinking  6/13/2019 0142 by Shahrzad Moeller RN  Outcome: Ongoing  6/12/2019 1841 by Jessica Joseph RN  Outcome: Ongoing     Problem: Sleep Pattern Disturbance:  Goal: Appears well-rested  Description  Appears well-rested  6/13/2019 0142 by Shahrzad Moeller RN  Outcome: Ongoing  6/12/2019 1841 by Jessica Joseph RN  Outcome: Ongoing     Problem: Restraint Use - Nonviolent/Non-Self-Destructive Behavior:  Goal: Absence of restraint indications  Description  Absence of restraint indications  Outcome: Not Met This Shift

## 2019-06-13 NOTE — PLAN OF CARE
Problem: Pain:  Goal: Pain level will decrease  Description  Pain level will decrease  6/13/2019 1243 by Sarah Bella RN  Outcome: Ongoing     Problem: Pain:  Goal: Control of acute pain  Description  Control of acute pain  6/13/2019 1243 by Sarah Bella RN  Outcome: Ongoing     Problem: Risk for Impaired Skin Integrity  Goal: Tissue integrity - skin and mucous membranes  Description  Structural intactness and normal physiological function of skin and  mucous membranes.   6/13/2019 1243 by Sarah Bella RN  Outcome: Ongoing     Problem: Falls - Risk of:  Goal: Will remain free from falls  Description  Will remain free from falls  6/13/2019 1243 by Sarah Bella RN  Outcome: Ongoing     Problem: Falls - Risk of:  Goal: Absence of physical injury  Description  Absence of physical injury  6/13/2019 1243 by Sarah Bella RN  Outcome: Ongoing     Problem: Airway Clearance - Ineffective:  Goal: Ability to maintain a clear airway will improve  Description  Ability to maintain a clear airway will improve  6/13/2019 1243 by Sarah Bella RN  Outcome: Ongoing     Problem: Anxiety/Stress:  Goal: Level of anxiety will decrease  Description  Level of anxiety will decrease  6/13/2019 1243 by Sarah Bella RN  Outcome: Ongoing     Problem: Cardiac Output - Decreased:  Goal: Hemodynamic stability will improve  Description  Hemodynamic stability will improve  6/13/2019 1243 by Sarah Bella RN  Outcome: Ongoing     Problem: Fluid Volume - Imbalance:  Goal: Absence of imbalanced fluid volume signs and symptoms  Description  Absence of imbalanced fluid volume signs and symptoms  6/13/2019 1243 by Sarah Bella RN  Outcome: Ongoing     Problem: Mental Status - Impaired:  Goal: Mental status will be restored to baseline  Description  Mental status will be restored to baseline  6/13/2019 1243 by Sarah Bella RN  Outcome: Ongoing     Problem: Nutrition Deficit:  Goal: Ability to

## 2019-06-13 NOTE — PROGRESS NOTES
Pharmacy Note  Warfarin Consult follow-up    Recent Labs     06/13/19  0425   INR 1.2     Recent Labs     06/11/19  1152 06/12/19  0216 06/13/19  0425   HGB 11.7* 10.6* 10.1*   HCT 37.4* 35.1* 32.5*   * 104* 74*     Current warfarin drug-drug interactions:  acetaminophen, heparin for line care, Zosyn    Date INR Dose   6/11/19 6.2 HOLD   6/12/19 9.9 Vitamin K 5mg IV given, HOLD WARFARIN   6/13/19 1.2 10mg     Notes:  Give warfarin 10mg today on 6/13/19. Daily PT/INR while inpatient.      Janice Galdamez RPh, CACP  Clinical Pharmacist Medication Management  6/13/2019  8:18 AM

## 2019-06-14 ENCOUNTER — APPOINTMENT (OUTPATIENT)
Dept: GENERAL RADIOLOGY | Age: 44
DRG: 557 | End: 2019-06-14
Attending: INTERNAL MEDICINE
Payer: MEDICARE

## 2019-06-14 LAB
ABSOLUTE EOS #: 0.16 K/UL (ref 0–0.4)
ABSOLUTE IMMATURE GRANULOCYTE: 0 K/UL (ref 0–0.3)
ABSOLUTE LYMPH #: 0.21 K/UL (ref 1–4.8)
ABSOLUTE MONO #: 0.73 K/UL (ref 0.1–0.8)
ALLEN TEST: ABNORMAL
ANION GAP SERPL CALCULATED.3IONS-SCNC: 12 MMOL/L (ref 9–17)
ANION GAP SERPL CALCULATED.3IONS-SCNC: 15 MMOL/L (ref 9–17)
BASOPHILS # BLD: 0 % (ref 0–2)
BASOPHILS ABSOLUTE: 0 K/UL (ref 0–0.2)
BUN BLDV-MCNC: 34 MG/DL (ref 6–20)
BUN BLDV-MCNC: 69 MG/DL (ref 6–20)
BUN/CREAT BLD: ABNORMAL (ref 9–20)
BUN/CREAT BLD: ABNORMAL (ref 9–20)
CALCIUM SERPL-MCNC: 7.8 MG/DL (ref 8.6–10.4)
CALCIUM SERPL-MCNC: 8.1 MG/DL (ref 8.6–10.4)
CHLORIDE BLD-SCNC: 96 MMOL/L (ref 98–107)
CHLORIDE BLD-SCNC: 98 MMOL/L (ref 98–107)
CO2: 25 MMOL/L (ref 20–31)
CO2: 25 MMOL/L (ref 20–31)
CREAT SERPL-MCNC: 3.78 MG/DL (ref 0.7–1.2)
CREAT SERPL-MCNC: 5.94 MG/DL (ref 0.7–1.2)
CULTURE: NORMAL
DIFFERENTIAL TYPE: ABNORMAL
DIRECT EXAM: NORMAL
EOSINOPHILS RELATIVE PERCENT: 3 % (ref 1–4)
FIO2: 30
GFR AFRICAN AMERICAN: 13 ML/MIN
GFR AFRICAN AMERICAN: 21 ML/MIN
GFR NON-AFRICAN AMERICAN: 10 ML/MIN
GFR NON-AFRICAN AMERICAN: 18 ML/MIN
GFR SERPL CREATININE-BSD FRML MDRD: ABNORMAL ML/MIN/{1.73_M2}
GLUCOSE BLD-MCNC: 100 MG/DL (ref 70–99)
GLUCOSE BLD-MCNC: 112 MG/DL (ref 74–100)
GLUCOSE BLD-MCNC: 118 MG/DL (ref 70–99)
GLUCOSE BLD-MCNC: 128 MG/DL (ref 75–110)
HBV SURFACE AB TITR SER: <3.5 MIU/ML
HCT VFR BLD CALC: 32.6 % (ref 40.7–50.3)
HEMOGLOBIN: 10.2 G/DL (ref 13–17)
HEPATITIS B CORE TOTAL ANTIBODY: NONREACTIVE
IMMATURE GRANULOCYTES: 0 %
INR BLD: 1.3
LACTIC ACID, WHOLE BLOOD: 0.9 MMOL/L (ref 0.7–2.1)
LYMPHOCYTES # BLD: 4 % (ref 24–44)
Lab: NORMAL
MCH RBC QN AUTO: 24.5 PG (ref 25.2–33.5)
MCHC RBC AUTO-ENTMCNC: 31.3 G/DL (ref 28.4–34.8)
MCV RBC AUTO: 78.2 FL (ref 82.6–102.9)
MODE: ABNORMAL
MONOCYTES # BLD: 14 % (ref 1–7)
MORPHOLOGY: ABNORMAL
NEGATIVE BASE EXCESS, ART: ABNORMAL (ref 0–2)
NRBC AUTOMATED: 0 PER 100 WBC
O2 DEVICE/FLOW/%: ABNORMAL
PARTIAL THROMBOPLASTIN TIME: 42.8 SEC (ref 20.5–30.5)
PARTIAL THROMBOPLASTIN TIME: 48.1 SEC (ref 20.5–30.5)
PARTIAL THROMBOPLASTIN TIME: 52 SEC (ref 20.5–30.5)
PATIENT TEMP: ABNORMAL
PDW BLD-RTO: 16.7 % (ref 11.8–14.4)
PLATELET # BLD: 79 K/UL (ref 138–453)
PLATELET ESTIMATE: ABNORMAL
PMV BLD AUTO: 11.7 FL (ref 8.1–13.5)
POC HCO3: 27 MMOL/L (ref 21–28)
POC O2 SATURATION: 94 % (ref 94–98)
POC PCO2 TEMP: ABNORMAL MM HG
POC PCO2: 45 MM HG (ref 35–48)
POC PH TEMP: ABNORMAL
POC PH: 7.39 (ref 7.35–7.45)
POC PO2 TEMP: ABNORMAL MM HG
POC PO2: 73.7 MM HG (ref 83–108)
POSITIVE BASE EXCESS, ART: 2 (ref 0–3)
POTASSIUM SERPL-SCNC: 3.7 MMOL/L (ref 3.7–5.3)
POTASSIUM SERPL-SCNC: 3.7 MMOL/L (ref 3.7–5.3)
PROTHROMBIN TIME: 13.8 SEC (ref 9–12)
RBC # BLD: 4.17 M/UL (ref 4.21–5.77)
RBC # BLD: ABNORMAL 10*6/UL
SAMPLE SITE: ABNORMAL
SEG NEUTROPHILS: 79 % (ref 36–66)
SEGMENTED NEUTROPHILS ABSOLUTE COUNT: 4.1 K/UL (ref 1.8–7.7)
SODIUM BLD-SCNC: 135 MMOL/L (ref 135–144)
SODIUM BLD-SCNC: 136 MMOL/L (ref 135–144)
SPECIMEN DESCRIPTION: NORMAL
TCO2 (CALC), ART: 28 MMOL/L (ref 22–29)
WBC # BLD: 5.2 K/UL (ref 3.5–11.3)
WBC # BLD: ABNORMAL 10*3/UL

## 2019-06-14 PROCEDURE — 37799 UNLISTED PX VASCULAR SURGERY: CPT

## 2019-06-14 PROCEDURE — 97110 THERAPEUTIC EXERCISES: CPT

## 2019-06-14 PROCEDURE — 82947 ASSAY GLUCOSE BLOOD QUANT: CPT

## 2019-06-14 PROCEDURE — 6360000002 HC RX W HCPCS: Performed by: STUDENT IN AN ORGANIZED HEALTH CARE EDUCATION/TRAINING PROGRAM

## 2019-06-14 PROCEDURE — 80048 BASIC METABOLIC PNL TOTAL CA: CPT

## 2019-06-14 PROCEDURE — 99291 CRITICAL CARE FIRST HOUR: CPT | Performed by: INTERNAL MEDICINE

## 2019-06-14 PROCEDURE — 94003 VENT MGMT INPAT SUBQ DAY: CPT

## 2019-06-14 PROCEDURE — 86317 IMMUNOASSAY INFECTIOUS AGENT: CPT

## 2019-06-14 PROCEDURE — 94681 O2 UPTK CO2 OUTP % O2 XTRC: CPT

## 2019-06-14 PROCEDURE — 82803 BLOOD GASES ANY COMBINATION: CPT

## 2019-06-14 PROCEDURE — 2500000003 HC RX 250 WO HCPCS: Performed by: EMERGENCY MEDICINE

## 2019-06-14 PROCEDURE — 6370000000 HC RX 637 (ALT 250 FOR IP): Performed by: STUDENT IN AN ORGANIZED HEALTH CARE EDUCATION/TRAINING PROGRAM

## 2019-06-14 PROCEDURE — 2000000000 HC ICU R&B

## 2019-06-14 PROCEDURE — 2580000003 HC RX 258: Performed by: NURSE PRACTITIONER

## 2019-06-14 PROCEDURE — 86704 HEP B CORE ANTIBODY TOTAL: CPT

## 2019-06-14 PROCEDURE — 85610 PROTHROMBIN TIME: CPT

## 2019-06-14 PROCEDURE — 2580000003 HC RX 258: Performed by: STUDENT IN AN ORGANIZED HEALTH CARE EDUCATION/TRAINING PROGRAM

## 2019-06-14 PROCEDURE — 85730 THROMBOPLASTIN TIME PARTIAL: CPT

## 2019-06-14 PROCEDURE — 2580000003 HC RX 258: Performed by: EMERGENCY MEDICINE

## 2019-06-14 PROCEDURE — 94762 N-INVAS EAR/PLS OXIMTRY CONT: CPT

## 2019-06-14 PROCEDURE — 83605 ASSAY OF LACTIC ACID: CPT

## 2019-06-14 PROCEDURE — 85025 COMPLETE CBC W/AUTO DIFF WBC: CPT

## 2019-06-14 PROCEDURE — 36600 WITHDRAWAL OF ARTERIAL BLOOD: CPT

## 2019-06-14 PROCEDURE — 90935 HEMODIALYSIS ONE EVALUATION: CPT

## 2019-06-14 PROCEDURE — 74018 RADEX ABDOMEN 1 VIEW: CPT

## 2019-06-14 PROCEDURE — 2700000000 HC OXYGEN THERAPY PER DAY

## 2019-06-14 PROCEDURE — 6360000002 HC RX W HCPCS: Performed by: INTERNAL MEDICINE

## 2019-06-14 RX ORDER — WARFARIN SODIUM 10 MG/1
10 TABLET ORAL
Status: COMPLETED | OUTPATIENT
Start: 2019-06-14 | End: 2019-06-14

## 2019-06-14 RX ORDER — PROPOFOL 10 MG/ML
10 INJECTION, EMULSION INTRAVENOUS
Status: DISCONTINUED | OUTPATIENT
Start: 2019-06-14 | End: 2019-06-16

## 2019-06-14 RX ORDER — METOCLOPRAMIDE HYDROCHLORIDE 5 MG/ML
10 INJECTION INTRAMUSCULAR; INTRAVENOUS EVERY 6 HOURS
Status: DISCONTINUED | OUTPATIENT
Start: 2019-06-14 | End: 2019-06-18

## 2019-06-14 RX ORDER — BISACODYL 10 MG
10 SUPPOSITORY, RECTAL RECTAL ONCE
Status: COMPLETED | OUTPATIENT
Start: 2019-06-14 | End: 2019-06-14

## 2019-06-14 RX ADMIN — PROPOFOL 20 MCG/KG/MIN: 10 INJECTION, EMULSION INTRAVENOUS at 14:11

## 2019-06-14 RX ADMIN — FENTANYL CITRATE 100 MCG: 50 INJECTION INTRAMUSCULAR; INTRAVENOUS at 03:27

## 2019-06-14 RX ADMIN — PROPOFOL 20 MCG/KG/MIN: 10 INJECTION, EMULSION INTRAVENOUS at 18:26

## 2019-06-14 RX ADMIN — FENTANYL CITRATE 100 MCG: 50 INJECTION INTRAMUSCULAR; INTRAVENOUS at 10:32

## 2019-06-14 RX ADMIN — Medication 10 ML: at 07:59

## 2019-06-14 RX ADMIN — HEPARIN SODIUM 1700 UNITS: 1000 INJECTION INTRAVENOUS; SUBCUTANEOUS at 12:31

## 2019-06-14 RX ADMIN — HEPARIN SODIUM 2000 UNITS: 1000 INJECTION INTRAVENOUS; SUBCUTANEOUS at 06:21

## 2019-06-14 RX ADMIN — PIPERACILLIN SODIUM,TAZOBACTAM SODIUM 2.25 G: 2; .25 INJECTION, POWDER, FOR SOLUTION INTRAVENOUS at 13:49

## 2019-06-14 RX ADMIN — HEPARIN SODIUM 2000 UNITS: 1000 INJECTION INTRAVENOUS; SUBCUTANEOUS at 12:10

## 2019-06-14 RX ADMIN — METOCLOPRAMIDE 10 MG: 5 INJECTION, SOLUTION INTRAMUSCULAR; INTRAVENOUS at 19:43

## 2019-06-14 RX ADMIN — METOCLOPRAMIDE 10 MG: 5 INJECTION, SOLUTION INTRAMUSCULAR; INTRAVENOUS at 15:27

## 2019-06-14 RX ADMIN — Medication 10 ML: at 19:43

## 2019-06-14 RX ADMIN — FAMOTIDINE 20 MG: 10 INJECTION, SOLUTION INTRAVENOUS at 07:59

## 2019-06-14 RX ADMIN — HEPARIN SODIUM 1600 UNITS: 1000 INJECTION INTRAVENOUS; SUBCUTANEOUS at 12:31

## 2019-06-14 RX ADMIN — PIPERACILLIN SODIUM,TAZOBACTAM SODIUM 2.25 G: 2; .25 INJECTION, POWDER, FOR SOLUTION INTRAVENOUS at 20:18

## 2019-06-14 RX ADMIN — BISACODYL 10 MG: 10 SUPPOSITORY RECTAL at 22:52

## 2019-06-14 RX ADMIN — WARFARIN SODIUM 10 MG: 10 TABLET ORAL at 18:02

## 2019-06-14 RX ADMIN — HEPARIN SODIUM AND DEXTROSE 10.68 UNITS/KG/HR: 10000; 5 INJECTION INTRAVENOUS at 06:00

## 2019-06-14 RX ADMIN — PIPERACILLIN SODIUM,TAZOBACTAM SODIUM 2.25 G: 2; .25 INJECTION, POWDER, FOR SOLUTION INTRAVENOUS at 04:50

## 2019-06-14 RX ADMIN — HEPARIN SODIUM AND DEXTROSE 14.68 UNITS/KG/HR: 10000; 5 INJECTION INTRAVENOUS at 18:27

## 2019-06-14 ASSESSMENT — PAIN SCALES - GENERAL
PAINLEVEL_OUTOF10: 0
PAINLEVEL_OUTOF10: 5

## 2019-06-14 ASSESSMENT — PULMONARY FUNCTION TESTS
PIF_VALUE: 23
PIF_VALUE: 31
PIF_VALUE: 24
PIF_VALUE: 24
PIF_VALUE: 25
PIF_VALUE: 24

## 2019-06-14 NOTE — CARE COORDINATION
Pt intubated. 3rd Hemo treatment. Called hemo [facility] and left VM - possible OP hemo needed. Unknown dc disposition at this time. Will continue to follow progress.

## 2019-06-14 NOTE — PROGRESS NOTES
NEPHROLOGY PROGRESS NOTE      SUBJECTIVE     Patient seen and chart reviewed. Patient continues to be on sedation and MV with HD this am.   Continues to respond to vigorous stimulation and pain. Urine output around 1300 mL over the last 24 hours with around 40 mL this morning. Continues to be in Overall positive balance. Received hemodialysis via cath this am also. Creatinine levels trending down with HD. Cath has a good flow. Patient continues to be on MV. Current assessment this a.m.-continues to respond to commands but AAO x0. On mechanical ventilation. Off pressors this am .   Cultures negative so far. Total CK trending down. Myoglobin trending down. Low albumin levels. OBJECTIVE     Vitals:    06/14/19 0918 06/14/19 0919 06/14/19 0930 06/14/19 0946   BP:       Pulse:  71 72 73   Resp: 22 21     Temp:       TempSrc:       SpO2: 98% 98%     Weight:       Height:         24HR INTAKE/OUTPUT:      Intake/Output Summary (Last 24 hours) at 6/14/2019 0949  Last data filed at 6/14/2019 0759  Gross per 24 hour   Intake 1754.84 ml   Output 2775 ml   Net -1020.16 ml       General appearance:AMS  Respiratory::vesicular breath sounds,no wheeze/crackles  Cardiovascular:S1 S2 normal,no gallop or organic murmur. Abdomen:Non tender/non distended. Bowel sounds present  Extremities: No Cyanosis or Clubbing,present Lower extremity edema  Neurological:AMS      MEDICATIONS     Scheduled Meds:    warfarin  10 mg Oral Once    midodrine  5 mg Oral TID WC    warfarin (COUMADIN) daily dosing (placeholder)   Other RX Placeholder    sodium chloride  250 mL Intravenous Once    sodium chloride flush  10 mL Intravenous 2 times per day    famotidine (PEPCID) injection  20 mg Intravenous Daily    sodium chloride flush  10 mL Intravenous 2 times per day    piperacillin-tazobactam  2.25 g Intravenous Q8H     Continuous Infusions:    heparin (porcine) 12.68 Units/kg/hr (06/14/19 4213)    midazolam 1.5 mg/hr (06/14/19 10.2*   HCT 35.1* 32.5* 32.6*   MCV 80.3* 78.9* 78.2*   MCH 24.3* 24.5* 24.5*   MCHC 30.2 31.1 31.3   RDW 17.5* 17.2* 16.7*   * 74* 79*   MPV 10.7 9.8 11.7       ASSESSMENT     1. ELIE secondary to nephrotoxic (rhabdomyolysis) and ischemic ATN (, poor oral intake with dehydrationcontributed by drug abuse) - HD dependent since , resolving with HD. Status post placement of temporary Cheng catheter  2. VDRF  3. Shock on pressors exclude sepsis, BC negative so far, improving. 4. Encephalopathy - ELIE and polysubstance use, with previous 2-3 episodes of OD requiring hospitalization. 5. Rhabdomyolysis from volume depletion/substance abuse. Resolving. CK and Myoglobin trending down. Off Bicarbonates. 6. Antiphospholipid antibody syndrome - primary  7. Prednisone use for the last 25 years and on anticoagulation. Bridging today to coumadin per primary  8. History of TIA likely due to prolonged hx of APLA. 9. Polysubstance abuse   Positive for cocaine/amphetamine  10. CKD 3 with baseline creat 1.4-1.6  11. ECHO on 19 with pEF.        PLAN     1. Hemodialysis today. 2.  Continue  Midodrine TID 5 mg. 3. Follow labs and electrolytes. Replace per protocol. 4. Antibiotics per renal clearence. 5. Follow hepatitis panel. 6. Will follow    Please do not hesitate to call with questions. Will discuss with the attending for further recommendations    This note is created with the assistance of a speech-recognition program. While intending to generate a document that actually reflects the content of the visit, no guarantees can be provided that every mistake has been identified and corrected by editing    Richie Apgar, M.D.   Internal Medicine Resident , PGY-1  [facility]  19 9:49 AM

## 2019-06-14 NOTE — PROGRESS NOTES
SUBJECTIVE    Patient seen and chart reviewed. Patient continues to be on sedation and MV with HD this am.   Continues to respond to vigorous stimulation and pain. Urine output around 1300 mL over the last 24 hours with around 40 mL this morning. Continues to be in Overall positive balance. Received hemodialysis via cath this am also. Creatinine levels trending down with HD. Cath has a good flow. Patient continues to be on MV.       Current assessment this a.m.-continues to respond to commands but AAO x0. On mechanical ventilation. Off pressors this am .   Cultures negative so far. Total CK trending down. Myoglobin trending down. Low albumin levels. OBJECTIVE      CURRENT TEMPERATURE:  Temp: 96.8 °F (36 °C)  MAXIMUM TEMPERATURE OVER 24HRS:  Temp (24hrs), Av °F (36.7 °C), Min:96.8 °F (36 °C), Max:98.8 °F (37.1 °C)    CURRENT RESPIRATORY RATE:  Resp: 18  CURRENT PULSE:  Pulse: 79  CURRENT BLOOD PRESSURE:  BP: 114/77  24HR BLOOD PRESSURE RANGE:  Systolic (53CIM), GA , Min:109 , ELD:247   ; Diastolic (10NCP), HAC:61, Min:54, Max:94    24HR INTAKE/OUTPUT:      Intake/Output Summary (Last 24 hours) at 2019 1234  Last data filed at 2019 1100  Gross per 24 hour   Intake 1272.84 ml   Output 2670 ml   Net -1397.16 ml     WEIGHT :  Patient Vitals for the past 96 hrs (Last 3 readings):   Weight   19 0830 (!) 333 lb 1.8 oz (151.1 kg)   19 0400 (!) 337 lb 8 oz (153.1 kg)   19 1855 (!) 328 lb 1.6 oz (148.8 kg)     PHYSICAL EXAM      GENERAL APPEARANCE : No purposeful response to physical stimuli  ENT: Unable to examine throat patient is intubated  NECK:   No JVD. PULMONARY: coarse breath sounds.    CADRDIOVASCULAR: systolic murmur   ABDOMEN: soft nontender, bowel sounds present, no organomegaly,  no ascites   EXTREMITIES: Lower extremity edema  CURRENT MEDICATIONS        warfarin (COUMADIN) tablet 10 mg Once   heparin (porcine) injection 4,000 Units PRN   heparin (porcine) injection 2,000 Units PRN   heparin 25,000 units in dextrose 5% 250 mL infusion Continuous   midodrine (PROAMATINE) tablet 5 mg TID WC   warfarin (COUMADIN) daily dosing (placeholder) RX Placeholder   midazolam (VERSED) 100 mg in dextrose 5% 100 mL infusion Continuous   0.9 % sodium chloride bolus Once   fentaNYL (SUBLIMAZE) injection 100 mcg Q1H PRN   heparin (porcine) injection 1,700 Units PRN   heparin (porcine) injection 1,600 Units PRN   sodium chloride flush 0.9 % injection 10 mL 2 times per day   sodium chloride flush 0.9 % injection 10 mL PRN   magnesium hydroxide (MILK OF MAGNESIA) 400 MG/5ML suspension 30 mL Daily PRN   ondansetron (ZOFRAN) injection 4 mg Q6H PRN   famotidine (PEPCID) injection 20 mg Daily   acetaminophen (TYLENOL) tablet 650 mg Q4H PRN   sodium chloride flush 0.9 % injection 10 mL 2 times per day   sodium chloride flush 0.9 % injection 10 mL PRN   piperacillin-tazobactam (ZOSYN) 2.25 g in dextrose 5 % 50 mL IVPB (mini-bag) Q8H   norepinephrine (LEVOPHED) 16 mg in dextrose 5% 250 mL infusion Continuous   glucose (GLUTOSE) 40 % oral gel 15 g PRN   dextrose 50 % IV solution PRN   glucagon (rDNA) injection 1 mg PRN   dextrose 5 % solution PRN   albuterol (PROVENTIL) nebulizer solution 2.5 mg As Directed RT PRN   ipratropium-albuterol (DUONEB) nebulizer solution 1 ampule Q4H PRN         LABS      CBC: Recent Labs     06/12/19  0216 06/13/19  0425 06/14/19  0554   WBC 8.3 6.4 5.2   RBC 4.37 4.12* 4.17*   HGB 10.6* 10.1* 10.2*   HCT 35.1* 32.5* 32.6*   MCV 80.3* 78.9* 78.2*   MCH 24.3* 24.5* 24.5*   MCHC 30.2 31.1 31.3   RDW 17.5* 17.2* 16.7*   * 74* 79*   MPV 10.7 9.8 11.7      BMP: Recent Labs     06/13/19  0825 06/13/19  1954 06/14/19  0757    135 136   K 4.1 3.6* 3.7   CL 93* 96* 96*   CO2 22 26 25   BUN 98* 58* 69*   CREATININE 8.35* 5.05* 5.94*   GLUCOSE 107* 103* 118*   CALCIUM 7.0* 8.0* 7.8*      BNP:No results found for: BNP  PHOSPHORUS:    Recent Labs     06/12/19  0213 06/11/2019    LEUKOCYTESUR 1+ 06/11/2019    UROBILINOGEN Normal 06/11/2019    BILIRUBINUR 2+ 06/11/2019    GLUCOSEU 50 mg/dL 06/11/2019    KETUA TRACE 06/11/2019    AMORPHOUS 2+ 06/11/2019     RADIOLOGY      Reviewed as available. ASSESSMENT    2. ELIE secondary to nephrotoxic (rhabdomyolysis) and ischemic ATN (, poor oral intake with dehydrationcontributed by drug abuse) -patient is receiving dialysis via Cheng catheter. There is no evidence of renal recovery. Patient is hypercatabolic and will be requiring more frequent dialysis. 3. Vent dependent respiratory failure patient is on 30% FiO2  4. Shock on pressors    5. Encephalopathy -   6. Rhabdomyolysis from volume depletion/substance abuse. Resolving. CK and Myoglobin trending down. 7. Antiphospholipid antibody syndrome - primary  8. Prednisone use for the last 25 years and on anticoagulation. Bridging today to coumadin per primary  9. History of TIA likely due to prolonged hx of APLA. 10. Polysubstance abuse   Positive for cocaine/amphetamine  11. CKD 3 with baseline creat 1.4-1.6  12. ECHO on 6-11-19 with pEF. PLAN      1. Dialysis as ordered and discussed with the nurses. 2.  Continue  Midodrine TID 5 mg. 3. Follow labs and electrolytes. Replace per protocol. 4. Antibiotics per renal clearence. 5. Follow hepatitis panel. 6. Will follow    Please do not hesitate to call with questions. Lexie Holt M.D. Internal Medicine Resident , PGY-1  [address] [place] [address]  06/14/19 12:37 PM     Attending Physician Statement  I have discussed the care of Tracy Atwood, including pertinent history and exam findings with the resident/fellow. I have reviewed the key elements of all parts of the encounter with the resident/fellow. I have seen and examined the patient with the resident/fellow. I agree with the assessment and plan and status of the problem list as documented. Patient was dialyzed today.   Patient tolerated dialysis fairly well. He remains hemodynamically stable during dialysis. Addiitionally I recommend dialysis in [place]

## 2019-06-14 NOTE — PLAN OF CARE
Problem: MECHANICAL VENTILATION  Goal: Patient will maintain patent airway  Outcome: Ongoing     Problem: MECHANICAL VENTILATION  Goal: Oral health is maintained or improved  6/13/2019 2005 by Jeremiah Bejarano  Outcome: Ongoing  6/13/2019 1243 by Cydney Gandara RN  Outcome: Ongoing     Problem: MECHANICAL VENTILATION  Goal: ET tube will be managed safely  6/13/2019 2005 by Jeremiah Bejarano  Outcome: Ongoing  6/13/2019 1243 by Cydney Gandara RN  Outcome: Ongoing     Problem: MECHANICAL VENTILATION  Goal: Ability to express needs and understand communication  Outcome: Ongoing     Problem: MECHANICAL VENTILATION  Goal: Mobility/activity is maintained at optimum level for patient  Outcome: Ongoing     Problem: Respiratory:  Goal: Ability to maintain a clear airway will improve  Description  Ability to maintain a clear airway will improve  6/13/2019 1243 by Cydney Gandara RN  Outcome: Ongoing     Problem: OXYGENATION/RESPIRATORY FUNCTION  Goal: Patient will maintain patent airway  Outcome: Ongoing     Problem: OXYGENATION/RESPIRATORY FUNCTION  Goal: Patient will achieve/maintain normal respiratory rate/effort  Description  Respiratory rate and effort will be within normal limits for the patient  Outcome: Ongoing     Problem: SKIN INTEGRITY  Goal: Skin integrity is maintained or improved  Outcome: Ongoing

## 2019-06-14 NOTE — CONSULTS
History and Physical    PATIENT NAME: Wilton Shen  AGE: 39 y.o. MEDICAL RECORD NO. [id number]  DATE: 6/14/2019  SURGEON: Dr. Prasanth Lino: Bentley Peres, MILLER, APRN - CNP    Patient evaluated at the request of Dr. Carli Youngblood  Reason for evaluation: concern for small bowel obstruction    Patient information was obtained from past medical records, chart review, nursing. History/Exam limitations: intubated. Patient presented to outside hospital Emergency Department by ambulance. IMPRESSION:     Patient Active Problem List   Diagnosis    [facility]-phospholipid antibody syndrome [facility]    Long term current use of anticoagulant therapy    Substance abuse ([facility]    ELIE (acute kidney injury) ([facility] [facility]    Elevated transaminase level    Leukocytosis    Seizures (HCC)    Hypertension    Asthma    Cerebral artery occlusion with cerebral infarction ([facility] [facility]    Unresponsive episode    Seizure disorder (HCC)    Coagulopathy (HCC)    Unresponsive    Drug overdose    Rhabdomyolysis    Morbid obesity ([facility] [facility]    Supratherapeutic INR   1. Diffuse small bowel dilatation, likely ileus versus SBO    PLAN:     1. Diffuse small bowel dilatation with high OG output likely ileus vs SBO - keep OG to continuous low suction, serial abdominal exams, f/u lactate level. Give dulcolax suppository tonight, if no BM then will give molasses or fleet enema. 2. Medical management per ICU team.  3. Please call with any concerns. HISTORY:   History of Chief Complaint:    Wilton Shen is a 39 y.o. male who presents initially on 6/11 after being found altered at home. Found to have rhabdomyolysis and ELIE likely secondary to dehydration and drug use with positive UDS, and was intubated for airway protection. Past medical history includes antiphospholipid syndrome for which he is on coumadin at home and was found to have supratherapeutic INR and was reversed with K centra and FFP.  Patient started temporary dialysis during admission and has had 3 sessions now, making little urine. Patient was found to have large dark brown OG output today and KUB showed dilated loops of bowel for which general surgery was consulted. Patient has not had bowel movement since admission. No prior abdominal surgeries. Has had ventral hernia in the past.          Past Medical History   has a past medical history of Antiphospholipid antibody with hemorrhagic disorder ([facility] [address] Arthritis, Asthma, Bronchitis, Cerebral artery occlusion with cerebral infarction [facility]), Disease of blood and blood forming organ, Hypertension, Long-term (current) use of anticoagulants, INR goal 2.5-3.5, Pleurisy, Pneumonia, and Seizures ([facility] [address] Past Surgical History   has a past surgical history that includes Cardiac catheterization. Medications  Prior to Admission medications    Medication Sig Start Date End Date Taking? Authorizing Provider   gabapentin (NEURONTIN) 600 MG tablet Take 600 mg by mouth 3 times daily. Yes Historical Provider, MD   warfarin (COUMADIN) 7.5 MG tablet Take 1 tablet 4 days weekly in the evening on Sundays, Tuesdays, Thursdays, and Saturdays or as directed. Managed by [id number] 5/16/19  Yes Marko Reza MD   topiramate (TOPAMAX) 200 MG tablet Take 100 mg by mouth 2 times daily   Yes Historical Provider, MD   atenolol (TENORMIN) 25 MG tablet Take 25 mg by mouth daily    Yes Historical Provider, MD   predniSONE (DELTASONE) 10 MG tablet Take 10 mg by mouth daily. Yes Historical Provider, MD   warfarin (COUMADIN) 10 MG tablet Take 1 tablet on Mondays, Wednesdays and Fridays or as directed.  Managed by [id number] 5/16/19   Marko Reza MD    Scheduled Meds:  [facility] warfarin  10 mg Oral Once    metoclopramide  10 mg Intravenous Q6H    midodrine  5 mg Oral TID WC    warfarin (COUMADIN) daily dosing (placeholder)   Other RX Placeholder    sodium chloride  250 mL Intravenous Once  sodium chloride flush  10 mL Intravenous 2 times per day    famotidine (PEPCID) injection  20 mg Intravenous Daily    sodium chloride flush  10 mL Intravenous 2 times per day    piperacillin-tazobactam  2.25 g Intravenous Q8H     Continuous Infusions:   propofol 20 mcg/kg/min (06/14/19 1411)    heparin (porcine) 14.68 Units/kg/hr (06/14/19 1210)    norepinephrine Stopped (06/14/19 0700)    dextrose       PRN Meds:.heparin (porcine), heparin (porcine), fentanNYL, heparin (porcine), heparin (porcine), sodium chloride flush, magnesium hydroxide, ondansetron, acetaminophen, sodium chloride flush, glucose, dextrose, glucagon (rDNA), dextrose, albuterol, ipratropium-albuterol  Allergies  is allergic to uncaria tomentosa (cats claw). Family History  family history includes Heart Disease in his maternal grandfather and maternal grandmother; Stroke in his maternal grandfather. Social History   reports that he has never smoked. His smokeless tobacco use includes chew. reports that he drank about 1.2 oz of alcohol per week. reports that he does not use drugs. Review of Systems  Unable to obtain due to patient being intubated    PHYSICAL:   VITALS:  height is 6' (1.829 m) and weight is 326 lb 11.6 oz (148.2 kg) (abnormal). His temperature is 98.2 °F (36.8 °C). His blood pressure is 125/74 and his pulse is 80. His respiration is 19 and oxygen saturation is 98%. CONSTITUTIONAL: intubated, sedated, no acute distress  HEENT: Head is normocephalic, atraumatic. PERRLA, ETT in place and OG  NECK: Soft, trachea midline and straight  LUNGS: Chest expands equally bilaterally upon ventilation, ausculation reveals no mild wheezes throughout, no rales or rhonchi. CARDIOVASCULAR: Heart sounds are normal.  Regular rate and rhythm   ABDOMEN: soft, nondistended, ventral hernia with no palpable loops of bowel superior and left lateral of umbilicus, no guarding or peritoneal signs.  Bowel sounds are present in all four quadrants but hypoactive. No prior surgical scars. NEUROLOGIC: intubated, sedated  EXTREMITIES: scattered abrasions to extremities; no cyanosis, clubbing, mild pitting edema to extremities    LABS:     Recent Labs     06/11/19  1717 06/12/19  0213 06/12/19  0216  06/12/19  1359  06/13/19  0425 06/13/19  0825 06/13/19  1954 06/14/19  0554 06/14/19  0757   WBC  --   --  8.3  --   --   --  6.4  --   --  5.2  --    HGB  --   --  10.6*  --   --   --  10.1*  --   --  10.2*  --    HCT  --   --  35.1*  --   --   --  32.5*  --   --  32.6*  --    PLT  --   --  104*  --   --   --  74*  --   --  79*  --    * 131*  --    < >  --    < >  --  135 135  --  136   K 4.4 4.3  --    < >  --    < >  --  4.1 3.6*  --  3.7   CL 93* 93*  --    < >  --    < >  --  93* 96*  --  96*   CO2 23 15*  --    < >  --    < >  --  22 26  --  25   * 112*  --    < >  --    < >  --  98* 58*  --  69*   CREATININE 7.00* 8.33*  --    < >  --    < >  --  8.35* 5.05*  --  5.94*   MG  --  2.4  --   --   --   --   --   --   --   --   --    PHOS  --  9.3*  --   --   --   --   --   --   --   --   --    CALCIUM 5.6* 6.4*  --    < >  --    < >  --  7.0* 8.0*  --  7.8*   INR  --   --  9.9*   < > 1.6  --  1.2  --   --  1.3  --    * 183*  --   --   --   --   --   --   --   --   --    ALT 83* 93*  --   --   --   --   --   --   --   --   --    BILITOT 0.41 0.58  --   --   --   --   --   --   --   --   --    BILIDIR  --  0.42*  --   --   --   --   --   --   --   --   --     < > = values in this interval not displayed. Recent Labs     06/12/19  0213   ALKPHOS 64   ALT 93*   *   BILITOT 0.58   BILIDIR 0.42*   LABALBU 2.2*       RADIOLOGY:   XR Abdomen KUB -     Impression   Diffuse small bowel dilatation suspicious for small bowel obstruction. Continued follow-up is advised.       Enteric tube projects in the expected location of stomach. Thank you for the interesting evaluation. Further recommendations to follow.     Renny Mata

## 2019-06-14 NOTE — PROGRESS NOTES
Dialysis Post Treatment Note  Patient tolerated treatment well. VSS post. Dr Aura Ramsey at bedside.   Vitals:    06/14/19 1300   BP: 114/72   Pulse: 79   Resp: 22   Temp:    SpO2: 99%     Pre-Weight = 151.1kg  Post-weight = Weight: (!) 326 lb 11.6 oz (148.2 kg)  Total Liters Processed = Total Liters Processed (l/min): 75.7 l/min  Rinseback Volume (mL) = Rinseback Volume (ml): 380 ml  Net Removal (mL) = 2400  Length of treatment=240

## 2019-06-14 NOTE — PLAN OF CARE
Problem: Pain:  Goal: Control of acute pain  Description  Control of acute pain  Outcome: Ongoing     Problem: Risk for Impaired Skin Integrity  Goal: Tissue integrity - skin and mucous membranes  Description  Structural intactness and normal physiological function of skin and  mucous membranes.   Outcome: Ongoing     Problem: Falls - Risk of:  Goal: Will remain free from falls  Description  Will remain free from falls  Outcome: Ongoing     Problem: Falls - Risk of:  Goal: Absence of physical injury  Description  Absence of physical injury  Outcome: Ongoing     Problem: Fluid Volume - Imbalance:  Goal: Absence of imbalanced fluid volume signs and symptoms  Description  Absence of imbalanced fluid volume signs and symptoms  Outcome: Ongoing     Problem: Mental Status - Impaired:  Goal: Mental status will be restored to baseline  Description  Mental status will be restored to baseline  Outcome: Ongoing     Problem: Nutrition Deficit:  Goal: Ability to achieve adequate nutritional intake will improve  Description  Ability to achieve adequate nutritional intake will improve  Outcome: Ongoing     Problem: MECHANICAL VENTILATION  Goal: Patient will maintain patent airway  Outcome: Ongoing     Problem: MECHANICAL VENTILATION  Goal: Oral health is maintained or improved  Outcome: Ongoing     Problem: MECHANICAL VENTILATION  Goal: ET tube will be managed safely  Outcome: Ongoing     Problem: Confusion - Acute:  Goal: Mental status will be restored to baseline  Description  Mental status will be restored to baseline  Outcome: Ongoing     Problem: Injury - Risk of, Physical Injury:  Goal: Will remain free from falls  Description  Will remain free from falls  Outcome: Ongoing     Problem: Injury - Risk of, Physical Injury:  Goal: Absence of physical injury  Description  Absence of physical injury  Outcome: Ongoing     Problem: Restraint Use - Nonviolent/Non-Self-Destructive Behavior:  Goal: Absence of restraint-related injury  Description  Absence of restraint-related injury  Outcome: Ongoing     Problem: Nutrition  Goal: Optimal nutrition therapy  Outcome: Ongoing     Problem: Restraint Use - Nonviolent/Non-Self-Destructive Behavior:  Goal: Absence of restraint indications  Description  Absence of restraint indications  Outcome: Not Met This Shift

## 2019-06-14 NOTE — FLOWSHEET NOTE
DATE: 2019  NAME: Cooper Loja  MRN: [id number]   : 1975    Discharge Recommendations: Continue to Assess (pending progress)     Subjective: RN agreeable to ROM. Pain: MIC, no facial grimacing noted  Patient follows: wiggled toes and squeezed therapist's hand  Is patient on ventilator: YES  Is patient on sedation:NO  Precautions: B wrist restraints    Therapeutic exercises:  B gastroc stretch 30''x2  PROM BUEs x 10 reps all planes of motion  PROM BLEs x 10 reps all planes of motion    Goals  Short Term Goals  Short term goal 1: Prevent contractures through ROM and stretching  Short term goal 2: Pt to follow some commands for active movement  Short term goal 3: Progress with mobility as appropriate. Plan: Progress functional mobility as medically appropriate.    Time In: 138 pm  Time Out: 154pm  Time Coded Minutes (treatment minutes): 16  Rehab Potential: CTA  Treatments/week: 2-3x     Christina Wells, PTA

## 2019-06-14 NOTE — PROGRESS NOTES
 sodium chloride flush  10 mL Intravenous 2 times per day    famotidine (PEPCID) injection  20 mg Intravenous Daily    sodium chloride flush  10 mL Intravenous 2 times per day    piperacillin-tazobactam  2.25 g Intravenous Q8H     Continuous Infusions:   heparin (porcine) 12.68 Units/kg/hr (06/14/19 1691)    midazolam 3 mg/hr (06/14/19 0027)    norepinephrine 2 mcg/min (06/13/19 1854)    dextrose       PRN Meds:     heparin (porcine) 4,000 Units PRN   heparin (porcine) 2,000 Units PRN   fentanNYL 100 mcg Q1H PRN   heparin (porcine) 1,700 Units PRN   heparin (porcine) 1,600 Units PRN   sodium chloride flush 10 mL PRN   magnesium hydroxide 30 mL Daily PRN   ondansetron 4 mg Q6H PRN   acetaminophen 650 mg Q4H PRN   sodium chloride flush 10 mL PRN   glucose 15 g PRN   dextrose 12.5 g PRN   glucagon (rDNA) 1 mg PRN   dextrose 100 mL/hr PRN   albuterol 2.5 mg As Directed RT PRN   ipratropium-albuterol 1 ampule Q4H PRN       VENT SETTINGS (Comprehensive) (if applicable):  Vent Information  $Ventilation: $Subsequent Day  Ventilator Started: Yes  Ventilation Day(s): 2  Skin Assessment: Clean, dry, & intact  Equipment ID: [id number]  Vent Type: Servo i  Vent Mode: PRVC  Vt Ordered: 620 mL  Rate Set: 22 bmp  FiO2 : 30 %  Sensitivity: 5  PEEP/CPAP: 5  I Time/ I Time %: 0.8 s  Cuff Pressure (cm H2O): 22 cm H2O  Humidification Source: HME  Nitric Oxide/Epoprostenol In Use?: No  Additional Respiratory  Assessments  Pulse: 71  Resp: 22  SpO2: 97 %  End Tidal CO2: 40 (%)  Position: Semi-Bowens's  Humidification Source: HME  Oral Care Completed?: Yes  Oral Care: Mouthwash, Mouth swabbed, Mouth suctioned  Subglottic Suction Done?: Yes  Cuff Pressure (cm H2O): 22 cm H2O    ABGs:   Arterial Blood Gas result:  pO2 73.7; pCO2 45.0; pH 7.386;  HCO3 27.0, %O2 Sat 97.     Laboratory findings:    Complete Blood Count:   Recent Labs     06/12/19  0216 06/13/19  0425 06/14/19  0554   WBC 8.3 6.4 5.2   HGB 10.6* 10.1* 10.2*   HCT 35.1* 32.5* 32.6*   * 74* 79*      Last 3 Blood Glucose:   Recent Labs     06/13/19  0128 06/13/19 0825 06/13/19 1954   GLUCOSE 102* 107* 103*      PT/INR:    Lab Results   Component Value Date    PROTIME 13.8 06/14/2019    INR 1.3 06/14/2019     PTT:    Lab Results   Component Value Date    APTT 42.8 06/14/2019     Comprehensive Metabolic Profile:   Recent Labs     06/12/19  0213  06/13/19 0128 06/13/19 0825 06/13/19 1954   *   < > 133* 135 135   K 4.3   < > 4.1 4.1 3.6*   CL 93*   < > 91* 93* 96*   CO2 15*   < > 22 22 26   *   < > 98* 98* 58*   CREATININE 8.33*   < > 7.97* 8.35* 5.05*   GLUCOSE 158*   < > 102* 107* 103*   CALCIUM 6.4*   < > 6.9* 7.0* 8.0*   PROT 5.4*  --   --   --   --    LABALBU 2.2*  --   --   --   --    BILITOT 0.58  --   --   --   --    ALKPHOS 64  --   --   --   --    *  --   --   --   --    ALT 93*  --   --   --   --     < > = values in this interval not displayed. Magnesium:   Lab Results   Component Value Date    MG 2.4 06/12/2019     Phosphorus:   Lab Results   Component Value Date    PHOS 9.3 06/12/2019     Ionized Calcium:   Lab Results   Component Value Date    CAION 0.89 06/13/2019      Urinalysis:   Lab Results   Component Value Date    COLORU ANANT 06/11/2019    NITRU POSITIVE 06/11/2019    GLUCOSEU 50 mg/dL 06/11/2019    KETUA TRACE 06/11/2019    UROBILINOGEN Normal 06/11/2019    BILIRUBINUR 2+ 06/11/2019     Microbiology:  Cultures during this admission:     Blood cultures:                 [] None drawn      [x] Negative             []  Positive (Details:  )  Urine Culture:                   [] None drawn      [x] Negative             []  Positive (Details:  )    Radiology/Imaging:     Ct Head Wo Contrast    Result Date: 6/11/2019  EXAM: CT HEAD WO CONTRAST CLINICAL STATEMENT: Altered mental status. COMPARISON: CT head examination dated 10/31/2018.  TECHNIQUE: CT examination of the head without IV contrast.   Dose reduction techniques were achieved by 6/11/2019  EXAMINATION: ONE XRAY VIEW OF THE CHEST 6/11/2019 10:09 pm COMPARISON: Chest radiograph performed 06/11/2019. HISTORY: ORDERING SYSTEM PROVIDED HISTORY: Concern for fluid overload TECHNOLOGIST PROVIDED HISTORY: Concern for fluid overload Ordering Physician Provided Reason for Exam: portable upright concern for fluid overload Acuity: Unknown Type of Exam: Unknown FINDINGS: There is bilateral pulmonary congestion. There is no acute consolidation or effusion. There is no pneumothorax. The heart is enlarged. The upper abdomen is unremarkable. The extrathoracic soft tissues are unremarkable. Cardiomegaly and bilateral pulmonary congestion. Xr Chest Portable    Result Date: 6/11/2019  CHEST 1 VIEW, 6/11/2019: CLINICAL INDICATION: Mental status change. COMPARISON: Chest, 2/24/2019. FINDINGS: The heart is mildly to moderately enlarged, but exaggerated by mild patient rotation to the right and suboptimal inspiratory effort. The lungs are hypoinflated with mild streaky lower lung atelectasis, but appear otherwise grossly clear. Hypoinflation with mild streaky lower lung atelectasis. The lungs are otherwise grossly clear.      ASSESSMENT:     Patient Active Problem List    Diagnosis Date Noted    Supratherapeutic INR     Morbid obesity ([address]     Rhabdomyolysis 06/11/2019    Unresponsive 08/12/2018    Drug overdose     Unresponsive episode 04/03/2016    Seizure disorder ([address] 04/03/2016    Coagulopathy ([address] 04/03/2016    Seizures ([address]     Hypertension     Asthma     Cerebral artery occlusion with cerebral infarction ([address]     Substance abuse ([address] 04/02/2016    ELIE (acute kidney injury) ([address] 04/02/2016    Elevated transaminase level 04/02/2016    Leukocytosis 04/02/2016    Anti-phospholipid antibody syndrome (HCC) 11/07/2012    Long term current use of anticoagulant therapy 11/07/2012     Additional assessment:     Antiphospholipid antibody syndrome    Long term current use of anticoagulant therapy    [facility]    ELIE (acute kidney injury) ([facility]    Elevated transaminase level    Leukocytosis    Seizures (HCC)    Hypertension    Asthma    Cerebral artery occlusion with cerebral infarction (HCC)    Unresponsive episode    Seizure disorder (HCC)    Lupus anticoagulant syndrome (HCC)    Unresponsive    Drug overdose    Rhabdomyolysis     PLAN:     WEAN PER PROTOCOL:  [x] No   [] Yes  [] N/A    DISCONTINUE ANY LABS:   [x] No   [] Yes    ICU PROPHYLAXIS:  Stress ulcer:  [] PPI Agent  [x] E3Sxcsm [] Sucralfate  [] Other:  VTE:   [] Enoxaparin  [x] Unfract. Heparin gtt  [] EPC Cuffs    NUTRITION:  [] NPO [x] Tube Feeding (Specify: ) [] TPN  [] PO (Diet: DIET TUBE FEED CONTINUOUS/CYCLIC NPO; Renal Formula; 10; 45; 24; Exceptions are: Other (See Comment))    HOME MEDICATIONS RECONCILED: [x] No  [] Yes    INSULIN DRIP:   [x] No   [] Yes    CONSULTATION NEEDED:  [] No   [x] Yes    FAMILY UPDATED:    [] No   [x] Yes    TRANSFER OUT OF ICU:   [x] No   [] Yes    ADDITIONAL PLAN:    1. Neuro  · Elevated uremia  · Multiple drug abuse (Methamphetamines, cocaine)  2. CV  · VSS - off pressors  · monitor, NSR currently, Hx cocaine abuse avoid unopposed beta blocker use, Hx cardiac cath, Keep MAP >60 /SBP >110 mm Hg  · Echo ordered. 3. Resp  · on vent, persistent respiratory acidosis, improved over yesterday  4.  GI  · NPO, monitor bowel function, IV pepcid   5.  Renal  · monitor UOP, nephro on board, follow up with their recommendatiosn. Following, GAGE,Complement levels,Acute hepatitis panel to exclude occult connective tissue disease, Hx of antiphospholipid disorder per family, SPEP/UPEP/sFLC to rule out occult  Paraproteinemia, USS Kidneys for size,echogenicity and exclude obstruction  ·   6. Endo  · Follow glucose. · Will consider steriod supplementation given hx of daily steroid use. 7. ID  · zosyn 2.25g Q8h, monitor for fevers and trend WBC  8.  Heme:   · INR 9.9 - effectively reversed with Vit K and FFP  · Uremia. · Continue to follow Hg. · Resumed anticoagulation yesterday with Heparin gtt and Coumadin  9. Cont Supportive care, SCDs, AC with coumadin and heparin  10. Claudia Bauman M.D.              Emergency Medicine Resident  Critical Care Services        6/14/2019, 7:15 AM

## 2019-06-15 LAB
ABSOLUTE EOS #: 0.11 K/UL (ref 0–0.4)
ABSOLUTE IMMATURE GRANULOCYTE: 0.11 K/UL (ref 0–0.3)
ABSOLUTE LYMPH #: 0.74 K/UL (ref 1–4.8)
ABSOLUTE MONO #: 0.74 K/UL (ref 0.1–0.8)
ALLEN TEST: ABNORMAL
ANION GAP SERPL CALCULATED.3IONS-SCNC: 12 MMOL/L (ref 9–17)
ANION GAP SERPL CALCULATED.3IONS-SCNC: 15 MMOL/L (ref 9–17)
BASOPHILS # BLD: 0 % (ref 0–2)
BASOPHILS ABSOLUTE: 0 K/UL (ref 0–0.2)
BUN BLDV-MCNC: 32 MG/DL (ref 6–20)
BUN BLDV-MCNC: 42 MG/DL (ref 6–20)
BUN/CREAT BLD: ABNORMAL (ref 9–20)
BUN/CREAT BLD: ABNORMAL (ref 9–20)
CALCIUM SERPL-MCNC: 7.8 MG/DL (ref 8.6–10.4)
CALCIUM SERPL-MCNC: 8 MG/DL (ref 8.6–10.4)
CHLORIDE BLD-SCNC: 95 MMOL/L (ref 98–107)
CHLORIDE BLD-SCNC: 96 MMOL/L (ref 98–107)
CO2: 24 MMOL/L (ref 20–31)
CO2: 24 MMOL/L (ref 20–31)
CREAT SERPL-MCNC: 3.64 MG/DL (ref 0.7–1.2)
CREAT SERPL-MCNC: 4.52 MG/DL (ref 0.7–1.2)
DIFFERENTIAL TYPE: ABNORMAL
EOSINOPHILS RELATIVE PERCENT: 2 % (ref 1–4)
FIO2: 30
GFR AFRICAN AMERICAN: 17 ML/MIN
GFR AFRICAN AMERICAN: 22 ML/MIN
GFR NON-AFRICAN AMERICAN: 14 ML/MIN
GFR NON-AFRICAN AMERICAN: 18 ML/MIN
GFR SERPL CREATININE-BSD FRML MDRD: ABNORMAL ML/MIN/{1.73_M2}
GLUCOSE BLD-MCNC: 100 MG/DL (ref 70–99)
GLUCOSE BLD-MCNC: 90 MG/DL (ref 75–110)
GLUCOSE BLD-MCNC: 95 MG/DL (ref 70–99)
HCT VFR BLD CALC: 33.8 % (ref 40.7–50.3)
HEMOGLOBIN: 10.5 G/DL (ref 13–17)
IMMATURE GRANULOCYTES: 2 %
INR BLD: 3
LACTIC ACID, WHOLE BLOOD: 0.7 MMOL/L (ref 0.7–2.1)
LYMPHOCYTES # BLD: 13 % (ref 24–44)
MAGNESIUM: 1.6 MG/DL (ref 1.6–2.6)
MCH RBC QN AUTO: 24.8 PG (ref 25.2–33.5)
MCHC RBC AUTO-ENTMCNC: 31.1 G/DL (ref 28.4–34.8)
MCV RBC AUTO: 79.7 FL (ref 82.6–102.9)
MODE: ABNORMAL
MONOCYTES # BLD: 13 % (ref 1–7)
MORPHOLOGY: ABNORMAL
MORPHOLOGY: ABNORMAL
NEGATIVE BASE EXCESS, ART: ABNORMAL (ref 0–2)
NRBC AUTOMATED: 0 PER 100 WBC
O2 DEVICE/FLOW/%: ABNORMAL
PARTIAL THROMBOPLASTIN TIME: 47.9 SEC (ref 20.5–30.5)
PARTIAL THROMBOPLASTIN TIME: 53.6 SEC (ref 20.5–30.5)
PATIENT TEMP: ABNORMAL
PDW BLD-RTO: 16.4 % (ref 11.8–14.4)
PHOSPHORUS: 4 MG/DL (ref 2.5–4.5)
PLATELET # BLD: 71 K/UL (ref 138–453)
PLATELET ESTIMATE: ABNORMAL
PMV BLD AUTO: 11.7 FL (ref 8.1–13.5)
POC HCO3: 28.9 MMOL/L (ref 21–28)
POC O2 SATURATION: 96 % (ref 94–98)
POC PCO2 TEMP: ABNORMAL MM HG
POC PCO2: 41.9 MM HG (ref 35–48)
POC PH TEMP: ABNORMAL
POC PH: 7.45 (ref 7.35–7.45)
POC PO2 TEMP: ABNORMAL MM HG
POC PO2: 77.7 MM HG (ref 83–108)
POSITIVE BASE EXCESS, ART: 4 (ref 0–3)
POTASSIUM SERPL-SCNC: 3.5 MMOL/L (ref 3.7–5.3)
POTASSIUM SERPL-SCNC: 3.6 MMOL/L (ref 3.7–5.3)
PROTHROMBIN TIME: 28.8 SEC (ref 9–12)
RBC # BLD: 4.24 M/UL (ref 4.21–5.77)
RBC # BLD: ABNORMAL 10*6/UL
SAMPLE SITE: ABNORMAL
SEG NEUTROPHILS: 70 % (ref 36–66)
SEGMENTED NEUTROPHILS ABSOLUTE COUNT: 4 K/UL (ref 1.8–7.7)
SODIUM BLD-SCNC: 132 MMOL/L (ref 135–144)
SODIUM BLD-SCNC: 134 MMOL/L (ref 135–144)
TCO2 (CALC), ART: 30 MMOL/L (ref 22–29)
TOTAL CK: 365 U/L (ref 39–308)
WBC # BLD: 5.7 K/UL (ref 3.5–11.3)
WBC # BLD: ABNORMAL 10*3/UL

## 2019-06-15 PROCEDURE — 6360000002 HC RX W HCPCS: Performed by: EMERGENCY MEDICINE

## 2019-06-15 PROCEDURE — 2700000000 HC OXYGEN THERAPY PER DAY

## 2019-06-15 PROCEDURE — 6360000002 HC RX W HCPCS: Performed by: INTERNAL MEDICINE

## 2019-06-15 PROCEDURE — 37799 UNLISTED PX VASCULAR SURGERY: CPT

## 2019-06-15 PROCEDURE — 84100 ASSAY OF PHOSPHORUS: CPT

## 2019-06-15 PROCEDURE — 82947 ASSAY GLUCOSE BLOOD QUANT: CPT

## 2019-06-15 PROCEDURE — 85610 PROTHROMBIN TIME: CPT

## 2019-06-15 PROCEDURE — 2000000000 HC ICU R&B

## 2019-06-15 PROCEDURE — 6360000002 HC RX W HCPCS: Performed by: STUDENT IN AN ORGANIZED HEALTH CARE EDUCATION/TRAINING PROGRAM

## 2019-06-15 PROCEDURE — 2580000003 HC RX 258: Performed by: STUDENT IN AN ORGANIZED HEALTH CARE EDUCATION/TRAINING PROGRAM

## 2019-06-15 PROCEDURE — 99291 CRITICAL CARE FIRST HOUR: CPT | Performed by: INTERNAL MEDICINE

## 2019-06-15 PROCEDURE — 82803 BLOOD GASES ANY COMBINATION: CPT

## 2019-06-15 PROCEDURE — 94003 VENT MGMT INPAT SUBQ DAY: CPT

## 2019-06-15 PROCEDURE — 94770 HC ETCO2 MONITOR DAILY: CPT

## 2019-06-15 PROCEDURE — 85730 THROMBOPLASTIN TIME PARTIAL: CPT

## 2019-06-15 PROCEDURE — 36600 WITHDRAWAL OF ARTERIAL BLOOD: CPT

## 2019-06-15 PROCEDURE — 2500000003 HC RX 250 WO HCPCS: Performed by: EMERGENCY MEDICINE

## 2019-06-15 PROCEDURE — 83605 ASSAY OF LACTIC ACID: CPT

## 2019-06-15 PROCEDURE — 83735 ASSAY OF MAGNESIUM: CPT

## 2019-06-15 PROCEDURE — 85025 COMPLETE CBC W/AUTO DIFF WBC: CPT

## 2019-06-15 PROCEDURE — 80048 BASIC METABOLIC PNL TOTAL CA: CPT

## 2019-06-15 PROCEDURE — 82550 ASSAY OF CK (CPK): CPT

## 2019-06-15 PROCEDURE — 94762 N-INVAS EAR/PLS OXIMTRY CONT: CPT

## 2019-06-15 RX ORDER — MAGNESIUM SULFATE 1 G/100ML
1 INJECTION INTRAVENOUS ONCE
Status: COMPLETED | OUTPATIENT
Start: 2019-06-15 | End: 2019-06-15

## 2019-06-15 RX ORDER — POTASSIUM CHLORIDE 7.45 MG/ML
20 INJECTION INTRAVENOUS ONCE
Status: COMPLETED | OUTPATIENT
Start: 2019-06-15 | End: 2019-06-15

## 2019-06-15 RX ORDER — POTASSIUM CHLORIDE 29.8 MG/ML
20 INJECTION INTRAVENOUS PRN
Status: DISCONTINUED | OUTPATIENT
Start: 2019-06-15 | End: 2019-06-21 | Stop reason: HOSPADM

## 2019-06-15 RX ORDER — HEPARIN SODIUM 1000 [USP'U]/ML
1700 INJECTION, SOLUTION INTRAVENOUS; SUBCUTANEOUS ONCE
Status: COMPLETED | OUTPATIENT
Start: 2019-06-15 | End: 2019-06-15

## 2019-06-15 RX ORDER — HEPARIN SODIUM 1000 [USP'U]/ML
1600 INJECTION, SOLUTION INTRAVENOUS; SUBCUTANEOUS ONCE
Status: COMPLETED | OUTPATIENT
Start: 2019-06-15 | End: 2019-06-15

## 2019-06-15 RX ADMIN — HEPARIN SODIUM 2000 UNITS: 1000 INJECTION INTRAVENOUS; SUBCUTANEOUS at 06:20

## 2019-06-15 RX ADMIN — METOCLOPRAMIDE 10 MG: 5 INJECTION, SOLUTION INTRAMUSCULAR; INTRAVENOUS at 02:07

## 2019-06-15 RX ADMIN — POTASSIUM CHLORIDE 20 MEQ: 7.46 INJECTION, SOLUTION INTRAVENOUS at 08:19

## 2019-06-15 RX ADMIN — HEPARIN SODIUM 1700 UNITS: 1000 INJECTION INTRAVENOUS; SUBCUTANEOUS at 16:35

## 2019-06-15 RX ADMIN — PROPOFOL 20 MCG/KG/MIN: 10 INJECTION, EMULSION INTRAVENOUS at 01:11

## 2019-06-15 RX ADMIN — FENTANYL CITRATE 100 MCG: 50 INJECTION INTRAMUSCULAR; INTRAVENOUS at 17:28

## 2019-06-15 RX ADMIN — PROPOFOL 20 MCG/KG/MIN: 10 INJECTION, EMULSION INTRAVENOUS at 17:27

## 2019-06-15 RX ADMIN — FENTANYL CITRATE 100 MCG: 50 INJECTION INTRAMUSCULAR; INTRAVENOUS at 10:47

## 2019-06-15 RX ADMIN — PROPOFOL 20 MCG/KG/MIN: 10 INJECTION, EMULSION INTRAVENOUS at 22:48

## 2019-06-15 RX ADMIN — PROPOFOL 20 MCG/KG/MIN: 10 INJECTION, EMULSION INTRAVENOUS at 13:02

## 2019-06-15 RX ADMIN — HEPARIN SODIUM 1600 UNITS: 1000 INJECTION INTRAVENOUS; SUBCUTANEOUS at 16:35

## 2019-06-15 RX ADMIN — PROPOFOL 20 MCG/KG/MIN: 10 INJECTION, EMULSION INTRAVENOUS at 05:02

## 2019-06-15 RX ADMIN — FAMOTIDINE 20 MG: 10 INJECTION, SOLUTION INTRAVENOUS at 08:19

## 2019-06-15 RX ADMIN — PIPERACILLIN SODIUM,TAZOBACTAM SODIUM 2.25 G: 2; .25 INJECTION, POWDER, FOR SOLUTION INTRAVENOUS at 05:03

## 2019-06-15 RX ADMIN — HEPARIN SODIUM AND DEXTROSE 14.68 UNITS/KG/HR: 10000; 5 INJECTION INTRAVENOUS at 05:00

## 2019-06-15 RX ADMIN — MAGNESIUM SULFATE HEPTAHYDRATE 1 G: 1 INJECTION, SOLUTION INTRAVENOUS at 09:54

## 2019-06-15 ASSESSMENT — PULMONARY FUNCTION TESTS
PIF_VALUE: 14
PIF_VALUE: 12
PIF_VALUE: 20
PIF_VALUE: 22
PIF_VALUE: 22
PIF_VALUE: 39
PIF_VALUE: 24
PIF_VALUE: 11
PIF_VALUE: 22
PIF_VALUE: 26

## 2019-06-15 NOTE — PROGRESS NOTES
SUBJECTIVE    Patient seen and chart reviewed. Patient continues to be on sedation and MV. Continues to respond to vigorous stimulation and pain. Urine output around 300 mL from the last 7-8 hours. Continues to be in overall positive balance. Patient has not received this am. Creatinine levels improving. Cat has a good flow. Patient continues to be on MV.       Current assessment this a.m.-continues to respond to commands but AAO x0. On mechanical ventilation. Off pressors. Cultures negative so far. Total CK trending down. Myoglobin trending down. Low albumin levels. OBJECTIVE      CURRENT TEMPERATURE:  Temp: 97.7 °F (36.5 °C)  MAXIMUM TEMPERATURE OVER 24HRS:  Temp (24hrs), Av.3 °F (36.8 °C), Min:97.7 °F (36.5 °C), Max:98.8 °F (37.1 °C)    CURRENT RESPIRATORY RATE:  Resp: 22  CURRENT PULSE:  Pulse: 77  CURRENT BLOOD PRESSURE:  BP: 126/62  24HR BLOOD PRESSURE RANGE:  Systolic (64VAO), JF , Min:108 , CDC:313   ; Diastolic (83ZVM), TUD:51, Min:54, Max:76    24HR INTAKE/OUTPUT:      Intake/Output Summary (Last 24 hours) at 6/15/2019 1330  Last data filed at 6/15/2019 1300  Gross per 24 hour   Intake 925.92 ml   Output 1995 ml   Net -1069.08 ml     WEIGHT :  Patient Vitals for the past 96 hrs (Last 3 readings):   Weight   19 1300 (!) 326 lb 11.6 oz (148.2 kg)   19 0830 (!) 333 lb 1.8 oz (151.1 kg)   19 0400 (!) 337 lb 8 oz (153.1 kg)     PHYSICAL EXAM      GENERAL APPEARANCE : No purposeful response to physical stimuli  ENT: Unable to examine throat patient is intubated  NECK:   No JVD. PULMONARY: coarse breath sounds.    CARDIOVASCULAR: systolic murmur   ABDOMEN: soft nontender, bowel sounds present, no organomegaly,  no ascites   EXTREMITIES: Lower extremity edema  CURRENT MEDICATIONS        heparin (porcine) injection 1,600 Units Once   heparin (porcine) injection 1,700 Units Once   propofol 1000 MG/100ML injection Titrated   [Held by provider] metoclopramide (REGLAN) injection 10 mg Q6H   midodrine (PROAMATINE) tablet 5 mg TID WC   warfarin (COUMADIN) daily dosing (placeholder) RX Placeholder   0.9 % sodium chloride bolus Once   fentaNYL (SUBLIMAZE) injection 100 mcg Q1H PRN   heparin (porcine) injection 1,700 Units PRN   heparin (porcine) injection 1,600 Units PRN   sodium chloride flush 0.9 % injection 10 mL 2 times per day   sodium chloride flush 0.9 % injection 10 mL PRN   magnesium hydroxide (MILK OF MAGNESIA) 400 MG/5ML suspension 30 mL Daily PRN   ondansetron (ZOFRAN) injection 4 mg Q6H PRN   famotidine (PEPCID) injection 20 mg Daily   acetaminophen (TYLENOL) tablet 650 mg Q4H PRN   sodium chloride flush 0.9 % injection 10 mL 2 times per day   sodium chloride flush 0.9 % injection 10 mL PRN   norepinephrine (LEVOPHED) 16 mg in dextrose 5% 250 mL infusion Continuous   glucose (GLUTOSE) 40 % oral gel 15 g PRN   dextrose 50 % IV solution PRN   glucagon (rDNA) injection 1 mg PRN   dextrose 5 % solution PRN   albuterol (PROVENTIL) nebulizer solution 2.5 mg As Directed RT PRN   ipratropium-albuterol (DUONEB) nebulizer solution 1 ampule Q4H PRN         LABS      CBC:   Recent Labs     06/13/19  0425 06/14/19  0554 06/15/19  0512   WBC 6.4 5.2 5.7   RBC 4.12* 4.17* 4.24   HGB 10.1* 10.2* 10.5*   HCT 32.5* 32.6* 33.8*   MCV 78.9* 78.2* 79.7*   MCH 24.5* 24.5* 24.8*   MCHC 31.1 31.3 31.1   RDW 17.2* 16.7* 16.4*   PLT 74* 79* 71*   MPV 9.8 11.7 11.7      BMP:   Recent Labs     06/14/19  0757 06/14/19  1757 06/15/19  0512    135 134*   K 3.7 3.7 3.5*   CL 96* 98 95*   CO2 25 25 24   BUN 69* 34* 42*   CREATININE 5.94* 3.78* 4.52*   GLUCOSE 118* 100* 100*   CALCIUM 7.8* 8.1* 7.8*      BNP:No results found for: BNP  PHOSPHORUS:    Recent Labs     06/15/19  0512   PHOS 4.0     MAGNESIUM:   Recent Labs     06/15/19  0512   MG 1.6     ALBUMIN:   No results for input(s): LABALBU in the last 72 hours.   IRON:  No results found for: IRON  IRON SATURATION:  No results found for: LABIRON  TIBC:  No results found for: TIBC  FERRITIN:  No results found for: FERRITIN  GAGE:   Lab Results   Component Value Date    GAGE (A) 06/11/2019     This test is equivocal, which represents a borderline GAGE result. Suggest repeating in 3-5 weeks. SPEP:   Lab Results   Component Value Date    PROT 5.4 06/12/2019    ALBCAL 2.7 06/11/2019    ALBPCT 55 06/11/2019    LABALPH 0.3 06/11/2019    LABALPH 0.6 06/11/2019    A1PCT 7 06/11/2019    A2PCT 13 06/11/2019    LABBETA 0.6 06/11/2019    BETAPCT 12 06/11/2019    GAMGLOB 0.7 06/11/2019    GGPCT 14 06/11/2019    PATH ELECTRONICALLY SIGNED. Lauren Evans M.D. 06/11/2019    PATH ELECTRONICALLY SIGNED. Lauren Evans M.D. 06/11/2019     UPEP: No results found for: TPU   HEPBSAG:  Lab Results   Component Value Date    HEPBSAG NONREACTIVE 06/12/2019     HEPCAB:  Lab Results   Component Value Date    HEPCAB NONREACTIVE 06/12/2019     C3:   Lab Results   Component Value Date    C3 88 (L) 06/11/2019     C4:   Lab Results   Component Value Date    C4 16 06/11/2019     MPO ANCA: No results found for: MPO .   PR3 ANCA:  No results found for: PR3  URINE SODIUM:    Lab Results   Component Value Date    MALINA 21 06/11/2019      URINE CREATININE:    Lab Results   Component Value Date    LABCREA 240.2 06/11/2019     URINE EOSINOPHILS: No components found for: EOSU  URINE PROTEIN:  No results found for: TPU  URINALYSIS:  U/A:   Lab Results   Component Value Date    NITRU POSITIVE 06/11/2019    COLORU ANANT 06/11/2019    PHUR 5.0 06/11/2019    WBCUA 10 TO 20 06/11/2019    RBCUA 2 TO 5 06/11/2019    MUCUS NOT REPORTED 06/11/2019    TRICHOMONAS NOT REPORTED 06/11/2019    YEAST NOT REPORTED 06/11/2019    BACTERIA RARE 06/11/2019    SPECGRAV 1.025 06/11/2019    LEUKOCYTESUR 1+ 06/11/2019    UROBILINOGEN Normal 06/11/2019    BILIRUBINUR 2+ 06/11/2019    GLUCOSEU 50 mg/dL 06/11/2019    KETUA TRACE 06/11/2019    AMORPHOUS 2+ 06/11/2019     RADIOLOGY      Reviewed as available. ASSESSMENT    2. ELIE secondary to nephrotoxic (rhabdomyolysis) and ischemic ATN (poor oral intake with dehydration contributed by drug abuse) -patient is receiving dialysis via Cheng catheter. There is  evidence of very slow renal recovery. Patient is hypercatabolic and will be requiring more frequent dialysis. 3. Vent dependent respiratory failure patient is on 30% FiO2  4. Shock on pressors, resolved. 5. Encephalopathy   6. Rhabdomyolysis from volume depletion/substance abuse. Resolving. CK and Myoglobin trending down. 7. Antiphospholipid antibody syndrome - primary  8. Prednisone use for the last 25 years and on anticoagulation. Bridging today to coumadin per primary  9. History of TIA likely due to prolonged hx of APLA. 10. Polysubstance abuse   Positive for cocaine/amphetamine on admission. 11.  CKD 3 with baseline creat 1.4-1.6  12. ECHO on 19 with pEF. PLAN      1. Patient continues to require intermittent hemodialysis. 2. Continue  Midodrine TID 5 mg. 3. Follow labs and electrolytes. Replace per protocol. 4. Antibiotics per renal clearence. 5. Follow hepatitis panel. 6. Will follow    Please do not hesitate to call with questions. Will discuss with the attending for further recommendations. Richie Apgar, M.D. Internal Medicine Resident , PGY-1  [facility]  06/15/19 1:30 PM     Attending Physician Statement  I have discussed the care of Nydia Wolff, including pertinent history and exam findings with the resident/fellow. I have reviewed the key elements of all parts of the encounter with the resident/fellow and have edited the documentation as appropriate. I have seen and examined the patient with the resident/fellow. I agree with the assessment and plan and status of the problem list as documented. Addiitionally I have seen and examined the patient on hemodialysis.   Labs and bath and weight removal were reviewed with the dialysis nurse at the bedside in the ICU while the patient was on  Hemodialysis. Likely no dialysis tomorrow, 6/16/19. Will evaluate daily the need for dialysis.     Varun Banerjee MD  Nephrology Attending Physician  [facility]

## 2019-06-15 NOTE — PROGRESS NOTES
Critical Care Team - Daily Progress Note    Date and time: 6/16/2019 7:51 AM  Patient's name:  Kim Dooley  Medical Record Number: [id number]  Patient's account/billing number: [de-identified]  Patient's YOB: 1975  Age: 39 y.o.   Date of Admission: 6/11/2019 11:01 AM  Length of stay during current admission: 5    Primary Care Physician: Codie Stevens CNP, APRN - CNP  ICU Attending Physician: Dr. Jesus Alberto Sheldon Status: Full Code     Reason for ICU admission: Rhabdo, ELIE, Respiratory Failure    SUBJECTIVE:     OVERNIGHT EVENTS:     Vitals stable  Levophed off  Intubated and sedated  Currently on weaning mode  ABG 7.443, 36.4, 85.6, 25.0, 97%  Had 2 bowel movement yesterday  Abdomen is mildly distended  On NG intermittent suctioning  Urine output 0.19 ML per hour (double yesterday)  Had dialysis today - plan for re-evaluation today, no HD planned for today  NG output 175 ml last 24 hours    AWAKE & FOLLOWING COMMANDS:  [x] No   [] Yes    CURRENT VENTILATION STATUS:     [x] Ventilator  [] BIPAP  [] Nasal Cannula [] Room Air      SECRETIONS Amount:  [x] Small [] Moderate  [] Large  [] None  Color:     [] White [] Colored  [] Bloody    SEDATION:  RAAS Score:  [] Propofol gtt  [x] Versed gtt  [x] Fentanyl pushes   [] No Sedation    PARALYZED:  [x] No    [] Yes    DIARRHEA:                [x] No                [] Yes  (C. Difficile status: [] positive                                                                                                                       [] negative                                                                                                                     [] pending)    VASOPRESSORS:  [x] No    [] Yes    If yes -   [] Levophed       [] Dopamine     [] Vasopressin       [] Dobutamine  [] Phenylephrine         [] Epinephrine    CENTRAL LINES:     [] No   [x] Yes   (Date of Insertion: 6/12/19)           If yes -     [x] Right IJ     [] Left IJ [] Right Femoral [] Left Femoral                   [] Right Subclavian [] Left Subclavian     VAUGHN'S CATHETER:   [] No   [x] Yes     URINE OUTPUT:            [] Good   [x] Low              [] Anuric    OBJECTIVE:     VITAL SIGNS:  BP (!) 141/76   Pulse 78   Temp 98.4 °F (36.9 °C) (Oral)   Resp (!) 0   Ht 6' (1.829 m)   Wt (!) 326 lb 11.6 oz (148.2 kg)   SpO2 99%   BMI 44.31 kg/m²   Tmax over 24 hours:  Temp (24hrs), Av.2 °F (36.8 °C), Min:97.7 °F (36.5 °C), Max:98.8 °F (37.1 °C)    Patient Vitals for the past 6 hrs:   BP Temp Temp src Pulse Resp SpO2   19 0639 -- -- -- -- (!) 0 99 %   19 0600 (!) 141/76 -- -- 78 20 --   19 0500 (!) 145/69 -- -- 82 21 --   19 0400 133/60 98.4 °F (36.9 °C) Oral 84 15 99 %   19 0359 -- -- -- 86 18 99 %   19 0300 127/64 -- -- 85 20 --   19 0200 (!) 136/58 -- -- 83 22 96 %       Intake/Output Summary (Last 24 hours) at 2019 0751  Last data filed at 2019 0400  Gross per 24 hour   Intake 931 ml   Output 3880 ml   Net -2949 ml     Wt Readings from Last 2 Encounters:   06/15/19 (!) 326 lb 11.6 oz (148.2 kg)   19 (!) 302 lb (137 kg)     Body mass index is 44.31 kg/m². PHYSICAL EXAMINATION:  Constitutional:  No purposeful response to physical stimuli  EENT:  sclera clear, anicteric, oropharynx clear, no lesions, neck supple with midline trachea.    Neck: Supple, symmetrical, trachea midline, no adenopathy, thyroid symmetric, no JVD  Respiratory: equal breath sounds b/l, no wheezes or but scattered rales on the Right lung base  Cardiovascular: regular rate and rhythm, systolic murmur   Abdomen: soft very mild epigastric tenderness on exam, nondistended, no masses or organomegaly  Neurological: Intubated and sedated  Extremities:  peripheral pulses normal, minimal pitting edema to both lower extremities    MEDICATIONS:  Scheduled Meds:   [Held by provider] metoclopramide  10 mg Intravenous Q6H    midodrine  5 mg Oral TID WC    warfarin (COUMADIN) daily dosing (placeholder)   Other RX Placeholder    sodium chloride  250 mL Intravenous Once    sodium chloride flush  10 mL Intravenous 2 times per day    famotidine (PEPCID) injection  20 mg Intravenous Daily    sodium chloride flush  10 mL Intravenous 2 times per day     Continuous Infusions:   propofol 20 mcg/kg/min (06/16/19 0445)    norepinephrine Stopped (06/14/19 0700)    dextrose       PRN Meds:     potassium chloride 20 mEq PRN   fentanNYL 100 mcg Q1H PRN   heparin (porcine) 1,700 Units PRN   heparin (porcine) 1,600 Units PRN   sodium chloride flush 10 mL PRN   magnesium hydroxide 30 mL Daily PRN   ondansetron 4 mg Q6H PRN   acetaminophen 650 mg Q4H PRN   sodium chloride flush 10 mL PRN   glucose 15 g PRN   dextrose 12.5 g PRN   glucagon (rDNA) 1 mg PRN   dextrose 100 mL/hr PRN   albuterol 2.5 mg As Directed RT PRN   ipratropium-albuterol 1 ampule Q4H PRN       VENT SETTINGS (Comprehensive) (if applicable):  Vent Information  $Ventilation: $Subsequent Day  Ventilator Started: Yes  Ventilation Day(s): 3  Skin Assessment: Clean, dry, & intact  Equipment ID: [id number]  Equipment Changed: HME  Vent Type: Servo i  Vent Mode: PRVC  Vt Ordered: 620 mL  Rate Set: 22 bmp  Pressure Support: 6 cmH20  FiO2 : 30 %  Sensitivity: 5  PEEP/CPAP: 5  I Time/ I Time %: 0.8 s  Cuff Pressure (cm H2O): 22 cm H2O  Humidification Source: HME  Nitric Oxide/Epoprostenol In Use?: No  Additional Respiratory  Assessments  Pulse: 78  Resp: (!) 0  SpO2: 99 %  End Tidal CO2: 49 (%)  Position: Semi-Bowens's  Humidification Source: HME  Oral Care Completed?: Yes  Oral Care: Mouthwash, Lip moisturizer applied, Mouth moisturizer, Mouth suctioned  Subglottic Suction Done?: Yes  Cuff Pressure (cm H2O): 22 cm H2O    ABGs:   Arterial Blood Gas result: 7.443, 36.4, 85.6, 25.0, 97%    Laboratory findings:    Complete Blood Count:   Recent Labs     06/14/19  0554 06/15/19  0512 06/16/19  0414   WBC 5.2 5.7 5.3   HGB 10.2* overload Acuity: Unknown Type of Exam: Unknown FINDINGS: There is bilateral pulmonary congestion. There is no acute consolidation or effusion. There is no pneumothorax. The heart is enlarged. The upper abdomen is unremarkable. The extrathoracic soft tissues are unremarkable. Cardiomegaly and bilateral pulmonary congestion. Xr Chest Portable    Result Date: 6/11/2019  CHEST 1 VIEW, 6/11/2019: CLINICAL INDICATION: Mental status change. COMPARISON: Chest, 2/24/2019. FINDINGS: The heart is mildly to moderately enlarged, but exaggerated by mild patient rotation to the right and suboptimal inspiratory effort. The lungs are hypoinflated with mild streaky lower lung atelectasis, but appear otherwise grossly clear. Hypoinflation with mild streaky lower lung atelectasis. The lungs are otherwise grossly clear.      ASSESSMENT:     Patient Active Problem List    Diagnosis Date Noted    Supratherapeutic INR     Morbid obesity ([address]     Rhabdomyolysis 06/11/2019    Unresponsive 08/12/2018    Drug overdose     Unresponsive episode 04/03/2016    Se[facility] disorder [facility] 04/03/2016    Coagulopathy ([address] 04/03/2016    Seizures ([address]     Hypertension     Asthma     Cerebral artery occlusion with cerebral infarction ([address]     Substance abuse ([address] 04/02/2016    ELIE (acute kidney injury) ([facility] [address] [facility] 04/02/2016    Elevated transaminase level 04/02/2016    Leukocytosis 04/02/2016    Anti-phospholipid antibody syndrome (HCC) 11/07/2012    Long term current use of anticoagulant therapy 11/07/2012     Additional assessment:     Antiphospholipid antibody syndrome    Long term current use of anticoagulant therapy    Polysubstance abuse (HCC)    ELIE (acute kidney injury) (HCC)    Elevated transaminase level    Leukocytosis    Seizures (HCC)    Hypertension    Asthma    Cerebral artery occlusion with cerebral infarction (HCC)    Unresponsive episode    Seizure disorder (HCC)    [facility]    Unresponsive    Drug overdose    Rhabdomyolysis     PLAN:     WEAN PER PROTOCOL:  [x] No   [] Yes  [] N/A    DISCONTINUE ANY LABS:   [x] No   [] Yes    ICU PROPHYLAXIS:  Stress ulcer:  [] PPI Agent  [x] [id number] [] Sucralfate  [] Other:  VTE:   [] Enoxaparin  [x] Unfract. Heparin gtt  [] EPC Cuffs    NUTRITION:  [] NPO [x] Tube Feeding (Specify: ) [] TPN  [] PO (Diet: DIET TUBE FEED CONTINUOUS/CYCLIC NPO; Renal Formula; 10; 45; 24; Exceptions are: Other (See Comment))    HOME MEDICATIONS RECONCILED: [x] No  [] Yes    INSULIN DRIP:   [x] No   [] Yes    CONSULTATION NEEDED:  [] No   [x] Yes    FAMILY UPDATED:    [] No   [x] Yes    TRANSFER OUT OF ICU:   [x] No   [] Yes    ADDITIONAL PLAN:    1. Neuro  · Intubated and sedated  · Multiple drug abuse (Methamphetamines, cocaine)  2. CV  · VSS - off pressors  · monitor, NSR currently, Hx cocaine abuse avoid unopposed beta blocker use, Hx cardiac cath, Keep MAP >60 /SBP >110 mm Hg  ·   3. Resp  · on vent, currently in weaning mode  · ABG 7.446, 41.9, 77.7, 28.9  4.  GI  · NPO, monitor bowel function, IV pepcid   · Abdomen distended-KUB is today diffuse small bowel dilatation suspicion for SBO  · Receive Dulcolax suppository yesterday with one bowel movement last night  · On intermittent suction through OG  · Surgery on board  · Hold Reglan for now if there is suspicion for SBO  5.  Renal  · monitor UOP, nephro on board, follow up with their recommendatiosn. · Had dialysis numerous times this week, most recently yesterday - no plan for HD today per Nephro  · Monitor CK, phosphorus, magnesium, ionized calcium, potassium-replace electrolytes accordingly  6. ID  · No fever, no leukocytosis. No signs and source of infection  · Discontinue antibiotic  7. Heme:   · INR 3.4 today  · On Coumadin - managed per pharmacy  · Continue to follow HB. 8. Dispo cont ICU      Reg Dunaway M.D.              Emergency Medicine Resident  Critical Care Services 6/16/2019, 7:51 AM

## 2019-06-15 NOTE — PROGRESS NOTES
artery occlusion with cerebral infarction (HCC)    Unresponsive episode    Seizure disorder ([address]    Coagulopathy ([address]    Unresponsive    Drug overdose    Rhabdomyolysis    Morbid obesity ([address]    Supratherapeutic INR       MEDICAL DECISION MAKING AND PLAN    Patient passed bowel movement which is consistent with resolving ileus   Dorota Hines MD  6/15/2019  10:28 AM

## 2019-06-15 NOTE — PLAN OF CARE
Problem: Airway Clearance - Ineffective:  Goal: Ability to maintain a clear airway will improve  Description  Ability to maintain a clear airway will improve  6/15/2019 0732 by Les Sheppard RCP  Outcome: Ongoing  6/15/2019 0551 by Vincent Beach RN  Outcome: Ongoing     Problem: Gas Exchange - Impaired:  Goal: Levels of oxygenation will improve  Description  Levels of oxygenation will improve  6/15/2019 0732 by Les Sheppard RCP  Outcome: Ongoing  6/15/2019 0551 by Vincent Beach RN  Outcome: Ongoing     Problem: MECHANICAL VENTILATION  Goal: Patient will maintain patent airway  Outcome: Ongoing     Problem: MECHANICAL VENTILATION  Goal: Oral health is maintained or improved  Outcome: Ongoing     Problem: MECHANICAL VENTILATION  Goal: ET tube will be managed safely  Outcome: Ongoing     Problem: MECHANICAL VENTILATION  Goal: Ability to express needs and understand communication  Outcome: Ongoing     Problem: MECHANICAL VENTILATION  Goal: Mobility/activity is maintained at optimum level for patient  Outcome: Ongoing     Problem: Respiratory:  Goal: Ability to maintain a clear airway will improve  Description  Ability to maintain a clear airway will improve  6/15/2019 0732 by Les Sheppard RCP  Outcome: Ongoing  6/15/2019 0551 by Vincent Beach RN  Outcome: Ongoing     Problem: Respiratory:  Goal: Ability to maintain adequate ventilation will improve  Description  Ability to maintain adequate ventilation will improve  6/15/2019 0732 by Les Sheppard RCP  Outcome: Ongoing  6/15/2019 0551 by Vincent Beach RN  Outcome: Ongoing     Problem: Respiratory:  Goal: Complications related to the disease process, condition or treatment will be avoided or minimized  Description  Complications related to the disease process, condition or treatment will be avoided or minimized  6/15/2019 0732 by Les Sheppard RCP  Outcome: Ongoing  6/15/2019 0551 by Vincent Beach RN  Outcome:

## 2019-06-15 NOTE — PROGRESS NOTES
Pt. Placed on wean mode at 0740 and tolerated well until [id number] when dialysis was started. Pt placed back on full support at this time. No plans to extubate, pt for trach  and peg  When more stable. Plan to continue wean in the a.m.

## 2019-06-15 NOTE — PLAN OF CARE
Problem: Pain:  Goal: Pain level will decrease  Description  Pain level will decrease  Outcome: Ongoing  Goal: Control of acute pain  Description  Control of acute pain  Outcome: Ongoing  Goal: Control of chronic pain  Description  Control of chronic pain  Outcome: Ongoing     Problem: Risk for Impaired Skin Integrity  Goal: Tissue integrity - skin and mucous membranes  Description  Structural intactness and normal physiological function of skin and  mucous membranes.   Outcome: Ongoing     Problem: Falls - Risk of:  Goal: Will remain free from falls  Description  Will remain free from falls  Outcome: Ongoing  Goal: Absence of physical injury  Description  Absence of physical injury  Outcome: Ongoing     Problem: Airway Clearance - Ineffective:  Goal: Ability to maintain a clear airway will improve  Description  Ability to maintain a clear airway will improve  Outcome: Ongoing     Problem: Anxiety/Stress:  Goal: Level of anxiety will decrease  Description  Level of anxiety will decrease  Outcome: Ongoing     Problem: Cardiac Output - Decreased:  Goal: Hemodynamic stability will improve  Description  Hemodynamic stability will improve  Outcome: Ongoing     Problem: Fluid Volume - Imbalance:  Goal: Absence of imbalanced fluid volume signs and symptoms  Description  Absence of imbalanced fluid volume signs and symptoms  Outcome: Ongoing     Problem: Gas Exchange - Impaired:  Goal: Levels of oxygenation will improve  Description  Levels of oxygenation will improve  Outcome: Ongoing     Problem: Mental Status - Impaired:  Goal: Mental status will be restored to baseline  Description  Mental status will be restored to baseline  Outcome: Ongoing     Problem: Nutrition Deficit:  Goal: Ability to achieve adequate nutritional intake will improve  Description  Ability to achieve adequate nutritional intake will improve  Outcome: Ongoing     Problem: Pain:  Goal: Recognizes and communicates pain  Description  Recognizes and free from falls  Description  Will remain free from falls  Outcome: Ongoing  Goal: Absence of physical injury  Description  Absence of physical injury  Outcome: Ongoing     Problem: Mood - Altered:  Goal: Mood stable  Description  Mood stable  Outcome: Ongoing  Goal: Absence of abusive behavior  Description  Absence of abusive behavior  Outcome: Ongoing  Goal: Verbalizations of feeling emotionally comfortable while being cared for will increase  Description  Verbalizations of feeling emotionally comfortable while being cared for will increase  Outcome: Ongoing     Problem: Restraint Use - Nonviolent/Non-Self-Destructive Behavior:  Goal: Absence of restraint-related injury  Description  Absence of restraint-related injury  Outcome: Ongoing     Problem: Nutrition  Goal: Optimal nutrition therapy  Outcome: Ongoing

## 2019-06-15 NOTE — PROGRESS NOTES
Critical Care Team - Daily Progress Note    Date and time: 6/15/2019 8:29 AM  Patient's name:  Kim Dooley  Medical Record Number: [id number]  Patient's account/billing number: [de-identified]  Patient's YOB: 1975  Age: 39 y.o.   Date of Admission: 6/11/2019 11:01 AM  Length of stay during current admission: 4    Primary Care Physician: Codie Stevens CNP, APRRODO - CNP  ICU Attending Physician: Dr. Tom Schultz    Code Status: Full Code     Reason for ICU admission: Rhabdo, ELIE, Respiratory Failure    SUBJECTIVE:     OVERNIGHT EVENTS:     Vitals stable  Levophed off  Intubated and sedated  Currently on weaning mode  ABG 7.446, 41.9, 77.7, 28.9  Had 1 bowel movement yesterday after Dulcolax suppository  Abdomen is distended  On NG intermittent suctioning  Urine output 5-25 ML per hour  Had dialysis yesterday  NG output 100ml last 24 hours      AWAKE & FOLLOWING COMMANDS:  [x] No   [] Yes    CURRENT VENTILATION STATUS:     [x] Ventilator  [] BIPAP  [] Nasal Cannula [] Room Air      SECRETIONS Amount:  [x] Small [] Moderate  [] Large  [] None  Color:     [] White [] Colored  [] Bloody    SEDATION:  RAAS Score:  [] Propofol gtt  [x] Versed gtt  [x] Fentanyl pushes   [] No Sedation    PARALYZED:  [x] No    [] Yes    DIARRHEA:                [x] No                [] Yes  (C. Difficile status: [] positive                                                                                                                       [] negative                                                                                                                     [] pending)    VASOPRESSORS:  [x] No    [] Yes    If yes -   [] Levophed       [] Dopamine     [] Vasopressin       [] Dobutamine  [] Phenylephrine         [] Epinephrine    CENTRAL LINES:     [] No   [x] Yes   (Date of Insertion: 6/12/19)           If yes -     [x] Right IJ     [] Left IJ [] Right Femoral [] Left Femoral                   [] Right Subclavian [] Left Subclavian     VAUGHN'S CATHETER:   [] No   [x] Yes     URINE OUTPUT:            [] Good   [x] Low              [] Anuric    OBJECTIVE:     VITAL SIGNS:  /68   Pulse 78   Temp 98.8 °F (37.1 °C) (Oral)   Resp 16   Ht 6' (1.829 m)   Wt (!) 326 lb 11.6 oz (148.2 kg)   SpO2 94%   BMI 44.31 kg/m²   Tmax over 24 hours:  Temp (24hrs), Av.2 °F (36.8 °C), Min:96.8 °F (36 °C), Max:98.8 °F (37.1 °C)    Patient Vitals for the past 6 hrs:   BP Temp Temp src Pulse Resp SpO2   06/15/19 0727 -- -- -- 78 16 94 %   06/15/19 0700 114/68 -- -- 78 22 97 %   06/15/19 0600 135/68 -- -- 79 22 --   06/15/19 0500 -- -- -- 80 19 --   06/15/19 0421 -- -- -- 79 20 97 %   06/15/19 0400 -- 98.8 °F (37.1 °C) Oral 80 22 --   06/15/19 0300 (!) 109/54 -- -- 80 22 --       Intake/Output Summary (Last 24 hours) at 6/15/2019 0829  Last data filed at 6/15/2019 0600  Gross per 24 hour   Intake 862.02 ml   Output 2800 ml   Net -1937.98 ml     Wt Readings from Last 2 Encounters:   19 (!) 326 lb 11.6 oz (148.2 kg)   19 (!) 302 lb (137 kg)     Body mass index is 44.31 kg/m². PHYSICAL EXAMINATION:  Constitutional:  follows simple commands, slow in responses. EENT:  sclera clear, anicteric, oropharynx clear, no lesions, neck supple with midline trachea. Neck: Supple, symmetrical, trachea midline, no adenopathy, thyroid symmetric  Respiratory: equal breath sounds b/l, no wheezes or but scattered rales on the Right lung base  Cardiovascular: regular rate and rhythm  Abdomen: soft very mild epigastric tenderness on exam, nondistended, no masses or organomegaly  Neurological: Intubated and sedated  Extremities:  peripheral pulses normal, minimal pitting edema in the shins.     MEDICATIONS:  Scheduled Meds:   [Held by provider] metoclopramide  10 mg Intravenous Q6H    midodrine  5 mg Oral TID WC    warfarin (COUMADIN) daily dosing (placeholder)   Other RX Placeholder    sodium chloride  250 mL Intravenous Once    sodium chloride flush  10 mL Intravenous 2 times per day    famotidine (PEPCID) injection  20 mg Intravenous Daily    sodium chloride flush  10 mL Intravenous 2 times per day     Continuous Infusions:   propofol 10 mcg/kg/min (06/15/19 0811)    norepinephrine Stopped (06/14/19 0700)    dextrose       PRN Meds:     fentanNYL 100 mcg Q1H PRN   heparin (porcine) 1,700 Units PRN   heparin (porcine) 1,600 Units PRN   sodium chloride flush 10 mL PRN   magnesium hydroxide 30 mL Daily PRN   ondansetron 4 mg Q6H PRN   acetaminophen 650 mg Q4H PRN   sodium chloride flush 10 mL PRN   glucose 15 g PRN   dextrose 12.5 g PRN   glucagon (rDNA) 1 mg PRN   dextrose 100 mL/hr PRN   albuterol 2.5 mg As Directed RT PRN   ipratropium-albuterol 1 ampule Q4H PRN       VENT SETTINGS (Comprehensive) (if applicable):  Vent Information  $Ventilation: $Subsequent Day  Ventilator Started: Yes  Ventilation Day(s): 3  Skin Assessment: Clean, dry, & intact  Equipment ID: [id number]  Equipment Changed: HME  Vent Type: Servo i  Vent Mode: (S) CPAP  Vt Ordered: 620 mL  Rate Set: 22 bmp  Pressure Support: (S) 6 cmH20  FiO2 : 30 %  Sensitivity: 5  PEEP/CPAP: (S) 5  I Time/ I Time %: 0.8 s  Cuff Pressure (cm H2O): 22 cm H2O  Humidification Source: HME  Nitric Oxide/Epoprostenol In Use?: No  Additional Respiratory  Assessments  Pulse: 78  Resp: 16  SpO2: 94 %  End Tidal CO2: 44 (%)  Position: Semi-Bowens's  Humidification Source: HME  Oral Care Completed?: Yes  Oral Care: Mouthwash, Mouth swabbed  Subglottic Suction Done?: Yes  Cuff Pressure (cm H2O): 22 cm H2O    ABGs:   Arterial Blood Gas result:  pO2 73.7; pCO2 45.0; pH 7.386;  HCO3 27.0, %O2 Sat 97.     Laboratory findings:    Complete Blood Count:   Recent Labs     06/13/19  0425 06/14/19  0554 06/15/19  0512   WBC 6.4 5.2 5.7   HGB 10.1* 10.2* 10.5*   HCT 32.5* 32.6* 33.8*   PLT 74* 79* 71*      Last 3 Blood Glucose:   Recent Labs     06/14/19  0757 06/14/19  1757 06/15/19  0512   GLUCOSE brainstem and cerebellum are normal in appearance. There is complete opacification of the right maxillary sinus with mucosal thickening in the frontal, bilateral ethmoid, and bilateral sphenoid sinuses. Otherwise, the visualized paranasal sinuses and mastoid air cells are clear. No skull abnormalities are identified. 1. Limited examination due to patient motion artifact. Within the limitations of the examination, no acute intracranial abnormality is seen. 2. Sinus disease. Please correlate for acute sinusitis. Us Renal Complete    Result Date: 6/11/2019  EXAMINATION: RETROPERITONEAL ULTRASOUND OF THE KIDNEYS AND URINARY BLADDER 6/11/2019 COMPARISON: 12/08/2018. HISTORY: ORDERING SYSTEM PROVIDED HISTORY: Renal failure, acute (kidney injury) TECHNOLOGIST PROVIDED HISTORY: ELIE Initial exam. FINDINGS: Kidneys: The right kidney measures 10.3 cm in length and the left kidney measures 12.0 cm in length. The echogenicity of the kidneys is normal.  There is no hydronephrosis or mass. There is sludge noted in the gallbladder lumen. No stones are identified. The previously noted right-sided renal calculi on the CT from 12/08/2018 are not visualized on the current exam. Bladder: The bladder was not imaged secondary to it being empty. There is a Browne catheter present. 1. Unremarkable bilateral renal ultrasound. 2. Biliary sludge. 3. Nonvisualization of the bladder secondary to a Browne catheter in place. Xr Chest Portable    Result Date: 6/11/2019  EXAMINATION: ONE XRAY VIEW OF THE CHEST 6/11/2019 10:09 pm COMPARISON: Chest radiograph performed 06/11/2019. HISTORY: ORDERING SYSTEM PROVIDED HISTORY: Concern for fluid overload TECHNOLOGIST PROVIDED HISTORY: Concern for fluid overload Ordering Physician Provided Reason for Exam: portable upright concern for fluid overload Acuity: Unknown Type of Exam: Unknown FINDINGS: There is bilateral pulmonary congestion. There is no acute consolidation or effusion.   There is no pneumothorax. The heart is enlarged. The upper abdomen is unremarkable. The extrathoracic soft tissues are unremarkable. Cardiomegaly and bilateral pulmonary congestion. Xr Chest Portable    Result Date: 6/11/2019  CHEST 1 VIEW, 6/11/2019: CLINICAL INDICATION: Mental status change. COMPARISON: Chest, 2/24/2019. FINDINGS: The heart is mildly to moderately enlarged, but exaggerated by mild patient rotation to the right and suboptimal inspiratory effort. The lungs are hypoinflated with mild streaky lower lung atelectasis, but appear otherwise grossly clear. Hypoinflation with mild streaky lower lung atelectasis. The lungs are otherwise grossly clear.      ASSESSMENT:     Patient Active Problem List    Diagnosis Date Noted    Supratherapeutic INR     Morbid obesity ([address]     Rhabdomyolysis 06/11/2019    Unresponsive 08/12/2018    Drug overdose     Unresponsive episode 04/03/2016    Seizure disorder ([address] 04/03/2016    Coagulopathy ([address] 04/03/2016    Seizures ([address]     Hypertension     Asthma     Cerebral artery occlusion with cerebral infarction ([address]     Substance abuse ([address] 04/02/2016    ELIE (acute kidney injury) ([address] 04/02/2016    Elevated transaminase level 04/02/2016    Leukocytosis 04/02/2016    Anti-phospholipid antibody syndrome (HCC) 11/07/2012    Long term current use of anticoagulant therapy 11/07/2012     Additional assessment:     Antiphospholipid antibody syndrome    Long term current use of anticoagulant therapy    Polysubstance abuse (HCC)    ELIE (acute kidney injury) (HCC)    Elevated transaminase level    Leukocytosis    Seizures (HCC)    Hypertension    Asthma    Cerebral artery occlusion with cerebral infarction (HCC)    Unresponsive episode    Seizure disorder (HCC)    Lupus anticoagulant syndrome (HCC)    Unresponsive    Drug overdose    Rhabdomyolysis     PLAN:     WEAN PER PROTOCOL:  [x] No   [] Yes  [] N/A    DISCONTINUE ANY LABS:   [x] No

## 2019-06-15 NOTE — PROGRESS NOTES
Dialysis Post Treatment Note  Patient tolerated treatment well. Denies complaints at time of discharge.    Vitals:    06/15/19 1630   BP: 118/65   Pulse: 82   Resp: 20   Temp: 97.8 °F (36.6 °C)   SpO2: 97%     Pre-Weight = 151.3kg  Post-weight = Weight: (!) 326 lb 11.6 oz (148.2 kg)  Total Liters Processed = Total Liters Processed (l/min): 98.1 l/min  Rinseback Volume (mL) = Rinseback Volume (ml): 370 ml  Net Removal (mL) = @[id number]  Length of treatment=3.5

## 2019-06-15 NOTE — PLAN OF CARE
Problem: Restraint Use - Nonviolent/Non-Self-Destructive Behavior:  Goal: Absence of restraint-related injury  Description  Absence of restraint-related injury  6/15/2019 1337 by Lesli Vo RN  Outcome: Ongoing     Problem: Restraint Use - Nonviolent/Non-Self-Destructive Behavior:  Goal: Absence of restraint indications  Description  Absence of restraint indications  6/15/2019 1337 by Lesli Vo RN  Outcome: Not Met This Shift

## 2019-06-15 NOTE — FLOWSHEET NOTE
Assessment: Pt intubated and sleeping with no family present. Intervention:  said a silent prayer and left a note from spiritual care. Plan: Chaplains will remain available to offer spiritual and emotional support as needed.      06/14/19 2015   Encounter Summary   Services provided to: Patient   Referral/Consult From: [facility] Family members   Place of [address]   (6/14 - not assessed)   Complexity of Encounter Low   Length of Encounter 15 minutes   Spiritual/Alevism   Type Spiritual support   Assessment Sleeping   Intervention Prayer;Sustaining presence/ Ministry of presence   Outcome Did not respond

## 2019-06-16 LAB
ABSOLUTE EOS #: 0.21 K/UL (ref 0–0.4)
ABSOLUTE IMMATURE GRANULOCYTE: 0.05 K/UL (ref 0–0.3)
ABSOLUTE LYMPH #: 0.53 K/UL (ref 1–4.8)
ABSOLUTE MONO #: 0.64 K/UL (ref 0.1–0.8)
ALLEN TEST: NORMAL
ANION GAP SERPL CALCULATED.3IONS-SCNC: 15 MMOL/L (ref 9–17)
ANION GAP SERPL CALCULATED.3IONS-SCNC: 22 MMOL/L (ref 9–17)
BASOPHILS # BLD: 0 % (ref 0–2)
BASOPHILS ABSOLUTE: 0 K/UL (ref 0–0.2)
BUN BLDV-MCNC: 39 MG/DL (ref 6–20)
BUN BLDV-MCNC: 48 MG/DL (ref 6–20)
BUN/CREAT BLD: ABNORMAL (ref 9–20)
BUN/CREAT BLD: ABNORMAL (ref 9–20)
CALCIUM SERPL-MCNC: 8.1 MG/DL (ref 8.6–10.4)
CALCIUM SERPL-MCNC: 8.2 MG/DL (ref 8.6–10.4)
CHLORIDE BLD-SCNC: 96 MMOL/L (ref 98–107)
CHLORIDE BLD-SCNC: 98 MMOL/L (ref 98–107)
CO2: 16 MMOL/L (ref 20–31)
CO2: 22 MMOL/L (ref 20–31)
CREAT SERPL-MCNC: 4.45 MG/DL (ref 0.7–1.2)
CREAT SERPL-MCNC: 4.87 MG/DL (ref 0.7–1.2)
DIFFERENTIAL TYPE: ABNORMAL
EOSINOPHILS RELATIVE PERCENT: 4 % (ref 1–4)
FIO2: 30
GFR AFRICAN AMERICAN: 16 ML/MIN
GFR AFRICAN AMERICAN: 18 ML/MIN
GFR NON-AFRICAN AMERICAN: 13 ML/MIN
GFR NON-AFRICAN AMERICAN: 14 ML/MIN
GFR SERPL CREATININE-BSD FRML MDRD: ABNORMAL ML/MIN/{1.73_M2}
GLUCOSE BLD-MCNC: 87 MG/DL (ref 70–99)
GLUCOSE BLD-MCNC: 89 MG/DL (ref 75–110)
GLUCOSE BLD-MCNC: 93 MG/DL (ref 70–99)
HCT VFR BLD CALC: 32.8 % (ref 40.7–50.3)
HEMOGLOBIN: 10 G/DL (ref 13–17)
IMMATURE GRANULOCYTES: 1 %
INR BLD: 3.4
LYMPHOCYTES # BLD: 10 % (ref 24–44)
MCH RBC QN AUTO: 24.6 PG (ref 25.2–33.5)
MCHC RBC AUTO-ENTMCNC: 30.5 G/DL (ref 28.4–34.8)
MCV RBC AUTO: 80.8 FL (ref 82.6–102.9)
MODE: NORMAL
MONOCYTES # BLD: 12 % (ref 1–7)
MORPHOLOGY: ABNORMAL
MORPHOLOGY: ABNORMAL
NEGATIVE BASE EXCESS, ART: NORMAL (ref 0–2)
NRBC AUTOMATED: 0 PER 100 WBC
O2 DEVICE/FLOW/%: NORMAL
PARTIAL THROMBOPLASTIN TIME: 40.8 SEC (ref 20.5–30.5)
PATIENT TEMP: NORMAL
PDW BLD-RTO: 16.4 % (ref 11.8–14.4)
PLATELET # BLD: 67 K/UL (ref 138–453)
PLATELET ESTIMATE: ABNORMAL
PMV BLD AUTO: 11.1 FL (ref 8.1–13.5)
POC HCO3: 25 MMOL/L (ref 21–28)
POC O2 SATURATION: 97 % (ref 94–98)
POC PCO2 TEMP: NORMAL MM HG
POC PCO2: 36.4 MM HG (ref 35–48)
POC PH TEMP: NORMAL
POC PH: 7.44 (ref 7.35–7.45)
POC PO2 TEMP: NORMAL MM HG
POC PO2: 85.6 MM HG (ref 83–108)
POSITIVE BASE EXCESS, ART: 1 (ref 0–3)
POTASSIUM SERPL-SCNC: 3.6 MMOL/L (ref 3.7–5.3)
POTASSIUM SERPL-SCNC: 4.2 MMOL/L (ref 3.7–5.3)
PROTHROMBIN TIME: 32.2 SEC (ref 9–12)
RBC # BLD: 4.06 M/UL (ref 4.21–5.77)
RBC # BLD: ABNORMAL 10*6/UL
SAMPLE SITE: NORMAL
SEG NEUTROPHILS: 73 % (ref 36–66)
SEGMENTED NEUTROPHILS ABSOLUTE COUNT: 3.87 K/UL (ref 1.8–7.7)
SODIUM BLD-SCNC: 133 MMOL/L (ref 135–144)
SODIUM BLD-SCNC: 136 MMOL/L (ref 135–144)
TCO2 (CALC), ART: 26 MMOL/L (ref 22–29)
TRIGL SERPL-MCNC: 163 MG/DL
WBC # BLD: 5.3 K/UL (ref 3.5–11.3)
WBC # BLD: ABNORMAL 10*3/UL

## 2019-06-16 PROCEDURE — 2700000000 HC OXYGEN THERAPY PER DAY

## 2019-06-16 PROCEDURE — 80048 BASIC METABOLIC PNL TOTAL CA: CPT

## 2019-06-16 PROCEDURE — 84478 ASSAY OF TRIGLYCERIDES: CPT

## 2019-06-16 PROCEDURE — 94003 VENT MGMT INPAT SUBQ DAY: CPT

## 2019-06-16 PROCEDURE — 85610 PROTHROMBIN TIME: CPT

## 2019-06-16 PROCEDURE — 94762 N-INVAS EAR/PLS OXIMTRY CONT: CPT

## 2019-06-16 PROCEDURE — 37799 UNLISTED PX VASCULAR SURGERY: CPT

## 2019-06-16 PROCEDURE — 6360000002 HC RX W HCPCS: Performed by: INTERNAL MEDICINE

## 2019-06-16 PROCEDURE — 6360000002 HC RX W HCPCS: Performed by: STUDENT IN AN ORGANIZED HEALTH CARE EDUCATION/TRAINING PROGRAM

## 2019-06-16 PROCEDURE — 94770 HC ETCO2 MONITOR DAILY: CPT

## 2019-06-16 PROCEDURE — 85025 COMPLETE CBC W/AUTO DIFF WBC: CPT

## 2019-06-16 PROCEDURE — 2000000000 HC ICU R&B

## 2019-06-16 PROCEDURE — 82803 BLOOD GASES ANY COMBINATION: CPT

## 2019-06-16 PROCEDURE — 36415 COLL VENOUS BLD VENIPUNCTURE: CPT

## 2019-06-16 PROCEDURE — 6370000000 HC RX 637 (ALT 250 FOR IP): Performed by: STUDENT IN AN ORGANIZED HEALTH CARE EDUCATION/TRAINING PROGRAM

## 2019-06-16 PROCEDURE — 82947 ASSAY GLUCOSE BLOOD QUANT: CPT

## 2019-06-16 PROCEDURE — 2500000003 HC RX 250 WO HCPCS: Performed by: EMERGENCY MEDICINE

## 2019-06-16 PROCEDURE — 85730 THROMBOPLASTIN TIME PARTIAL: CPT

## 2019-06-16 PROCEDURE — 99291 CRITICAL CARE FIRST HOUR: CPT | Performed by: INTERNAL MEDICINE

## 2019-06-16 RX ORDER — HYDRALAZINE HYDROCHLORIDE 20 MG/ML
10 INJECTION INTRAMUSCULAR; INTRAVENOUS EVERY 6 HOURS PRN
Status: DISCONTINUED | OUTPATIENT
Start: 2019-06-16 | End: 2019-06-21 | Stop reason: HOSPADM

## 2019-06-16 RX ORDER — POLYETHYLENE GLYCOL 3350 17 G/17G
17 POWDER, FOR SOLUTION ORAL DAILY
Status: DISCONTINUED | OUTPATIENT
Start: 2019-06-16 | End: 2019-06-18

## 2019-06-16 RX ORDER — DOCUSATE SODIUM 100 MG/1
100 CAPSULE, LIQUID FILLED ORAL 2 TIMES DAILY
Status: DISCONTINUED | OUTPATIENT
Start: 2019-06-16 | End: 2019-06-16

## 2019-06-16 RX ORDER — WARFARIN SODIUM 7.5 MG/1
7.5 TABLET ORAL
Status: COMPLETED | OUTPATIENT
Start: 2019-06-16 | End: 2019-06-16

## 2019-06-16 RX ORDER — FENTANYL CITRATE 50 UG/ML
50 INJECTION, SOLUTION INTRAMUSCULAR; INTRAVENOUS
Status: DISCONTINUED | OUTPATIENT
Start: 2019-06-16 | End: 2019-06-17

## 2019-06-16 RX ORDER — DOCUSATE SODIUM 100 MG/1
100 CAPSULE, LIQUID FILLED ORAL 2 TIMES DAILY
Status: DISCONTINUED | OUTPATIENT
Start: 2019-06-16 | End: 2019-06-18

## 2019-06-16 RX ORDER — SENNA PLUS 8.6 MG/1
1 TABLET ORAL NIGHTLY
Status: DISCONTINUED | OUTPATIENT
Start: 2019-06-16 | End: 2019-06-18

## 2019-06-16 RX ADMIN — FAMOTIDINE 20 MG: 10 INJECTION, SOLUTION INTRAVENOUS at 08:05

## 2019-06-16 RX ADMIN — FENTANYL CITRATE 100 MCG: 50 INJECTION INTRAMUSCULAR; INTRAVENOUS at 07:34

## 2019-06-16 RX ADMIN — FENTANYL CITRATE 100 MCG: 50 INJECTION INTRAMUSCULAR; INTRAVENOUS at 10:02

## 2019-06-16 RX ADMIN — PROPOFOL 20 MCG/KG/MIN: 10 INJECTION, EMULSION INTRAVENOUS at 04:45

## 2019-06-16 RX ADMIN — FENTANYL CITRATE 50 MCG: 50 INJECTION INTRAMUSCULAR; INTRAVENOUS at 13:58

## 2019-06-16 RX ADMIN — PROPOFOL 15 MCG/KG/MIN: 10 INJECTION, EMULSION INTRAVENOUS at 09:18

## 2019-06-16 RX ADMIN — HYDRALAZINE HYDROCHLORIDE 10 MG: 20 INJECTION INTRAMUSCULAR; INTRAVENOUS at 16:21

## 2019-06-16 RX ADMIN — WARFARIN SODIUM 7.5 MG: 7.5 TABLET ORAL at 17:28

## 2019-06-16 ASSESSMENT — PULMONARY FUNCTION TESTS
PIF_VALUE: 16
PIF_VALUE: 12
PIF_VALUE: 12

## 2019-06-16 NOTE — PROGRESS NOTES
Nutrition Assessment     Type and Reason for Visit: Reassess    Nutrition Recommendations: Start oral diet as able. Monitor need for nutrition support. Nutrition Assessment: Pt extubated this afternoon. Pt remains nutritionally at risk as evidenced by TF on hold since 6/14/19 due to emesis/intolerance and current NPO status. Per RN, pt is having BMs ; on bowel regimen. Malnutrition Assessment:  · Malnutrition Status: At risk for malnutrition  · Context: Acute illness or injury  · Findings of the 6 clinical characteristics of malnutrition (Minimum of 2 out of 6 clinical characteristics is required to make the diagnosis of moderate or severe Protein Calorie Malnutrition based on AND/ASPEN Guidelines):  1. Energy Intake-Less than or equal to 75% of estimated energy requirement, Greater than or equal to 5 days    2. Weight Loss-No significant weight loss,    3. Fat Loss-No significant subcutaneous fat loss,    4. Muscle Loss-No significant muscle mass loss,    5. Fluid Accumulation-mild-moderate, Extremities, Generalized  6.  Strength-Not measured    Nutrition Risk Level: Moderate    Nutrition Needs:  · Estimated Daily Total Kcal: 2200 kcal/day  · Estimated Daily Protein (g): 120-160 g pro/day    Nutrition Diagnosis:   · Problem: Inadequate oral intake  · Etiology: related to recent alteration in GI function, recent extubation   Signs and symptoms:  as evidenced by TF on hold while intubated, currently NPO     Objective Information:  · Nutrition-Focused Physical Findings: obese  · Wound Type: Deep Tissue Injury, Multiple(traumatic injuries)  · Current Nutrition Therapies:  · Oral Diet Orders: NPO   · Tube Feeding (TF) Orders:   · Formula: Renal  · Rate (ml/hr):Goal 45 mL/hr -held on 6/14/19 d/t intolerance,? SBO    · Current TF & Flush Orders Provides: 0  · Anthropometric Measures:  · Ht: 6' (182.9 cm)   · Current Body Wt: 319 lb 3.6 oz (144.8 kg)  · Admission Body Wt: 330 lb (149.7 kg)  · Weight

## 2019-06-16 NOTE — PROGRESS NOTES
injection 10 mL 2 times per day   sodium chloride flush 0.9 % injection 10 mL PRN   magnesium hydroxide (MILK OF MAGNESIA) 400 MG/5ML suspension 30 mL Daily PRN   ondansetron (ZOFRAN) injection 4 mg Q6H PRN   famotidine (PEPCID) injection 20 mg Daily   acetaminophen (TYLENOL) tablet 650 mg Q4H PRN   sodium chloride flush 0.9 % injection 10 mL 2 times per day   sodium chloride flush 0.9 % injection 10 mL PRN   norepinephrine (LEVOPHED) 16 mg in dextrose 5% 250 mL infusion Continuous   glucose (GLUTOSE) 40 % oral gel 15 g PRN   dextrose 50 % IV solution PRN   glucagon (rDNA) injection 1 mg PRN   dextrose 5 % solution PRN   albuterol (PROVENTIL) nebulizer solution 2.5 mg As Directed RT PRN   ipratropium-albuterol (DUONEB) nebulizer solution 1 ampule Q4H PRN       LABS      CBC:   Recent Labs     06/14/19  0554 06/15/19  0512 06/16/19 0414   WBC 5.2 5.7 5.3   RBC 4.17* 4.24 4.06*   HGB 10.2* 10.5* 10.0*   HCT 32.6* 33.8* 32.8*   MCV 78.2* 79.7* 80.8*   MCH 24.5* 24.8* 24.6*   MCHC 31.3 31.1 30.5   RDW 16.7* 16.4* 16.4*   PLT 79* 71* 67*   MPV 11.7 11.7 11.1      BMP:   Recent Labs     06/15/19  0512 06/15/19  1759 06/16/19 0414   * 132* 133*   K 3.5* 3.6* 3.6*   CL 95* 96* 96*   CO2 24 24 22   BUN 42* 32* 39*   CREATININE 4.52* 3.64* 4.45*   GLUCOSE 100* 95 93   CALCIUM 7.8* 8.0* 8.2*      BNP:No results found for: BNP  PHOSPHORUS:    Recent Labs     06/15/19  0512   PHOS 4.0     MAGNESIUM:   Recent Labs     06/15/19  0512   MG 1.6     GAGE:   Lab Results   Component Value Date    GAGE (A) 06/11/2019     This test is equivocal, which represents a borderline GAGE result. Suggest repeating in 3-5 weeks.        SPEP:   Lab Results   Component Value Date    PROT 5.4 06/12/2019    ALBCAL 2.7 06/11/2019    ALBPCT 55 06/11/2019    LABALPH 0.3 06/11/2019    LABALPH 0.6 06/11/2019    A1PCT 7 06/11/2019    A2PCT 13 06/11/2019    LABBETA 0.6 06/11/2019    BETAPCT 12 06/11/2019    GAMGLOB 0.7 06/11/2019    GGPCT 14 06/11/2019 PATH ELECTRONICALLY SIGNED. Sallie Roca M.D. 06/11/2019    PATH ELECTRONICALLY SIGNED. Sallie Roca M.D. 06/11/2019       HEPBSAG:  Lab Results   Component Value Date    HEPBSAG NONREACTIVE 06/12/2019     HEPCAB:  Lab Results   Component Value Date    HEPCAB NONREACTIVE 06/12/2019     C3:   Lab Results   Component Value Date    C3 88 (L) 06/11/2019     C4:   Lab Results   Component Value Date    C4 16 06/11/2019     URINE SODIUM:    Lab Results   Component Value Date    MALINA 21 06/11/2019      URINE CREATININE:    Lab Results   Component Value Date    LABCREA 240.2 06/11/2019     URINALYSIS:  U/A:   Lab Results   Component Value Date    NITRU POSITIVE 06/11/2019    COLORU ANANT 06/11/2019    PHUR 5.0 06/11/2019    WBCUA 10 TO 20 06/11/2019    RBCUA 2 TO 5 06/11/2019    MUCUS NOT REPORTED 06/11/2019    TRICHOMONAS NOT REPORTED 06/11/2019    YEAST NOT REPORTED 06/11/2019    BACTERIA RARE 06/11/2019    SPECGRAV 1.025 06/11/2019    LEUKOCYTESUR 1+ 06/11/2019    UROBILINOGEN Normal 06/11/2019    BILIRUBINUR 2+ 06/11/2019    GLUCOSEU 50 mg/dL 06/11/2019    KETUA TRACE 06/11/2019    AMORPHOUS 2+ 06/11/2019     RADIOLOGY      No recent. KUB ordered    ASSESSMENT      1. ELIE secondary to nephrotoxic (rhabdomyolysis) and ischemic ATN (poor oral intake with dehydration contributed by drug abuse) -patient is receiving dialysis via Cheng catheter. There is  evidence of very slow renal recovery. Patient is hypercatabolic and has been requiring frequent dialysis. 2. Vent dependent respiratory failure patient is on 30% FiO2  3. Encephalopathy   4. Rhabdomyolysis from volume depletion/substance abuse. Resolving. CK and Myoglobin trending down. 5. Antiphospholipid antibody syndrome - primary  6. Prednisone use for the last 25 years and on anticoagulation. Bridging today to coumadin per primary  7. History of TIA likely due to prolonged hx of APLA.    8. Polysubstance abuse   Positive for cocaine/amphetamine on admission. 9.  CKD 3 with baseline creat 1.4-1.6  10. ECHO on 6-11-19 with pEF  11. Improving urine output of about 100 ml/hr today. PLAN      1. No HD today. Will re-evaluate in morning    2. Continue  Midodrine TID 5 mg. 3. Follow labs and electrolytes. Replace per protocol. 4. Antibiotics per renal clearence. 5. Follow hepatitis panel. 6. Will follow and continue to monitor for signs of renal recovery    Please do not hesitate to call with questions.        Huan Funez MD  [facility]

## 2019-06-16 NOTE — PLAN OF CARE
Problem: Airway Clearance - Ineffective:  Goal: Ability to maintain a clear airway will improve  Description  Ability to maintain a clear airway will improve  Outcome: Ongoing     Problem: Gas Exchange - Impaired:  Goal: Levels of oxygenation will improve  Description  Levels of oxygenation will improve  Outcome: Ongoing     Problem: MECHANICAL VENTILATION  Goal: Patient will maintain patent airway  Outcome: Ongoing     Problem: MECHANICAL VENTILATION  Goal: ET tube will be managed safely  Outcome: Ongoing     Problem: MECHANICAL VENTILATION  Goal: Ability to express needs and understand communication  Outcome: Ongoing     Problem: MECHANICAL VENTILATION  Goal: Mobility/activity is maintained at optimum level for patient  Outcome: Ongoing     Problem: Respiratory:  Goal: Ability to maintain a clear airway will improve  Description  Ability to maintain a clear airway will improve  Outcome: Ongoing     Problem: Respiratory:  Goal: Ability to maintain adequate ventilation will improve  Description  Ability to maintain adequate ventilation will improve  Outcome: Ongoing     Problem: Respiratory:  Goal: Complications related to the disease process, condition or treatment will be avoided or minimized  Description  Complications related to the disease process, condition or treatment will be avoided or minimized  Outcome: Ongoing     Problem: OXYGENATION/RESPIRATORY FUNCTION  Goal: Patient will maintain patent airway  Outcome: Ongoing     Problem: OXYGENATION/RESPIRATORY FUNCTION  Goal: Patient will achieve/maintain normal respiratory rate/effort  Description  Respiratory rate and effort will be within normal limits for the patient  Outcome: Ongoing     Problem: SKIN INTEGRITY  Goal: Skin integrity is maintained or improved  Outcome: Ongoing

## 2019-06-16 NOTE — PROGRESS NOTES
Pharmacy Note  Warfarin Consult follow-up      Recent Labs     06/16/19  0414   INR 3.4     Recent Labs     06/14/19  0554 06/15/19  0512 06/16/19  0414   HGB 10.2* 10.5* 10.0*   HCT 32.6* 33.8* 32.8*   PLT 79* 71* 67*       Current warfarin drug-drug interactions:  tube feeds    Date INR Dose   6/11/19 6.2 Held   6/12/19 9.9 Vitamin K 5mg IV given, held warfain   6/13/19 1.2 10 mg   6/14/19 1.3 10 mg   6.15.19 3.0 Hold   6.16.19 3.4 7.5 mg         Notes:    INR therapeutic (goal 2.5-3.5) will give 7.5 mg this evening. Hold tube feeds 1 hour before and 2 hours after administration. Daily PT/INR while inpatient. Aide Anthony, Pharm. [address] [facility]  [phone]  6/16/2019 11:41 AM

## 2019-06-16 NOTE — PLAN OF CARE
Problem: MECHANICAL VENTILATION  Goal: Patient will maintain patent airway  6/16/2019 1308 by Nika Santiago RCP  Outcome: Completed  6/16/2019 0926 by Nika Santiago RCP  Outcome: Ongoing     Problem: MECHANICAL VENTILATION  Goal: Oral health is maintained or improved  Outcome: Completed     Problem: MECHANICAL VENTILATION  Goal: Ability to express needs and understand communication  6/16/2019 1308 by Nika Santiago RCP  Outcome: Completed  6/16/2019 0926 by Nika Santiago RCP  Outcome: Ongoing     Problem: MECHANICAL VENTILATION  Goal: Mobility/activity is maintained at optimum level for patient  6/16/2019 1308 by Nika Santiago RCP  Outcome: Completed  6/16/2019 0926 by Nika Santiago RCP  Outcome: Ongoing

## 2019-06-16 NOTE — PLAN OF CARE
Problem: Restraint Use - Nonviolent/Non-Self-Destructive Behavior:  Goal: Absence of restraint indications  Description  Absence of restraint indications  Outcome: Not Met This Shift     Problem: Restraint Use - Nonviolent/Non-Self-Destructive Behavior:  Goal: Absence of restraint-related injury  Description  Absence of restraint-related injury  Outcome: Ongoing

## 2019-06-17 LAB
ABSOLUTE EOS #: 0.12 K/UL (ref 0–0.4)
ABSOLUTE IMMATURE GRANULOCYTE: 0 K/UL (ref 0–0.3)
ABSOLUTE LYMPH #: 0.56 K/UL (ref 1–4.8)
ABSOLUTE MONO #: 0.31 K/UL (ref 0.1–0.8)
ANION GAP SERPL CALCULATED.3IONS-SCNC: 16 MMOL/L (ref 9–17)
ANION GAP SERPL CALCULATED.3IONS-SCNC: 18 MMOL/L (ref 9–17)
BASOPHILS # BLD: 0 % (ref 0–2)
BASOPHILS ABSOLUTE: 0 K/UL (ref 0–0.2)
BUN BLDV-MCNC: 50 MG/DL (ref 6–20)
BUN BLDV-MCNC: 54 MG/DL (ref 6–20)
BUN/CREAT BLD: ABNORMAL (ref 9–20)
BUN/CREAT BLD: ABNORMAL (ref 9–20)
CALCIUM SERPL-MCNC: 8.2 MG/DL (ref 8.6–10.4)
CALCIUM SERPL-MCNC: 8.6 MG/DL (ref 8.6–10.4)
CHLORIDE BLD-SCNC: 100 MMOL/L (ref 98–107)
CHLORIDE BLD-SCNC: 99 MMOL/L (ref 98–107)
CO2: 21 MMOL/L (ref 20–31)
CO2: 23 MMOL/L (ref 20–31)
CREAT SERPL-MCNC: 4.95 MG/DL (ref 0.7–1.2)
CREAT SERPL-MCNC: 5.36 MG/DL (ref 0.7–1.2)
CULTURE: NORMAL
DIFFERENTIAL TYPE: ABNORMAL
EOSINOPHILS RELATIVE PERCENT: 2 % (ref 1–4)
GFR AFRICAN AMERICAN: 14 ML/MIN
GFR AFRICAN AMERICAN: 16 ML/MIN
GFR NON-AFRICAN AMERICAN: 12 ML/MIN
GFR NON-AFRICAN AMERICAN: 13 ML/MIN
GFR SERPL CREATININE-BSD FRML MDRD: ABNORMAL ML/MIN/{1.73_M2}
GLUCOSE BLD-MCNC: 120 MG/DL (ref 70–99)
GLUCOSE BLD-MCNC: 90 MG/DL (ref 70–99)
HCT VFR BLD CALC: 35.2 % (ref 40.7–50.3)
HEMOGLOBIN: 10.4 G/DL (ref 13–17)
IMMATURE GRANULOCYTES: 0 %
INR BLD: 3.4
LYMPHOCYTES # BLD: 9 % (ref 24–44)
Lab: NORMAL
MCH RBC QN AUTO: 24.1 PG (ref 25.2–33.5)
MCHC RBC AUTO-ENTMCNC: 29.5 G/DL (ref 28.4–34.8)
MCV RBC AUTO: 81.5 FL (ref 82.6–102.9)
MONOCYTES # BLD: 5 % (ref 1–7)
MORPHOLOGY: ABNORMAL
MORPHOLOGY: ABNORMAL
NRBC AUTOMATED: 0 PER 100 WBC
PARTIAL THROMBOPLASTIN TIME: 43.7 SEC (ref 20.5–30.5)
PDW BLD-RTO: 16 % (ref 11.8–14.4)
PLATELET # BLD: 103 K/UL (ref 138–453)
PLATELET ESTIMATE: ABNORMAL
PMV BLD AUTO: 12.6 FL (ref 8.1–13.5)
POTASSIUM SERPL-SCNC: 3.6 MMOL/L (ref 3.7–5.3)
POTASSIUM SERPL-SCNC: 3.9 MMOL/L (ref 3.7–5.3)
PROTHROMBIN TIME: 32.9 SEC (ref 9–12)
RBC # BLD: 4.32 M/UL (ref 4.21–5.77)
RBC # BLD: ABNORMAL 10*6/UL
SEG NEUTROPHILS: 84 % (ref 36–66)
SEGMENTED NEUTROPHILS ABSOLUTE COUNT: 5.21 K/UL (ref 1.8–7.7)
SODIUM BLD-SCNC: 138 MMOL/L (ref 135–144)
SODIUM BLD-SCNC: 139 MMOL/L (ref 135–144)
SPECIMEN DESCRIPTION: NORMAL
WBC # BLD: 6.2 K/UL (ref 3.5–11.3)
WBC # BLD: ABNORMAL 10*3/UL

## 2019-06-17 PROCEDURE — 6370000000 HC RX 637 (ALT 250 FOR IP): Performed by: STUDENT IN AN ORGANIZED HEALTH CARE EDUCATION/TRAINING PROGRAM

## 2019-06-17 PROCEDURE — 99291 CRITICAL CARE FIRST HOUR: CPT | Performed by: INTERNAL MEDICINE

## 2019-06-17 PROCEDURE — 97530 THERAPEUTIC ACTIVITIES: CPT

## 2019-06-17 PROCEDURE — 6360000002 HC RX W HCPCS: Performed by: STUDENT IN AN ORGANIZED HEALTH CARE EDUCATION/TRAINING PROGRAM

## 2019-06-17 PROCEDURE — 85730 THROMBOPLASTIN TIME PARTIAL: CPT

## 2019-06-17 PROCEDURE — 2580000003 HC RX 258: Performed by: NURSE PRACTITIONER

## 2019-06-17 PROCEDURE — 2500000003 HC RX 250 WO HCPCS: Performed by: EMERGENCY MEDICINE

## 2019-06-17 PROCEDURE — 85025 COMPLETE CBC W/AUTO DIFF WBC: CPT

## 2019-06-17 PROCEDURE — 97110 THERAPEUTIC EXERCISES: CPT

## 2019-06-17 PROCEDURE — 97535 SELF CARE MNGMENT TRAINING: CPT

## 2019-06-17 PROCEDURE — 6370000000 HC RX 637 (ALT 250 FOR IP): Performed by: EMERGENCY MEDICINE

## 2019-06-17 PROCEDURE — 80048 BASIC METABOLIC PNL TOTAL CA: CPT

## 2019-06-17 PROCEDURE — 2060000000 HC ICU INTERMEDIATE R&B

## 2019-06-17 PROCEDURE — 97167 OT EVAL HIGH COMPLEX 60 MIN: CPT

## 2019-06-17 PROCEDURE — 36415 COLL VENOUS BLD VENIPUNCTURE: CPT

## 2019-06-17 PROCEDURE — 85610 PROTHROMBIN TIME: CPT

## 2019-06-17 RX ORDER — WARFARIN SODIUM 7.5 MG/1
7.5 TABLET ORAL
Status: COMPLETED | OUTPATIENT
Start: 2019-06-17 | End: 2019-06-17

## 2019-06-17 RX ORDER — OXYCODONE HYDROCHLORIDE 5 MG/1
10 TABLET ORAL EVERY 6 HOURS PRN
Status: DISCONTINUED | OUTPATIENT
Start: 2019-06-17 | End: 2019-06-21 | Stop reason: HOSPADM

## 2019-06-17 RX ORDER — GINSENG 100 MG
CAPSULE ORAL 3 TIMES DAILY
Status: DISCONTINUED | OUTPATIENT
Start: 2019-06-17 | End: 2019-06-21 | Stop reason: HOSPADM

## 2019-06-17 RX ORDER — OXYCODONE HYDROCHLORIDE 5 MG/1
5 TABLET ORAL EVERY 6 HOURS PRN
Status: DISCONTINUED | OUTPATIENT
Start: 2019-06-17 | End: 2019-06-21 | Stop reason: HOSPADM

## 2019-06-17 RX ADMIN — FENTANYL CITRATE 50 MCG: 50 INJECTION INTRAMUSCULAR; INTRAVENOUS at 05:48

## 2019-06-17 RX ADMIN — OXYCODONE HYDROCHLORIDE 10 MG: 5 TABLET ORAL at 20:51

## 2019-06-17 RX ADMIN — FENTANYL CITRATE 50 MCG: 50 INJECTION INTRAMUSCULAR; INTRAVENOUS at 11:42

## 2019-06-17 RX ADMIN — FENTANYL CITRATE 50 MCG: 50 INJECTION INTRAMUSCULAR; INTRAVENOUS at 09:13

## 2019-06-17 RX ADMIN — BACITRACIN: 500 OINTMENT TOPICAL at 20:51

## 2019-06-17 RX ADMIN — Medication 10 ML: at 09:56

## 2019-06-17 RX ADMIN — WARFARIN SODIUM 7.5 MG: 7.5 TABLET ORAL at 18:39

## 2019-06-17 RX ADMIN — FENTANYL CITRATE 50 MCG: 50 INJECTION INTRAMUSCULAR; INTRAVENOUS at 16:08

## 2019-06-17 RX ADMIN — BACITRACIN: 500 OINTMENT TOPICAL at 13:50

## 2019-06-17 RX ADMIN — FENTANYL CITRATE 50 MCG: 50 INJECTION INTRAMUSCULAR; INTRAVENOUS at 18:22

## 2019-06-17 RX ADMIN — FAMOTIDINE 20 MG: 10 INJECTION, SOLUTION INTRAVENOUS at 09:55

## 2019-06-17 RX ADMIN — FENTANYL CITRATE 50 MCG: 50 INJECTION INTRAMUSCULAR; INTRAVENOUS at 13:51

## 2019-06-17 RX ADMIN — Medication 10 ML: at 20:52

## 2019-06-17 ASSESSMENT — PAIN SCALES - GENERAL
PAINLEVEL_OUTOF10: 8
PAINLEVEL_OUTOF10: 8
PAINLEVEL_OUTOF10: 9
PAINLEVEL_OUTOF10: 10
PAINLEVEL_OUTOF10: 9
PAINLEVEL_OUTOF10: 10

## 2019-06-17 ASSESSMENT — PAIN DESCRIPTION - PAIN TYPE
TYPE: ACUTE PAIN;CHRONIC PAIN
TYPE: CHRONIC PAIN

## 2019-06-17 ASSESSMENT — PAIN DESCRIPTION - ORIENTATION: ORIENTATION: RIGHT;LEFT

## 2019-06-17 ASSESSMENT — PAIN DESCRIPTION - LOCATION: LOCATION: SHOULDER;LEG;FOOT

## 2019-06-17 ASSESSMENT — PAIN DESCRIPTION - DESCRIPTORS: DESCRIPTORS: ACHING;DISCOMFORT;SORE

## 2019-06-17 ASSESSMENT — PAIN DESCRIPTION - PROGRESSION: CLINICAL_PROGRESSION: NOT CHANGED

## 2019-06-17 ASSESSMENT — PAIN DESCRIPTION - FREQUENCY: FREQUENCY: CONTINUOUS

## 2019-06-17 ASSESSMENT — PAIN DESCRIPTION - ONSET: ONSET: ON-GOING

## 2019-06-17 NOTE — CARE COORDINATION
Transitional Planning    1800 Patient extubated today  Alert and up in chair with samantha guy. Discussed options of SNF vs Rehab vs Home Care. Lists provided.   Con't to follow - need for HD long term TBD

## 2019-06-17 NOTE — PROGRESS NOTES
hours: Temp (24hrs), Av.5 °F (36.9 °C), Min:97.8 °F (36.6 °C), Max:98.8 °F (37.1 °C)      Patient Vitals for the past 6 hrs:   BP Temp Temp src Pulse Resp Weight   19 0700 (!) 148/77 -- -- 73 16 --   19 0600 (!) 162/80 -- -- 74 16 --   19 0500 (!) 173/86 -- -- 80 17 (!) 313 lb 8 oz (142.2 kg)   19 0400 (!) 159/80 98.8 °F (37.1 °C) Oral 78 18 --   19 0300 (!) 168/87 -- -- 84 20 --         Intake/Output Summary (Last 24 hours) at 2019 0834  Last data filed at 2019 0500  Gross per 24 hour   Intake 235 ml   Output 2270 ml   Net -2035 ml     Wt Readings from Last 2 Encounters:   19 (!) 313 lb 8 oz (142.2 kg)   19 (!) 302 lb (137 kg)     Body mass index is 42.52 kg/m².         PHYSICAL EXAMINATION:  General appearance - alert, well appearing, and in no distress  Mental status -  oriented to person, place, and time  Eyes - pupils equal and reactive, left eye normal, right eye normal  Ears - right ear normal, left ear normal  Nose - normal and patent   Mouth - mucous membranes moist   Neck - supple, no significant adenopathy  Chest - clear to auscultation, symmetric air entry  Heart - normal rate, regular rhythm   Abdomen - soft, nontender, nondistended, positive bowel sounds appreciated   Neurological -  no focal findings or movement disorder noted  Extremities - peripheral pulses normal, no pedal edema, no clubbing or cyanosis, tender to right leg palpation, no obvious trauma to right LE   Skin - normal coloration and turgor     MEDICATIONS:    Scheduled Meds:   warfarin  7.5 mg Oral Once    senna  1 tablet Oral Nightly    polyethylene glycol  17 g Oral Daily    docusate sodium  100 mg Oral BID    [Held by provider] metoclopramide  10 mg Intravenous Q6H    midodrine  5 mg Oral TID     warfarin (COUMADIN) daily dosing (placeholder)   Other RX Placeholder    sodium chloride  250 mL Intravenous Once    sodium chloride flush  10 mL Intravenous 2 times per day  famotidine (PEPCID) injection  20 mg Intravenous Daily    sodium chloride flush  10 mL Intravenous 2 times per day     Continuous Infusions:   dextrose       PRN Meds:     fentanNYL 50 mcg Q2H PRN   hydrALAZINE 10 mg Q6H PRN   potassium chloride 20 mEq PRN   heparin (porcine) 1,700 Units PRN   heparin (porcine) 1,600 Units PRN   sodium chloride flush 10 mL PRN   magnesium hydroxide 30 mL Daily PRN   ondansetron 4 mg Q6H PRN   acetaminophen 650 mg Q4H PRN   sodium chloride flush 10 mL PRN   glucose 15 g PRN   dextrose 12.5 g PRN   glucagon (rDNA) 1 mg PRN   dextrose 100 mL/hr PRN   albuterol 2.5 mg As Directed RT PRN   ipratropium-albuterol 1 ampule Q4H PRN         VENT SETTINGS (Comprehensive) (if applicable):  Vent Information  $Ventilation: $Subsequent Day  Ventilator Started: Yes  Ventilator Stopped: Yes(pt. extubated to 2Ll n/c)  Ventilation Day(s): 3  Skin Assessment: Clean, dry, & intact  Equipment ID: [id number]  Equipment Changed: HME  Vent Type: Servo i  Vent Mode: (S) CPAP  Vt Ordered: 620 mL  Rate Set: 22 bmp  Pressure Support: (S) 6 cmH20  FiO2 : 30 %  Sensitivity: 5  PEEP/CPAP: (S) 5  I Time/ I Time %: 0.8 s  Cuff Pressure (cm H2O): 22 cm H2O  Humidification Source: HME  Nitric Oxide/Epoprostenol In Use?: No  Additional Respiratory  Assessments  Pulse: 73  Resp: 16  SpO2: 96 %  End Tidal CO2: 45 (%)  Position: Semi-Bowens's  Humidification Source: HME  Oral Care Completed?: Yes  Oral Care: Mouthwash, Mouth swabbed  Subglottic Suction Done?: Yes  Cuff Pressure (cm H2O): 22 cm H2O     Laboratory findings:    Complete Blood Count: Recent Labs     06/15/19  0512 06/16/19  0414 06/17/19  0544   WBC 5.7 5.3 6.2   HGB 10.5* 10.0* 10.4*   HCT 33.8* 32.8* 35.2*   PLT 71* 67* 103*        Last 3 Blood Glucose:   Recent Labs     06/16/19  0414 06/16/19  1827 06/17/19  0544   GLUCOSE 93 87 90        PT/INR:    Lab Results   Component Value Date    PROTIME 32.9 06/17/2019    INR 3.4 06/17/2019     PTT:    Lab Results   Component Value Date    APTT 43.7 06/17/2019       Comprehensive Metabolic Profile:   Recent Labs     06/16/19  0414 06/16/19  1827 06/17/19  0544   * 136 138   K 3.6* 4.2 3.6*   CL 96* 98 99   CO2 22 16* 21   BUN 39* 48* 50*   CREATININE 4.45* 4.87* 4.95*   GLUCOSE 93 87 90   CALCIUM 8.2* 8.1* 8.2*      Magnesium:   Lab Results   Component Value Date    MG 1.6 06/15/2019     Phosphorus:   Lab Results   Component Value Date    PHOS 4.0 06/15/2019     Ionized Calcium:   Lab Results   Component Value Date    CAION 0.89 06/13/2019           Radiology/Imaging:     Abdominal XR 6/14/19  Diffuse small bowel dilatation suspicious for small bowel obstruction. Continued follow-up is advised.       Enteric tube projects in the expected location of stomach. ASSESSMENT:     Patient Active Problem List    Diagnosis Date Noted    Supratherapeutic INR     Morbid obesity ([facility] [address]     Rhabdomyolysis 06/11/2019    Unresponsive 08/12/2018    Drug overdose     Unresponsive episode 04/03/2016    Seizure disorder ([facility] [address] 04/03/2016    Coagulopathy ([facility] [address] 04/03/2016    Seizures ([facility] [address]     Hypertension     Asthma     Cerebral artery occlusion with cerebral infarction ([facility] [address]     Substance abuse ([facility] [address] 04/02/2016    ELIE (acute kidney injury) ([facility] [address] 04/02/2016    Elevated transaminase level 04/02/2016    Leukocytosis 04/02/2016    Antiphospholipid antibody syndrome (HCC) 11/07/2012    Long term current use of anticoagulant therapy 11/07/2012          PLAN:     WEAN PER PROTOCOL:  [x] No   [] Yes  [] N/A    DISCONTINUE ANY LABS:   [x] No   [] Yes    ICU PROPHYLAXIS:  Stress ulcer:  [] PPI Agent  [] [id number] [] Sucralfate  [] Other:  VTE:   [] Enoxaparin  [] Unfract.  Heparin Subcut  [] EPC Cuffs    NUTRITION:  [] NPO [] Tube Feeding (Specify: ) [] TPN  [x] PO (Diet: DIET FULL LIQUID;)    HOME MEDICATIONS RECONCILED: [] No  [x] Yes    INSULIN DRIP:   [x] No   [] Yes    CONSULTATION NEEDED:  [] No   [x] Yes    FAMILY UPDATED:    [x] No   [] Yes    TRANSFER OUT OF ICU:   [] No   [x] Yes    ADDITIONAL PLAN:    Neuro   - Following commands and alert    - Hx multiple drug abuse (methamphetamines, cocaine)    CV   - Pt not requiring pressures, pressures have been elevated    - With history of cocaine abuse, avoid unopposed BB use    - Hx cardiac cath   - Keep MAP >60 /SBP >110 mm Hg   - Arterial line removed yesterday      Resp   - Extubated 6/16/19    - RR 16    GI   - Gen Surg consulted and signed off.  Low suspicion for SBO    - Pt has had multiple bowel movements since consult    - Pepcid, colace, glycolax, senokot ordered for patient     - Full liquid diet ordered     Renal   - Urine output adequate      - Last dialysis 6/15/19   - Nephro following    - Midodrine TID 5mg    - Replace lytes accordingly    ID   - Abx discontinued   - Afebrile    - White count normal 6.2    Heme:    - INR 3.4 this am     - Pharmacy to manage coumadin   - Hgb stable  ~10   - PMHx antiphospholipid syndrome       DO PHYLLIS Scott Resident PGY-1   [id number], [address]  6/17/2019, 8:34 AM

## 2019-06-17 NOTE — PLAN OF CARE
Problem: Pain:  Goal: Pain level will decrease  Description  Pain level will decrease  6/17/2019 0936 by Janie Rodrigues RN  Outcome: Ongoing  6/17/2019 0230 by Chiquita Menezes RN  Outcome: Ongoing  Goal: Control of acute pain  Description  Control of acute pain  6/17/2019 0936 by Janie Rodrigues RN  Outcome: Ongoing  6/17/2019 0230 by Chiquita Menezes RN  Outcome: Ongoing  Goal: Control of chronic pain  Description  Control of chronic pain  6/17/2019 0936 by Janie Rodrigues RN  Outcome: Ongoing  6/17/2019 0230 by Chiquita Menezes RN  Outcome: Ongoing     Problem: Risk for Impaired Skin Integrity  Goal: Tissue integrity - skin and mucous membranes  Description  Structural intactness and normal physiological function of skin and  mucous membranes.   6/17/2019 0936 by Janie Rodrigues RN  Outcome: Ongoing  6/17/2019 0230 by Chiquita Menezes RN  Outcome: Ongoing     Problem: Falls - Risk of:  Goal: Will remain free from falls  Description  Will remain free from falls  6/17/2019 0936 by Janie Rodrigues RN  Outcome: Ongoing  6/17/2019 0230 by Chiquita Menezes RN  Outcome: Ongoing  Goal: Absence of physical injury  Description  Absence of physical injury  6/17/2019 0936 by Janie Rodrigues RN  Outcome: Ongoing  6/17/2019 0230 by Chiquita Menezes RN  Outcome: Ongoing     Problem: Discharge Planning:  Goal: Participates in care planning  Description  Participates in care planning  6/17/2019 0936 by Janie Rodrigues RN  Outcome: Ongoing  6/17/2019 0230 by Chiquita Menezes RN  Outcome: Ongoing  Goal: Discharged to appropriate level of care  Description  Discharged to appropriate level of care  6/17/2019 0936 by Janie Rodrigues RN  Outcome: Ongoing  6/17/2019 0230 by Chiquita Menezes RN  Outcome: Ongoing  Goal: Ability to perform activities of daily living will improve  Description  Ability to perform activities of daily living will improve  6/17/2019 0936 by Janie Rodrigues RN  Outcome: Ongoing  6/17/2019 0230 by Ann Marie Fox RN  Outcome: Ongoing     Problem: Airway Clearance - Ineffective:  Goal: Ability to maintain a clear airway will improve  Description  Ability to maintain a clear airway will improve  6/17/2019 0936 by Foster Nieves RN  Outcome: Ongoing  6/17/2019 0230 by Ann Marie Fox RN  Outcome: Ongoing     Problem: Anxiety/Stress:  Goal: Level of anxiety will decrease  Description  Level of anxiety will decrease  6/17/2019 0936 by Foster Nieves RN  Outcome: Ongoing  6/17/2019 0230 by Ann Marie Fox RN  Outcome: Ongoing     Problem: Cardiac Output - Decreased:  Goal: Hemodynamic stability will improve  Description  Hemodynamic stability will improve  6/17/2019 0936 by Foster Nieves RN  Outcome: Ongoing  6/17/2019 0230 by Ann Marie Fox RN  Outcome: Ongoing     Problem: Fluid Volume - Imbalance:  Goal: Absence of imbalanced fluid volume signs and symptoms  Description  Absence of imbalanced fluid volume signs and symptoms  6/17/2019 0936 by Foster Nieves RN  Outcome: Ongoing  6/17/2019 0230 by Ann Marie Fox RN  Outcome: Ongoing     Problem: Gas Exchange - Impaired:  Goal: Levels of oxygenation will improve  Description  Levels of oxygenation will improve  6/17/2019 0936 by Foster Nieves RN  Outcome: Ongoing  6/17/2019 0230 by Ann Marie Fox RN  Outcome: Ongoing     Problem: Mental Status - Impaired:  Goal: Mental status will be restored to baseline  Description  Mental status will be restored to baseline  6/17/2019 0936 by Foster Nieves RN  Outcome: Ongoing  6/17/2019 0230 by Ann Marie Fox RN  Outcome: Ongoing     Problem: Nutrition Deficit:  Goal: Ability to achieve adequate nutritional intake will improve  Description  Ability to achieve adequate nutritional intake will improve  6/17/2019 0936 by Foster Nieves RN  Outcome: Ongoing  6/17/2019 0230 by Ann Marie Fox RN  Outcome: Ongoing     Problem: Pain:  Goal: Recognizes and communicates pain  Description  Recognizes and communicates pain  6/17/2019 0936 by Mayelin De Jesus RN  Outcome: Ongoing  6/17/2019 0230 by Kodak Kaminski RN  Outcome: Ongoing  Goal: Control of acute pain  Description  Control of acute pain  6/17/2019 0936 by Mayelin De Jesus RN  Outcome: Ongoing  6/17/2019 0230 by Kodak Kaminski RN  Outcome: Ongoing  Goal: Control of chronic pain  Description  Control of chronic pain  6/17/2019 0936 by Mayelin De Jesus RN  Outcome: Ongoing  6/17/2019 0230 by Kodak Kaminski RN  Outcome: Ongoing     Problem: Skin Integrity - Impaired:  Goal: Will show no infection signs and symptoms  Description  Will show no infection signs and symptoms  6/17/2019 0936 by Mayelin De Jesus RN  Outcome: Ongoing  6/17/2019 0230 by Kodak Kaminski RN  Outcome: Ongoing  Goal: Absence of new skin breakdown  Description  Absence of new skin breakdown  6/17/2019 0936 by Mayelin De Jesus RN  Outcome: Ongoing  6/17/2019 0230 by Kodak Kaminski RN  Outcome: Ongoing     Problem: Tissue Perfusion, Altered:  Goal: Circulatory function within specified parameters  Description  Circulatory function within specified parameters  6/17/2019 0936 by Mayelin De Jesus RN  Outcome: Ongoing  6/17/2019 0230 by Kodak Kaminski RN  Outcome: Ongoing     Problem: Respiratory:  Goal: Ability to maintain a clear airway will improve  Description  Ability to maintain a clear airway will improve  6/17/2019 0936 by Mayelin De Jesus RN  Outcome: Ongoing  6/17/2019 0230 by Kodak Kaminski RN  Outcome: Ongoing  Goal: Ability to maintain adequate ventilation will improve  Description  Ability to maintain adequate ventilation will improve  6/17/2019 0936 by Mayelin De Jesus RN  Outcome: Ongoing  6/17/2019 0230 by Kodak Kaminski RN  Outcome: Ongoing  Goal: Complications related to the disease process, condition or treatment will be avoided or minimized  Description  Complications related to the disease process, condition or treatment will be avoided or minimized  6/17/2019 0936 by Kadi Poon RN  Outcome: Ongoing  6/17/2019 0230 by Ruby Lainez RN  Outcome: Ongoing     Problem: Skin Integrity:  Goal: Risk for impaired skin integrity will decrease  Description  Risk for impaired skin integrity will decrease  6/17/2019 0936 by Kadi Poon RN  Outcome: Ongoing  6/17/2019 0230 by Ruby Lainez RN  Outcome: Ongoing     Problem: OXYGENATION/RESPIRATORY FUNCTION  Goal: Patient will maintain patent airway  6/17/2019 0936 by Kadi Poon RN  Outcome: Ongoing  6/17/2019 0230 by Ruby Lainez RN  Outcome: Ongoing  Goal: Patient will achieve/maintain normal respiratory rate/effort  Description  Respiratory rate and effort will be within normal limits for the patient  6/17/2019 0936 by Kadi Poon RN  Outcome: Ongoing  6/17/2019 0230 by Ruby Lainez RN  Outcome: Ongoing     Problem: SKIN INTEGRITY  Goal: Skin integrity is maintained or improved  6/17/2019 0936 by Kadi Poon RN  Outcome: Ongoing  6/17/2019 0230 by Ruby Lainez RN  Outcome: Ongoing     Problem: Confusion - Acute:  Goal: Mental status will be restored to baseline  Description  Mental status will be restored to baseline  6/17/2019 0936 by Kadi Poon RN  Outcome: Ongoing  6/17/2019 0230 by Ruby Lainez RN  Outcome: Ongoing  Goal: Absence of continued neurological deterioration signs and symptoms  Description  Absence of continued neurological deterioration signs and symptoms  Outcome: Ongoing     Problem: Injury - Risk of, Physical Injury:  Goal: Will remain free from falls  Description  Will remain free from falls  6/17/2019 0936 by Kadi Poon RN  Outcome: Ongoing  6/17/2019 0230 by Ruby Lainez RN  Outcome: Ongoing  Goal: Absence of physical injury  Description  Absence of physical injury  6/17/2019 0936 by Charlee Redmond RN  Outcome: Ongoing  6/17/2019 0230 by Miguel Art RN  Outcome: Ongoing     Problem: Mood - Altered:  Goal: Mood stable  Description  Mood stable  6/17/2019 0936 by Charlee Redmond RN  Outcome: Ongoing  6/17/2019 0230 by Miguel Art RN  Outcome: Ongoing  Goal: Absence of abusive behavior  Description  Absence of abusive behavior  6/17/2019 0936 by Charlee Redmond RN  Outcome: Ongoing  6/17/2019 0230 by Miguel Art RN  Outcome: Ongoing  Goal: Verbalizations of feeling emotionally comfortable while being cared for will increase  Description  Verbalizations of feeling emotionally comfortable while being cared for will increase  6/17/2019 0936 by Charlee Redmond RN  Outcome: Ongoing  6/17/2019 0230 by Miguel Art RN  Outcome: Ongoing     Problem: Psychomotor Activity - Altered:  Goal: Absence of psychomotor disturbance signs and symptoms  Description  Absence of psychomotor disturbance signs and symptoms  6/17/2019 0936 by Charlee Redmond RN  Outcome: Ongoing  6/17/2019 0230 by Miguel Art RN  Outcome: Ongoing     Problem: Sensory Perception - Impaired:  Goal: Demonstrations of improved sensory functioning will increase  Description  Demonstrations of improved sensory functioning will increase  6/17/2019 0936 by Charlee Redmond RN  Outcome: Ongoing  6/17/2019 0230 by Miguel Art RN  Outcome: Ongoing  Goal: Decrease in sensory misperception frequency  Description  Decrease in sensory misperception frequency  6/17/2019 0936 by Charlee Redmond RN  Outcome: Ongoing  6/17/2019 0230 by Miguel Art RN  Outcome: Ongoing  Goal: Able to refrain from responding to false sensory perceptions  Description  Able to refrain from responding to false sensory perceptions  6/17/2019 0936 by Charlee Redmond RN  Outcome: Ongoing  6/17/2019 0230 by Miguel Art RN  Outcome: Ongoing  Goal: Demonstrates accurate environmental perceptions  Description  Demonstrates accurate environmental perceptions  6/17/2019 0936 by Mikaela Lewis RN  Outcome: Ongoing  6/17/2019 0230 by Yehuda Saldaña RN  Outcome: Ongoing  Goal: Able to distinguish between reality-based and nonreality-based thinking  Description  Able to distinguish between reality-based and nonreality-based thinking  6/17/2019 0936 by Mikaela Lewis RN  Outcome: Ongoing  6/17/2019 0230 by Yehuda Saldaña RN  Outcome: Ongoing  Goal: Able to interrupt nonreality-based thinking  Description  Able to interrupt nonreality-based thinking  6/17/2019 0936 by Mikaela Lewis RN  Outcome: Ongoing  6/17/2019 0230 by Yehuda Saldaña RN  Outcome: Ongoing     Problem: Sleep Pattern Disturbance:  Goal: Appears well-rested  Description  Appears well-rested  6/17/2019 0936 by Mikaela Lewis RN  Outcome: Ongoing  6/17/2019 0230 by Yehuda Saldaña RN  Outcome: Ongoing     Problem: Restraint Use - Nonviolent/Non-Self-Destructive Behavior:  Goal: Absence of restraint indications  Description  Absence of restraint indications  6/17/2019 0936 by Mikaela Lewis RN  Outcome: Ongoing  6/17/2019 0230 by Yehuda Saldaña RN  Outcome: Met This Shift  Goal: Absence of restraint-related injury  Description  Absence of restraint-related injury  6/17/2019 0936 by Mikaela Lewis RN  Outcome: Ongoing  6/17/2019 0230 by Yehuda Saldaña RN  Outcome: Met This Shift

## 2019-06-17 NOTE — PROGRESS NOTES
Occupational Therapy   Occupational Therapy Initial Assessment  Date: 2019   Patient Name: Lary Cintron  MRN: [id number]     : 1975    Date of Service: 2019    Discharge Recommendations:    Further therapy recommended at discharge. Assessment   Performance deficits / Impairments: Decreased functional mobility ; Decreased safe awareness;Decreased balance;Decreased ADL status; Decreased cognition;Decreased endurance;Decreased high-level IADLs;Decreased strength  Assessment: Patient demonstrates decreased cognition impacting patient's safety awareness and following education on safety technqiues to increase independence. Patient is limited by decreased balance, decreased strength and endurance heavily impacting performance in self-care tasks. patient is expected to need skilled OT services at this time and upon d/c. Prognosis: Good  Decision Making: High Complexity  Patient Education: Patient educated on role of OT, OT POC, transfer technqiue, transfer safety, proper hand placement, importance of therapy, importance of participation - fair return  REQUIRES OT FOLLOW UP: Yes  Activity Tolerance  Activity Tolerance: Patient limited by fatigue;Patient limited by pain  Safety Devices  Safety Devices in place: Yes  Type of devices: Nurse notified; Patient at risk for falls;Gait belt;Left in bed;Call light within reach           Patient Diagnosis(es): There were no encounter diagnoses. has a past medical history of Antiphospholipid antibody with hemorrhagic disorder ([facility] Arthritis, Asthma, Bronchitis, Cerebral artery occlusion with cerebral infarction [facility]), Disease of blood and blood forming organ, Hypertension, Long-term (current) use of anticoagulants, INR goal 2.5-3.5, Pleurisy, Pneumonia, and Seizures ([facility] [address] has a past surgical history that includes Cardiac catheterization.            Restrictions  Restrictions/Precautions  Restrictions/Precautions: Up as Tolerated  Required Braces or Orthoses?: No  Position Activity Restriction  Other position/activity restrictions: up with assistance     Subjective   General  Chart Reviewed: No  Patient assessed for rehabilitation services?: Yes  Family / Caregiver Present: Yes(Mom and daughter)  General Comment  Comments: RN ok'd patient to be seen for therapy services   Pain Assessment  Pain Assessment: 0-10  Pain Level: 10  Non-Pharmaceutical Pain Intervention(s): Distraction  Response to Pain Intervention: Patient Satisfied  Oxygen Therapy  SpO2: 95 %  Social/Functional History  Social/Functional History  Lives With: Spouse  Type of Home: House  Home Layout: Two level, Able to Live on Main level with bedroom/bathroom  Home Access: Stairs to enter with rails  Entrance Stairs - Number of Steps: 2  Entrance Stairs - Rails: Left  Bathroom Shower/Tub: Tub/Shower unit  Bathroom Toilet: Standard  Bathroom Equipment: Grab bars in shower  Bathroom Accessibility: Accessible  Home Equipment: Life in Hi-Fi  ADL Assistance: Independent  Homemaking Assistance: Independent  Ambulation Assistance: Independent  Transfer Assistance: Independent  Active : No  Patient's  Info: wife and mom   Occupation: On disability  Additional Comments: Patient demonstrates difficulty answering questions, information obtained throughout patient's charts and based on his responses        Objective   Hearing: Within functional limits    Orientation  Overall Orientation Status: Within Functional Limits     Balance  Sitting Balance: Stand by assistance  Standing Balance: Contact guard assistance  Standing Balance  Time: ~5 minutes  Activity: standing for pericare, stand pivot transfer  Toilet Transfers  Toilet - Technique: Stand pivot  Equipment Used: Drop-arm commode  Toilet Transfer: Maximum assistance  ADL  Feeding: Modified independent   Grooming: Minimal assistance  UE Bathing: Moderate assistance  LE Bathing: Moderate assistance  UE Dressing: Moderate assistance  LE Dressing:  Moderate assistance  Toileting: Moderate assistance  Additional Comments: Immediately upon arrival patient reported urgent use to use restroom. OT trialed putting patient under bedpan due to urgency, although patient willing to wait to try [facility]. Patient completed supine to sit EOB at Max A and sat EOB for ~4 minutes prior to trail stand pivot to [facility] MAx A to complete. Patient able to completed tolieting using BSC at Mod A for pericare, although able to complete static standing at [facility] for pericare to be performed. Patient returned to EOB via stand pivot transfer at min A, sitting EOB for ~10 minutes and returned to supine at Mod A. Patient educated on OT POC and importance of therapy with good return. Tone RUE  RUE Tone: Normotonic  Tone LUE  LUE Tone: Normotonic  Coordination  Movements Are Fluid And Coordinated: Yes     Bed mobility  Rolling to Right: Moderate assistance  Supine to Sit: Moderate assistance  Sit to Supine: Moderate assistance  Scooting: Moderate assistance  Transfers  Sit to stand: Maximum assistance  Stand to sit: Minimal assistance     Cognition  Overall Cognitive Status: Exceptions  Arousal/Alertness: Delayed responses to stimuli;Inconsistent responses to stimuli  Following Commands: Follows one step commands with increased time; Follows multistep commands with repitition  Attention Span: Attends with cues to redirect  Safety Judgement: Decreased awareness of need for assistance  Problem Solving: Assistance required to generate solutions  Insights: Decreased awareness of deficits  Initiation: Requires cues for some  Sequencing: Requires cues for some               LUE AROM : WFL  Left Hand AROM: WFL  RUE AROM : WFL    Right Hand AROM: WFL  LUE Strength  L Hand General: 4-/5  RUE Strength  R Hand General: 4-/5              Plan   Plan  Times per week: 4-5x/wk  Current Treatment Recommendations: Strengthening, Endurance Training, Patient/Caregiver Education & Training, Self-Care / ADL, Home Management Training, Equipment Evaluation, Education, & procurement, Safety Education & Training    AM-PAC Score        AM-PAC Inpatient Daily Activity Raw Score: 15 (06/17/19 1652)  AM-PAC Inpatient ADL T-Scale Score : 34.69 (06/17/19 1652)  ADL Inpatient CMS 0-100% Score: 56.46 (06/17/19 1652)  ADL Inpatient CMS G-Code Modifier : CK (06/17/19 1652)    Goals  Short term goals  Time Frame for Short term goals: Patient will, by discharge  Short term goal 1: demonstrate UB self-cares at [facility]   Short term goal 2: demonstrate LB self-cares at [facility] with AE PRN   Short term goal 3: demonstrate transfer to [facility] at Supervision  Short term goal 4: demonstrate ~20 minutes of functional activity tolerance to engage in functional activities  Short term goal 5: demonstrate grooming tasks standing sinkside with LRD at [address] [place] Time   Individual Concurrent Group Co-treatment   Time In 1513         Time Out 1554         Minutes 41         Timed Code Treatment Minutes: [address], OTR/L

## 2019-06-17 NOTE — PLAN OF CARE
Problem: Restraint Use - Nonviolent/Non-Self-Destructive Behavior:  Goal: Absence of restraint indications  Description  Absence of restraint indications  6/17/2019 0230 by Patience Madera RN  Outcome: Met This Shift  6/16/2019 1519 by Augustus Gar RN  Outcome: Not Met This Shift  Goal: Absence of restraint-related injury  Description  Absence of restraint-related injury  6/17/2019 0230 by Patience Madera RN  Outcome: Met This Shift  6/16/2019 1519 by Augustus Gar RN  Outcome: Ongoing     Problem: Pain:  Goal: Pain level will decrease  Description  Pain level will decrease  Outcome: Ongoing  Goal: Control of acute pain  Description  Control of acute pain  Outcome: Ongoing  Goal: Control of chronic pain  Description  Control of chronic pain  Outcome: Ongoing     Problem: Risk for Impaired Skin Integrity  Goal: Tissue integrity - skin and mucous membranes  Description  Structural intactness and normal physiological function of skin and  mucous membranes.   Outcome: Ongoing     Problem: Falls - Risk of:  Goal: Will remain free from falls  Description  Will remain free from falls  Outcome: Ongoing  Goal: Absence of physical injury  Description  Absence of physical injury  Outcome: Ongoing     Problem: Discharge Planning:  Goal: Participates in care planning  Description  Participates in care planning  Outcome: Ongoing  Goal: Discharged to appropriate level of care  Description  Discharged to appropriate level of care  Outcome: Ongoing  Goal: Ability to perform activities of daily living will improve  Description  Ability to perform activities of daily living will improve  Outcome: Ongoing     Problem: Airway Clearance - Ineffective:  Goal: Ability to maintain a clear airway will improve  Description  Ability to maintain a clear airway will improve  Outcome: Ongoing     Problem: Anxiety/Stress:  Goal: Level of anxiety will decrease  Description  Level of anxiety will decrease  Outcome: Ongoing frequency  Description  Decrease in sensory misperception frequency  Outcome: Ongoing  Goal: Able to refrain from responding to false sensory perceptions  Description  Able to refrain from responding to false sensory perceptions  Outcome: Ongoing  Goal: Demonstrates accurate environmental perceptions  Description  Demonstrates accurate environmental perceptions  Outcome: Ongoing  Goal: Able to distinguish between reality-based and nonreality-based thinking  Description  Able to distinguish between reality-based and nonreality-based thinking  Outcome: Ongoing  Goal: Able to interrupt nonreality-based thinking  Description  Able to interrupt nonreality-based thinking  Outcome: Ongoing     Problem: Sleep Pattern Disturbance:  Goal: Appears well-rested  Description  Appears well-rested  Outcome: Ongoing

## 2019-06-17 NOTE — PROGRESS NOTES
respiratory failure patient was intubated. Wound care consulted for multiple deep tissue injuries. 2U FFP given to reverse coagulopathy (INR 9.9) for safe Cheng placement. IR placed Cheng cath. Dialysis started. CXR shows worsening edema. 6/13/19: Tube feeds starting, dialysis continued. INR 1.2 so heparin bridge was started. 6/14/19: Third dialysis treatment. Orogastric tube place. Due to a KUB performed this date showing dilated loops of bowel w/ no bowel movement since admission, general surgery was consulted for ileus to rule out SBO.     6/15/19: Patient had a bowel movement and dialysis treatment today. Heparin and Zosyn discontinued. 6/16/19: Patient no longer requiring levophed and patient extubated. 6/17/19: INR therapeutic at 3.4 and Cr stable at 4.95. Nephro not recommending dialysis today. 6/18/19 INR 4.7 pharmacy dosing, cr around 5 today.  Advanced diet    Procedures during patient's ICU stay:     Cheng catheter, arterial line, central line, castellanos catheter    Current Vitals:     BP (!) 142/86   Pulse 89   Temp 98.6 °F (37 °C) (Oral)   Resp 23   Ht 6' (1.829 m)   Wt (!) 313 lb 8 oz (142.2 kg)   SpO2 96%   BMI 42.52 kg/m²       Cultures:     Blood cultures:                 [x] None drawn      [] Negative             []  Positive (Details:  )  Urine Culture:                   [] None drawn      [x] Negative             []  Positive (Details:  )  Sputum Culture:               [x] None drawn       [] Negative             []  Positive (Details:  )   Endotracheal aspirate:     [x] None drawn       [] Negative             []  Positive (Details:  )       Consults:     Nephrology, IR, Wound Care, Hematology, General Surgery       Assessment:     Patient Active Problem List    Diagnosis Date Noted    Supratherapeutic INR     Morbid obesity ([facility]     Rhabdomyolysis 06/11/2019    Unresponsive 08/12/2018    Drug overdose     Unresponsive episode 04/03/2016    Seizure disorder ([facility] 04/03/2016    [facility] 04/03/2016    Seizures (HCC)     Hypertension     Asthma     Cerebral artery occlusion with cerebral infarction ([facility]     Substance abuse ([facility] 04/02/2016    ELIE (acute kidney injury) ([facility] 04/02/2016    Elevated transaminase level 04/02/2016    Leukocytosis 04/02/2016    Antiphospholipid antibody syndrome (HCC) 11/07/2012    Long term current use of anticoagulant therapy 11/07/2012   1. Recommended Follow-up:   Neuro              - Following commands and alert               - Hx multiple drug abuse (methamphetamines, cocaine)     CV              - Blood pressure stable               - With history of cocaine abuse, avoid unopposed BB use               - Hx cardiac cath              - Keep MAP >60 /SBP >110 mm Hg      Respiratory               - Extubated 6/16/19      GI              - Colace, glycolax, senokot ordered for patient               - Advanced to dental soft diet               - S/p general surgery consult for ileus with r/o SBO     Renal              - Urine output adequate               - Cr increased 4.95>5.36>4.82              - Last dialysis 6/15/19              - Nephro following               - Midodrine TID 5mg               - Replace lytes accordingly     ID              - Abx discontinued              - Afebrile overnight              - White count normal 6.6     Heme:               - INR 4.7 this am                - Pharmacy to manage coumadin              - Hgb stable ~10              - PMHx antiphospholipid syndrome              Above mentioned assessment and plan was discussed by me with the admitting medicine resident. The medicine team assigned to the patient by medicine admitting resident will be following up the patient from now onwards on the floor.      DO PHYLLIS Pearl Resident PGY-1   [facility], [address]  6/17/2019, 12:07 PM

## 2019-06-17 NOTE — PROGRESS NOTES
Pharmacy Note  Warfarin Consult follow-up    Recent Labs     06/17/19  0544   INR 3.4     Recent Labs     06/15/19  0512 06/16/19  0414 06/17/19  0544   HGB 10.5* 10.0* 10.4*   HCT 33.8* 32.8* 35.2*   PLT 71* 67* 103*     Current warfarin drug-drug interactions:  none (tube feeds on hold)    Date INR Dose   6/11/19 6.2 Held   6/12/19 9.9 Vitamin K 5mg IV given, held warfain   6/13/19 1.2 10 mg   6/14/19 1.3 10 mg   6.15.19 3.0 Hold   6.16.19 3.4 7.5 mg   6/17/19 3.4 7.5mg     Notes:  Give warfarin 7.5mg today on 6/17/19. May be able to resume home dosing regimen tomorrow. Daily PT/INR while inpatient.       Stas Lino RPh, CACP  Clinical Pharmacist Medication Management  6/17/2019  8:17 AM

## 2019-06-17 NOTE — PROGRESS NOTES
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 Patient extubated,, answers questions appropriately. Freddie  His urine output seems to be significantly improved over the last 24 hours or so. Urine output 2.3L last 24 hours. Had dialysis Saturday for 3.5 hours. Blood pressure stable, he is not on any pressor support. Creatinine is essentially unchanged from yesterday without having received any hemodialysis. OBJECTIVE      CURRENT TEMPERATURE:  Temp: 98.6 °F (37 °C)  MAXIMUM TEMPERATURE OVER 24HRS:  Temp (24hrs), Av.6 °F (37 °C), Min:97.8 °F (36.6 °C), Max:98.8 °F (37.1 °C)    CURRENT RESPIRATORY RATE:  Resp: 21  CURRENT PULSE:  Pulse: 82  CURRENT BLOOD PRESSURE:  BP: 119/69  24HR BLOOD PRESSURE RANGE:  Systolic (66VII), BKB:514 , Min:119 , GNR:206   ; Diastolic (02QZR), RMD:95, Min:59, Max:108    24HR INTAKE/OUTPUT:      Intake/Output Summary (Last 24 hours) at 2019 0918  Last data filed at 2019 0500  Gross per 24 hour   Intake 235 ml   Output 2145 ml   Net -1910 ml     WEIGHT :  Patient Vitals for the past 96 hrs (Last 3 readings):   Weight   19 0500 (!) 313 lb 8 oz (142.2 kg)   19 0800 (!) 319 lb 3.6 oz (144.8 kg)   06/15/19 1630 (!) 326 lb 11.6 oz (148.2 kg)     PHYSICAL EXAM      GENERAL APPEARANCE : Patient extubated, awake and alert  NECK:   No JVD. PULMONARY: coarse breath sounds.    CARDIOVASCULAR: systolic murmur, F4-A3 regular  ABDOMEN: soft nontender, bowel sounds present, no organomegaly,  no ascites   EXTREMITIES: + Slight lower extremity edema  CURRENT MEDICATIONS        warfarin (COUMADIN) tablet 7.5 mg Once   senna (SENOKOT) tablet 8.6 mg Nightly   polyethylene glycol (GLYCOLAX) packet 17 g Daily   docusate sodium (COLACE) capsule 100 mg BID   fentaNYL (SUBLIMAZE) injection 50 mcg Q2H PRN   hydrALAZINE (APRESOLINE) injection 10 mg Q6H PRN   potassium chloride 20 mEq/50 mL IVPB (Central Line) PRN   [Held by provider] metoclopramide (REGLAN) injection 10 mg Q6H   midodrine (PROAMATINE) tablet 5 mg TID WC   warfarin (COUMADIN) daily dosing (placeholder) RX Placeholder   0.9 % sodium chloride bolus Once   heparin (porcine) injection 1,700 Units PRN   heparin (porcine) injection 1,600 Units PRN   sodium chloride flush 0.9 % injection 10 mL 2 times per day   sodium chloride flush 0.9 % injection 10 mL PRN   magnesium hydroxide (MILK OF MAGNESIA) 400 MG/5ML suspension 30 mL Daily PRN   ondansetron (ZOFRAN) injection 4 mg Q6H PRN   famotidine (PEPCID) injection 20 mg Daily   acetaminophen (TYLENOL) tablet 650 mg Q4H PRN   sodium chloride flush 0.9 % injection 10 mL 2 times per day   sodium chloride flush 0.9 % injection 10 mL PRN   glucose (GLUTOSE) 40 % oral gel 15 g PRN   dextrose 50 % IV solution PRN   glucagon (rDNA) injection 1 mg PRN   dextrose 5 % solution PRN   albuterol (PROVENTIL) nebulizer solution 2.5 mg As Directed RT PRN   ipratropium-albuterol (DUONEB) nebulizer solution 1 ampule Q4H PRN       LABS      CBC:   Recent Labs     06/15/19  0512 06/16/19  0414 06/17/19  0544   WBC 5.7 5.3 6.2   RBC 4.24 4.06* 4.32   HGB 10.5* 10.0* 10.4*   HCT 33.8* 32.8* 35.2*   MCV 79.7* 80.8* 81.5*   MCH 24.8* 24.6* 24.1*   MCHC 31.1 30.5 29.5   RDW 16.4* 16.4* 16.0*   PLT 71* 67* 103*   MPV 11.7 11.1 12.6      BMP:   Recent Labs     06/16/19  0414 06/16/19  1827 06/17/19  0544   * 136 138   K 3.6* 4.2 3.6*   CL 96* 98 99   CO2 22 16* 21   BUN 39* 48* 50*   CREATININE 4.45* 4.87* 4.95*   GLUCOSE 93 87 90   CALCIUM 8.2* 8.1* 8.2*      BNP:No results found for: BNP  PHOSPHORUS:    Recent Labs     06/15/19  0512   PHOS 4.0     MAGNESIUM:   Recent Labs     06/15/19  0512   MG 1.6     GAGE:   Lab Results   Component Value Date    GAGE (A) 06/11/2019     This test is equivocal, which represents a borderline GAGE result. Suggest repeating in 3-5 weeks.        SPEP:   Lab Results   Component Value Date    PROT 5.4 1. 4-1.6  9. ECHO on 6-11-19 with pEF  10. Improving urine output of about 100 ml/hr today. PLAN      1. No HD today. Will re-evaluate in morning    2. Continue  Midodrine TID 5 mg. 3. Follow labs and electrolytes. Replace per protocol. 4. Antibiotics per renal clearence. 5. Follow hepatitis panel. 6. Will follow and continue to monitor for signs of renal recovery    Please do not hesitate to call with questions.      Clarissa Carrasquillo MD   [facility]

## 2019-06-17 NOTE — PROGRESS NOTES
Physical Therapy  Facility/Department: [address] MICU  Daily Treatment Note  NAME: Dejah Cortez  : 1975  MRN: [id number]    Date of Service: 2019    Discharge Recommendations:  Further therapy recommended at discharge. PT Equipment Recommendations  Equipment Needed: (TBD)    Assessment   Body structures, Functions, Activity limitations: Decreased functional mobility ; Decreased cognition;Decreased endurance  Assessment: Pt required ModA x2 for supine ti sit, Kristian x1-2 for sit to stand using samantha stedy. Pt maintained seated EOB/on toilet ~23 mins, stood in SS ~2-3 mins x3 with CGA. Pt will need re-eval for mobility goals, progressing well with PT. Prognosis: Good  Patient Education: importance of EOB/OOB, use of call light  REQUIRES PT FOLLOW UP: Yes  Activity Tolerance  Activity Tolerance: Patient limited by fatigue;Patient limited by endurance     Patient Diagnosis(es): There were no encounter diagnoses. has a past medical history of Antiphospholipid antibody with hemorrhagic disorder ([facility] Arthritis, Asthma, Bronchitis, Cerebral artery occlusion with cerebral infarction [facility]), Disease of blood and blood forming organ, Hypertension, Long-term (current) use of anticoagulants, INR goal 2.5-3.5, Pleurisy, Pneumonia, and Seizures ([facility] has a past surgical history that includes Cardiac catheterization. Restrictions  Restrictions/Precautions  Required Braces or Orthoses?: No  Subjective   General  Chart Reviewed: Yes  Response To Previous Treatment: Patient with no complaints from previous session. Family / Caregiver Present: No  Subjective  Subjective: Pt and RN agreeable to PT. Pt alert in bed upon arrival, c/o pain in R foot/leg and L shoulder.   General Comment  Comments: Pt left in chair with call light within reach, alarm activated  Pain Screening  Patient Currently in Pain: Yes  Pain Assessment  Pain Assessment: 0-10  Pain Level: 8  Pain Type: Acute pain;Chronic pain  Pain Location: Shoulder;Leg;Foot  Pain Orientation: Right;Left  Pain Descriptors: Aching;Discomfort; Sore  Pain Frequency: Continuous  Pain Onset: On-going  Clinical Progression: Not changed  Non-Pharmaceutical Pain Intervention(s): Ambulation/Increased Activity; Therapeutic presence  Response to Pain Intervention: Patient Satisfied  Vital Signs  Patient Currently in Pain: Yes       Orientation  Orientation  Overall Orientation Status: Within Functional Limits  Cognition      Objective   Bed mobility  Rolling to Right: Minimal assistance  Supine to Sit: 2 Person assistance; Moderate assistance  Sit to Supine: (pt left seated in chair)  Scooting: Modified independent  Transfers  Sit to Stand: Minimal Assistance;2 Person Assistance(pt initially 2 assist for STS transfer with SS, progressed to Min x1 after multiple trials)  Stand to sit: Minimal Assistance  Bed to Chair: Dependent/Total(with samantha stedy)  Ambulation  Ambulation?: No     Balance  Posture: Fair(pt leaning forward on Samantha stedy, able to stand upright with frequent verbal cues)  Sitting - Static: Fair;+  Sitting - Dynamic: Fair  Standing - Static: Fair;-  Standing - Dynamic: Fair;-  Comments: standing balance assessed with SS, most limited by decreased endurance. Pt able to stand ~2-3mins x3 in samantha stedy with CGA  Exercises  Heelslides: 20x   Hip Flexion: 20x   Knee Long Arc Quad: 20x   Ankle Pumps: 20x   Core Strengthening: pt able to maintain seated EOB ~5 mins prior to standing, able to maintain seated with unilateral UE support on toilet ~17 mins with close SBA/CGA     Goals  Short term goals  Time Frame for Short term goals: 14 visits  Short term goal 1: Prevent contractures through ROM and stretching  Short term goal 2: Pt to follow some commands for active movement  Short term goal 3: Progress with mobility as appropriate.   Patient Goals   Patient goals : Unable to state    Plan    Plan  Times per week: 2-3x/week  Current Treatment Recommendations: Strengthening, ROM, Cognitive Reorientation  Safety Devices  Type of devices: Nurse notified, All fall risk precautions in place, Call light within reach, Chair alarm in place, Gait belt, Patient at risk for falls, Left in chair  Restraints  Initially in place: No     Therapy Time   Individual Concurrent Group Co-treatment   Time In 0900         Time Out 0944         Minutes [address] [place], [place]

## 2019-06-18 PROBLEM — N17.9 ACUTE KIDNEY INJURY (HCC): Status: ACTIVE | Noted: 2019-06-18

## 2019-06-18 LAB
ABSOLUTE EOS #: 0.2 K/UL (ref 0–0.4)
ABSOLUTE IMMATURE GRANULOCYTE: 0.13 K/UL (ref 0–0.3)
ABSOLUTE LYMPH #: 0.59 K/UL (ref 1–4.8)
ABSOLUTE MONO #: 0.53 K/UL (ref 0.1–0.8)
ANION GAP SERPL CALCULATED.3IONS-SCNC: 15 MMOL/L (ref 9–17)
ANION GAP SERPL CALCULATED.3IONS-SCNC: 15 MMOL/L (ref 9–17)
BASOPHILS # BLD: 0 % (ref 0–2)
BASOPHILS ABSOLUTE: 0 K/UL (ref 0–0.2)
BUN BLDV-MCNC: 57 MG/DL (ref 6–20)
BUN BLDV-MCNC: 59 MG/DL (ref 6–20)
BUN/CREAT BLD: ABNORMAL (ref 9–20)
BUN/CREAT BLD: ABNORMAL (ref 9–20)
CALCIUM SERPL-MCNC: 8.2 MG/DL (ref 8.6–10.4)
CALCIUM SERPL-MCNC: 8.3 MG/DL (ref 8.6–10.4)
CHLORIDE BLD-SCNC: 100 MMOL/L (ref 98–107)
CHLORIDE BLD-SCNC: 101 MMOL/L (ref 98–107)
CO2: 22 MMOL/L (ref 20–31)
CO2: 22 MMOL/L (ref 20–31)
CREAT SERPL-MCNC: 4.77 MG/DL (ref 0.7–1.2)
CREAT SERPL-MCNC: 4.82 MG/DL (ref 0.7–1.2)
DIFFERENTIAL TYPE: ABNORMAL
EOSINOPHILS RELATIVE PERCENT: 3 % (ref 1–4)
GFR AFRICAN AMERICAN: 16 ML/MIN
GFR AFRICAN AMERICAN: 16 ML/MIN
GFR NON-AFRICAN AMERICAN: 13 ML/MIN
GFR NON-AFRICAN AMERICAN: 13 ML/MIN
GFR SERPL CREATININE-BSD FRML MDRD: ABNORMAL ML/MIN/{1.73_M2}
GLUCOSE BLD-MCNC: 100 MG/DL (ref 70–99)
GLUCOSE BLD-MCNC: 113 MG/DL (ref 70–99)
HCT VFR BLD CALC: 34.1 % (ref 40.7–50.3)
HEMOGLOBIN: 10.5 G/DL (ref 13–17)
IMMATURE GRANULOCYTES: 2 %
INR BLD: 4.7
LYMPHOCYTES # BLD: 9 % (ref 24–44)
MAGNESIUM: 1.7 MG/DL (ref 1.6–2.6)
MCH RBC QN AUTO: 24.9 PG (ref 25.2–33.5)
MCHC RBC AUTO-ENTMCNC: 30.8 G/DL (ref 28.4–34.8)
MCV RBC AUTO: 80.8 FL (ref 82.6–102.9)
MONOCYTES # BLD: 8 % (ref 1–7)
MORPHOLOGY: ABNORMAL
MORPHOLOGY: ABNORMAL
NRBC AUTOMATED: 0 PER 100 WBC
PARTIAL THROMBOPLASTIN TIME: 48.4 SEC (ref 20.5–30.5)
PDW BLD-RTO: 16.1 % (ref 11.8–14.4)
PLATELET # BLD: 105 K/UL (ref 138–453)
PLATELET ESTIMATE: ABNORMAL
PMV BLD AUTO: 11.6 FL (ref 8.1–13.5)
POTASSIUM SERPL-SCNC: 3.5 MMOL/L (ref 3.7–5.3)
POTASSIUM SERPL-SCNC: 4 MMOL/L (ref 3.7–5.3)
PROTHROMBIN TIME: 44.1 SEC (ref 9–12)
RBC # BLD: 4.22 M/UL (ref 4.21–5.77)
RBC # BLD: ABNORMAL 10*6/UL
SEG NEUTROPHILS: 78 % (ref 36–66)
SEGMENTED NEUTROPHILS ABSOLUTE COUNT: 5.15 K/UL (ref 1.8–7.7)
SODIUM BLD-SCNC: 137 MMOL/L (ref 135–144)
SODIUM BLD-SCNC: 138 MMOL/L (ref 135–144)
WBC # BLD: 6.6 K/UL (ref 3.5–11.3)
WBC # BLD: ABNORMAL 10*3/UL

## 2019-06-18 PROCEDURE — 6370000000 HC RX 637 (ALT 250 FOR IP): Performed by: EMERGENCY MEDICINE

## 2019-06-18 PROCEDURE — 83735 ASSAY OF MAGNESIUM: CPT

## 2019-06-18 PROCEDURE — 80048 BASIC METABOLIC PNL TOTAL CA: CPT

## 2019-06-18 PROCEDURE — 51798 US URINE CAPACITY MEASURE: CPT

## 2019-06-18 PROCEDURE — 97530 THERAPEUTIC ACTIVITIES: CPT

## 2019-06-18 PROCEDURE — 85025 COMPLETE CBC W/AUTO DIFF WBC: CPT

## 2019-06-18 PROCEDURE — 99291 CRITICAL CARE FIRST HOUR: CPT | Performed by: INTERNAL MEDICINE

## 2019-06-18 PROCEDURE — 36415 COLL VENOUS BLD VENIPUNCTURE: CPT

## 2019-06-18 PROCEDURE — 6370000000 HC RX 637 (ALT 250 FOR IP): Performed by: STUDENT IN AN ORGANIZED HEALTH CARE EDUCATION/TRAINING PROGRAM

## 2019-06-18 PROCEDURE — 2580000003 HC RX 258: Performed by: NURSE PRACTITIONER

## 2019-06-18 PROCEDURE — 90792 PSYCH DIAG EVAL W/MED SRVCS: CPT | Performed by: NURSE PRACTITIONER

## 2019-06-18 PROCEDURE — 85730 THROMBOPLASTIN TIME PARTIAL: CPT

## 2019-06-18 PROCEDURE — 2060000000 HC ICU INTERMEDIATE R&B

## 2019-06-18 PROCEDURE — 97161 PT EVAL LOW COMPLEX 20 MIN: CPT

## 2019-06-18 PROCEDURE — 85610 PROTHROMBIN TIME: CPT

## 2019-06-18 PROCEDURE — 97164 PT RE-EVAL EST PLAN CARE: CPT

## 2019-06-18 RX ORDER — PREDNISONE 10 MG/1
10 TABLET ORAL DAILY
Status: DISCONTINUED | OUTPATIENT
Start: 2019-06-18 | End: 2019-06-21 | Stop reason: HOSPADM

## 2019-06-18 RX ORDER — POTASSIUM CHLORIDE 20 MEQ/1
20 TABLET, EXTENDED RELEASE ORAL ONCE
Status: COMPLETED | OUTPATIENT
Start: 2019-06-18 | End: 2019-06-18

## 2019-06-18 RX ADMIN — BACITRACIN: 500 OINTMENT TOPICAL at 16:22

## 2019-06-18 RX ADMIN — Medication 10 ML: at 20:33

## 2019-06-18 RX ADMIN — BACITRACIN: 500 OINTMENT TOPICAL at 20:33

## 2019-06-18 RX ADMIN — BACITRACIN: 500 OINTMENT TOPICAL at 08:56

## 2019-06-18 RX ADMIN — TOPIRAMATE 100 MG: 200 TABLET, FILM COATED ORAL at 20:32

## 2019-06-18 RX ADMIN — PREDNISONE 10 MG: 10 TABLET ORAL at 17:24

## 2019-06-18 RX ADMIN — OXYCODONE HYDROCHLORIDE 10 MG: 5 TABLET ORAL at 02:55

## 2019-06-18 RX ADMIN — Medication 10 ML: at 08:58

## 2019-06-18 RX ADMIN — OXYCODONE HYDROCHLORIDE 10 MG: 5 TABLET ORAL at 14:55

## 2019-06-18 RX ADMIN — OXYCODONE HYDROCHLORIDE 10 MG: 5 TABLET ORAL at 20:32

## 2019-06-18 RX ADMIN — OXYCODONE HYDROCHLORIDE 10 MG: 5 TABLET ORAL at 09:01

## 2019-06-18 RX ADMIN — POTASSIUM CHLORIDE 20 MEQ: 20 TABLET, EXTENDED RELEASE ORAL at 11:49

## 2019-06-18 ASSESSMENT — PAIN SCALES - GENERAL
PAINLEVEL_OUTOF10: 9
PAINLEVEL_OUTOF10: 10
PAINLEVEL_OUTOF10: 9
PAINLEVEL_OUTOF10: 5
PAINLEVEL_OUTOF10: 9
PAINLEVEL_OUTOF10: 8

## 2019-06-18 ASSESSMENT — PAIN DESCRIPTION - PAIN TYPE: TYPE: CHRONIC PAIN

## 2019-06-18 ASSESSMENT — PAIN DESCRIPTION - LOCATION: LOCATION: GENERALIZED

## 2019-06-18 NOTE — PROGRESS NOTES
Critical Care Team - Daily Progress Note      Date and time: 6/18/2019 6:53 AM  Patient's name:  Lori Rosenberg  Medical Record Number: [id number]  Patient's account/billing number: [de-identified]  Patient's YOB: 1975  Age: 39 y.o.   Date of Admission: 6/11/2019 11:01 AM  Length of stay during current admission: 7      Primary Care Physician: Marzella Homans, CNP, KIESHA - CNP  ICU Attending Physician: Dr. Angel Copeland     Code Status: Full Code    Reason for ICU admission: Rhabdo, ELIE, Respiratory Failure       SUBJECTIVE:     OVERNIGHT EVENTS:         Was able to sit up and in chair yesterday  Has no complaints this morning  Awaiting bed for transfer out of ICU  Cr bumped to 5.36 last night and decreased to 4.82 this am     AWAKE & FOLLOWING COMMANDS:  [] No   [x] Yes    CURRENT VENTILATION STATUS:     [] Ventilator  [] BIPAP  [] Nasal Cannula [x] Room Air       SEDATION:  RAAS Score:  [] Propofol gtt  [] Versed gtt  [] Ativan gtt   [x] No Sedation    PARALYZED:  [x] No    [] Yes    DIARRHEA:                [x] No                [] Yes  (C. Difficile status: [] positive                                                                                                                       [] negative                                                                                                                     [] pending)    VASOPRESSORS:  [x] No    [] Yes    If yes -   [] Levophed       [] Dopamine     [] Vasopressin       [] Dobutamine  [] Phenylephrine         [] Epinephrine    CENTRAL LINES:     [x] No   [] Yes   (Date of Insertion:   )           If yes -     [] Right IJ     [] Left IJ [] Right Femoral [] Left Femoral                   [] Right Subclavian [] Left Subclavian       VAUGHN'S CATHETER:   [] No   [x] Yes       URINE OUTPUT:            [x] Good   [] Low              [] Anuric      OBJECTIVE:     VITAL SIGNS:  /84   Pulse 72   Temp 98.2 °F (36.8 °C) (Oral)   Resp 16   Ht 6' (1.829 m)   Wt 296 lb 1.6 oz (134.3 kg)   SpO2 95%   BMI 40.16 kg/m²     Tmax over 24 hours:  Temp (24hrs), Av.4 °F (36.9 °C), Min:98.2 °F (36.8 °C), Max:98.6 °F (37 °C)      Patient Vitals for the past 6 hrs:   BP Temp Temp src Pulse Resp SpO2 Height Weight   19 0400 138/84 98.2 °F (36.8 °C) Oral 72 16 95 % 6' (1.829 m) 296 lb 1.6 oz (134.3 kg)         Intake/Output Summary (Last 24 hours) at 2019 0653  Last data filed at 2019 0600  Gross per 24 hour   Intake 240 ml   Output 2205 ml   Net -1965 ml     Wt Readings from Last 2 Encounters:   19 296 lb 1.6 oz (134.3 kg)   19 (!) 302 lb (137 kg)     Body mass index is 40.16 kg/m².         PHYSICAL EXAMINATION:  General appearance - alert, well appearing, and in no distress  Mental status - alert, oriented to person  Eyes - pupils equal and reactive, extraocular eye movements intact  Ears - right ear normal, left ear normal  Nose - normal and patent   Mouth - mucous membranes moist  Neck - supple   Chest - clear to auscultation,  symmetric air entry  Heart - normal rate, regular rhythm   Abdomen - soft, nontender, nondistended, +bowel sounds appreciated   Neurological -   no focal findings or movement disorder noted  Extremities - peripheral pulses normal,   no clubbing or cyanosis  Skin - normal coloration and turgor,   no suspicious skin lesions noted        MEDICATIONS:    Scheduled Meds:   bacitracin   Topical TID    senna  1 tablet Oral Nightly    polyethylene glycol  17 g Oral Daily    docusate sodium  100 mg Oral BID    [Held by provider] metoclopramide  10 mg Intravenous Q6H    midodrine  5 mg Oral TID WC    warfarin (COUMADIN) daily dosing (placeholder)   Other RX Placeholder    sodium chloride flush  10 mL Intravenous 2 times per day     Continuous Infusions:   dextrose       PRN Meds:     oxyCODONE 5 mg Q6H PRN   Or     oxyCODONE 10 mg Q6H PRN   hydrALAZINE 10 mg Q6H PRN   potassium chloride 20 mEq PRN   heparin (porcine) 1,700 Units PRN   heparin (porcine) 1,600 Units PRN   magnesium hydroxide 30 mL Daily PRN   ondansetron 4 mg Q6H PRN   acetaminophen 650 mg Q4H PRN   sodium chloride flush 10 mL PRN   glucose 15 g PRN   dextrose 12.5 g PRN   glucagon (rDNA) 1 mg PRN   dextrose 100 mL/hr PRN   albuterol 2.5 mg As Directed RT PRN   ipratropium-albuterol 1 ampule Q4H PRN             Laboratory findings:    Complete Blood Count: Recent Labs     06/16/19  0414 06/17/19  0544 06/18/19  0455   WBC 5.3 6.2 6.6   HGB 10.0* 10.4* 10.5*   HCT 32.8* 35.2* 34.1*   PLT 67* 103* 105*        Last 3 Blood Glucose:   Recent Labs     06/17/19  0544 06/17/19  1758 06/18/19  0455   GLUCOSE 90 120* 100*        PT/INR:    Lab Results   Component Value Date    PROTIME 44.1 06/18/2019    INR 4.7 06/18/2019     PTT:    Lab Results   Component Value Date    APTT 48.4 06/18/2019       Comprehensive Metabolic Profile:   Recent Labs     06/17/19  0544 06/17/19  1758 06/18/19  0455    139 138   K 3.6* 3.9 3.5*   CL 99 100 101   CO2 21 23 22   BUN 50* 54* 57*   CREATININE 4.95* 5.36* 4.82*   GLUCOSE 90 120* 100*   CALCIUM 8.2* 8.6 8.2*      Magnesium:   Lab Results   Component Value Date    MG 1.7 06/18/2019     Phosphorus:   Lab Results   Component Value Date    PHOS 4.0 06/15/2019     Ionized Calcium:   Lab Results   Component Value Date    CAION 0.89 06/13/2019          Radiology/Imaging:      Abdominal XR 6/14/19  Diffuse small bowel dilatation suspicious for small bowel obstruction.    Continued follow-up is advised.       Enteric tube projects in the expected location of stomach.              ASSESSMENT:     Patient Active Problem List    Diagnosis Date Noted    Supratherapeutic INR     Morbid obesity ([facility]     Rhabdomyolysis 06/11/2019    Unresponsive 08/12/2018    Drug overdose     Unresponsive episode 04/03/2016    [facility] 04/03/2016    Coagulopathy ([facility] 04/03/2016    Seizures ([facility]     Hypertension     Asthma

## 2019-06-18 NOTE — PROGRESS NOTES
Physical Therapy    [address]  [address]-Assessment    NAME: Radha Garcia  : 1975  MRN: [id number]    Date of Service: 2019    Discharge Recommendations:  Further therapy recommended at discharge. PT Equipment Recommendations  Other: TBD, pt unsafe to amb w/o RW and min A    Assessment   Body structures, Functions, Activity limitations: Decreased functional mobility ; Decreased endurance;Decreased balance;Decreased strength;Decreased safe awareness  Assessment: pt grossly min A for bed mob/transfers/amb, amb 5 steps using RW min A required for RW placement, verbal cueing for forward flexed postured, pt with heavy reliance on RW with standing, amb limited by endurance  Prognosis: Good  Decision Making: Low Complexity  Patient Education: PT POC, importance of continuing EOB/OOB mobility  REQUIRES PT FOLLOW UP: Yes  Activity Tolerance  Activity Tolerance: Patient Tolerated treatment well;Patient limited by fatigue;Patient limited by endurance       Patient Diagnosis(es): There were no encounter diagnoses. has a past medical history of Antiphospholipid antibody with hemorrhagic disorder ([facility] Arthritis, Asthma, Bronchitis, Cerebral artery occlusion with cerebral infarction [facility]), Disease of blood and blood forming organ, Hypertension, Long-term (current) use of anticoagulants, INR goal 2.5-3.5, Pleurisy, Pneumonia, and Seizures ([facility] has a past surgical history that includes Cardiac catheterization.     Restrictions  Restrictions/Precautions  Restrictions/Precautions: Fall Risk, General Precautions  Required Braces or Orthoses?: No  Position Activity Restriction  Other position/activity restrictions: up with assistance   Vision/Hearing  Vision: Impaired  Vision Exceptions: Wears glasses at all times  Hearing: Within functional limits     Subjective  General  Chart Reviewed: Yes  Patient assessed for rehabilitation services?: Yes  Response To Previous Treatment: Not applicable  Family / Caregiver Present: No  Follows Commands: Within Functional Limits  General Comment  Comments: pt and RN agreeable to PT, pt alert in bed upon arrival  Pain Screening  Patient Currently in Pain: Yes  Vital Signs  Patient Currently in Pain: Yes  Pre Treatment Pain Screening  Intervention List: Patient able to continue with treatment    Orientation  Orientation  Overall Orientation Status: Within Functional Limits  Social/Functional History  Social/Functional History  Lives With: Family(Wife and mother)  Type of Home: House  Home Layout: One level  Home Access: Stairs to enter without rails  Entrance Stairs - Number of Steps: threshold step in  Entrance Stairs - Rails: Left  Bathroom Shower/Tub: Walk-in shower  Bathroom Toilet: Standard  Bathroom Equipment: Grab bars in shower  Bathroom Accessibility: Accessible  Home Equipment: Cane(pt stated he uses cane for all amb and is household amb at baseline )  Receives Help From: Family  ADL Assistance: [address] [place] [address] Avenue: Independent  Homemaking Responsibilities: Yes  Ambulation Assistance: Independent  Transfer Assistance: Independent  Active : No  Patient's  Info: wife and mom   Mode of Transportation: Car  Occupation: On disability  Leisure & Hobbies: Playing with his children  Additional Comments: Patient demonstrates difficulty answering questions, information obtained throughout patient's charts and based on his responses   Cognition   Cognition  Overall Cognitive Status: WFL    Objective     Observation/Palpation  Posture: Fair    AROM RLE (degrees)  RLE AROM: WFL  AROM LLE (degrees)  LLE AROM : WFL  AROM RUE (degrees)  RUE AROM : WFL  AROM LUE (degrees)  LUE AROM : WFL  Strength RLE  Strength RLE: WFL  Comment: grossly 4/5  Strength LLE  Strength LLE: WFL  Comment: grossly 4/5  Strength RUE  Strength RUE: WFL  Strength LUE  Strength LUE: WFL  Strength Other  Other: UEs minimum anti-gravity     Sensation  Overall Strengthening, Patient/Caregiver Education & Training, Gait Training, ROM, Equipment Evaluation, Education, & procurement, Stair training, Balance Training, Functional Mobility Training, Endurance Training, Transfer Training, Safety Education & Training  Safety Devices  Type of devices:  All fall risk precautions in place, Left in bed, Patient at risk for falls, Gait belt, Call light within reach, Nurse notified  Restraints  Initially in place: No    AM-PAC Score  AM-PAC Inpatient Mobility Raw Score : 18 (06/18/19 1124)  AM-PAC Inpatient T-Scale Score : 43.63 (06/18/19 1124)  Mobility Inpatient CMS 0-100% Score: 46.58 (06/18/19 1124)  Mobility Inpatient CMS G-Code Modifier : CK (06/18/19 1124)        Goals  Short term goals  Time Frame for Short term goals: 14 visits  Short term goal 1: pt will be mod I bed mob  Short term goal 2: pt will be mod I transfers  Short term goal 3: pt will amb using ' SBA  Short term goal 4: pt will negotiate 2 steps using R darryl CGA       Therapy Time   Individual Concurrent Group Co-treatment   Time In [address]         Time Out 0948         Minutes [address]    Patient was evaluated/treated by DANIELA Glasgow, and the above documentation was completed and checked for accuracy by the supervising Physical Therapist

## 2019-06-18 NOTE — PROGRESS NOTES
Nutrition Assessment    Type and Reason for Visit: Reassess    Nutrition Recommendations: Recommend continue Dental Soft diet and try high protein supplements on trays daily. Nutrition Assessment: Pt is progressing: intake is 50-75% of Dental Soft diet, +BM. See Recommendations. Malnutrition Assessment:  · Malnutrition Status: At risk for malnutrition  · Context: Acute illness or injury  · Findings of the 6 clinical characteristics of malnutrition (Minimum of 2 out of 6 clinical characteristics is required to make the diagnosis of moderate or severe Protein Calorie Malnutrition based on AND/ASPEN Guidelines):  1. Energy Intake-Less than or equal to 75% of estimated energy requirement, Greater than or equal to 5 days    2. Weight Loss-No significant weight loss,    3. Fat Loss-No significant subcutaneous fat loss,    4. Muscle Loss-No significant muscle mass loss,    5. Fluid Accumulation-(mild-moderate ), Extremities, Generalized  6.  Strength-Not measured    Nutrition Risk Level:  Moderate    Nutrient Needs:  · Estimated Daily Total Kcal: 2200 kcal/day  · Estimated Daily Protein (g): 120-160 g pro/day  Nutrition Diagnosis:   · Problem: Increased nutrient needs  · Etiology: related to (injuries)     Signs and symptoms:  as evidenced by (calculated protein needs greater than 120 g)    Objective Information:  · Nutrition-Focused Physical Findings: +BM  · Wound Type: Deep Tissue Injury, Multiple(traumatic injuries)  · Current Nutrition Therapies:  · Oral Diet Orders: Dental Soft   · Oral Diet intake: 51-75%  · Anthropometric Measures:  · Ht: 6' (182.9 cm)   · Current Body Wt: 296 lb (134.3 kg)  · Admission Body Wt: 330 lb (149.7 kg)  · Ideal Body Wt: 178 lb (80.7 kg), % Ideal Body 179%  · BMI Classification: BMI > or equal to 40.0 Obese Class III    Nutrition Interventions:   Continue current diet, Start ONS  Continued Inpatient Monitoring, Education Not Indicated    Nutrition Evaluation:   · Evaluation: Progressing toward goals   · Goals: meet more than 75% of nutrition needs    · Monitoring: Meal Intake, Supplement Intake      Electronically signed by Maynor Kaminski RD, LD on 6/18/19 at 11:18 AM    Contact Number: [phone]

## 2019-06-18 NOTE — PROGRESS NOTES
Renal Progress Note    Patient :  Cooper Loja; 39 y.o. MRN# [id number]  Location:  9471/9017-18  Attending:  Olga Lidia Lee DO  Admit Date:  6/11/2019   Hospital Day: 7      Subjective:     Patient was seen and examined. No new issues overnight. Patient has been doing well and is now currently transferred out of ICU. Patient has made about 2.2 L of urine last 24 hours. His serum creatinine has improved to 4.82 mg/DL today. Peak creatinine this admission was 8.83 mg/DL. Baseline creatinine of 1.4 to 1.6 mg/DL. Blood pressure stable. Patient's last hemodialysis treatment was on 6/15/2019 and has not required dialysis since then. Outpatient Medications:     Medications Prior to Admission: gabapentin (NEURONTIN) 600 MG tablet, Take 600 mg by mouth 3 times daily. warfarin (COUMADIN) 7.5 MG tablet, Take 1 tablet 4 days weekly in the evening on Sundays, Tuesdays, Thursdays, and Saturdays or as directed. Managed by [facility]  topiramate (TOPAMAX) 200 MG tablet, Take 100 mg by mouth 2 times daily  atenolol (TENORMIN) 25 MG tablet, Take 25 mg by mouth daily   predniSONE (DELTASONE) 10 MG tablet, Take 10 mg by mouth daily. warfarin (COUMADIN) 10 MG tablet, Take 1 tablet on Mondays, Wednesdays and Fridays or as directed. Managed by [facility]    Current Medications:     Scheduled Meds:    bacitracin   Topical TID    midodrine  5 mg Oral TID WC    warfarin (COUMADIN) daily dosing (placeholder)   Other RX Placeholder    sodium chloride flush  10 mL Intravenous 2 times per day     Continuous Infusions:    dextrose       PRN Meds:  oxyCODONE **OR** oxyCODONE, hydrALAZINE, potassium chloride, heparin (porcine), heparin (porcine), magnesium hydroxide, ondansetron, acetaminophen, sodium chloride flush, glucose, dextrose, glucagon (rDNA), dextrose, albuterol, ipratropium-albuterol    Input/Output:       I/O last 3 completed shifts:   In: 390 [P.O.:390]  Out: 6881 [[facility]:[id number]]. Patient Vitals for the past 96 hrs (Last 3 readings):   Weight   19 0400 296 lb 1.6 oz (134.3 kg)   19 0500 (!) 313 lb 8 oz (142.2 kg)   19 0800 (!) 319 lb 3.6 oz (144.8 kg)       Vital Signs:   Temperature:  Temp: 98.3 °F (36.8 °C)  TMax:   Temp (24hrs), Av.4 °F (36.9 °C), Min:98.2 °F (36.8 °C), Max:98.6 °F (37 °C)    Respirations:  Resp: 16  Pulse:   Pulse: 73  BP:    BP: 134/79  BP Range: Systolic (07JUM), TXC:222 , Min:122 , ZLY:670       Diastolic (33PGY), CBT:55, Min:49, Max:84      Physical Examination:     General:  AAO x 3, speaking in full sentences, no accessory muscle use. HEENT: Atraumatic, normocephalic, no throat congestion, moist mucosa. Eyes:   Pupils equal, round and reactive to light, EOMI. Neck:   Supple  Chest:   Bilateral vesicular breath sounds, no rales or wheezes. Cardiac:  S1 S2 RR, no murmurs, gallops or rubs. Abdomen: Soft, obese, non-tender, no masses or organomegaly, BS audible. :   No suprapubic or flank tenderness. Neuro:  AAO x 3, No FND. SKIN:  No rashes, good skin turgor. Extremities:  No edema. Labs:       Recent Labs     19  0414 19  0544 19  0455   WBC 5.3 6.2 6.6   RBC 4.06* 4.32 4.22   HGB 10.0* 10.4* 10.5*   HCT 32.8* 35.2* 34.1*   MCV 80.8* 81.5* 80.8*   MCH 24.6* 24.1* 24.9*   MCHC 30.5 29.5 30.8   RDW 16.4* 16.0* 16.1*   PLT 67* 103* 105*   MPV 11.1 12.6 11.6      BMP:   Recent Labs     19  0544 19  1758 19  0455    139 138   K 3.6* 3.9 3.5*   CL 99 100 101   CO2 21 23 22   BUN 50* 54* 57*   CREATININE 4.95* 5.36* 4.82*   GLUCOSE 90 120* 100*   CALCIUM 8.2* 8.6 8.2*      Magnesium:    Recent Labs     19  0455   MG 1.7     GAGE:      Lab Results   Component Value Date    GAGE  2019     This test is equivocal, which represents a borderline GAGE result. Suggest repeating in 3-5 weeks.      SPEP:  Lab Results   Component Value Date    PROT 5.4 2019    ALBCAL 2.7 TIA likely due to prolonged hx of APLA.   7. Polysubstance abuse   Positive for cocaine/amphetamine on admission. 8. CKD 3 with baseline creat 1.4-1.6  9. ECHO on 6-11-19 with pEF. Plan:   1. Continue to monitor renal function and strict I's and O's.  2.  BMP in a.m. 3.  Replace potassium cautiously. 4.  No acute need for dialysis today. Will evaluate again in a.m.    5.  [place] has been removed per ICU. Check PVR. 6.  Will follow. Nutrition   Please ensure that patient is on a renal diet/TF. Avoid nephrotoxic drugs/contrast exposure. We will continue to follow along with you. Hardik Do MD  [facility]     This note is created with the assistance of a speech-recognition program. While intending to generate a document that actually reflects the content of the visit, no guarantees can be provided that every mistake has been identified and corrected by editing.

## 2019-06-18 NOTE — PROGRESS NOTES
daily dosing (placeholder)   Other RX Placeholder    sodium chloride flush  10 mL Intravenous 2 times per day     Continuous Infusions:   dextrose       PRN Meds:oxyCODONE **OR** oxyCODONE, hydrALAZINE, potassium chloride, heparin (porcine), heparin (porcine), magnesium hydroxide, ondansetron, acetaminophen, sodium chloride flush, glucose, dextrose, glucagon (rDNA), dextrose, albuterol, ipratropium-albuterol    Objective:    CBC:   Recent Labs     06/16/19  0414 06/17/19  0544 06/18/19  0455   WBC 5.3 6.2 6.6   HGB 10.0* 10.4* 10.5*   PLT 67* 103* 105*     BMP:    Recent Labs     06/17/19  0544 06/17/19  1758 06/18/19  0455    139 138   K 3.6* 3.9 3.5*   CL 99 100 101   CO2 21 23 22   BUN 50* 54* 57*   CREATININE 4.95* 5.36* 4.82*   GLUCOSE 90 120* 100*     Calcium:  Recent Labs     06/18/19  0455   CALCIUM 8.2*     Ionized Calcium:No results for input(s): IONCA in the last 72 hours. Magnesium:  Recent Labs     06/18/19  0455   MG 1.7     Phosphorus:No results for input(s): PHOS in the last 72 hours. BNP:No results for input(s): BNP in the last 72 hours. Glucose:  Recent Labs     06/15/19  1750 06/16/19  0934   POCGLU 90 89     HgbA1C: No results for input(s): LABA1C in the last 72 hours. INR:   Recent Labs     06/18/19  0455   INR 4.7*     Hepatic: No results for input(s): ALKPHOS, ALT, AST, PROT, BILITOT, BILIDIR, LABALBU in the last 72 hours. Amylase and Lipase:No results for input(s): LACTA, AMYLASE in the last 72 hours. Lactic Acid: No results for input(s): LACTA in the last 72 hours. CARDIAC ENZYMES:No results for input(s): CKTOTAL, CKMB, CKMBINDEX, TROPONINI in the last 72 hours. BNP: No results for input(s): BNP in the last 72 hours.   Lipids:   Recent Labs     06/16/19  0941   TRIG 163*     ABGs: No results found for: PH, PCO2, PO2, HCO3, O2SAT  Thyroid:   Lab Results   Component Value Date    TSH 0.89 06/12/2019        Urinalysis: No results for input(s): BACTERIA, BLOODU, CLARITYU, COLORU, Eric German, RBCUA, SPECGRAV, BILIRUBINUR, NITRU, WBCUA, LEUKOCYTESUR, GLUCOSEU in the last 72 hours. Assessment:  Active Problems:    Antiphospholipid antibody syndrome (HCC)    Substance abuse (HCC)    Coagulopathy (HCC)    Rhabdomyolysis    Morbid obesity (HCC)    Supratherapeutic INR  Resolved Problems:    * No resolved hospital problems. *          Plan:  1. ELIE secondary to Rhabdomyolysis  CKD stage III with Baseline cr 1.4-1.6. Cr today 4.8, K-3.5. Last HD on 6/15. Nephrology following. D/C Midodrine 5 TID. U/O improving. Strict I and O. Daily BMP. 2. Anion gap metabolic acidosis-resolved    3. Toxic metabolic encephalopathy secondary to drug use-resolving  Currently AAO x 2. Slowly responsive. Responds appropriately. 4. Acute respiratory failure s/p extubation 6/16- resolved    5. Hypovolemic shock 2/2 volume depletion, poor oral intake and drug use-resolved  Off levophed. Currently B.P in normal range. 6.Coagulopathy, elevated INR -resolving   Antiphospholipid antibody syndrome  Pred use for last 25 yrs and on coumadin. Bridged to coumadin from heparin. Currently on coumadin. Pharmacy to dose. Hb stable at 10.    7. Multiple TIAs in past     8. Hypertension   Currently B.P in normal range. D/c Midodrine 5 TID    9. Polysubstance abuse  U tox positive on admission. Psychiatry consulted. Diet- Dental soft diet  DVT px-on coumadin  PT/OT   following for discharge planning.         Rosemarie Jaime MD             Department of Internal Medicine  [facility]           6/18/2019, 1:25 PM

## 2019-06-18 NOTE — VIRTUAL HEALTH
Inpatient consult to Psychiatry  Consult performed by: KIESHA Calhoun - CNP  Consult ordered by: Hebert Torres MD  Assessment/Recommendations:   Department of Psychiatry  Consult Service  Attending Physician Psychiatric Assessment      Thank you very much for allowing us to participate in the care of this patient. Reason for Consult:  \"multi drug overdose\"      History obtained from:  patient, electronic medical record    HISTORY OF PRESENT ILLNESS:          The patient is a 39 y.o. male who is admitted medically for treatment of antiphospholipid antibody syndrome and rhabdomyolysis. He was initially admitted inpt on 6/11/19 after being found unresponsive by his mother. She had stated that he had been sober a while, but it had appeared that he had relapsed into polysubstance abuse. Client's UDS was + for cocaine, opiates, amphetamines. For further information regarding client's initial presentation, see ED Assessment and H&P dated 6/11/19. Today, client states that he was brought in about a week ago after a drug overdose. He states, \"It was not intentiontal.\"  He states that he was trying to get high at the time. Client states, \"It's not going to happen again, because it scared me stupid. I almost took my life, and I didn't even know it. It has taken me this long just to get this much mobility back. \"  Client relates that he had been sober about 6 months prior to this most recent overdose. He also states that he has gone through rehab before. Client denies any problems with hopelessness, helplessness, worthlessness, crying spells, or generalized sadness. Appetite is \"getting better\". H relates that he has only been getting about 5 or 6 hours of sleep most nights for about the last month. Client denies any hx anger problems, psychotic symptoms, or mood lability.             Patient Active Problem List:     Antiphospholipid antibody syndrome (HCC) (D68.61)     Long term current use of anticoagulant therapy (Z79.01)     Substance abuse (HCC) (F19.10)     ELIE (acute kidney injury) ([facility] (N17.9)     Elevated transaminase level (R74.0)     Leukocytosis (D72.829)     Seizures (HCC) (R56.9)     Hypertension (I10)     Asthma (J45.909)     Cerebral artery occlusion with cerebral infarction (HCC) (I63.50)     Unresponsive episode (R41.89)     Seizure disorder (HCC) (G40.909)     Coagulopathy (HCC) (D68.9)     Unresponsive (R41.89)     Drug overdose (T50.901A)     Rhabdomyolysis (M62.82)     Morbid obesity (HCC) (E66.01)     Supratherapeutic INR (R79.1)      Current Outpatient Psychiatric Medications:  none    Medications:    Current Facility-Administered Medications: bacitracin ointment, , Topical, TIDoxyCODONE (ROXICODONE) immediate release tablet 5 mg, 5 mg, Oral, Q6H PRN **OR** oxyCODONE (ROXICODONE) immediate release tablet 10 mg, 10 mg, Oral, Q6H PRNhydrALAZINE (APRESOLINE) injection 10 mg, 10 mg, Intravenous, Q6H PRNpotassium chloride 20 mEq/50 mL IVPB (Central Line), 20 mEq, Intravenous, PRNwarfarin (COUMADIN) daily dosing (placeholder), , Other, RX Placeholderheparin (porcine) injection 1,700 Units, 1,700 Units, Intracatheter, PRNheparin (porcine) injection 1,600 Units, 1,600 Units, Intracatheter, PRNmagnesium hydroxide (MILK OF MAGNESIA) 400 MG/5ML suspension 30 mL, 30 mL, Oral, Daily PRNondansetron (ZOFRAN) injection 4 mg, 4 mg, Intravenous, Q6H PRNacetaminophen (TYLENOL) tablet 650 mg, 650 mg, Oral, Q4H PRNsodium chloride flush 0.9 % injection 10 mL, 10 mL, Intravenous, 2 times per day sodium chloride flush 0.9 % injection 10 mL, 10 mL, Intravenous, PRNglucose (GLUTOSE) 40 % oral gel 15 g, 15 g, Oral, PRNdextrose 50 % IV solution, 12.5 g, Intravenous, PRNglucagon (rDNA) injection 1 mg, 1 mg, Intramuscular, PRNdextrose 5 % solution, 100 mL/hr, Intravenous, PRNalbuterol (PROVENTIL) nebulizer solution 2.5 mg, 2.5 mg, Nebulization, As Directed RT PRNipratropium-albuterol (DUONEB) nebulizer solution 1 ampule, 1 ampule, Inhalation, Q4H PRN       PAST PSYCHIATRIC HISTORY:  Began using cannabis at 20 y/o. Client states that he was prescribed opiate pain meds starting in 2018. He states that he sees his pain specialist every 3 months. Client states that he cannot remember what happened to bring him to the hospital this time, and he states that he was surprised to find out that his UDS came back + for cocaine and amphetamines.     Past psychiatric medications include: denies    Adverse reactions from psychotropic medications:  n/a    Lifetime Psychiatric Review of Systems:     Christina: denies  Panic: denies  Phobia: denies  Hallucinations: denies  Delusions: denies     Past Medical History:       No date: Antiphospholipid antibody with hemorrhagic disorder (HCC)  No date: Arthritis  No date: Asthma  No date: Bronchitis  No date: C[facility]bral [facility] occlusion [facility]  No date: Disease of blood and blood forming organ  No date: Hypertension  No date: Long-term (current) use of anticoagulants, INR goal 2.5-3.5  No date: Pleurisy  No date: Pneumonia  No date: Seizures ([facility]    Past Surgical History:       No date: CARDIAC CATHETERIZATION    Allergies: Uncaria tomentosa (cats claw)      Social History:    Social History    Socioeconomic History      Marital status:       Spouse name: None      Number of children: None      Years of education: None      Highest education level: None    Occupational History      None    Social Needs      Financial resource strain: None      Food insecurity:        Worry: None        Inability: None      Transportation needs:        Medical: None        Non-medical: None    Tobacco Use      Smoking status: Never Smoker      Smokeless tobacco: Current User        Types: Chew    Substance and Sexual Activity      Alcohol use: Not Currently        Alcohol/week: 1.2 oz        Types: 2 Cans of beer per week        Comment: occasional      Drug use: No      Sexual activity: Not Currently        Partners: Female    Lifestyle      Physical activity:        Days per week: None        Minutes per session: None      Stress: None    Relationships      Social connections:        Talks on phone: None        Gets together: None        Attends Denominational service: None        Active member of club or organization: None        Attends meetings of clubs or organizations: None        Relationship status: None      Intimate partner violence:        Fear of current or ex partner: None        Emotionally abused: None        Physically abused: None        Forced sexual activity: None    Other Topics      Concerns:        None    Social History Narrative      None      Family Psychiatric History:  denies    Family History:      Problem: Heart Disease      Relation: Maternal Grandmother          Age of Onset: (Not Specified)  Problem: Heart Disease      Relation: Maternal Grandfather          Age of Onset: (Not Specified)  Problem: Stroke      Relation: Maternal Grandfather          Age of Onset: (Not Specified)        Physical  /79   Pulse 73   Temp 98.3 °F (36.8 °C) (Oral)   Resp 16   Ht 6' (1.829 m)   Wt 296 lb 1.6 oz (134.3 kg)   SpO2 100%   BMI 40.16 kg/m²     MENTAL STATUS EXAM:  Level of consciousness:  awake and lethargic  Appearance:  ill-appearing, hospital attire, lying in bed and poor grooming  Behavior/Motor:  psychomotor retardation  Attitude toward examiner:  cooperative, attentive and poor eye contact  Speech:  slow, whispered and dysarthric  Mood:  fairly euthymic; very remorseful about unintentional overdose  Affect:  mood congruent  Thought processes:  linear, goal directed and coherent  Thought content:  Homocidal ideation: denies  Suicidal Ideation:  denies suicidal ideation  Delusions:  no evidence of delusions  Perceptual Disturbance:  denies any perceptual disturbance  Cognition:  oriented to person, place, and time  Memory: impaired recent memory (cannot recall events that led up to recent unintentional overdose)  Insight & Judgement: limited   Medication side effects: denies     DSM-V DIAGNOSIS:  Cocaine Use D/O                                     Opioid Use D/O, severe                                     Amphetamine Use D/O    Impression:  Client denies any s/s depression, arlet, or psychosis. He further denies any thoughts of harming himself or others. He also denies any hx psychiatric tx before. He does endorse a hx substance us problems (including a hx going to rehab). Per [facility] records, it also appears that client has been admitted a few times before for unintentional drug overdoses. He states that he has really scared himself this time, and he does not want this to happen again. Client states that he is motivated for drug tx at this time. He does not meet criteria for BHI placement. RECOMMENDATIONS:  1. Refer to social work to coordinate setting client up with substance use treatment upon discharge  2. Fee free to re-consult psychiatry service at a later time regarding this client. Thank you very much for allowing us to participate in the care of this patient. Time spent 40 min. Physicians Signature:  Electronically signed by KIESHA Shah CNP on 6/18/2019 at 2:15 PM.          Patient Location:  [address] Stepdown    Provider Location ([facility]):   [place], [place]    This virtual visit was conducted via interactive/real-time audio/video.

## 2019-06-18 NOTE — PROGRESS NOTES
Pharmacy Note  Warfarin Consult follow-up    Recent Labs     06/18/19  0455   INR 4.7*     Recent Labs     06/16/19  0414 06/17/19  0544 06/18/19  0455   HGB 10.0* 10.4* 10.5*   HCT 32.8* 35.2* 34.1*   PLT 67* 103* 105*     Current warfarin drug-drug interactions:  acetaminophen, heparin    Date INR Dose   6/11/19 6.2 Held   6/12/19 9.9 Vitamin K 5mg IV given, held warfain   6/13/19 1.2 10 mg   6/14/19 1.3 10 mg   6.15.19 3.0 Hold   6.16.19 3.4 7.5 mg   6/17/19 3.4 7.5mg   6/18/ 4.7 HOLD     Notes:   INR with increase to 4.7, now supratherapeutic. HOLD warfarin until INR below 3.5. Daily PT/INR while inpatient.      Miguelangel Steven RPh, NOP  Clinical Pharmacist Medication Management  6/18/2019  7:48 AM

## 2019-06-19 LAB
ABSOLUTE EOS #: 0.26 K/UL (ref 0–0.4)
ABSOLUTE IMMATURE GRANULOCYTE: 0.07 K/UL (ref 0–0.3)
ABSOLUTE LYMPH #: 1.06 K/UL (ref 1–4.8)
ABSOLUTE MONO #: 0.33 K/UL (ref 0.1–0.8)
ANION GAP SERPL CALCULATED.3IONS-SCNC: 15 MMOL/L (ref 9–17)
ANION GAP SERPL CALCULATED.3IONS-SCNC: 17 MMOL/L (ref 9–17)
BASOPHILS # BLD: 0 % (ref 0–2)
BASOPHILS ABSOLUTE: 0 K/UL (ref 0–0.2)
BUN BLDV-MCNC: 61 MG/DL (ref 6–20)
BUN BLDV-MCNC: 62 MG/DL (ref 6–20)
BUN/CREAT BLD: ABNORMAL (ref 9–20)
BUN/CREAT BLD: ABNORMAL (ref 9–20)
CALCIUM SERPL-MCNC: 8.5 MG/DL (ref 8.6–10.4)
CALCIUM SERPL-MCNC: 8.6 MG/DL (ref 8.6–10.4)
CHLORIDE BLD-SCNC: 100 MMOL/L (ref 98–107)
CHLORIDE BLD-SCNC: 106 MMOL/L (ref 98–107)
CO2: 21 MMOL/L (ref 20–31)
CO2: 21 MMOL/L (ref 20–31)
CREAT SERPL-MCNC: 4.26 MG/DL (ref 0.7–1.2)
CREAT SERPL-MCNC: 4.79 MG/DL (ref 0.7–1.2)
DIFFERENTIAL TYPE: ABNORMAL
EOSINOPHILS RELATIVE PERCENT: 4 % (ref 1–4)
GFR AFRICAN AMERICAN: 16 ML/MIN
GFR AFRICAN AMERICAN: 18 ML/MIN
GFR NON-AFRICAN AMERICAN: 13 ML/MIN
GFR NON-AFRICAN AMERICAN: 15 ML/MIN
GFR SERPL CREATININE-BSD FRML MDRD: ABNORMAL ML/MIN/{1.73_M2}
GLUCOSE BLD-MCNC: 114 MG/DL (ref 75–110)
GLUCOSE BLD-MCNC: 130 MG/DL (ref 70–99)
GLUCOSE BLD-MCNC: 96 MG/DL (ref 75–110)
GLUCOSE BLD-MCNC: 97 MG/DL (ref 75–110)
GLUCOSE BLD-MCNC: 99 MG/DL (ref 70–99)
HCT VFR BLD CALC: 33.8 % (ref 40.7–50.3)
HEMOGLOBIN: 10.2 G/DL (ref 13–17)
IMMATURE GRANULOCYTES: 1 %
INR BLD: 3.6
LYMPHOCYTES # BLD: 16 % (ref 24–44)
MCH RBC QN AUTO: 25.1 PG (ref 25.2–33.5)
MCHC RBC AUTO-ENTMCNC: 30.2 G/DL (ref 28.4–34.8)
MCV RBC AUTO: 83.3 FL (ref 82.6–102.9)
MONOCYTES # BLD: 5 % (ref 1–7)
MORPHOLOGY: ABNORMAL
NRBC AUTOMATED: 0 PER 100 WBC
PARTIAL THROMBOPLASTIN TIME: 47.8 SEC (ref 20.5–30.5)
PDW BLD-RTO: 15.9 % (ref 11.8–14.4)
PLATELET # BLD: 122 K/UL (ref 138–453)
PLATELET ESTIMATE: ABNORMAL
PMV BLD AUTO: 11.7 FL (ref 8.1–13.5)
POTASSIUM SERPL-SCNC: 4 MMOL/L (ref 3.7–5.3)
POTASSIUM SERPL-SCNC: 4.4 MMOL/L (ref 3.7–5.3)
PROTHROMBIN TIME: 34.3 SEC (ref 9–12)
RBC # BLD: 4.06 M/UL (ref 4.21–5.77)
RBC # BLD: ABNORMAL 10*6/UL
SEG NEUTROPHILS: 74 % (ref 36–66)
SEGMENTED NEUTROPHILS ABSOLUTE COUNT: 4.88 K/UL (ref 1.8–7.7)
SODIUM BLD-SCNC: 138 MMOL/L (ref 135–144)
SODIUM BLD-SCNC: 142 MMOL/L (ref 135–144)
WBC # BLD: 6.6 K/UL (ref 3.5–11.3)
WBC # BLD: ABNORMAL 10*3/UL

## 2019-06-19 PROCEDURE — 2580000003 HC RX 258: Performed by: NURSE PRACTITIONER

## 2019-06-19 PROCEDURE — 97530 THERAPEUTIC ACTIVITIES: CPT

## 2019-06-19 PROCEDURE — 6370000000 HC RX 637 (ALT 250 FOR IP): Performed by: EMERGENCY MEDICINE

## 2019-06-19 PROCEDURE — 2060000000 HC ICU INTERMEDIATE R&B

## 2019-06-19 PROCEDURE — 99212 OFFICE O/P EST SF 10 MIN: CPT

## 2019-06-19 PROCEDURE — 85610 PROTHROMBIN TIME: CPT

## 2019-06-19 PROCEDURE — 36415 COLL VENOUS BLD VENIPUNCTURE: CPT

## 2019-06-19 PROCEDURE — 80048 BASIC METABOLIC PNL TOTAL CA: CPT

## 2019-06-19 PROCEDURE — 85025 COMPLETE CBC W/AUTO DIFF WBC: CPT

## 2019-06-19 PROCEDURE — 99223 1ST HOSP IP/OBS HIGH 75: CPT | Performed by: INTERNAL MEDICINE

## 2019-06-19 PROCEDURE — 85730 THROMBOPLASTIN TIME PARTIAL: CPT

## 2019-06-19 PROCEDURE — 6370000000 HC RX 637 (ALT 250 FOR IP): Performed by: STUDENT IN AN ORGANIZED HEALTH CARE EDUCATION/TRAINING PROGRAM

## 2019-06-19 PROCEDURE — 82947 ASSAY GLUCOSE BLOOD QUANT: CPT

## 2019-06-19 RX ORDER — ATENOLOL 25 MG/1
25 TABLET ORAL DAILY
Status: DISCONTINUED | OUTPATIENT
Start: 2019-06-19 | End: 2019-06-21 | Stop reason: HOSPADM

## 2019-06-19 RX ORDER — WARFARIN SODIUM 3 MG/1
3 TABLET ORAL
Status: COMPLETED | OUTPATIENT
Start: 2019-06-19 | End: 2019-06-19

## 2019-06-19 RX ORDER — WARFARIN SODIUM 5 MG/1
5 TABLET ORAL
Status: DISCONTINUED | OUTPATIENT
Start: 2019-06-19 | End: 2019-06-19

## 2019-06-19 RX ADMIN — ATENOLOL 25 MG: 25 TABLET ORAL at 09:43

## 2019-06-19 RX ADMIN — TOPIRAMATE 100 MG: 200 TABLET, FILM COATED ORAL at 09:00

## 2019-06-19 RX ADMIN — OXYCODONE HYDROCHLORIDE 10 MG: 5 TABLET ORAL at 20:51

## 2019-06-19 RX ADMIN — OXYCODONE HYDROCHLORIDE 10 MG: 5 TABLET ORAL at 09:00

## 2019-06-19 RX ADMIN — TOPIRAMATE 100 MG: 200 TABLET, FILM COATED ORAL at 20:50

## 2019-06-19 RX ADMIN — BACITRACIN: 500 OINTMENT TOPICAL at 08:59

## 2019-06-19 RX ADMIN — BACITRACIN: 500 OINTMENT TOPICAL at 20:49

## 2019-06-19 RX ADMIN — PREDNISONE 10 MG: 10 TABLET ORAL at 09:00

## 2019-06-19 RX ADMIN — Medication 10 ML: at 20:50

## 2019-06-19 RX ADMIN — BACITRACIN: 500 OINTMENT TOPICAL at 13:56

## 2019-06-19 RX ADMIN — OXYCODONE HYDROCHLORIDE 10 MG: 5 TABLET ORAL at 02:18

## 2019-06-19 RX ADMIN — Medication 10 ML: at 09:03

## 2019-06-19 RX ADMIN — WARFARIN SODIUM 3 MG: 3 TABLET ORAL at 18:06

## 2019-06-19 RX ADMIN — OXYCODONE HYDROCHLORIDE 10 MG: 5 TABLET ORAL at 14:43

## 2019-06-19 ASSESSMENT — PAIN SCALES - GENERAL
PAINLEVEL_OUTOF10: 8
PAINLEVEL_OUTOF10: 7
PAINLEVEL_OUTOF10: 6
PAINLEVEL_OUTOF10: 8
PAINLEVEL_OUTOF10: 9
PAINLEVEL_OUTOF10: 9
PAINLEVEL_OUTOF10: 4
PAINLEVEL_OUTOF10: 0

## 2019-06-19 ASSESSMENT — PAIN DESCRIPTION - PAIN TYPE
TYPE: CHRONIC PAIN
TYPE: CHRONIC PAIN

## 2019-06-19 ASSESSMENT — PAIN DESCRIPTION - DESCRIPTORS
DESCRIPTORS: ACHING;CONSTANT;SORE
DESCRIPTORS: ACHING

## 2019-06-19 ASSESSMENT — PAIN DESCRIPTION - PROGRESSION
CLINICAL_PROGRESSION: NOT CHANGED
CLINICAL_PROGRESSION: NOT CHANGED
CLINICAL_PROGRESSION: GRADUALLY IMPROVING
CLINICAL_PROGRESSION: NOT CHANGED
CLINICAL_PROGRESSION: GRADUALLY IMPROVING
CLINICAL_PROGRESSION: NOT CHANGED
CLINICAL_PROGRESSION: NOT CHANGED

## 2019-06-19 ASSESSMENT — PAIN DESCRIPTION - FREQUENCY
FREQUENCY: CONTINUOUS
FREQUENCY: INTERMITTENT

## 2019-06-19 ASSESSMENT — PAIN - FUNCTIONAL ASSESSMENT
PAIN_FUNCTIONAL_ASSESSMENT: PREVENTS OR INTERFERES SOME ACTIVE ACTIVITIES AND ADLS
PAIN_FUNCTIONAL_ASSESSMENT: ACTIVITIES ARE NOT PREVENTED

## 2019-06-19 ASSESSMENT — PAIN DESCRIPTION - LOCATION
LOCATION: BACK;LEG
LOCATION: GENERALIZED

## 2019-06-19 ASSESSMENT — PAIN DESCRIPTION - ORIENTATION: ORIENTATION: RIGHT

## 2019-06-19 ASSESSMENT — PAIN DESCRIPTION - ONSET
ONSET: GRADUAL
ONSET: ON-GOING

## 2019-06-19 NOTE — PLAN OF CARE
Problem: Pain:  Goal: Control of chronic pain  Description  Control of chronic pain  Outcome: Ongoing   Patient educated on available pain control measures including non-pharmacologic and medications. Pain goal discussed. Whiteboard updated for available time of next administration PRN. Will continue ordered interventions & assess pain. Emphasis on pain control in light of admitting diagnosis and history of substance abuse. Plan also discussed with mother via phone. Problem: Risk for Impaired Skin Integrity  Goal: Tissue integrity - skin and mucous membranes  Description  Structural intactness and normal physiological function of skin and  mucous membranes. Outcome: Ongoing   Low air loss alternating pressure bariatric bed in use. MaxiSky lift to reposition. Comfort Glide sling and bed pads. Problem: Falls - Risk of:  Goal: Will remain free from falls  Description  Will remain free from falls  Outcome: Ongoing   No falls this shift. Precautions include  nonskid footwear on, bed locked in lowest position, siderails up x4 padded for ordered seizure precautions, table and call light within reach. Bed alarm on. Patient calls out appropriately for assistance inconsistently. Hourly rounding to assess for needs.     Problem: Mental Status - Impaired:  Goal: Mental status will be restored to baseline  Description  Mental status will be restored to baseline  Outcome: Ongoing     Problem: Sleep Pattern Disturbance:  Goal: Appears well-rested  Description  Appears well-rested  Outcome: Ongoing

## 2019-06-19 NOTE — PROGRESS NOTES
past  On Topamax 100 mg BID.     Diet- Gen diet  DVT px-on coumadin  PT/OT following   following for discharge planning.     Josefa Mg MD      Department of Internal Medicine  [facility]         6/19/2019, 12:47 PM    Attending Physician Statement  I have discussed the care of Denisse Patten, including pertinent history and exam findings with the resident. I have reviewed the key elements of all parts of the encounter with the resident. I have seen and examined the patient with the resident and the key elements of all parts of the encounter have been performed by me.         Claribel Eubanks MD  Attending and Faculty Internal Medicine  [facility]  Internal Medicine [address] [place] [address], [facility]   6/19/19

## 2019-06-19 NOTE — PROGRESS NOTES
[facility] Wound Ostomy Continence Nurse  Consult Note       NAME:  [address] RECORD NUMBER:  [id number]  AGE: 39 y.o. GENDER: male  : 1975  TODAY'S DATE:  2019    Subjective:     Reason for WOCN Evaluation and Assessment: multiple deep tissue injuries      Radha Garcia is a 39 y.o. male referred by:   [x] Physician  [] Nursing  [] Other:      Wound Identification:  Wound Type: pressure  Contributing Factors: chronic pressure, decreased mobility, shear force, obesity, decreased tissue oxygenation, immunosuppression, anticoagulation therapy, non-adherence and substance abuse   Down in a chair for estimated 3-4 days  Older right knee wound from reported fall in shower.              PAST MEDICAL HISTORY        Diagnosis Date    [facility]iphospholipid antibody [facility] hemorrhagic disorder ([facility]     Arthritis     Asthma     Bronchitis     Cerebral artery occlusion with cerebral infarction ([facility]     Disease of blood and blood forming organ     Hypertension     Long-term (current) use of anticoagulants, INR goal 2.5-3.5     Pleurisy     Pneumonia     Seizures (HCC)        PAST SURGICAL HISTORY    Past Surgical History:   Procedure Laterality Date    CARDIAC CATHETERIZATION         FAMILY HISTORY    Family History   Problem Relation Age of Onset    Heart Disease Maternal Grandmother     Heart Disease Maternal Grandfather     Stroke Maternal Grandfather        SOCIAL HISTORY    Social History     Tobacco Use    Smoking status: Never Smoker    Smokeless tobacco: Current User     Types: Chew   Substance Use Topics    Alcohol use: Not Currently     Alcohol/week: 1.2 oz     Types: 2 Cans of beer per week     Comment: occasional    Drug use: No       ALLERGIES    Allergies   Allergen Reactions    Uncaria Tomentosa (Cats Claw) Other (See Comments)     itch,sneezing           Objective:      BP (!) 144/82   Pulse 72   Temp 98.7 °F (37.1 °C) (Oral)   Resp 15   Ht 6' (1.829 m)   Wt 296 lb 1.6 oz (134.3 kg)   SpO2 97%   BMI 40.16 kg/m²   Almas Risk Score: Almas Scale Score: 17    LABS    CBC:   Lab Results   Component Value Date    WBC 6.6 06/19/2019    RBC 4.06 06/19/2019    HGB 10.2 06/19/2019     CMP:  Albumin:    Lab Results   Component Value Date    LABALBU 2.2 06/12/2019     PT/INR:    Lab Results   Component Value Date    PROTIME 34.3 06/19/2019    INR 3.6 06/19/2019     HgBA1c:  No results found for: LABA1C  PTT: No components found for: LABPTT      Assessment:     Patient Active Problem List   Diagnosis    [facility]iphospholipid antibody syndrome ([facility]    Long term current use of anticoagulant therapy    Substance abuse ([facility]    ELIE (acute kidney injury) ([facility]    Elevated transaminase level    Leukocytosis    Seizures (HCC)    Hypertension    Asthma    Cerebral artery occlusion with cerebral infarction (HCC)    Unresponsive episode    Seizure disorder (HCC)    Coagulopathy (HCC)    Unresponsive    Drug overdose    Rhabdomyolysis    Morbid obesity (HCC)    Supratherapeutic INR    Opioid use disorder, severe, in early remission, dependence (HCC)    Amphetamine use disorder, mild (HCC)    Cocaine use disorder ([facility]    Acute kidney injury ([facility]       Measurements:     06/19/19 1253   Wound 06/11/19 Knee Right   Date First Assessed/Time First Assessed: 06/11/19 0345   Present on Hospital Admission: Yes  Primary Wound Type: (c) Other (comment)  Location: Knee  Wound Location Orientation: Right   Wound Traumatic   Dressing/Treatment Antibacterial Ointment   Wound Length (cm) 7 cm   Wound Width (cm) 3 cm   Wound Surface Area (cm^2) 21 cm^2   Wound Assessment Dry;Brown;Red   Drainage Amount None   Odor None   Louise-wound Assessment Intact; Pink   Wound 06/11/19 Thigh Distal;Right;Posterior Large swollen bruised area   Date First Assessed/Time First Assessed: 06/11/19 1125   Present on Hospital Admission: Yes  Primary Wound Type: (c) Other (comment)  Location: Thigh  Wound Location Orientation: Distal;Right;Posterior  Wound Description (Comments): Large swollen brui. .. Wound Deep tissue/Injury   Dressing Status Clean;Dry; Intact   Dressing/Treatment Foam   Wound Assessment Maroon   Drainage Amount Scant   Drainage Description Sanguinous   Odor None   Louise-wound Assessment Dry; Intact   Red%Wound Bed 100   Wound 06/12/19 Foot Lateral;Right   Date First Assessed/Time First Assessed: 06/12/19 1350   Present on Hospital Admission: Yes  Primary Wound Type: Pressure Injury  Location: Foot  Wound Location Orientation: Lateral;Right   Wound Deep tissue/Injury   Dressing/Treatment Open to air   Wound Length (cm) 2 cm   Wound Width (cm) 2 cm   Wound Depth (cm) 0 cm   Wound Surface Area (cm^2) 4 cm^2   Change in Wound Size % (l*w) 80   Wound Volume (cm^3) 0 cm^3   Wound Assessment Dry; Intact; Red   Drainage Amount None   Odor None   Wound 06/12/19 Ischium Right   Date First Assessed/Time First Assessed: 06/12/19 1350   Present on Hospital Admission: Yes  Primary Wound Type: Pressure Injury  Location: Ischium  Wound Location Orientation: Right   Wound Assessment MIC  (patient declined rolling over due to back pain)     Right posterior thigh wound moist, maroon, areas of skin blistering continues. May require debridement in future. Would recommend enzymatic debridement (Santyl). Response to treatment:  With complaints of pain. C/o low back pain. Requests pain medication. D/W RN. Prn pain medication is given per prescribed frequency. Patient declines movement in bed despite encouragement change in position may help alleviate pain. Foot of bed extended to offload pressure from feet as patient declines moving up in bed. Plan:     Plan of Care: Turn every 2 hours  Float heels off of bed with pillows under calves     Use lift sling to reposition patient to minimize potential for shear injury.     Foam sacrum dressing to sacrococcygeal area. Peel back dressing, inspect skin beneath, re-secure.

## 2019-06-19 NOTE — PROGRESS NOTES
glucagon (rDNA), dextrose, albuterol, ipratropium-albuterol    Input/Output:       I/O last 3 completed shifts: In: 80 [P.O.:800; I.V.:10]  Out: 1480 [Urine:1480]. Patient Vitals for the past 96 hrs (Last 3 readings):   Weight   19 0400 296 lb 1.6 oz (134.3 kg)   19 0500 (!) 313 lb 8 oz (142.2 kg)   19 0800 (!) 319 lb 3.6 oz (144.8 kg)       Vital Signs:   Temperature:  Temp: 98.7 °F (37.1 °C)  TMax:   Temp (24hrs), Av °F (36.7 °C), Min:97.2 °F (36.2 °C), Max:98.7 °F (37.1 °C)    Respirations:  Resp: 15  Pulse:   Pulse: 72  BP:    BP: (!) 144/82  BP Range: Systolic (37KQD), NAIMA:437 , Min:104 , XDO:245       Diastolic (22HAQ), MZR:88, Min:80, Max:103      Physical Examination:     General:  AAO x 3, speaking in full sentences, no accessory muscle use. HEENT: Atraumatic, normocephalic, no throat congestion, moist mucosa. Eyes:   Pupils equal, round and reactive to light, EOMI. Neck:   Supple  Chest:   Bilateral vesicular breath sounds, no rales or wheezes. Cardiac:  S1 S2 RR, no murmurs, gallops or rubs. Abdomen: Soft, obese, non-tender, no masses or organomegaly, BS audible. :   No suprapubic or flank tenderness. Neuro:  AAO x 3, No FND. SKIN:  No rashes, good skin turgor. Extremities:  No edema.     Labs:       Recent Labs     19  0544 19  0455 19  0611   WBC 6.2 6.6 6.6   RBC 4.32 4.22 4.06*   HGB 10.4* 10.5* 10.2*   HCT 35.2* 34.1* 33.8*   MCV 81.5* 80.8* 83.3   MCH 24.1* 24.9* 25.1*   MCHC 29.5 30.8 30.2   RDW 16.0* 16.1* 15.9*   * 105* 122*   MPV 12.6 11.6 11.7      BMP:   Recent Labs     19  0455 19  1800 19  0611    137 142   K 3.5* 4.0 4.0    100 106   CO2 22 22 21   BUN 57* 59* 61*   CREATININE 4.82* 4.77* 4.79*   GLUCOSE 100* 113* 99   CALCIUM 8.2* 8.3* 8.6      Magnesium:    Recent Labs     19  0455   MG 1.7     GAGE:      Lab Results   Component Value Date    GAGE  2019     This test is equivocal, which represents a borderline GAGE result. Suggest repeating in 3-5 weeks. SPEP:  Lab Results   Component Value Date    PROT 5.4 06/12/2019    ALBCAL 2.7 06/11/2019    ALBPCT 55 06/11/2019    LABALPH 0.3 06/11/2019    LABALPH 0.6 06/11/2019    A1PCT 7 06/11/2019    A2PCT 13 06/11/2019    LABBETA 0.6 06/11/2019    BETAPCT 12 06/11/2019    GAMGLOB 0.7 06/11/2019    GGPCT 14 06/11/2019    PATH ELECTRONICALLY SIGNED. Lauren Evans M.D. 06/11/2019    PATH ELECTRONICALLY SIGNED. Lauren Evans M.D. 06/11/2019     UPEP:   No results found for: LABPE  C3:     Lab Results   Component Value Date    C3 88 06/11/2019     C4:     Lab Results   Component Value Date    C4 16 06/11/2019     Hep BsAg:         Lab Results   Component Value Date    HEPBSAG NONREACTIVE 06/12/2019     Hep C AB:          Lab Results   Component Value Date    HEPCAB NONREACTIVE 06/12/2019       Urinalysis/Chemistries:      Lab Results   Component Value Date    NITRU POSITIVE 06/11/2019    COLORU ANANT 06/11/2019    PHUR 5.0 06/11/2019    WBCUA 10 TO 20 06/11/2019    RBCUA 2 TO 5 06/11/2019    MUCUS NOT REPORTED 06/11/2019    TRICHOMONAS NOT REPORTED 06/11/2019    YEAST NOT REPORTED 06/11/2019    BACTERIA RARE 06/11/2019    SPECGRAV 1.025 06/11/2019    LEUKOCYTESUR 1+ 06/11/2019    UROBILINOGEN Normal 06/11/2019    BILIRUBINUR 2+ 06/11/2019    GLUCOSEU 50 mg/dL 06/11/2019    KETUA TRACE 06/11/2019    AMORPHOUS 2+ 06/11/2019     Urine Sodium:     Lab Results   Component Value Date    MALINA 21 06/11/2019      Urine Creatinine:     Lab Results   Component Value Date    LABCREA 240.2 06/11/2019       Radiology:     Reviewed. Assessment:     1. 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   2. Acute respiratory failure status post intubation and extubation. 3. Rhabdomyolysis from substance abuse.    4.

## 2019-06-19 NOTE — CARE COORDINATION
Met with pt again to discuss treatment for his drug abuse. Pt stated that he doesn't want or need treatment. Pt very short and did not want to discuss any further. Explained to pt that if he changes his mind, to call for a .

## 2019-06-19 NOTE — PROGRESS NOTES
0  Vital Signs  Patient Currently in Pain: No       Orientation  Orientation  Overall Orientation Status: Within Functional Limits  Cognition   Cognition  Overall Cognitive Status: Exceptions  Arousal/Alertness: Delayed responses to stimuli;Inconsistent responses to stimuli  Following Commands: Follows one step commands with increased time; Follows multistep commands with repitition  Attention Span: Attends with cues to redirect  Safety Judgement: Decreased awareness of need for assistance  Problem Solving: Assistance required to generate solutions  Insights: Decreased awareness of deficits  Initiation: Requires cues for some  Sequencing: Requires cues for some  Objective   Bed mobility  Rolling to Left: Minimal assistance  Rolling to Right: Minimal assistance  Supine to Sit: Minimal assistance  Sit to Supine: Minimal assistance  Comment: Requires use of bedrail   Transfers  Sit to Stand: Contact guard assistance  Stand to sit: Contact guard assistance  Comment: Pt requires VC for proper UE placement with transfers. Ambulation  Ambulation?: Yes  Ambulation 1  Surface: level tile  Device: Rolling Walker  Assistance: Contact guard assistance;Minimal assistance  Quality of Gait: decreased shital/LYNN, significant forward flexed posture  Distance: three steps towards HOB.       Balance  Posture: Fair  Sitting - Static: Good;-  Sitting - Dynamic: Fair;+  Standing - Static: Fair  Standing - Dynamic: Fair;-  Comments: standing balance assessed using RW                             Goals  Short term goals  Time Frame for Short term goals: 14 visits  Short term goal 1: pt will be mod I bed mob  Short term goal 2: pt will be mod I transfers  Short term goal 3: pt will amb using ' SBA  Short term goal 4: pt will negotiate 2 steps using R railing CGA  Patient Goals   Patient goals : .    Plan    Plan  Times per week: 5-6x/wk  Current Treatment Recommendations: Strengthening, Patient/Caregiver Education & Training, Gait Training, ROM, Equipment Evaluation, Education, & procurement, Stair training, Balance Training, Functional Mobility Training, Endurance Training, Transfer Training, Safety Education & Training  Safety Devices  Type of devices:  All fall risk precautions in place, Call light within reach, Patient at risk for falls, Left in bed, Nurse notified  Restraints  Initially in place: No     Therapy Time   Individual Concurrent Group Co-treatment   Time In [address] [place] [address]         Time Out 1057         Minutes 18         Timed Code Treatment Minutes: Anita Rowlel, PT

## 2019-06-19 NOTE — PROGRESS NOTES
Pharmacy Note  Warfarin Consult follow-up    Recent Labs     06/19/19  0611   INR 3.6     Recent Labs     06/17/19  0544 06/18/19  0455 06/19/19  0611   HGB 10.4* 10.5* 10.2*   HCT 35.2* 34.1* 33.8*   * 105* 122*       Significant Drug-Drug Interactions:  New warfarin drug-drug interactions: prednisone  Discontinued drug-drug interactions: none  Current warfarin drug-drug interactions:  acetaminophen, heparin    Date INR Dose   6/11/19 6.2 Held   6/12/19 9.9 Vitamin K 5mg IV given, held warfain   6/13/19 1.2 10 mg   6/14/19 1.3 10 mg   6.15.19 3.0 Hold   6.16.19 3.4 7.5 mg   6/17/19 3.4 7.5mg   6/18/ 4.7 HOLD   6/19/19 3.6 3mg     Notes: Will give warfarin 3mg today on 6/19/19 to prevent INR from dropping too far below therapeutic range. Daily PT/INR while inpatient.      Mini Newman RPh, CACP  Clinical Pharmacist Medication Management  6/19/2019  7:50 AM

## 2019-06-20 PROBLEM — R41.89 UNRESPONSIVE: Status: RESOLVED | Noted: 2018-08-12 | Resolved: 2019-06-20

## 2019-06-20 LAB
ANION GAP SERPL CALCULATED.3IONS-SCNC: 13 MMOL/L (ref 9–17)
BUN BLDV-MCNC: 61 MG/DL (ref 6–20)
BUN/CREAT BLD: ABNORMAL (ref 9–20)
CALCIUM SERPL-MCNC: 8.7 MG/DL (ref 8.6–10.4)
CHLORIDE BLD-SCNC: 102 MMOL/L (ref 98–107)
CO2: 22 MMOL/L (ref 20–31)
CREAT SERPL-MCNC: 4.1 MG/DL (ref 0.7–1.2)
GFR AFRICAN AMERICAN: 19 ML/MIN
GFR NON-AFRICAN AMERICAN: 16 ML/MIN
GFR SERPL CREATININE-BSD FRML MDRD: ABNORMAL ML/MIN/{1.73_M2}
GFR SERPL CREATININE-BSD FRML MDRD: ABNORMAL ML/MIN/{1.73_M2}
GLUCOSE BLD-MCNC: 88 MG/DL (ref 75–110)
GLUCOSE BLD-MCNC: 95 MG/DL (ref 70–99)
INR BLD: 2.5
POTASSIUM SERPL-SCNC: 4.3 MMOL/L (ref 3.7–5.3)
PROTHROMBIN TIME: 24.7 SEC (ref 9–12)
SODIUM BLD-SCNC: 137 MMOL/L (ref 135–144)

## 2019-06-20 PROCEDURE — 85610 PROTHROMBIN TIME: CPT

## 2019-06-20 PROCEDURE — 36415 COLL VENOUS BLD VENIPUNCTURE: CPT

## 2019-06-20 PROCEDURE — 6370000000 HC RX 637 (ALT 250 FOR IP): Performed by: EMERGENCY MEDICINE

## 2019-06-20 PROCEDURE — 82947 ASSAY GLUCOSE BLOOD QUANT: CPT

## 2019-06-20 PROCEDURE — 6370000000 HC RX 637 (ALT 250 FOR IP): Performed by: STUDENT IN AN ORGANIZED HEALTH CARE EDUCATION/TRAINING PROGRAM

## 2019-06-20 PROCEDURE — 2580000003 HC RX 258: Performed by: NURSE PRACTITIONER

## 2019-06-20 PROCEDURE — 2060000000 HC ICU INTERMEDIATE R&B

## 2019-06-20 PROCEDURE — 80048 BASIC METABOLIC PNL TOTAL CA: CPT

## 2019-06-20 PROCEDURE — 99233 SBSQ HOSP IP/OBS HIGH 50: CPT | Performed by: INTERNAL MEDICINE

## 2019-06-20 PROCEDURE — 97116 GAIT TRAINING THERAPY: CPT

## 2019-06-20 RX ORDER — WARFARIN SODIUM 3 MG/1
6 TABLET ORAL DAILY
Status: DISCONTINUED | OUTPATIENT
Start: 2019-06-20 | End: 2019-06-21

## 2019-06-20 RX ADMIN — BACITRACIN: 500 OINTMENT TOPICAL at 21:00

## 2019-06-20 RX ADMIN — OXYCODONE HYDROCHLORIDE 10 MG: 5 TABLET ORAL at 13:58

## 2019-06-20 RX ADMIN — Medication 10 ML: at 09:41

## 2019-06-20 RX ADMIN — Medication 10 ML: at 21:00

## 2019-06-20 RX ADMIN — WARFARIN SODIUM 6 MG: 3 TABLET ORAL at 18:23

## 2019-06-20 RX ADMIN — ATENOLOL 25 MG: 25 TABLET ORAL at 09:40

## 2019-06-20 RX ADMIN — BACITRACIN: 500 OINTMENT TOPICAL at 13:34

## 2019-06-20 RX ADMIN — TOPIRAMATE 100 MG: 200 TABLET, FILM COATED ORAL at 09:39

## 2019-06-20 RX ADMIN — OXYCODONE HYDROCHLORIDE 10 MG: 5 TABLET ORAL at 09:39

## 2019-06-20 RX ADMIN — BACITRACIN: 500 OINTMENT TOPICAL at 09:41

## 2019-06-20 RX ADMIN — OXYCODONE HYDROCHLORIDE 10 MG: 5 TABLET ORAL at 03:38

## 2019-06-20 RX ADMIN — PREDNISONE 10 MG: 10 TABLET ORAL at 09:40

## 2019-06-20 RX ADMIN — OXYCODONE HYDROCHLORIDE 10 MG: 5 TABLET ORAL at 18:57

## 2019-06-20 ASSESSMENT — PAIN SCALES - GENERAL
PAINLEVEL_OUTOF10: 8
PAINLEVEL_OUTOF10: 10
PAINLEVEL_OUTOF10: 9
PAINLEVEL_OUTOF10: 4
PAINLEVEL_OUTOF10: 7
PAINLEVEL_OUTOF10: 8
PAINLEVEL_OUTOF10: 8

## 2019-06-20 ASSESSMENT — PAIN DESCRIPTION - PROGRESSION
CLINICAL_PROGRESSION: GRADUALLY IMPROVING
CLINICAL_PROGRESSION: NOT CHANGED
CLINICAL_PROGRESSION: GRADUALLY IMPROVING

## 2019-06-20 ASSESSMENT — PAIN - FUNCTIONAL ASSESSMENT: PAIN_FUNCTIONAL_ASSESSMENT: ACTIVITIES ARE NOT PREVENTED

## 2019-06-20 ASSESSMENT — PAIN DESCRIPTION - LOCATION
LOCATION: BACK
LOCATION: BACK

## 2019-06-20 ASSESSMENT — PAIN DESCRIPTION - ORIENTATION: ORIENTATION: LOWER

## 2019-06-20 ASSESSMENT — PAIN DESCRIPTION - DESCRIPTORS: DESCRIPTORS: ACHING

## 2019-06-20 ASSESSMENT — PAIN DESCRIPTION - FREQUENCY: FREQUENCY: INTERMITTENT

## 2019-06-20 ASSESSMENT — PAIN DESCRIPTION - ONSET: ONSET: GRADUAL

## 2019-06-20 ASSESSMENT — PAIN DESCRIPTION - PAIN TYPE: TYPE: CHRONIC PAIN

## 2019-06-20 NOTE — PROGRESS NOTES
[facility]  Internal Medicine Residency Program  Inpatient Daily Progress Note  ______________________________________________________________________________    Patient: Nancy Velasquez  YOB: 1975   MRN: [id number]    Acct: [de-identified]     Admit date: 6/11/2019  Today's date: 06/20/19  Number of days in the hospital: 9  Expected Discharge Date: 06/25/19    Admitting Diagnosis: ELIE sec to Rhabdomyolysis    Subjective:   Pt seen and examined bedside. He was AAOx3. VS stable. Denies any active complaints currently. His RF is improving. Urine output excellent-2.1l/24hrs. Nephrology following. Last HD on 6/15. Not requiring HD for now. Currently he is eating, drinking and working with physical therapy. No other new acute issues overnight.       Objective:   Vital Sign:  /79   Pulse 70   Temp 98.7 °F (37.1 °C) (Temporal)   Resp 16   Ht 6' (1.829 m)   Wt 296 lb 1.6 oz (134.3 kg)   SpO2 93%   BMI 40.16 kg/m²       Physical Exam:  General appearance:   alert, well appearing, and in no distress  Mental Status: alert, oriented to person, place, and time  Neurologic:  alert, oriented, normal speech, no focal findings or movement disorder noted  Lungs:  clear to auscultation, no wheezes, rales or rhonchi, symmetric air entry  Heart[de-identified] normal rate, regular rhythm, normal S1, S2, no murmurs, rubs, clicks or gallops  Abdomen:  soft, nontender, nondistended, no masses or organomegaly  Extremities: peripheral pulses normal, no pedal edema, no clubbing or cyanosis   Skin: normal coloration and turgor, no rashes, no suspicious skin lesions noted    Medications:  Scheduled Medications   atenolol  25 mg Oral Daily    predniSONE  10 mg Oral Daily    topiramate  100 mg Oral BID    bacitracin   Topical TID    warfarin (COUMADIN) daily dosing (placeholder)   Other RX Placeholder    sodium chloride flush  10 mL Intravenous 2 times per day       PRN Medications    oxyCODONE 5 mg Q6H PRN   Or     oxyCODONE 10 mg Q6H PRN   hydrALAZINE 10 mg Q6H PRN   potassium chloride 20 mEq PRN   heparin (porcine) 1,700 Units PRN   heparin (porcine) 1,600 Units PRN   magnesium hydroxide 30 mL Daily PRN   ondansetron 4 mg Q6H PRN   acetaminophen 650 mg Q4H PRN   sodium chloride flush 10 mL PRN   glucose 15 g PRN   dextrose 12.5 g PRN   glucagon (rDNA) 1 mg PRN   dextrose 100 mL/hr PRN   albuterol 2.5 mg As Directed RT PRN   ipratropium-albuterol 1 ampule Q4H PRN       Diagnostic Labs and Imaging:  CBC:  Recent Labs     06/18/19  0455 06/19/19  0611   WBC 6.6 6.6   HGB 10.5* 10.2*   * 122*     BMP:   Recent Labs     06/18/19  1800 06/19/19  0611 06/19/19  1734    142 138   K 4.0 4.0 4.4    106 100   CO2 22 21 21   BUN 59* 61* 62*   CREATININE 4.77* 4.79* 4.26*   GLUCOSE 113* 99 130*     Hepatic: No results for input(s): AST, ALT, ALB, BILITOT, ALKPHOS in the last 72 hours. Assessment and Plan:   1. ELIE secondary to Rhabdomyolysis  CKD stage III with Baseline cr 1.4-1.6. RF improving. Urine output excellent 2.1l/24hrs. Nephrology following. Last HD on 6/15. Strict I and O. Daily BMP. 2. Anion gap metabolic acidosis-resolved     3. Toxic metabolic encephalopathy secondary to drug use-resolved  Currently AAO x 3.      4. Acute respiratory failure s/p extubation 6/16- resolved     5. Hypovolemic shock 2/2 volume depletion, poor oral intake and drug use-resolved  Off levophed. Currently B.P in normal range.     6.Coagulopathy, elevated INR -resolving   Antiphospholipid antibody syndrome  Currently on pred 10 daily and coumadin. Pharmacy to dose. Hb stable at 10.     7. Multiple TIAs in past      8. Hypertension   Currently B.P in normal range.     9. Polysubstance abuse  U tox positive on admission. Psychiatry evaluated and recommended O/P follow up. .    10. Seizures in past  On Topamax 100 mg BID.     Diet- Gen diet  DVT px-on coumadin  PT/OT following  Case manager following for discharge planning.     Sandy Casey MD      Department of Internal Medicine  [facility]         6/20/2019, 9:07 AM    Attending Physician Statement  I have discussed the care of Berenice Watson, including pertinent history and exam findings with the resident. I have reviewed the key elements of all parts of the encounter with the resident. I have seen and examined the patient with the resident and the key elements of all parts of the encounter have been performed by me.         Tim Bernal MD  Attending and Faculty Internal Medicine  [address] [place] [address]  Internal Medicine [address] [place] [address], [facility]   6/20/19

## 2019-06-20 NOTE — PROGRESS NOTES
Physical Therapy  Facility/Department: [address] STEPDOWN  Daily Treatment Note  NAME: Katy Rdo  : 1975  MRN: [id number]    Date of Service: 2019    Discharge Recommendations:  Patient would benefit from continued therapy after discharge   PT Equipment Recommendations  Other: TBD, pt unsafe to amb w/o RW and min A    Assessment   Body structures, Functions, Activity limitations: Decreased functional mobility ; Decreased endurance;Decreased balance;Decreased strength;Decreased safe awareness  Assessment: Pt requires physical assistance to complete functional mobility tasks. Pt displays increased tolerance to ambulation this date however is currently unsafe to attempt ambulation without physical assistance. Pt easily agitated. Prognosis: Good  Decision Making: Medium Complexity  Patient Education: Safety; use of walker; importance of mobility;proper transfer techniques  REQUIRES PT FOLLOW UP: Yes  Activity Tolerance  Activity Tolerance: Patient limited by fatigue;Patient limited by endurance     Patient Diagnosis(es): There were no encounter diagnoses. has a past medical history of Antiphospholipid antibody with hemorrhagic disorder ([facility] Arthritis, Asthma, Bronchitis, Cerebral artery occlusion with cerebral infarction [facility]), Disease of blood and blood forming organ, Hypertension, Long-term (current) use of anticoagulants, INR goal 2.5-3.5, Pleurisy, Pneumonia, and Seizures ([facility] has a past surgical history that includes Cardiac catheterization. Restrictions  Restrictions/Precautions  Restrictions/Precautions: Fall Risk, General Precautions  Required Braces or Orthoses?: No  Position Activity Restriction  Other position/activity restrictions: up with assistance; seizure precautions. Subjective   General  Response To Previous Treatment: Patient with no complaints from previous session. Family / Caregiver Present: No  Subjective  Subjective: Pt and RN agreeable to PT this morning.  Pt Patient goals : .    Plan    Plan  Times per week: 5-6x/wk  Current Treatment Recommendations: Strengthening, Patient/Caregiver Education & Training, Gait Training, ROM, Equipment Evaluation, Education, & procurement, Stair training, Balance Training, Functional Mobility Training, Endurance Training, Transfer Training, Safety Education & Training  Safety Devices  Type of devices:  All fall risk precautions in place, Call light within reach, Chair alarm in place, Patient at risk for falls, Gait belt, Left in chair  Restraints  Initially in place: No     Therapy Time   Individual Concurrent Group Co-treatment   Time In 1434         Time Out 1446         Minutes 12         Timed Code Treatment Minutes: 12 Minutes       Ellen Rivera, PT

## 2019-06-20 NOTE — PROGRESS NOTES
Pt refused to have HD line pulled. He is ignoring writer and refusing to cooperate. Will continue to educate pt.  Serenity Gerard RN

## 2019-06-20 NOTE — PROGRESS NOTES
Pharmacy Note  Warfarin Consult follow-up      Recent Labs     06/20/19  0945   INR 2.5     Recent Labs     06/18/19  0455 06/19/19  0611   HGB 10.5* 10.2*   HCT 34.1* 33.8*   * 122*       Significant Drug-Drug Interactions:  New warfarin drug-drug interactions: none  Discontinued drug-drug interactions: none  Current warfarin drug-drug interactions:  acetaminophen,  prednisone    Date INR Dose   6/11/19 6.2 Held   6/12/19 9.9 Vitamin K 5mg IV given, held warfain   6/13/19 1.2 10 mg   6/14/19 1.3 10 mg   6.15.19 3.0 Hold   6.16.19 3.4 7.5 mg   6/17/19 3.4 7.5mg   6/18/ 4.7 HOLD   6/19/19 3.6 5mg   6/20/19 2.5 6 mg       Notes:      INR is in range but dropped significantly, will order warfarin 6 mg daily. Daily PT/INR while inpatient. [address] [place] [address]  Ph., CACP, Clinical Pharmacist  Anticoagulation Services, [facility]  6/20/2019  2:17 PM

## 2019-06-20 NOTE — DISCHARGE INSTR - COC
Continuity of Care Form    Patient Name: Anival Fish   :  1975  MRN:  [id number]    Admit date:  2019  Discharge date:  ***    Code Status Order: Full Code   Advance Directives:   Advance Care Flowsheet Documentation     Date/Time Healthcare Directive Type of Healthcare Directive Copy in [address] Agent's Name Healthcare Agent's Phone Number    19 1629  Yes, patient has an advance directive for healthcare treatment  Health care treatment directive  No, copy requested from family  -- Mother  --  --          Admitting Physician:  Curtis Del Cid MD  PCP: Odilon Hernandez CNP, APRN - CNP    Discharging Nurse: [facility] Unit/Room#: 8894/5164-44  Discharging Unit Phone Number: ***    Emergency Contact:   Extended Emergency Contact Information  Primary Emergency Contact: Cathy Clintwood  Address[address] Phone: [phone]  Work Phone: [phone]  Mobile Phone: [phone]  Relation: Parent  Secondary Emergency Contact: Agnes Filemon  Address: [address] [place] [address] Phone: [phone]  Mobile Phone: [phone]  Relation: Spouse    Past Surgical History:  Past Surgical History:   Procedure Laterality Date    CARDIAC CATHETERIZATION         Immunization History: There is no immunization history on file for this patient.     Active Problems:  Patient Active Problem List   Diagnosis Code    Antiphospholipid antibody syndrome ([facility] D68.61    Long term current use of anticoagulant therapy Z79.01    Substance abuse ([facility] F19.10    ELIE (acute kidney injury) ([facility] N17.9    Elevated transaminase level R74.0    Leukocytosis D72.829    Seizures ([facility] R56.9    Hypertension I10    Asthma J45.909    Cerebral artery occlusion with cerebral infarction (HCC) I63.50    Unresponsive episode R41.89    Seizure disorder (HCC) G40.909    Coagulopathy ([facility] D68.9  Unresponsive R41.89    Drug overdose T50.901A    Rhabdomyolysis M62.82    Morbid obesity (HCC) E66.01    Supratherapeutic INR R79.1    Opioid use disorder, severe, in early remission, dependence ([facility]) F11.21    Amphetamine use disorder, mild ([facility]) F15.10    Cocaine use disorder ([facility]) F14.10    Acute kidney injury ([address] N17.9       Isolation/Infection:   Isolation          No Isolation            Nurse Assessment:  Last Vital Signs: /79   Pulse 70   Temp 98.7 °F (37.1 °C) (Temporal)   Resp 16   Ht 6' (1.829 m)   Wt 296 lb 1.6 oz (134.3 kg)   SpO2 93%   BMI 40.16 kg/m²     Last documented pain score (0-10 scale): Pain Level: 9  Last Weight:   Wt Readings from Last 1 Encounters:   06/18/19 296 lb 1.6 oz (134.3 kg)     Mental Status:  {IP PT MENTAL STATUS:64836}    IV Access:  { MOE IV ACCESS:781436299}    Nursing Mobility/ADLs:  Walking   {CHP DME DKOS:668333686}  Transfer  {P DME RZLL:094151260}  Bathing  {CHP DME ZZRS:088003084}  Dressing  {CHP DME ZZKO:073735044}  Toileting  {P DME PGPV:667901834}  Feeding  {P DME VQBD:199667475}  Med Admin  {P DME DXPI:902264318}  Med Delivery   { MOE MED Delivery:685618418}    Wound Care Documentation and Therapy:  Wound 06/11/19 Knee Right (Active)   Wound Image   6/12/2019  1:45 PM   Wound Traumatic 6/19/2019  4:00 PM   Dressing/Treatment Open to air; Antibacterial Ointment 6/20/2019  4:00 AM   Wound Cleansed Not Cleansed 6/12/2019  8:00 AM   Wound Length (cm) 7 cm 6/19/2019 12:53 PM   Wound Width (cm) 3 cm 6/19/2019 12:53 PM   Wound Surface Area (cm^2) 21 cm^2 6/19/2019 12:53 PM   Wound Assessment Dry;Clean 6/20/2019  4:00 AM   Drainage Amount None 6/20/2019  4:00 AM   Odor None 6/20/2019  4:00 AM   Margins Attached edges 6/14/2019  5:26 PM   Louise-wound Assessment Intact; Pink 6/20/2019  4:00 AM   Number of days: 9       Wound 06/11/19 Head Mid (Active)   Wound Traumatic 6/18/2019  8:00 PM   Dressing Status Other (Comment) 6/19/2019  4:00 AM Dressing/Treatment Open to air 6/20/2019  4:00 AM   Wound Assessment Dry;Red;Purple 6/20/2019  4:00 AM   Drainage Amount None 6/20/2019  4:00 AM   Odor None 6/20/2019  4:00 AM   Number of days: 9       Wound 06/11/19 Thigh Distal;Right;Posterior Large swollen bruised area (Active)   Wound Image   6/12/2019  1:45 PM   Wound Deep tissue/Injury 6/19/2019  4:00 PM   Dressing Status Clean;Dry; Intact 6/20/2019  4:00 AM   Dressing/Treatment Foam 6/20/2019  4:00 AM   Wound Cleansed Not Cleansed 6/12/2019  4:00 PM   Wound Length (cm) 3.5 cm 6/12/2019  1:45 PM   Wound Width (cm) 14 cm 6/12/2019  1:45 PM   Wound Depth (cm) 0 cm 6/12/2019  1:45 PM   Wound Surface Area (cm^2) 49 cm^2 6/12/2019  1:45 PM   Wound Volume (cm^3) 0 cm^3 6/12/2019  1:45 PM   Wound Assessment MIC 6/20/2019  4:00 AM   Drainage Amount Scant 6/20/2019  4:00 AM   Drainage Description Sanguinous 6/20/2019  4:00 AM   Odor None 6/20/2019  4:00 AM   Margins Defined edges 6/15/2019  4:00 AM   Louise-wound Assessment Dry; Intact 6/20/2019  4:00 AM   Red%Wound Bed 100 6/19/2019 12:53 PM   Number of days: 8       Wound 06/12/19 Foot Lateral;Right (Active)   Wound Deep tissue/Injury 6/19/2019  4:00 PM   Dressing/Treatment Open to air 6/20/2019  4:00 AM   Wound Length (cm) 2 cm 6/19/2019 12:53 PM   Wound Width (cm) 2 cm 6/19/2019 12:53 PM   Wound Depth (cm) 0 cm 6/19/2019 12:53 PM   Wound Surface Area (cm^2) 4 cm^2 6/19/2019 12:53 PM   Change in Wound Size % (l*w) 80 6/19/2019 12:53 PM   Wound Volume (cm^3) 0 cm^3 6/19/2019 12:53 PM   Wound Assessment Dry; Intact; Red;Purple 6/20/2019  4:00 AM   Drainage Amount None 6/20/2019  4:00 AM   Odor None 6/20/2019  4:00 AM   Number of days: 7       Wound 06/12/19 Ischium Right (Active)   Wound Traumatic 6/18/2019  8:00 PM   Dressing/Treatment Protective barrier 6/20/2019  4:00 AM   Wound Length (cm) 8 cm 6/12/2019  1:45 PM   Wound Width (cm) 8 cm 6/12/2019  1:45 PM   Wound Depth (cm) 0 cm 6/12/2019  1:45 PM   Wound Surface Area (cm^2) 64 cm^2 2019  1:45 PM   Wound Volume (cm^3) 0 cm^3 2019  1:45 PM   Wound Assessment Dry;Red;Non-blanchable erythema;Maroon 2019  4:00 AM   Drainage Amount None 2019  4:00 AM   Odor None 2019  4:00 AM   Louise-wound Assessment Dry; Intact; Blanchable erythema 2019  4:00 AM   Number of days: 7       Wound 19 Abdomen Outer;Right (Active)   Wound Traumatic 2019  4:00 PM   Dressing Status Clean;Dry; Intact 2019  4:00 AM   Dressing/Treatment Foam 2019  4:00 AM   Wound Assessment Purple;Red 2019  4:00 AM   Drainage Amount None 2019  4:00 AM   Odor None 2019  4:00 AM   Number of days: 3        Elimination:  Continence:   · Bowel: {YES / XE:97890}  · Bladder: {YES / ID:83155}  Urinary Catheter: {Urinary Catheter:778161528}   Colostomy/Ileostomy/Ileal Conduit: {YES / BA:12500}       Date of Last BM: ***    Intake/Output Summary (Last 24 hours) at 2019 0937  Last data filed at 2019 0600  Gross per 24 hour   Intake 2520 ml   Output 2100 ml   Net 420 ml     I/O last 3 completed shifts:   In: 2520 [P.O.:2480; I.V.:40]  Out: 2100 [Urine:2100]    Safety Concerns:     [address] Safety Concerns:921725379}    Impairments/Disabilities:      [address] Impairments/Disabilities:862815019}    Nutrition Therapy:  Current Nutrition Therapy:   [address] Diet List:329872590}    Routes of Feeding: {CHP DME Other Feedings:290979561}  Liquids: {Slp liquid thickness:06717}  Daily Fluid Restriction: {CHP DME Yes amt example:773877773}  Last Modified Barium Swallow with Video (Video Swallowing Test): {Done Not Done FWHQ:231974968}    Treatments at the Time of Hospital Discharge:   Respiratory Treatments: ***  Oxygen Therapy:  {Therapy; copd oxygen:01486}  Ventilator:    {MH CC Vent HJAY:053691311}    Rehab Therapies: {THERAPEUTIC INTERVENTION:6332668181}  Weight Bearing Status/Restrictions: [address] Weight Bearin}  Other Medical Equipment (for information only, NOT a DME order):  {EQUIPMENT:506284156}  Other Treatments: ***    Patient's personal belongings (please select all that are sent with patient):  {CHP DME Belongings:333613961}    RN SIGNATURE:  {Esignature:323366788}    CASE MANAGEMENT/SOCIAL WORK SECTION    Inpatient Status Date: ***    Readmission Risk Assessment Score:  Readmission Risk              Risk of Unplanned Readmission:        30           Discharging to Facility/ Agency   · Name:   · Address:  · Phone:  · Fax:    Dialysis Facility (if applicable)   · Name:  · Address:  · Dialysis Schedule:  · Phone:  · Fax:    / signature: {Esignature:175599979}    PHYSICIAN SECTION    Prognosis: Fair    Condition at Discharge: Stable    Rehab Potential (if transferring to Rehab): Fair    Recommended Labs or Other Treatments After Discharge: Bmp in 3 days, f/u with nephrology as O/P, PCP visit in 1 week, f/u with psychiatry, hemonc and neurology as O/P    Physician Certification: I certify the above information and transfer of Berenice Watson  is necessary for the continuing treatment of the diagnosis listed and that he requires [address] for greater 30 days.      Update Admission H&P: No change in H&P    PHYSICIAN SIGNATURE:  Electronically signed by Tim Bernal MD on 6/21/19 at 12:41 PM

## 2019-06-20 NOTE — PROGRESS NOTES
Pt is okay to be discharged from nephrology point of view. Patient does not have transport today and requested to be discharged tomorrow.     Al Villalobos MD      Department of Internal Medicine  [address]          6/20/2019, 4:50 PM

## 2019-06-20 NOTE — PROGRESS NOTES
Renal Progress Note    Patient :  Claire Titus; 38 y.o. MRN# [id number]  Location:  3414/6517-80  Attending:  Shell Scanlon MD  Admit Date:  6/11/2019   Hospital Day: 9      Subjective:     Patient was seen and examined. No new issues overnight. Patient does not report any complaints today. Patient has been doing well and is now currently transferred out of ICU. Urine output documented is 550 cc for last 24 hours. Patient has been refusing treatments off and on per nursing staff. Patient is also refusing any discharge to nursing home or rehab. He states he wants to go home. His serum creatinine has improved to 4.10 mg/DL today. Peak creatinine this admission was 8.83 mg/DL. Baseline creatinine of 1.4 to 1.6 mg/DL. Blood pressure stable. Patient's last hemodialysis treatment was on 6/15/2019 and has not required dialysis since then. Outpatient Medications:     Medications Prior to Admission: gabapentin (NEURONTIN) 600 MG tablet, Take 600 mg by mouth 3 times daily. warfarin (COUMADIN) 7.5 MG tablet, Take 1 tablet 4 days weekly in the evening on Sundays, Tuesdays, Thursdays, and Saturdays or as directed. Managed by [facility]  topiramate (TOPAMAX) 200 MG tablet, Take 100 mg by mouth 2 times daily  atenolol (TENORMIN) 25 MG tablet, Take 25 mg by mouth daily   predniSONE (DELTASONE) 10 MG tablet, Take 10 mg by mouth daily. warfarin (COUMADIN) 10 MG tablet, Take 1 tablet on Mondays, Wednesdays and Fridays or as directed.  Managed by [facility]    Current Medications:     Scheduled Meds:    atenolol  25 mg Oral Daily    predniSONE  10 mg Oral Daily    topiramate  100 mg Oral BID    bacitracin   Topical TID    warfarin (COUMADIN) daily dosing (placeholder)   Other RX Placeholder    sodium chloride flush  10 mL Intravenous 2 times per day     Continuous Infusions:    dextrose       PRN Meds:  oxyCODONE **OR** oxyCODONE, hydrALAZINE, potassium chloride, heparin (porcine), heparin (porcine), magnesium hydroxide, ondansetron, acetaminophen, sodium chloride flush, glucose, dextrose, glucagon (rDNA), dextrose, albuterol, ipratropium-albuterol    Input/Output:       I/O last 3 completed shifts: In: 7424 [P.O.:2480; I.V.:40]  Out: 2100 [Urine:2100]. Patient Vitals for the past 96 hrs (Last 3 readings):   Weight   19 0400 296 lb 1.6 oz (134.3 kg)   19 0500 (!) 313 lb 8 oz (142.2 kg)       Vital Signs:   Temperature:  Temp: 98.7 °F (37.1 °C)  TMax:   Temp (24hrs), Av.2 °F (36.8 °C), Min:97.2 °F (36.2 °C), Max:98.9 °F (37.2 °C)    Respirations:  Resp: 16  Pulse:   Pulse: 70  BP:    BP: 130/79  BP Range: Systolic (33RQY), CPU:026 , Min:122 , MKC:277       Diastolic (96IPR), WUX:75, Min:68, Max:82      Physical Examination:     General:  AAO x 3, speaking in full sentences, no accessory muscle use. HEENT: Atraumatic, normocephalic, no throat congestion, moist mucosa. Eyes:   Pupils equal, round and reactive to light, EOMI. Neck:   Supple  Chest:   Bilateral vesicular breath sounds, no rales or wheezes. Cardiac:  S1 S2 RR, no murmurs, gallops or rubs. Abdomen: Soft, obese, non-tender, no masses or organomegaly, BS audible. :   No suprapubic or flank tenderness. Neuro:  AAO x 3, No FND. SKIN:  No rashes, good skin turgor. Extremities:  No edema.     Labs:       Recent Labs     19  0455 19  0611   WBC 6.6 6.6   RBC 4.22 4.06*   HGB 10.5* 10.2*   HCT 34.1* 33.8*   MCV 80.8* 83.3   MCH 24.9* 25.1*   MCHC 30.8 30.2   RDW 16.1* 15.9*   * 122*   MPV 11.6 11.7      BMP:   Recent Labs     19  1800 19  0611 19  1734    142 138   K 4.0 4.0 4.4    106 100   CO2 22 21 21   BUN 59* 61* 62*   CREATININE 4.77* 4.79* 4.26*   GLUCOSE 113* 99 130*   CALCIUM 8.3* 8.6 8.5*      Magnesium:    Recent Labs     19  0455   MG 1.7     GAGE:      Lab Results   Component Value Date    GAGE  2019 substance abuse. 4. Antiphospholipid antibody syndrome - primary  5. Prednisone use for the last 25 years and on anticoagulation. Bridging to coumadin per primary  6. History of TIA likely due to prolonged hx of APLA.   7. Polysubstance abuse   Positive for cocaine/amphetamine on admission. 8. CKD 3 with baseline creat 1.4-1.6  9. ECHO on 6-11-19 with pEF. Plan:   1. Continue to monitor renal function and strict I's and O's.  2.  Check PVR to rule out retention. Patient has been refusing PVR per nurse. 3.  Okay to DC tunnel catheter. 4.  Patient was recommended to go to rehab for discharge but he refuses. 5.  BMP in 1 week post discharge and fax results to Dr. Rosa Taylor office; Fax # [phone].  6.  Follow up with Dr. Rosa Taylor in 3-4 weeks post d/c. Nutrition   Please ensure that patient is on a renal diet/TF. Avoid nephrotoxic drugs/contrast exposure. We will continue to follow along with you. Hardik Lehman MD  [facility]     This note is created with the assistance of a speech-recognition program. While intending to generate a document that actually reflects the content of the visit, no guarantees can be provided that every mistake has been identified and corrected by editing.

## 2019-06-20 NOTE — PROGRESS NOTES
Patient refusing Blood Glucose check at 1200 as well as PVR. Patent educated on the risks of refusing. Continues to refuse.  Denisse Mix RN

## 2019-06-20 NOTE — PLAN OF CARE
Patient call light in reach, bed in low position, wheels locked, slippers on, environment cleaned up, items in reach, lighting in room, side rails up, reminded patient to call for assistance.  Ayla Reeves RN

## 2019-06-21 VITALS
DIASTOLIC BLOOD PRESSURE: 70 MMHG | HEART RATE: 68 BPM | TEMPERATURE: 97.2 F | SYSTOLIC BLOOD PRESSURE: 135 MMHG | WEIGHT: 296.1 LBS | OXYGEN SATURATION: 97 % | RESPIRATION RATE: 17 BRPM | BODY MASS INDEX: 40.11 KG/M2 | HEIGHT: 72 IN

## 2019-06-21 PROBLEM — M62.82 RHABDOMYOLYSIS: Status: RESOLVED | Noted: 2019-06-11 | Resolved: 2019-06-21

## 2019-06-21 LAB
ABSOLUTE EOS #: 0.11 K/UL (ref 0–0.4)
ABSOLUTE IMMATURE GRANULOCYTE: 0 K/UL (ref 0–0.3)
ABSOLUTE LYMPH #: 0.73 K/UL (ref 1–4.8)
ABSOLUTE MONO #: 0.62 K/UL (ref 0.1–0.8)
ANION GAP SERPL CALCULATED.3IONS-SCNC: 14 MMOL/L (ref 9–17)
BASOPHILS # BLD: 0 % (ref 0–2)
BASOPHILS ABSOLUTE: 0 K/UL (ref 0–0.2)
BUN BLDV-MCNC: 63 MG/DL (ref 6–20)
BUN/CREAT BLD: ABNORMAL (ref 9–20)
CALCIUM SERPL-MCNC: 8.3 MG/DL (ref 8.6–10.4)
CHLORIDE BLD-SCNC: 106 MMOL/L (ref 98–107)
CO2: 18 MMOL/L (ref 20–31)
CREAT SERPL-MCNC: 3.92 MG/DL (ref 0.7–1.2)
DIFFERENTIAL TYPE: ABNORMAL
EOSINOPHILS RELATIVE PERCENT: 2 % (ref 1–4)
GFR AFRICAN AMERICAN: 20 ML/MIN
GFR NON-AFRICAN AMERICAN: 17 ML/MIN
GFR SERPL CREATININE-BSD FRML MDRD: ABNORMAL ML/MIN/{1.73_M2}
GFR SERPL CREATININE-BSD FRML MDRD: ABNORMAL ML/MIN/{1.73_M2}
GLUCOSE BLD-MCNC: 103 MG/DL (ref 75–110)
GLUCOSE BLD-MCNC: 105 MG/DL (ref 70–99)
HCT VFR BLD CALC: 33.5 % (ref 40.7–50.3)
HEMOGLOBIN: 9.8 G/DL (ref 13–17)
IMMATURE GRANULOCYTES: 0 %
INR BLD: 1.8
LYMPHOCYTES # BLD: 13 % (ref 24–44)
MCH RBC QN AUTO: 25.1 PG (ref 25.2–33.5)
MCHC RBC AUTO-ENTMCNC: 29.3 G/DL (ref 28.4–34.8)
MCV RBC AUTO: 85.7 FL (ref 82.6–102.9)
MONOCYTES # BLD: 11 % (ref 1–7)
MORPHOLOGY: ABNORMAL
NRBC AUTOMATED: 0 PER 100 WBC
PARTIAL THROMBOPLASTIN TIME: 39.3 SEC (ref 20.5–30.5)
PDW BLD-RTO: 16 % (ref 11.8–14.4)
PLATELET # BLD: 163 K/UL (ref 138–453)
PLATELET ESTIMATE: ABNORMAL
PMV BLD AUTO: 11.4 FL (ref 8.1–13.5)
POTASSIUM SERPL-SCNC: 4.2 MMOL/L (ref 3.7–5.3)
PROTHROMBIN TIME: 17.9 SEC (ref 9–12)
RBC # BLD: 3.91 M/UL (ref 4.21–5.77)
RBC # BLD: ABNORMAL 10*6/UL
SEG NEUTROPHILS: 74 % (ref 36–66)
SEGMENTED NEUTROPHILS ABSOLUTE COUNT: 4.14 K/UL (ref 1.8–7.7)
SODIUM BLD-SCNC: 138 MMOL/L (ref 135–144)
WBC # BLD: 5.6 K/UL (ref 3.5–11.3)
WBC # BLD: ABNORMAL 10*3/UL

## 2019-06-21 PROCEDURE — 6370000000 HC RX 637 (ALT 250 FOR IP): Performed by: EMERGENCY MEDICINE

## 2019-06-21 PROCEDURE — 82947 ASSAY GLUCOSE BLOOD QUANT: CPT

## 2019-06-21 PROCEDURE — 85730 THROMBOPLASTIN TIME PARTIAL: CPT

## 2019-06-21 PROCEDURE — 6370000000 HC RX 637 (ALT 250 FOR IP): Performed by: STUDENT IN AN ORGANIZED HEALTH CARE EDUCATION/TRAINING PROGRAM

## 2019-06-21 PROCEDURE — 99233 SBSQ HOSP IP/OBS HIGH 50: CPT | Performed by: INTERNAL MEDICINE

## 2019-06-21 PROCEDURE — 85610 PROTHROMBIN TIME: CPT

## 2019-06-21 PROCEDURE — 80048 BASIC METABOLIC PNL TOTAL CA: CPT

## 2019-06-21 PROCEDURE — 36415 COLL VENOUS BLD VENIPUNCTURE: CPT

## 2019-06-21 PROCEDURE — 85025 COMPLETE CBC W/AUTO DIFF WBC: CPT

## 2019-06-21 RX ORDER — WARFARIN SODIUM 10 MG/1
10 TABLET ORAL
Status: DISCONTINUED | OUTPATIENT
Start: 2019-06-21 | End: 2019-06-21 | Stop reason: HOSPADM

## 2019-06-21 RX ORDER — WARFARIN SODIUM 10 MG/1
TABLET ORAL
Qty: 30 TABLET | Refills: 0 | Status: SHIPPED | OUTPATIENT
Start: 2019-06-21 | End: 2019-07-18 | Stop reason: DRUGHIGH

## 2019-06-21 RX ORDER — GINSENG 100 MG
CAPSULE ORAL
Qty: 1 TUBE | Refills: 0 | Status: SHIPPED | OUTPATIENT
Start: 2019-06-21 | End: 2019-06-30

## 2019-06-21 RX ADMIN — TOPIRAMATE 100 MG: 200 TABLET, FILM COATED ORAL at 09:28

## 2019-06-21 RX ADMIN — ATENOLOL 25 MG: 25 TABLET ORAL at 09:27

## 2019-06-21 RX ADMIN — OXYCODONE HYDROCHLORIDE 10 MG: 5 TABLET ORAL at 06:59

## 2019-06-21 RX ADMIN — BACITRACIN: 500 OINTMENT TOPICAL at 09:28

## 2019-06-21 RX ADMIN — PREDNISONE 10 MG: 10 TABLET ORAL at 09:28

## 2019-06-21 RX ADMIN — OXYCODONE HYDROCHLORIDE 10 MG: 5 TABLET ORAL at 00:31

## 2019-06-21 ASSESSMENT — PAIN SCALES - GENERAL
PAINLEVEL_OUTOF10: 7
PAINLEVEL_OUTOF10: 5
PAINLEVEL_OUTOF10: 8

## 2019-06-21 NOTE — PROGRESS NOTES
[facility]  Internal Medicine Residency Program  Inpatient Daily Progress Note  ______________________________________________________________________________    Patient: Nydia Wolff  YOB: 1975   MRN: [id number]    Acct: [de-identified]     Admit date: 6/11/2019  Today's date: 06/21/19  Number of days in the hospital: 10  Expected Discharge Date: 06/25/19    Admitting Diagnosis: ELIE sec to Rhabdomyolysis    Subjective:   Pt seen and examined bedside. He was AAOx3. VS stable. Denies any active complaints currently. His RF is improving. Urine output excellent-1.5l/24hrs. F/u BMP this am.   Nephrology following. Tunnel cath d/c. Pt is stable to be d/c home today(per patient wishes). No other new acute issues overnight.       Objective:   Vital Sign:  /70   Pulse 66   Temp 98.7 °F (37.1 °C) (Axillary)   Resp 15   Ht 6' (1.829 m)   Wt 296 lb 1.6 oz (134.3 kg)   SpO2 97%   BMI 40.16 kg/m²       Physical Exam:  General appearance:   alert, well appearing, and in no distress  Mental Status: alert, oriented to person, place, and time  Neurologic:  alert, oriented, normal speech, no focal findings or movement disorder noted  Lungs:  clear to auscultation, no wheezes, rales or rhonchi, symmetric air entry  Heart[de-identified] normal rate, regular rhythm, normal S1, S2, no murmurs, rubs, clicks or gallops  Abdomen:  soft, nontender, nondistended, no masses or organomegaly  Extremities: peripheral pulses normal, no pedal edema, no clubbing or cyanosis   Skin: normal coloration and turgor, no rashes, no suspicious skin lesions noted    Medications:  Scheduled Medications   warfarin  6 mg Oral Daily    atenolol  25 mg Oral Daily    predniSONE  10 mg Oral Daily    topiramate  100 mg Oral BID    bacitracin   Topical TID    warfarin (COUMADIN) daily dosing (placeholder)   Other RX Placeholder    sodium chloride flush  10 mL Intravenous 2 times per day       PRN diet  DVT px-on coumadin  PT/OT following   following for discharge planning.     Misbah Resendiz MD      Department of Internal Medicine  [facility]         6/21/2019, 7:14 AM    Attending Physician Statement  I have discussed the care of Wilton Shen, including pertinent history and exam findings with the resident. I have reviewed the key elements of all parts of the encounter with the resident. I have seen and examined the patient with the resident and the key elements of all parts of the encounter have been performed by me.         Areli Franks MD  Attending and Faculty Internal Medicine  [address] [place] [address]  Internal Medicine [address] [place] [address]   6/21/19

## 2019-06-21 NOTE — PLAN OF CARE
Problem: Falls - Risk of:  Goal: Will remain free from falls  Description  Will remain free from falls  6/20/2019 1511 by Llai Ruiz RN  Outcome: Met This Shift     Problem: Injury - Risk of, Physical Injury:  Goal: Will remain free from falls  Description  Will remain free from falls  6/20/2019 1511 by Lali Ruiz RN  Outcome: Met This Shift     Problem: Pain:  Goal: Pain level will decrease  Description  Pain level will decrease  Outcome: Ongoing

## 2019-06-21 NOTE — PROGRESS NOTES
Pharmacy Note  Warfarin Consult follow-up      Recent Labs     06/21/19  0821   INR 1.8     Recent Labs     06/19/19  0611 06/21/19  0743   HGB 10.2* 9.8*   HCT 33.8* 33.5*   * 163       Significant Drug-Drug Interactions:  New warfarin drug-drug interactions: None  Discontinued drug-drug interactions: None  Current warfarin drug-drug interactions:  acetaminophen,  prednisone    Date INR Dose   6/11/19 6.2 Held   6/12/19 9.9 Vitamin K 5 mg IV given, held warfain   6/13/19 1.2 10 mg   6/14/19 1.3 10 mg   6/15/19 3.0 Held   6/16/19 3.4 7.5 mg   6/17/19 3.4 7.5 mg   6/18/19 4.7 Held   6/19/19 3.6 5 mg   6/20/19 2.5 6 mg   6/21/19 1.8 10 mg       Notes: Patient's INR subtherapeutic and trending downwards, most likely due to reduced doses from home regimen and held doses, resume home dose of 10 mg today. Daily PT/INR while inpatient.

## 2019-06-28 ENCOUNTER — HOSPITAL ENCOUNTER (OUTPATIENT)
Age: 44
Discharge: HOME | End: 2019-06-28
Payer: MEDICARE

## 2019-06-28 DIAGNOSIS — D68.61 ANTIPHOSPHOLIPID ANTIBODY SYNDROME (HCC): ICD-10-CM

## 2019-06-28 LAB
ANION GAP SERPL CALCULATED.3IONS-SCNC: 18 MMOL/L (ref 9–17)
BUN BLDV-MCNC: 40 MG/DL (ref 6–20)
BUN/CREAT BLD: 13 (ref 9–20)
CALCIUM SERPL-MCNC: 9.7 MG/DL (ref 8.6–10.4)
CHLORIDE BLD-SCNC: 105 MMOL/L (ref 98–107)
CO2: 17 MMOL/L (ref 20–31)
CREAT SERPL-MCNC: 3.07 MG/DL (ref 0.7–1.2)
GFR AFRICAN AMERICAN: 27 ML/MIN
GFR NON-AFRICAN AMERICAN: 22 ML/MIN
GFR SERPL CREATININE-BSD FRML MDRD: ABNORMAL ML/MIN/{1.73_M2}
GFR SERPL CREATININE-BSD FRML MDRD: ABNORMAL ML/MIN/{1.73_M2}
GLUCOSE BLD-MCNC: 98 MG/DL (ref 70–99)
POTASSIUM SERPL-SCNC: 3.7 MMOL/L (ref 3.7–5.3)
SODIUM BLD-SCNC: 140 MMOL/L (ref 135–144)

## 2019-06-28 PROCEDURE — 36415 COLL VENOUS BLD VENIPUNCTURE: CPT

## 2019-06-28 PROCEDURE — 80048 BASIC METABOLIC PNL TOTAL CA: CPT

## 2019-06-28 NOTE — DISCHARGE SUMMARY
started on Zosyn. CXR showed worsening pul edema. During ICU stay patient required Levophed for hemodynamic support and was also intubated due to acute respiratory failure. Hemodialysis was continued in ICU. Later when INR improved and patient was bridged with heparin. He was also started on tube feeds. Orogastric tube was placed. His Xray KUB also showed dilated loops of bowel and general surgery was consulted to rule out  small bowel obstruction. Wound care was also following this patient had deep tissue wounds. He improved over time clinically. Heparin and Zosyn were d/c and pt was extubated on 6/16. He received 3 sessions of HD in ICU last one was 6/14. His RF improved over time and had  excellent urine output. Mentation improved over time. He was continued on Coumadin dose per pharmacy and his hemoglobin remained stable. He was also started on prednisone 10 mg daily as per his home dose for antiphospholipid antibody syndrome. Patient was recommended to go to rehab by time of discharge but he refused and wanted to go home. His tunneled catheter was discontinued and he was discharged home in stable condition with outpatient follow-up with nephrology and Coumadin clinic.           Procedures:  IR guided Cheng cath was placed and HD was started on 6/12  Intubated on extubated on 6/16. Any Hospital Acquired Infections: none    Discharge Functional Status:  stable    Disposition: home    Patient Instructions:   Discharge Medication List as of 6/21/2019 10:40 AM      START taking these medications    Details   bacitracin 500 UNIT/GM ointment Apply topically 2 times daily. , Disp-1 Tube, R-0, Normal         CONTINUE these medications which have CHANGED    Details   warfarin (COUMADIN) 10 MG tablet Take 1 tablet on Mondays, Wednesdays and Fridays or as directed.  Managed by [address] [facility] [address], Disp-30 tablet, R-0Called in new Rx to [facility], [address] at [address] to be delivered to

## 2019-07-01 ENCOUNTER — HOSPITAL ENCOUNTER (OUTPATIENT)
Dept: PHARMACY | Age: 44
Setting detail: THERAPIES SERIES
Discharge: HOME | End: 2019-07-01
Payer: MEDICARE

## 2019-07-01 VITALS — DIASTOLIC BLOOD PRESSURE: 86 MMHG | SYSTOLIC BLOOD PRESSURE: 124 MMHG | HEART RATE: 83 BPM

## 2019-07-01 DIAGNOSIS — Z79.01 LONG TERM CURRENT USE OF ANTICOAGULANT THERAPY: ICD-10-CM

## 2019-07-01 DIAGNOSIS — D68.61 ANTIPHOSPHOLIPID ANTIBODY SYNDROME (HCC): ICD-10-CM

## 2019-07-01 LAB — INR BLD: 1.5

## 2019-07-01 PROCEDURE — 99211 OFF/OP EST MAY X REQ PHY/QHP: CPT

## 2019-07-01 PROCEDURE — 85610 PROTHROMBIN TIME: CPT

## 2019-07-01 RX ORDER — GABAPENTIN 600 MG/1
600 TABLET ORAL 3 TIMES DAILY PRN
COMMUNITY
End: 2019-07-18 | Stop reason: ALTCHOICE

## 2019-07-01 RX ORDER — OXYCODONE HCL 10 MG/1
10 TABLET, FILM COATED, EXTENDED RELEASE ORAL DAILY PRN
COMMUNITY
End: 2019-07-11 | Stop reason: DRUGHIGH

## 2019-07-09 ENCOUNTER — TELEPHONE (OUTPATIENT)
Dept: PHARMACY | Age: 44
End: 2019-07-09

## 2019-07-09 DIAGNOSIS — D68.61 ANTIPHOSPHOLIPID ANTIBODY SYNDROME (HCC): Primary | ICD-10-CM

## 2019-07-09 DIAGNOSIS — Z79.01 LONG TERM CURRENT USE OF ANTICOAGULANT THERAPY: ICD-10-CM

## 2019-07-11 ENCOUNTER — HOSPITAL ENCOUNTER (OUTPATIENT)
Age: 44
Discharge: HOME | End: 2019-07-11
Payer: MEDICARE

## 2019-07-11 ENCOUNTER — HOSPITAL ENCOUNTER (OUTPATIENT)
Dept: MRI IMAGING | Age: 44
Discharge: HOME | End: 2019-07-13
Payer: MEDICARE

## 2019-07-11 ENCOUNTER — HOSPITAL ENCOUNTER (OUTPATIENT)
Dept: PHARMACY | Age: 44
Setting detail: THERAPIES SERIES
Discharge: HOME | End: 2019-07-11
Payer: MEDICARE

## 2019-07-11 DIAGNOSIS — D68.61 ANTIPHOSPHOLIPID ANTIBODY SYNDROME (HCC): ICD-10-CM

## 2019-07-11 DIAGNOSIS — Z79.01 LONG TERM CURRENT USE OF ANTICOAGULANT THERAPY: ICD-10-CM

## 2019-07-11 DIAGNOSIS — M54.16 CHRONIC LUMBAR RADICULOPATHY: ICD-10-CM

## 2019-07-11 LAB
INR BLD: 5
PROTHROMBIN TIME: 45.4 SEC (ref 9–11.6)

## 2019-07-11 PROCEDURE — 36415 COLL VENOUS BLD VENIPUNCTURE: CPT

## 2019-07-11 PROCEDURE — 85610 PROTHROMBIN TIME: CPT

## 2019-07-11 PROCEDURE — 72148 MRI LUMBAR SPINE W/O DYE: CPT

## 2019-07-11 RX ORDER — OXYCODONE HYDROCHLORIDE 20 MG/1
20 TABLET ORAL 4 TIMES DAILY
Refills: 0 | COMMUNITY
Start: 2019-07-01 | End: 2019-09-13 | Stop reason: ALTCHOICE

## 2019-07-11 RX ORDER — WARFARIN SODIUM 7.5 MG/1
TABLET ORAL
Qty: 30 TABLET | Refills: 3 | Status: SHIPPED
Start: 2019-07-11 | End: 2019-07-18 | Stop reason: DRUGHIGH

## 2019-07-11 NOTE — PATIENT INSTRUCTIONS
HOLD x 2 doses, then decrease current dose of warfarin as instructed on dosing calendar provided. Continue to monitor urine and stool for signs and symptoms of bleeding. Please notify the clinic of any medication changes. Please remember to bring all medications (both prescription and OTC) to your next visit. Kindly notify the clinic if you are unable to make to your next appointment.

## 2019-07-11 NOTE — PROGRESS NOTES
Venipuncture INR drawn per [facility] protocol and results called from [address] in Lab and also reported in electronic chart. All communication is via telephone with Kiara Christ, Merrick's mother. She states no visible blood in urine and no black tarry stool. Denies any missed doses of warfarin. No change in other maintenance medications or in diet. Since Merrick's INR is supratherapeutic today and has increased from 1.5 to 5.0 over the last 10 days, we will HOLD x 2 doses, then will decrease current weekly warfarin regimen by 11% and recheck INR in 5 day(s) on Tuesday, 7/16/19. Patient acknowledges working in consult agreement with pharmacist as referred by his/her physician.

## 2019-07-18 ENCOUNTER — HOSPITAL ENCOUNTER (EMERGENCY)
Age: 44
Discharge: HOME | End: 2019-07-18
Attending: FAMILY MEDICINE
Payer: MEDICARE

## 2019-07-18 ENCOUNTER — HOSPITAL ENCOUNTER (OUTPATIENT)
Dept: PHARMACY | Age: 44
Setting detail: THERAPIES SERIES
Discharge: HOME | End: 2019-07-18
Payer: MEDICARE

## 2019-07-18 ENCOUNTER — APPOINTMENT (OUTPATIENT)
Dept: GENERAL RADIOLOGY | Age: 44
End: 2019-07-18
Payer: MEDICARE

## 2019-07-18 VITALS — HEART RATE: 85 BPM | DIASTOLIC BLOOD PRESSURE: 73 MMHG | SYSTOLIC BLOOD PRESSURE: 115 MMHG

## 2019-07-18 VITALS
DIASTOLIC BLOOD PRESSURE: 85 MMHG | SYSTOLIC BLOOD PRESSURE: 129 MMHG | OXYGEN SATURATION: 100 % | TEMPERATURE: 98.2 F | BODY MASS INDEX: 40.63 KG/M2 | RESPIRATION RATE: 18 BRPM | HEART RATE: 83 BPM | HEIGHT: 72 IN | WEIGHT: 300 LBS

## 2019-07-18 DIAGNOSIS — Z79.01 LONG TERM CURRENT USE OF ANTICOAGULANT THERAPY: ICD-10-CM

## 2019-07-18 DIAGNOSIS — D68.61 ANTIPHOSPHOLIPID ANTIBODY SYNDROME (HCC): ICD-10-CM

## 2019-07-18 DIAGNOSIS — S81.801A LEG WOUND, RIGHT, INITIAL ENCOUNTER: ICD-10-CM

## 2019-07-18 DIAGNOSIS — R60.0 BILATERAL LOWER EXTREMITY EDEMA: Primary | ICD-10-CM

## 2019-07-18 DIAGNOSIS — R79.1 ELEVATED INR: ICD-10-CM

## 2019-07-18 LAB
-: ABNORMAL
ABSOLUTE EOS #: 0.3 K/UL (ref 0–0.4)
ABSOLUTE IMMATURE GRANULOCYTE: ABNORMAL K/UL (ref 0–0.3)
ABSOLUTE LYMPH #: 1.3 K/UL (ref 1–4.8)
ABSOLUTE MONO #: 0.5 K/UL (ref 0–1)
ALBUMIN SERPL-MCNC: 3 G/DL (ref 3.5–5.2)
ALBUMIN/GLOBULIN RATIO: ABNORMAL (ref 1–2.5)
ALP BLD-CCNC: 71 U/L (ref 40–129)
ALT SERPL-CCNC: 10 U/L (ref 5–41)
AMORPHOUS: ABNORMAL
ANION GAP SERPL CALCULATED.3IONS-SCNC: 13 MMOL/L (ref 9–17)
AST SERPL-CCNC: 12 U/L
BACTERIA: ABNORMAL
BASOPHILS # BLD: 1 % (ref 0–2)
BASOPHILS ABSOLUTE: 0.1 K/UL (ref 0–0.2)
BILIRUB SERPL-MCNC: 0.29 MG/DL (ref 0.3–1.2)
BILIRUBIN URINE: NEGATIVE
BUN BLDV-MCNC: 21 MG/DL (ref 6–20)
BUN/CREAT BLD: 13 (ref 9–20)
CALCIUM SERPL-MCNC: 8.6 MG/DL (ref 8.6–10.4)
CASTS UA: ABNORMAL /LPF
CHLORIDE BLD-SCNC: 106 MMOL/L (ref 98–107)
CO2: 20 MMOL/L (ref 20–31)
COLOR: YELLOW
COMMENT UA: ABNORMAL
CREAT SERPL-MCNC: 1.66 MG/DL (ref 0.7–1.2)
CRYSTALS, UA: ABNORMAL /HPF
DIFFERENTIAL TYPE: ABNORMAL
EOSINOPHILS RELATIVE PERCENT: 4 % (ref 0–5)
EPITHELIAL CELLS UA: ABNORMAL /HPF
GFR AFRICAN AMERICAN: 55 ML/MIN
GFR NON-AFRICAN AMERICAN: 45 ML/MIN
GFR SERPL CREATININE-BSD FRML MDRD: ABNORMAL ML/MIN/{1.73_M2}
GFR SERPL CREATININE-BSD FRML MDRD: ABNORMAL ML/MIN/{1.73_M2}
GLUCOSE BLD-MCNC: 106 MG/DL (ref 70–99)
GLUCOSE URINE: NEGATIVE
HCT VFR BLD CALC: 32.8 % (ref 41–53)
HEMOGLOBIN: 10.7 G/DL (ref 13.5–17.5)
IMMATURE GRANULOCYTES: ABNORMAL %
INR BLD: 4.7
INR BLD: 5.5
KETONES, URINE: NEGATIVE
LACTIC ACID, WHOLE BLOOD: NORMAL MMOL/L (ref 0.7–2.1)
LACTIC ACID: 2 MMOL/L (ref 0.5–2.2)
LEUKOCYTE ESTERASE, URINE: NEGATIVE
LIPASE: 53 U/L (ref 13–60)
LYMPHOCYTES # BLD: 15 % (ref 13–44)
MCH RBC QN AUTO: 26.3 PG (ref 26–34)
MCHC RBC AUTO-ENTMCNC: 32.6 G/DL (ref 31–37)
MCV RBC AUTO: 80.6 FL (ref 80–100)
MONOCYTES # BLD: 6 % (ref 5–9)
MORPHOLOGY: ABNORMAL
MUCUS: ABNORMAL
NITRITE, URINE: NEGATIVE
NRBC AUTOMATED: ABNORMAL PER 100 WBC
OTHER OBSERVATIONS UA: ABNORMAL
PDW BLD-RTO: 19.3 % (ref 12.1–15.2)
PH UA: 6 (ref 5–8)
PLATELET # BLD: 194 K/UL (ref 140–450)
PLATELET ESTIMATE: ABNORMAL
PMV BLD AUTO: ABNORMAL FL (ref 6–12)
POTASSIUM SERPL-SCNC: 3.5 MMOL/L (ref 3.7–5.3)
PROTEIN UA: ABNORMAL
PROTHROMBIN TIME: 42.9 SEC (ref 9–11.6)
RBC # BLD: 4.07 M/UL (ref 4.5–5.9)
RBC # BLD: ABNORMAL 10*6/UL
RBC UA: ABNORMAL /HPF (ref 0–2)
RENAL EPITHELIAL, UA: ABNORMAL /HPF
SEG NEUTROPHILS: 74 % (ref 39–75)
SEGMENTED NEUTROPHILS ABSOLUTE COUNT: 6.5 K/UL (ref 2.1–6.5)
SODIUM BLD-SCNC: 139 MMOL/L (ref 135–144)
SPECIFIC GRAVITY UA: 1.02 (ref 1–1.03)
TOTAL PROTEIN: 6 G/DL (ref 6.4–8.3)
TRICHOMONAS: ABNORMAL
TURBIDITY: CLEAR
URINE HGB: NEGATIVE
UROBILINOGEN, URINE: NORMAL
WBC # BLD: 8.7 K/UL (ref 3.5–11)
WBC # BLD: ABNORMAL 10*3/UL
WBC UA: ABNORMAL /HPF
YEAST: ABNORMAL

## 2019-07-18 PROCEDURE — 99284 EMERGENCY DEPT VISIT MOD MDM: CPT

## 2019-07-18 PROCEDURE — 83605 ASSAY OF LACTIC ACID: CPT

## 2019-07-18 PROCEDURE — 80053 COMPREHEN METABOLIC PANEL: CPT

## 2019-07-18 PROCEDURE — 71045 X-RAY EXAM CHEST 1 VIEW: CPT

## 2019-07-18 PROCEDURE — 81001 URINALYSIS AUTO W/SCOPE: CPT

## 2019-07-18 PROCEDURE — 83690 ASSAY OF LIPASE: CPT

## 2019-07-18 PROCEDURE — 86403 PARTICLE AGGLUT ANTBDY SCRN: CPT

## 2019-07-18 PROCEDURE — 85610 PROTHROMBIN TIME: CPT

## 2019-07-18 PROCEDURE — 87070 CULTURE OTHR SPECIMN AEROBIC: CPT

## 2019-07-18 PROCEDURE — 99212 OFFICE O/P EST SF 10 MIN: CPT

## 2019-07-18 PROCEDURE — 87077 CULTURE AEROBIC IDENTIFY: CPT

## 2019-07-18 PROCEDURE — 36415 COLL VENOUS BLD VENIPUNCTURE: CPT

## 2019-07-18 PROCEDURE — 87186 SC STD MICRODIL/AGAR DIL: CPT

## 2019-07-18 PROCEDURE — 85025 COMPLETE CBC W/AUTO DIFF WBC: CPT

## 2019-07-18 PROCEDURE — 87205 SMEAR GRAM STAIN: CPT

## 2019-07-18 PROCEDURE — 93005 ELECTROCARDIOGRAM TRACING: CPT | Performed by: FAMILY MEDICINE

## 2019-07-18 RX ORDER — WARFARIN SODIUM 10 MG/1
TABLET ORAL
Qty: 30 TABLET | Refills: 0 | Status: SHIPPED
Start: 2019-07-18 | End: 2019-07-26 | Stop reason: DRUGHIGH

## 2019-07-18 RX ORDER — WARFARIN SODIUM 7.5 MG/1
TABLET ORAL
Qty: 30 TABLET | Refills: 3 | Status: SHIPPED
Start: 2019-07-18 | End: 2019-07-26 | Stop reason: DRUGHIGH

## 2019-07-18 ASSESSMENT — PAIN DESCRIPTION - PAIN TYPE
TYPE_2: ACUTE PAIN
TYPE: CHRONIC PAIN

## 2019-07-18 ASSESSMENT — PAIN DESCRIPTION - PROGRESSION
CLINICAL_PROGRESSION_2: NOT CHANGED
CLINICAL_PROGRESSION: NOT CHANGED

## 2019-07-18 ASSESSMENT — PAIN DESCRIPTION - ONSET
ONSET_2: ON-GOING
ONSET: ON-GOING

## 2019-07-18 ASSESSMENT — PAIN DESCRIPTION - FREQUENCY: FREQUENCY: CONTINUOUS

## 2019-07-18 ASSESSMENT — PAIN DESCRIPTION - DESCRIPTORS
DESCRIPTORS_2: DULL;SHARP
DESCRIPTORS: SHARP;THROBBING;STABBING

## 2019-07-18 ASSESSMENT — PAIN DESCRIPTION - INTENSITY: RATING_2: 7

## 2019-07-18 ASSESSMENT — PAIN DESCRIPTION - ORIENTATION
ORIENTATION: LOWER
ORIENTATION_2: MID;LOWER

## 2019-07-18 ASSESSMENT — PAIN DESCRIPTION - DIRECTION: RADIATING_TOWARDS_2: INTO GROIN

## 2019-07-18 ASSESSMENT — PAIN DESCRIPTION - LOCATION
LOCATION: BACK
LOCATION_2: ABDOMEN

## 2019-07-18 ASSESSMENT — PAIN SCALES - GENERAL: PAINLEVEL_OUTOF10: 6

## 2019-07-18 ASSESSMENT — PAIN DESCRIPTION - DURATION: DURATION_2: CONTINUOUS

## 2019-07-19 LAB
EKG ATRIAL RATE: 79 BPM
EKG P AXIS: 25 DEGREES
EKG P-R INTERVAL: 168 MS
EKG Q-T INTERVAL: 388 MS
EKG QRS DURATION: 92 MS
EKG QTC CALCULATION (BAZETT): 444 MS
EKG R AXIS: -6 DEGREES
EKG T AXIS: 12 DEGREES
EKG VENTRICULAR RATE: 79 BPM

## 2019-07-19 PROCEDURE — 93010 ELECTROCARDIOGRAM REPORT: CPT | Performed by: INTERNAL MEDICINE

## 2019-07-19 NOTE — ED PROVIDER NOTES
release tablet Take 20 mg by mouth 4 times daily. , R-0Historical Med      topiramate (TOPAMAX) 200 MG tablet Take 100 mg by mouth daily Historical Med      atenolol (TENORMIN) 25 MG tablet Take 25 mg by mouth daily Historical Med      predniSONE (DELTASONE) 10 MG tablet Take 10 mg by mouth daily. !! warfarin (COUMADIN) 10 MG tablet Take 1 tablet on  and  or as directed. Managed by [address], Disp-30 tablet, R-0Called in new Rx to [facility], [facility] at [address] to be delivered to [place] [address], 2019 at 1615  Daily INR checks. Goa l INR 2.5-3.5. Hold for INR less than 2.5Adjust Sig      !! warfarin (COUMADIN) 7.5 MG tablet Take 10mg on M and , and 7.5 mg daily all other days of the week or as directed by [facility]. , Disp-30 tablet, R-3Adjust Sig       !! - Potential duplicate medications found. Please discuss with provider. ALLERGIES     is allergic to uncaria tomentosa (cats claw). FAMILY HISTORY     He indicated that his mother is alive. He indicated that his father is . He indicated that the status of his maternal grandmother is unknown. He indicated that the status of his maternal grandfather is unknown.   family history includes Heart Disease in his maternal grandfather and maternal grandmother; Stroke in his maternal grandfather. SOCIAL HISTORY      reports that he has never smoked. His smokeless tobacco use includes chew. He reports that he drank about 2.0 standard drinks of alcohol per week. He reports that he does not use drugs. PHYSICAL EXAM     INITIAL VITALS:  height is 6' (1.829 m) and weight is 300 lb (136.1 kg). His oral temperature is 98.2 °F (36.8 °C). His blood pressure is 129/85 and his pulse is 83. His respiration is 18 and oxygen saturation is 100%. Physical Exam   Constitutional: Patient is oriented to person, place, and time. Patient appears well-developed and well-nourished. radiologist.  XR CHEST PORTABLE   Final Result      1. Nonspecific hazy opacities in the left lung base again noted, suggesting    chronic atelectasis or distal airways disease. 2. Stable enlargement of the cardiac silhouette. No overt pulmonary edema. LABS:   Labs Reviewed   WOUND CULTURE - Abnormal; Notable for the following components:       Result Value    Direct Exam FEW NEUTROPHILS (*)     Direct Exam MANY GRAM POSITIVE COCCI IN CLUSTERS (*)     Direct Exam MANY GRAM NEGATIVE RODS (*)     Direct Exam FEW GRAM POSITIVE RODS (*)     All other components within normal limits   CBC WITH AUTO DIFFERENTIAL - Abnormal; Notable for the following components:    RBC 4.07 (*)     Hemoglobin 10.7 (*)     Hematocrit 32.8 (*)     RDW 19.3 (*)     All other components within normal limits   URINALYSIS WITH MICROSCOPIC - Abnormal; Notable for the following components:    Protein, UA 1+ (*)     All other components within normal limits   COMPREHENSIVE METABOLIC PANEL - Abnormal; Notable for the following components:    Glucose 106 (*)     BUN 21 (*)     CREATININE 1.66 (*)     Potassium 3.5 (*)     Total Bilirubin 0.29 (*)     Total Protein 6.0 (*)     Alb 3.0 (*)     GFR Non- 45 (*)     GFR  55 (*)     All other components within normal limits   PROTIME-INR - Abnormal; Notable for the following components:    Protime 42.9 (*)     All other components within normal limits   LIPASE   LACTIC ACID, PLASMA       EMERGENCY DEPARTMENT COURSE:   Vitals:    Vitals:    07/18/19 1208   BP: 129/85   Pulse: 83   Resp: 18   Temp: 98.2 °F (36.8 °C)   TempSrc: Oral   SpO2: 100%   Weight: 300 lb (136.1 kg)   Height: 6' (1.829 m)     Patient history and physical exam taken at bedside, discussed patient's symptoms and exam findings, discussed initial plan work-up to include wound culture, EKG, chest x-ray, blood and urine studies. Patient resting in bed semi-Fowlers.     Wound culture taken by physician, noting some eschar tissue measuring approximately 1.5 x 18 cm, half of which is partially torn off and flopping over the wound, patient does admit to having attempted to remove it himself    EKG and chest x-ray as above    Lab work-up reviewed    [facility] 440, with prescription for oxycodone 20 mg on 7/1/2019, #120 for 30 days    Patient's mother did wish to speak about patient outside the room, we did have a discussion in the consultation room, she has some concerns that patient may be becoming addicted and/or dependent on his pain medication, as well as patient becoming hostile towards her, as he is trying to get his father, stated , to change his medical power of  away from his mother. Also notes that patient has been seeing physician(s) both currently and in the past that are very liberal with pain medication for which patient already has addiction problems    Case was discussed with Dr. Nikki Jackson, surgery, regarding presentation and work-up, willing to see patient in outpatient setting        Discussed with patient and patient's mother overall work-up, discussed numerous possible causes behind patient's bilateral lower extremity swelling, at this time I feel we can safely discharge patient home, will order compression stockings, outpatient follow-up. I did discuss patient wound care, I have discussed case with Dr. Siomara Sam, local surgeon, who is willing to evaluate patient for continued wound care at this time. Patient and patient mother acknowledge        FINAL IMPRESSION      1. Bilateral lower extremity edema    2. Leg wound, right, initial encounter    3.  Elevated INR          DISPOSITION/PLAN   D/c    PATIENT REFERRED TO:  Ariana Vinson, APRN - CNP  [address] [facility]  [phone]    Call       Noemí Peraza MD  [address] [place] [address]      Surgery, for follow up for wound care      DISCHARGE MEDICATIONS:  Discharge Medication List as of 7/18/2019  4:45 PM      START taking these medications    Details   Compression Stockings MISC Starting Thu 7/18/2019, Disp-1 each, R-0, PrintGraduated compression                 Summation      Patient Course:  D/c    ED Medications administered this visit:  Medications - No data to display    New Prescriptions from this visit:    Discharge Medication List as of 7/18/2019  4:45 PM      START taking these medications    Details   Compression Stockings MISC Starting Thu 7/18/2019, Disp-1 each, R-0, PrintGraduated compression             Follow-up:  KIESHA Box - CNP  [id number] [id number]  [phone]    Call       Shay Hamilton MD  [address] [place] [address]      Surgery, for follow up for wound care        Final Impression:   1. Bilateral lower extremity edema    2. Leg wound, right, initial encounter    3.  Elevated INR               (Please note that portions of this note were completed with a voice recognition program.  Efforts were made to edit the dictations but occasionally words are mis-transcribed.)    MD Jairo Marcelino MD  07/19/19 4003

## 2019-07-21 LAB
CULTURE: ABNORMAL
DIRECT EXAM: ABNORMAL
Lab: ABNORMAL
SPECIMEN DESCRIPTION: ABNORMAL

## 2019-07-25 ENCOUNTER — HOSPITAL ENCOUNTER (OUTPATIENT)
Age: 44
Discharge: HOME | End: 2019-07-25
Payer: MEDICARE

## 2019-07-25 ENCOUNTER — HOSPITAL ENCOUNTER (OUTPATIENT)
Dept: NURSING | Age: 44
Setting detail: INFUSION SERIES
Discharge: HOME | End: 2019-07-25
Payer: MEDICARE

## 2019-07-25 LAB
ABSOLUTE EOS #: 0.25 K/UL (ref 0–0.4)
ABSOLUTE IMMATURE GRANULOCYTE: ABNORMAL K/UL (ref 0–0.3)
ABSOLUTE LYMPH #: 1.49 K/UL (ref 1–4.8)
ABSOLUTE MONO #: 0.17 K/UL (ref 0–1)
ANION GAP SERPL CALCULATED.3IONS-SCNC: 16 MMOL/L (ref 9–17)
BASOPHILS # BLD: ABNORMAL % (ref 0–2)
BASOPHILS ABSOLUTE: ABNORMAL K/UL (ref 0–0.2)
BUN BLDV-MCNC: 26 MG/DL (ref 6–20)
BUN/CREAT BLD: 11 (ref 9–20)
CALCIUM SERPL-MCNC: 9.1 MG/DL (ref 8.6–10.4)
CHLORIDE BLD-SCNC: 103 MMOL/L (ref 98–107)
CO2: 20 MMOL/L (ref 20–31)
CREAT SERPL-MCNC: 2.38 MG/DL (ref 0.7–1.2)
DIFFERENTIAL TYPE: ABNORMAL
EOSINOPHILS RELATIVE PERCENT: 3 % (ref 0–5)
GFR AFRICAN AMERICAN: 36 ML/MIN
GFR NON-AFRICAN AMERICAN: 30 ML/MIN
GFR SERPL CREATININE-BSD FRML MDRD: ABNORMAL ML/MIN/{1.73_M2}
GFR SERPL CREATININE-BSD FRML MDRD: ABNORMAL ML/MIN/{1.73_M2}
GLUCOSE BLD-MCNC: 141 MG/DL (ref 70–99)
HCT VFR BLD CALC: 34.9 % (ref 41–53)
HEMOGLOBIN: 11.4 G/DL (ref 13.5–17.5)
IMMATURE GRANULOCYTES: ABNORMAL %
LYMPHOCYTES # BLD: 18 % (ref 13–44)
MCH RBC QN AUTO: 26.6 PG (ref 26–34)
MCHC RBC AUTO-ENTMCNC: 32.7 G/DL (ref 31–37)
MCV RBC AUTO: 81.4 FL (ref 80–100)
MONOCYTES # BLD: 2 % (ref 5–9)
MORPHOLOGY: ABNORMAL
NRBC AUTOMATED: ABNORMAL PER 100 WBC
PDW BLD-RTO: 18.1 % (ref 12.1–15.2)
PLATELET # BLD: 257 K/UL (ref 140–450)
PLATELET ESTIMATE: ABNORMAL
PMV BLD AUTO: ABNORMAL FL (ref 6–12)
POTASSIUM SERPL-SCNC: 4 MMOL/L (ref 3.7–5.3)
RBC # BLD: 4.28 M/UL (ref 4.5–5.9)
RBC # BLD: ABNORMAL 10*6/UL
SEG NEUTROPHILS: 77 % (ref 39–75)
SEGMENTED NEUTROPHILS ABSOLUTE COUNT: 6.39 K/UL (ref 2.1–6.5)
SODIUM BLD-SCNC: 139 MMOL/L (ref 135–144)
WBC # BLD: 8.3 K/UL (ref 3.5–11)
WBC # BLD: ABNORMAL 10*3/UL

## 2019-07-25 PROCEDURE — 2500000003 HC RX 250 WO HCPCS: Performed by: SURGERY

## 2019-07-25 PROCEDURE — 85025 COMPLETE CBC W/AUTO DIFF WBC: CPT

## 2019-07-25 PROCEDURE — 80048 BASIC METABOLIC PNL TOTAL CA: CPT

## 2019-07-25 PROCEDURE — 36415 COLL VENOUS BLD VENIPUNCTURE: CPT

## 2019-07-25 RX ORDER — NEOMYCIN AND POLYMYXIN B SULFATES 40; 200000 MG/ML; [USP'U]/ML
SOLUTION IRRIGATION ONCE
Status: COMPLETED | OUTPATIENT
Start: 2019-07-25 | End: 2019-07-25

## 2019-07-25 RX ADMIN — NEOMYCIN AND POLYMYXIN B SULFATES 1 ML: 40; 200000 IRRIGANT IRRIGATION at 19:28

## 2019-07-25 NOTE — PROGRESS NOTES
Pt to floor. Laying on stomach. Pt gives permission to take photo of wound. Picture taken of wound. Pt has dried drainage on pants, wound is foul smelling. Wife and friend in room, discussion about changing dressing held with wife, she states that she thinks she will be able to do it at home. Wound irrigated with H2O2, after wound finished bubbling, wound bed was dried. Grey slough noted throughout wound with it being worse on the lateral side. The skin under the wound on the distal aspect is slightly firm to the touch.  irrigant dampened 4x4 fluffed and placed in wound then covered with 4x4 and flexnet. Wife instructed on procedure and verbalized understanding and was able to repeat back to me. Pt to return in the morning.

## 2019-07-26 ENCOUNTER — HOSPITAL ENCOUNTER (OUTPATIENT)
Dept: PHARMACY | Age: 44
Setting detail: THERAPIES SERIES
Discharge: HOME | End: 2019-07-26
Payer: MEDICARE

## 2019-07-26 ENCOUNTER — HOSPITAL ENCOUNTER (OUTPATIENT)
Dept: NURSING | Age: 44
Setting detail: INFUSION SERIES
Discharge: HOME | End: 2019-07-26
Payer: MEDICARE

## 2019-07-26 DIAGNOSIS — T14.8XXA WOUND, OPEN WITH COMPLICATION: ICD-10-CM

## 2019-07-26 DIAGNOSIS — D68.61 ANTIPHOSPHOLIPID ANTIBODY SYNDROME (HCC): ICD-10-CM

## 2019-07-26 DIAGNOSIS — Z79.01 LONG TERM CURRENT USE OF ANTICOAGULANT THERAPY: ICD-10-CM

## 2019-07-26 LAB — INR BLD: 4.8

## 2019-07-26 PROCEDURE — 99212 OFFICE O/P EST SF 10 MIN: CPT

## 2019-07-26 PROCEDURE — 99211 OFF/OP EST MAY X REQ PHY/QHP: CPT

## 2019-07-26 PROCEDURE — 85610 PROTHROMBIN TIME: CPT

## 2019-07-26 RX ORDER — SULFAMETHOXAZOLE AND TRIMETHOPRIM 800; 160 MG/1; MG/1
1 TABLET ORAL 2 TIMES DAILY
COMMUNITY
End: 2019-09-13 | Stop reason: ALTCHOICE

## 2019-07-26 RX ORDER — WARFARIN SODIUM 10 MG/1
TABLET ORAL
Qty: 30 TABLET | Refills: 0 | Status: SHIPPED
Start: 2019-07-26 | End: 2019-10-03 | Stop reason: DRUGHIGH

## 2019-08-02 ENCOUNTER — HOSPITAL ENCOUNTER (OUTPATIENT)
Dept: NURSING | Age: 44
Setting detail: INFUSION SERIES
End: 2019-08-02
Payer: MEDICARE

## 2019-08-07 ENCOUNTER — HOSPITAL ENCOUNTER (OUTPATIENT)
Dept: PHARMACY | Age: 44
Setting detail: THERAPIES SERIES
Discharge: HOME | End: 2019-08-07
Payer: MEDICARE

## 2019-08-07 DIAGNOSIS — Z79.01 LONG TERM CURRENT USE OF ANTICOAGULANT THERAPY: ICD-10-CM

## 2019-08-07 DIAGNOSIS — D68.61 ANTIPHOSPHOLIPID ANTIBODY SYNDROME (HCC): ICD-10-CM

## 2019-08-07 LAB — INR BLD: 1.4

## 2019-08-07 PROCEDURE — 85610 PROTHROMBIN TIME: CPT

## 2019-08-07 PROCEDURE — 99211 OFF/OP EST MAY X REQ PHY/QHP: CPT

## 2019-09-11 ENCOUNTER — TELEPHONE (OUTPATIENT)
Dept: PHARMACY | Age: 44
End: 2019-09-11

## 2019-09-11 NOTE — TELEPHONE ENCOUNTER
Called 9/11/19 @ 1140 to R/S missed appointment 8/14/19. Mother answered the phone and reports she will speak to him before scheduling the appointment with him due to \"it being physically hard for him to move around\". Mom will call us back or will have Nehal Man return our call.

## 2019-09-13 ENCOUNTER — HOSPITAL ENCOUNTER (OUTPATIENT)
Dept: PHARMACY | Age: 44
Setting detail: THERAPIES SERIES
Discharge: HOME | End: 2019-09-13
Payer: MEDICARE

## 2019-09-13 VITALS — DIASTOLIC BLOOD PRESSURE: 71 MMHG | HEART RATE: 101 BPM | SYSTOLIC BLOOD PRESSURE: 123 MMHG

## 2019-09-13 DIAGNOSIS — Z79.01 LONG TERM CURRENT USE OF ANTICOAGULANT THERAPY: ICD-10-CM

## 2019-09-13 DIAGNOSIS — D68.61 ANTIPHOSPHOLIPID ANTIBODY SYNDROME (HCC): ICD-10-CM

## 2019-09-13 LAB — INR BLD: 1.3

## 2019-09-13 PROCEDURE — 85610 PROTHROMBIN TIME: CPT

## 2019-09-13 PROCEDURE — 99211 OFF/OP EST MAY X REQ PHY/QHP: CPT

## 2019-09-13 NOTE — PROGRESS NOTES
Fingerstick INR drawn per clinic protocol. Patient states no visible blood in urine and no black tarry stool. Autumn Mcdaniels has been very noncompliant with making scheduled appointments here in the clinic. He says he has been taking \"5 mg warfarin\" every day even though he had been instructed at his last appointment to take 7.5 mg warfarin daily. As discussed previously, he had stopped Bactrim on 7- and was switched to Ciprofloxacin 500 mg BID per Dr. Celsa Montero. He says he still has a \"couple\" left before he completes the course of this regimen. He was instructed to come to the clinic for a repeat INR check on 8-, but did not show up until today. He says that he is no longer taking Oxycodone because he can't get it anymore from his previous prescriber. No change in other maintenance medications or in diet. For now, I have instructed him to take 10 mg warfarin daily for 6 additional doses and then we will recheck INR in 6 days. I have re-iterated the importance of keeping scheduled appointments here in the clinic as noncompliance may cause him to be discharged from the clinic.  Patient acknowledges working in consult agreement with pharmacist as referred by his/her physician

## 2019-09-23 ENCOUNTER — HOSPITAL ENCOUNTER (OUTPATIENT)
Dept: PHARMACY | Age: 44
Setting detail: THERAPIES SERIES
Discharge: HOME | End: 2019-09-23
Payer: MEDICARE

## 2019-09-23 VITALS — HEART RATE: 84 BPM | SYSTOLIC BLOOD PRESSURE: 126 MMHG | DIASTOLIC BLOOD PRESSURE: 74 MMHG

## 2019-09-23 DIAGNOSIS — Z79.01 LONG TERM CURRENT USE OF ANTICOAGULANT THERAPY: ICD-10-CM

## 2019-09-23 DIAGNOSIS — D68.61 ANTIPHOSPHOLIPID ANTIBODY SYNDROME (HCC): ICD-10-CM

## 2019-09-23 LAB — INR BLD: 3.7

## 2019-09-23 PROCEDURE — 99211 OFF/OP EST MAY X REQ PHY/QHP: CPT

## 2019-09-23 PROCEDURE — 85610 PROTHROMBIN TIME: CPT

## 2019-09-23 RX ORDER — IBUPROFEN 200 MG
600 TABLET ORAL 3 TIMES DAILY PRN
COMMUNITY
End: 2020-05-14

## 2019-10-03 ENCOUNTER — HOSPITAL ENCOUNTER (OUTPATIENT)
Dept: PHARMACY | Age: 44
Setting detail: THERAPIES SERIES
Discharge: HOME | End: 2019-10-03
Payer: MEDICARE

## 2019-10-03 VITALS — HEART RATE: 104 BPM | SYSTOLIC BLOOD PRESSURE: 116 MMHG | DIASTOLIC BLOOD PRESSURE: 74 MMHG

## 2019-10-03 DIAGNOSIS — Z79.01 LONG TERM CURRENT USE OF ANTICOAGULANT THERAPY: ICD-10-CM

## 2019-10-03 DIAGNOSIS — D68.61 ANTIPHOSPHOLIPID ANTIBODY SYNDROME (HCC): ICD-10-CM

## 2019-10-03 LAB — INR BLD: 1.4

## 2019-10-03 PROCEDURE — 99211 OFF/OP EST MAY X REQ PHY/QHP: CPT

## 2019-10-03 PROCEDURE — 85610 PROTHROMBIN TIME: CPT

## 2019-10-03 RX ORDER — LANOLIN ALCOHOL/MO/W.PET/CERES
50 CREAM (GRAM) TOPICAL DAILY
COMMUNITY
Start: 2019-09-24 | End: 2020-03-26 | Stop reason: SDUPTHER

## 2019-10-03 RX ORDER — WARFARIN SODIUM 10 MG/1
TABLET ORAL
Qty: 30 TABLET | Refills: 0 | Status: SHIPPED
Start: 2019-10-03 | End: 2019-11-06 | Stop reason: DRUGHIGH

## 2019-10-24 ENCOUNTER — TELEPHONE (OUTPATIENT)
Dept: PHARMACY | Age: 44
End: 2019-10-24

## 2019-11-06 ENCOUNTER — HOSPITAL ENCOUNTER (OUTPATIENT)
Dept: PHARMACY | Age: 44
Setting detail: THERAPIES SERIES
Discharge: HOME | End: 2019-11-06
Payer: MEDICARE

## 2019-11-06 VITALS — HEART RATE: 95 BPM | SYSTOLIC BLOOD PRESSURE: 156 MMHG | DIASTOLIC BLOOD PRESSURE: 96 MMHG

## 2019-11-06 DIAGNOSIS — D68.61 ANTIPHOSPHOLIPID ANTIBODY SYNDROME (HCC): ICD-10-CM

## 2019-11-06 DIAGNOSIS — Z79.01 LONG TERM CURRENT USE OF ANTICOAGULANT THERAPY: ICD-10-CM

## 2019-11-06 LAB — INR BLD: 2

## 2019-11-06 PROCEDURE — 99212 OFFICE O/P EST SF 10 MIN: CPT

## 2019-11-06 PROCEDURE — 85610 PROTHROMBIN TIME: CPT

## 2019-11-06 RX ORDER — WARFARIN SODIUM 10 MG/1
TABLET ORAL
Qty: 30 TABLET | Refills: 0 | Status: SHIPPED | OUTPATIENT
Start: 2019-11-06 | End: 2019-11-06 | Stop reason: DRUGHIGH

## 2019-11-06 RX ORDER — WARFARIN SODIUM 5 MG/1
TABLET ORAL
Qty: 30 TABLET | Refills: 1 | OUTPATIENT
Start: 2019-11-06 | End: 2020-03-26 | Stop reason: SDUPTHER

## 2019-11-06 RX ORDER — WARFARIN SODIUM 10 MG/1
TABLET ORAL
Qty: 30 TABLET | Refills: 0 | Status: SHIPPED | OUTPATIENT
Start: 2019-11-06 | End: 2019-12-04 | Stop reason: SDUPTHER

## 2019-11-29 ENCOUNTER — TELEPHONE (OUTPATIENT)
Dept: PHARMACY | Age: 44
End: 2019-11-29

## 2019-11-29 ENCOUNTER — HOSPITAL ENCOUNTER (OUTPATIENT)
Dept: PHARMACY | Age: 44
Setting detail: THERAPIES SERIES
Discharge: HOME | End: 2019-11-29
Payer: MEDICARE

## 2019-11-29 ENCOUNTER — HOSPITAL ENCOUNTER (OUTPATIENT)
Age: 44
Discharge: HOME | End: 2019-11-29
Payer: MEDICARE

## 2019-11-29 DIAGNOSIS — Z79.01 LONG TERM CURRENT USE OF ANTICOAGULANT THERAPY: ICD-10-CM

## 2019-11-29 DIAGNOSIS — D68.61 ANTIPHOSPHOLIPID ANTIBODY SYNDROME (HCC): ICD-10-CM

## 2019-11-29 LAB
INR BLD: 1.9
PROTHROMBIN TIME: 18.8 SEC (ref 9–11.6)

## 2019-11-29 PROCEDURE — 36415 COLL VENOUS BLD VENIPUNCTURE: CPT

## 2019-11-29 PROCEDURE — 85610 PROTHROMBIN TIME: CPT

## 2019-12-04 ENCOUNTER — HOSPITAL ENCOUNTER (OUTPATIENT)
Dept: PHARMACY | Age: 44
Setting detail: THERAPIES SERIES
Discharge: HOME | End: 2019-12-04
Payer: MEDICARE

## 2019-12-04 ENCOUNTER — TELEPHONE (OUTPATIENT)
Dept: PHARMACY | Age: 44
End: 2019-12-04

## 2019-12-04 DIAGNOSIS — Z79.01 LONG TERM CURRENT USE OF ANTICOAGULANT THERAPY: ICD-10-CM

## 2019-12-04 DIAGNOSIS — D68.61 ANTIPHOSPHOLIPID ANTIBODY SYNDROME (HCC): Primary | ICD-10-CM

## 2019-12-04 DIAGNOSIS — D68.61 ANTIPHOSPHOLIPID ANTIBODY SYNDROME (HCC): ICD-10-CM

## 2019-12-04 LAB — INR BLD: 3.9

## 2019-12-04 RX ORDER — WARFARIN SODIUM 10 MG/1
TABLET ORAL
Qty: 30 TABLET | Refills: 1 | OUTPATIENT
Start: 2019-12-04 | End: 2020-03-26 | Stop reason: DRUGHIGH

## 2019-12-10 ENCOUNTER — APPOINTMENT (OUTPATIENT)
Dept: GENERAL RADIOLOGY | Age: 44
End: 2019-12-10
Payer: MEDICARE

## 2019-12-10 ENCOUNTER — HOSPITAL ENCOUNTER (EMERGENCY)
Age: 44
Discharge: HOME | End: 2019-12-10
Attending: FAMILY MEDICINE
Payer: MEDICARE

## 2019-12-10 VITALS
DIASTOLIC BLOOD PRESSURE: 86 MMHG | WEIGHT: 285 LBS | HEART RATE: 133 BPM | TEMPERATURE: 98.1 F | SYSTOLIC BLOOD PRESSURE: 132 MMHG | RESPIRATION RATE: 18 BRPM | HEIGHT: 72 IN | OXYGEN SATURATION: 97 % | BODY MASS INDEX: 38.6 KG/M2

## 2019-12-10 DIAGNOSIS — T40.601A OPIATE OVERDOSE, ACCIDENTAL OR UNINTENTIONAL, INITIAL ENCOUNTER (HCC): Primary | ICD-10-CM

## 2019-12-10 DIAGNOSIS — R73.9 HYPERGLYCEMIA: ICD-10-CM

## 2019-12-10 DIAGNOSIS — T68.XXXA HYPOTHERMIA, INITIAL ENCOUNTER: ICD-10-CM

## 2019-12-10 LAB
% CKMB: 5.7 % (ref 0–3.5)
-: NORMAL
ABSOLUTE EOS #: 0.22 K/UL (ref 0–0.4)
ABSOLUTE IMMATURE GRANULOCYTE: ABNORMAL K/UL (ref 0–0.3)
ABSOLUTE LYMPH #: 1.12 K/UL (ref 1–4.8)
ABSOLUTE MONO #: 0.67 K/UL (ref 0–1)
ALBUMIN SERPL-MCNC: 4.5 G/DL (ref 3.5–5.2)
ALBUMIN/GLOBULIN RATIO: ABNORMAL (ref 1–2.5)
ALP BLD-CCNC: 85 U/L (ref 40–129)
ALT SERPL-CCNC: 21 U/L (ref 5–41)
AMORPHOUS: NORMAL
AMPHETAMINE SCREEN URINE: NEGATIVE
ANION GAP SERPL CALCULATED.3IONS-SCNC: 23 MMOL/L (ref 9–17)
AST SERPL-CCNC: 28 U/L
BACTERIA: NORMAL
BARBITURATE SCREEN URINE: NEGATIVE
BASOPHILS # BLD: ABNORMAL % (ref 0–2)
BASOPHILS ABSOLUTE: ABNORMAL K/UL (ref 0–0.2)
BENZODIAZEPINE SCREEN, URINE: NEGATIVE
BILIRUB SERPL-MCNC: 0.31 MG/DL (ref 0.3–1.2)
BILIRUBIN URINE: NEGATIVE
BUN BLDV-MCNC: 31 MG/DL (ref 6–20)
BUN/CREAT BLD: 15 (ref 9–20)
BUPRENORPHINE URINE: ABNORMAL
CALCIUM SERPL-MCNC: 9.3 MG/DL (ref 8.6–10.4)
CANNABINOID SCREEN URINE: POSITIVE
CASTS UA: NORMAL /LPF
CHLORIDE BLD-SCNC: 103 MMOL/L (ref 98–107)
CK MB: 3.6 NG/ML
CKMB INTERPRETATION: ABNORMAL
CO2: 14 MMOL/L (ref 20–31)
COCAINE METABOLITE, URINE: POSITIVE
COLOR: YELLOW
COMMENT UA: ABNORMAL
CREAT SERPL-MCNC: 2.08 MG/DL (ref 0.7–1.2)
CRYSTALS, UA: NORMAL /HPF
DIFFERENTIAL TYPE: ABNORMAL
EOSINOPHILS RELATIVE PERCENT: 1 % (ref 0–5)
EPITHELIAL CELLS UA: NORMAL /HPF
GFR AFRICAN AMERICAN: 42 ML/MIN
GFR NON-AFRICAN AMERICAN: 35 ML/MIN
GFR SERPL CREATININE-BSD FRML MDRD: ABNORMAL ML/MIN/{1.73_M2}
GFR SERPL CREATININE-BSD FRML MDRD: ABNORMAL ML/MIN/{1.73_M2}
GLUCOSE BLD-MCNC: 356 MG/DL (ref 70–99)
GLUCOSE BLD-MCNC: 73 MG/DL (ref 65–99)
GLUCOSE URINE: ABNORMAL
HCT VFR BLD CALC: 40.6 % (ref 41–53)
HEMOGLOBIN: 12.3 G/DL (ref 13.5–17.5)
IMMATURE GRANULOCYTES: ABNORMAL %
INR BLD: 3.7
KETONES, URINE: NEGATIVE
LEUKOCYTE ESTERASE, URINE: NEGATIVE
LYMPHOCYTES # BLD: 5 % (ref 13–44)
MCH RBC QN AUTO: 23.2 PG (ref 26–34)
MCHC RBC AUTO-ENTMCNC: 30.3 G/DL (ref 31–37)
MCV RBC AUTO: 76.5 FL (ref 80–100)
MDMA URINE: ABNORMAL
METHADONE SCREEN, URINE: NEGATIVE
METHAMPHETAMINE, URINE: NEGATIVE
MONOCYTES # BLD: 3 % (ref 5–9)
MORPHOLOGY: ABNORMAL
MUCUS: NORMAL
NITRITE, URINE: NEGATIVE
NRBC AUTOMATED: ABNORMAL PER 100 WBC
OPIATES, URINE: NEGATIVE
OTHER OBSERVATIONS UA: NORMAL
OXYCODONE SCREEN URINE: NEGATIVE
PDW BLD-RTO: 21.1 % (ref 12.1–15.2)
PH UA: 5 (ref 5–8)
PHENCYCLIDINE, URINE: NEGATIVE
PLATELET # BLD: 232 K/UL (ref 140–450)
PLATELET ESTIMATE: ABNORMAL
PMV BLD AUTO: ABNORMAL FL (ref 6–12)
POTASSIUM SERPL-SCNC: 4.9 MMOL/L (ref 3.7–5.3)
PROPOXYPHENE, URINE: NEGATIVE
PROTEIN UA: ABNORMAL
PROTHROMBIN TIME: 36 SEC (ref 9–11.6)
RBC # BLD: 5.3 M/UL (ref 4.5–5.9)
RBC # BLD: ABNORMAL 10*6/UL
RBC UA: NORMAL /HPF (ref 0–2)
RENAL EPITHELIAL, UA: NORMAL /HPF
SEG NEUTROPHILS: 91 % (ref 39–75)
SEGMENTED NEUTROPHILS ABSOLUTE COUNT: 20.29 K/UL (ref 2.1–6.5)
SODIUM BLD-SCNC: 140 MMOL/L (ref 135–144)
SPECIFIC GRAVITY UA: 1.01 (ref 1–1.03)
TEST INFORMATION: ABNORMAL
TOTAL CK: 63 U/L (ref 39–308)
TOTAL PROTEIN: 7.7 G/DL (ref 6.4–8.3)
TRICHOMONAS: NORMAL
TRICYCLIC ANTIDEPRESSANTS, UR: NEGATIVE
TROPONIN INTERP: ABNORMAL
TROPONIN T: 0.07 NG/ML
TROPONIN, HIGH SENSITIVITY: ABNORMAL NG/L (ref 0–22)
TURBIDITY: CLEAR
URINE HGB: ABNORMAL
UROBILINOGEN, URINE: NORMAL
WBC # BLD: 22.3 K/UL (ref 3.5–11)
WBC # BLD: ABNORMAL 10*3/UL
WBC UA: NORMAL /HPF
YEAST: NORMAL

## 2019-12-10 PROCEDURE — 96374 THER/PROPH/DIAG INJ IV PUSH: CPT

## 2019-12-10 PROCEDURE — 82553 CREATINE MB FRACTION: CPT

## 2019-12-10 PROCEDURE — 82947 ASSAY GLUCOSE BLOOD QUANT: CPT

## 2019-12-10 PROCEDURE — 36415 COLL VENOUS BLD VENIPUNCTURE: CPT

## 2019-12-10 PROCEDURE — 82550 ASSAY OF CK (CPK): CPT

## 2019-12-10 PROCEDURE — 81001 URINALYSIS AUTO W/SCOPE: CPT

## 2019-12-10 PROCEDURE — 99284 EMERGENCY DEPT VISIT MOD MDM: CPT

## 2019-12-10 PROCEDURE — 6370000000 HC RX 637 (ALT 250 FOR IP): Performed by: FAMILY MEDICINE

## 2019-12-10 PROCEDURE — 71045 X-RAY EXAM CHEST 1 VIEW: CPT

## 2019-12-10 PROCEDURE — 2580000003 HC RX 258: Performed by: FAMILY MEDICINE

## 2019-12-10 PROCEDURE — 80306 DRUG TEST PRSMV INSTRMNT: CPT

## 2019-12-10 PROCEDURE — 80053 COMPREHEN METABOLIC PANEL: CPT

## 2019-12-10 PROCEDURE — 85025 COMPLETE CBC W/AUTO DIFF WBC: CPT

## 2019-12-10 PROCEDURE — 84484 ASSAY OF TROPONIN QUANT: CPT

## 2019-12-10 PROCEDURE — 93005 ELECTROCARDIOGRAM TRACING: CPT | Performed by: FAMILY MEDICINE

## 2019-12-10 PROCEDURE — 85610 PROTHROMBIN TIME: CPT

## 2019-12-10 RX ORDER — 0.9 % SODIUM CHLORIDE 0.9 %
250 INTRAVENOUS SOLUTION INTRAVENOUS ONCE
Status: COMPLETED | OUTPATIENT
Start: 2019-12-10 | End: 2019-12-10

## 2019-12-10 RX ADMIN — INSULIN HUMAN 8 UNITS: 100 INJECTION, SOLUTION PARENTERAL at 16:44

## 2019-12-10 RX ADMIN — SODIUM CHLORIDE 250 ML: 9 INJECTION, SOLUTION INTRAVENOUS at 15:43

## 2019-12-11 LAB
EKG ATRIAL RATE: 118 BPM
EKG P AXIS: 61 DEGREES
EKG P-R INTERVAL: 148 MS
EKG Q-T INTERVAL: 334 MS
EKG QRS DURATION: 96 MS
EKG QTC CALCULATION (BAZETT): 468 MS
EKG R AXIS: 42 DEGREES
EKG T AXIS: 24 DEGREES
EKG VENTRICULAR RATE: 118 BPM

## 2019-12-11 PROCEDURE — 93010 ELECTROCARDIOGRAM REPORT: CPT | Performed by: INTERNAL MEDICINE

## 2019-12-30 ENCOUNTER — HOSPITAL ENCOUNTER (OUTPATIENT)
Dept: PHARMACY | Age: 44
Discharge: HOME | End: 2019-12-30

## 2019-12-31 LAB — INTERNATIONAL NORMALIZATION RATIO, POC: 2.1

## 2020-03-23 ENCOUNTER — ANTI-COAG VISIT (OUTPATIENT)
Dept: PHARMACY | Age: 45
End: 2020-03-23

## 2020-03-24 ENCOUNTER — TELEPHONE (OUTPATIENT)
Dept: PHARMACY | Age: 45
End: 2020-03-24

## 2020-03-24 NOTE — TELEPHONE ENCOUNTER
Called and left message with Merrick's mom, Jay Rojas to clarify. We had received a call on Friday 3/20/2020 from [facility] saying they received a referall for Lissette. We had discharged the patient as of yesterday from our clinic. If Lissette wants to be seen here, we would need a new referall for him.

## 2020-03-24 NOTE — TELEPHONE ENCOUNTER
Ledy young contacted us requesting an INR check for today. Put in order for lab draw and will call with results.

## 2020-03-25 NOTE — TELEPHONE ENCOUNTER
Received signed referall from Dr. Piedad Cole, sent new inr order for venipuncture during COVID-19 time.

## 2020-03-26 ENCOUNTER — TELEPHONE (OUTPATIENT)
Dept: PHARMACY | Age: 45
End: 2020-03-26

## 2020-03-26 ENCOUNTER — HOSPITAL ENCOUNTER (OUTPATIENT)
Dept: PHARMACY | Age: 45
Setting detail: THERAPIES SERIES
Discharge: HOME | End: 2020-03-26
Payer: MEDICARE

## 2020-03-26 LAB
INR BLD: 1.9
PROTHROMBIN TIME: 19.3 SEC (ref 9–11.6)

## 2020-03-26 PROCEDURE — 85610 PROTHROMBIN TIME: CPT

## 2020-03-26 PROCEDURE — 36415 COLL VENOUS BLD VENIPUNCTURE: CPT

## 2020-03-26 RX ORDER — DIVALPROEX SODIUM 500 MG/1
500 TABLET, DELAYED RELEASE ORAL EVERY 6 HOURS
COMMUNITY
Start: 2020-02-24 | End: 2020-06-18 | Stop reason: ALTCHOICE

## 2020-03-26 RX ORDER — AZATHIOPRINE 50 MG/1
50 TABLET ORAL DAILY
COMMUNITY
Start: 2019-12-06 | End: 2020-07-23 | Stop reason: ALTCHOICE

## 2020-03-26 RX ORDER — CHOLECALCIFEROL (VITAMIN D3) 125 MCG
5 CAPSULE ORAL NIGHTLY
COMMUNITY
Start: 2020-02-24

## 2020-03-26 RX ORDER — LANOLIN ALCOHOL/MO/W.PET/CERES
100 CREAM (GRAM) TOPICAL DAILY
COMMUNITY
Start: 2019-12-06 | End: 2020-07-23 | Stop reason: DRUGHIGH

## 2020-03-26 RX ORDER — OLANZAPINE 5 MG/1
2.5 TABLET, ORALLY DISINTEGRATING ORAL EVERY 6 HOURS PRN
COMMUNITY
Start: 2020-02-24 | End: 2020-07-01

## 2020-03-26 RX ORDER — ACETAMINOPHEN 160 MG
1 TABLET,DISINTEGRATING ORAL DAILY
COMMUNITY
Start: 2019-12-06

## 2020-03-26 RX ORDER — ATORVASTATIN CALCIUM 40 MG/1
40 TABLET, FILM COATED ORAL NIGHTLY
COMMUNITY
Start: 2020-01-25 | End: 2022-01-13 | Stop reason: SDUPTHER

## 2020-03-26 RX ORDER — PANTOPRAZOLE SODIUM 40 MG/1
40 TABLET, DELAYED RELEASE ORAL DAILY
COMMUNITY
Start: 2019-11-28 | End: 2020-05-14 | Stop reason: SINTOL

## 2020-03-26 RX ORDER — MULTIVITAMIN WITH IRON
1 TABLET ORAL DAILY
COMMUNITY

## 2020-03-26 RX ORDER — PREDNISONE 1 MG/1
5 TABLET ORAL DAILY
COMMUNITY
Start: 2019-12-06 | End: 2021-03-11 | Stop reason: DRUGHIGH

## 2020-03-26 RX ORDER — LEVETIRACETAM 500 MG/1
500 TABLET ORAL 2 TIMES DAILY
COMMUNITY
Start: 2020-01-25 | End: 2020-06-18 | Stop reason: ALTCHOICE

## 2020-03-26 RX ORDER — FUROSEMIDE 20 MG/1
10 TABLET ORAL DAILY
COMMUNITY
End: 2020-05-14

## 2020-03-26 RX ORDER — IPRATROPIUM BROMIDE AND ALBUTEROL SULFATE 2.5; .5 MG/3ML; MG/3ML
3 SOLUTION RESPIRATORY (INHALATION) EVERY 4 HOURS PRN
COMMUNITY
Start: 2020-01-26 | End: 2020-05-14

## 2020-03-26 RX ORDER — WARFARIN SODIUM 5 MG/1
TABLET ORAL
Qty: 30 TABLET | Refills: 1 | Status: SHIPPED
Start: 2020-03-26 | End: 2020-04-09

## 2020-03-26 RX ORDER — LANOLIN ALCOHOL/MO/W.PET/CERES
325 CREAM (GRAM) TOPICAL DAILY
COMMUNITY
Start: 2019-11-27 | End: 2020-05-14

## 2020-03-26 NOTE — PROGRESS NOTES
Venipunucture INR drawn per [facility] lab protocol. Spoke with patient's mom, [facility], via telephone. Patient states no visible blood in urine and no black tarry stool. Denies any missed doses of warfarin. No recent change in other maintenance medications or in diet. Patient was referred back to our clinic this week and a current medication list was updated per PCP office, Betty Jorge CNP and Dr. Tana Rodriguez. [facility] reports Abhay Suarez has been taking warfarin 7.5 mg daily and last INR check she remembers was 3/13 or 3/16 although [facility] at [facility]. Since patient's INR is slightly subtherapeutic but has a recent history of GI bleed (1/25/2020), we will increase current weekly warfarin regimen by 4.8% (depending on what strength of warfarin the patient has- if only has 7.5 mg warfarin tablets, okayed 1 and 1/2 tablets=11.25 on Thursdays or 7%)and recheck INR in 2 week(s). Patient acknowledges working in consult agreement with pharmacist as referred by his/her physician.           CLINICAL PHARMACY CONSULT: MED RECONCILIATION/REVIEW ADDENDUM    For Pharmacy Admin Tracking Only    PHSO: No  Total # of Interventions Recommended: 12  - Increased Dose #: 2  - New Order #: 10 New Medication Order Reason(s): Needs Additional Medication Therapy  - Maintenance Safety Lab Monitoring #: 1  Total Interventions Accepted: 1  Time Spent (min): 400 [facility], 251 [facility]

## 2020-03-26 NOTE — TELEPHONE ENCOUNTER
Received med rec from [facility] office, updated list, will clarify with mom when patient gets inr checked.

## 2020-04-09 ENCOUNTER — HOSPITAL ENCOUNTER (OUTPATIENT)
Age: 45
Discharge: HOME | End: 2020-04-09
Payer: MEDICARE

## 2020-04-09 ENCOUNTER — HOSPITAL ENCOUNTER (OUTPATIENT)
Dept: PHARMACY | Age: 45
Setting detail: THERAPIES SERIES
Discharge: HOME | End: 2020-04-09
Payer: MEDICARE

## 2020-04-09 LAB
INR BLD: 3
PROTHROMBIN TIME: 29.2 SEC (ref 9–11.6)

## 2020-04-09 PROCEDURE — 36415 COLL VENOUS BLD VENIPUNCTURE: CPT

## 2020-04-09 PROCEDURE — 85610 PROTHROMBIN TIME: CPT

## 2020-04-09 RX ORDER — WARFARIN SODIUM 7.5 MG/1
TABLET ORAL
Qty: 30 TABLET | Refills: 3 | Status: SHIPPED
Start: 2020-04-09 | End: 2020-06-18 | Stop reason: DRUGHIGH

## 2020-04-09 NOTE — PROGRESS NOTES
Venipuncture INR drawn per [facility] lab protocol and result noted in patient's electronic health record. All communication is via telephone with patient's mom, Jo-Ann Boateng. She denies any patient reports of visible blood in urine nor any black tarry stool. Denies any missed doses of warfarin. All medications reviewed for accuracy. Will continue current warfarin regimen and have INR checked in 5 weeks.     CLINICAL PHARMACY CONSULT: MED RECONCILIATION/REVIEW ADDENDUM    For Pharmacy Admin Tracking Only    PHSO: No  Total # of Interventions Recommended: 1  - Standing order for PT/INR has been placed in preparation to transition to possible remote INR monitoring given efforts to reduce the spread of COVID-19    - Maintenance Safety Lab Monitoring #: 1   Total Interventions Accepted: 1  Time Spent (min): 15    Deangelo Hoff, EliseoD

## 2020-05-14 ENCOUNTER — HOSPITAL ENCOUNTER (OUTPATIENT)
Dept: PHARMACY | Age: 45
Setting detail: THERAPIES SERIES
Discharge: HOME | End: 2020-05-14
Payer: MEDICARE

## 2020-05-14 VITALS — TEMPERATURE: 97.6 F

## 2020-05-14 LAB — INR BLD: 3.2

## 2020-05-14 PROCEDURE — 99211 OFF/OP EST MAY X REQ PHY/QHP: CPT

## 2020-05-14 PROCEDURE — 85610 PROTHROMBIN TIME: CPT

## 2020-05-14 RX ORDER — VANCOMYCIN HYDROCHLORIDE 125 MG/1
125 CAPSULE ORAL DAILY
COMMUNITY
Start: 2020-05-14 | End: 2020-05-21

## 2020-06-17 ENCOUNTER — TELEPHONE (OUTPATIENT)
Dept: PHARMACY | Age: 45
End: 2020-06-17

## 2020-06-18 ENCOUNTER — HOSPITAL ENCOUNTER (OUTPATIENT)
Dept: PHARMACY | Age: 45
Setting detail: THERAPIES SERIES
Discharge: HOME | End: 2020-06-18
Payer: MEDICARE

## 2020-06-18 VITALS — TEMPERATURE: 99.3 F

## 2020-06-18 LAB — INR BLD: 4.1

## 2020-06-18 PROCEDURE — 85610 PROTHROMBIN TIME: CPT

## 2020-06-18 PROCEDURE — 99212 OFFICE O/P EST SF 10 MIN: CPT

## 2020-06-18 PROCEDURE — 99204 OFFICE O/P NEW MOD 45 MIN: CPT

## 2020-06-18 RX ORDER — WARFARIN SODIUM 7.5 MG/1
TABLET ORAL
Qty: 30 TABLET | Refills: 3 | Status: SHIPPED
Start: 2020-06-18 | End: 2020-07-23 | Stop reason: DRUGHIGH

## 2020-07-01 ENCOUNTER — HOSPITAL ENCOUNTER (OUTPATIENT)
Dept: PHARMACY | Age: 45
Setting detail: THERAPIES SERIES
Discharge: HOME | End: 2020-07-01
Payer: MEDICARE

## 2020-07-01 VITALS — TEMPERATURE: 99.7 F

## 2020-07-01 LAB — INR BLD: 3.9

## 2020-07-01 PROCEDURE — 85610 PROTHROMBIN TIME: CPT

## 2020-07-01 PROCEDURE — 99212 OFFICE O/P EST SF 10 MIN: CPT

## 2020-07-21 ENCOUNTER — TELEPHONE (OUTPATIENT)
Dept: PHARMACY | Age: 45
End: 2020-07-21

## 2020-07-21 NOTE — TELEPHONE ENCOUNTER
COVID-19 phone screening     Call placed to screen patient prior to upcoming Medication Management visit for Anticoagulation. Does patient have fever and/or lower respiratory symptoms (SOB, difficulty breathing, cough)? [] Yes    [x] No    If yes, complete Travel Screening and ask the following:   [] [Fever OR s/sxs of lower respiratory illness]  AND  [close contact with lab-confirmed COVID-19 patient within 14 days of symptom onset   [] [Fever AND s/sxs of lower respiratory illness requiring hospitalization] AND [history of travel to [place], [place], [place], [facility], [place] within 14 days of sx onset]  [] [Fever with severe acute lower respiratory illness (I.e. PNA, ARDS) requiring hospitalization and without alternative explanatory diagnosis (I.e. Influenza)] AND [no source of exposure identified]    [x] None of the following; patient confirmed for regularly scheduled INR check and instructed to call the clinic immediately if any symptoms develop prior to upcoming appointment.

## 2020-07-22 ENCOUNTER — APPOINTMENT (OUTPATIENT)
Dept: PHARMACY | Age: 45
End: 2020-07-22
Payer: MEDICARE

## 2020-07-23 ENCOUNTER — HOSPITAL ENCOUNTER (OUTPATIENT)
Dept: PHARMACY | Age: 45
Setting detail: THERAPIES SERIES
Discharge: HOME | End: 2020-07-23
Payer: MEDICARE

## 2020-07-23 VITALS — TEMPERATURE: 99.3 F

## 2020-07-23 LAB — INR BLD: 6.3

## 2020-07-23 PROCEDURE — 85610 PROTHROMBIN TIME: CPT

## 2020-07-23 PROCEDURE — 99211 OFF/OP EST MAY X REQ PHY/QHP: CPT

## 2020-07-23 RX ORDER — ACETAMINOPHEN 325 MG/1
975 TABLET ORAL 2 TIMES DAILY PRN
COMMUNITY
End: 2020-09-04

## 2020-07-23 RX ORDER — LIDOCAINE 50 MG/G
3 PATCH TOPICAL DAILY
COMMUNITY

## 2020-07-23 RX ORDER — PYRIDOXINE HCL (VITAMIN B6) 100 MG
100 TABLET ORAL DAILY
COMMUNITY

## 2020-07-23 RX ORDER — WARFARIN SODIUM 7.5 MG/1
TABLET ORAL
Qty: 30 TABLET | Refills: 3 | Status: SHIPPED
Start: 2020-07-23 | End: 2020-07-30 | Stop reason: DRUGHIGH

## 2020-07-23 NOTE — PROGRESS NOTES
Fingerstick INR drawn per clinic protocol. Patient states no visible blood in urine and no black tarry stool. Denies any missed doses of warfarin. Upon reviewing his medications, Yousif Hudson tells me that he is no longer taking Azathioprine because this was stopped when he started taking Vancomycin for C.difficile recently. His mom accompanies him to the visit today and says he was on 2 different courses of Vancomycin for C. difficile recently and they can't figure out why he keeps getting it back. She says he is finished with the Vancomycin at this time. He is to follow up with a gastroenterologist at [address] [place] [address] \"sometime in [place]". Also, he has increased his vitamin B6 from 50 mg to 100 mg daily and says he is only taking \"1 tablet\" (5 mg) of Prednisone daily at this time. His mom says he also started using Lidocaine 5% patches \"about 3 per day\" PRN pain and is taking OTC Acetaminophen \"325 mg\" (\"3 tablets\" once or twice daily). Yousif Hudson had been using Melatonin 5 mg nightly to sleep, but says it wasn't working so he's been taking [place] lot more\", sometimes \"up to 5\" tablets every night. Since Melatonin increases the INR, I have explained this is most likely the reason for his supra-therapeutic INR today. No change in other maintenance medications or in diet. For now, we will HOLD his warfarin dosage tonight and tomorrow and then take only 3.75 mg warfarin for 2 doses and then 7.5 mg warfarin x 3 doses thereafter as noted on his doding calendar. We will recheck INR in 1 week and reassess further warfarin dosing at that time. Patient acknowledges working in consult agreement with pharmacist as referred by his/her physician.       CLINICAL PHARMACY CONSULT: MED RECONCILIATION/REVIEW ADDENDUM    For Pharmacy Admin Tracking Only    PHSO: No  Total # of Interventions Recommended: 1  - Decreased Dose #: 1  - Maintenance Safety Lab Monitoring #: 1    Total Interventions Accepted: 1  Time Spent (min): 30    [employer], PharmD

## 2020-07-29 ENCOUNTER — TELEPHONE (OUTPATIENT)
Dept: PHARMACY | Age: 45
End: 2020-07-29

## 2020-07-30 ENCOUNTER — HOSPITAL ENCOUNTER (OUTPATIENT)
Dept: PHARMACY | Age: 45
Setting detail: THERAPIES SERIES
Discharge: HOME | End: 2020-07-30
Payer: MEDICARE

## 2020-07-30 VITALS — TEMPERATURE: 97.8 F

## 2020-07-30 LAB — INR BLD: 2.6

## 2020-07-30 PROCEDURE — 85610 PROTHROMBIN TIME: CPT

## 2020-07-30 PROCEDURE — 99212 OFFICE O/P EST SF 10 MIN: CPT

## 2020-07-30 RX ORDER — WARFARIN SODIUM 7.5 MG/1
TABLET ORAL
Qty: 30 TABLET | Refills: 3 | Status: SHIPPED
Start: 2020-07-30 | End: 2020-11-19 | Stop reason: DRUGHIGH

## 2020-07-30 NOTE — PROGRESS NOTES
Fingerstick INR drawn per clinic protocol. Maryam Escudero is accompanied by his mom, Dajuan Peterson. Patient states no visible blood in urine and no black tarry stool. Maryam Escudero missed 2 doses of warfarin on 7/23 and 7/24 as instructed by our clinic for supratherapeutic INR. No change in diet. Maryam Escudero states he has \"cut back\" on the melatonin to 1 of 5 mg nightly. Dajuan Peterson states he has an appt in [place] on 8/7 with pain specialist and gastro on 8/10. Since Merrick's INR has decreased from 6.3 to 2.6 over the last 7 days, we will have Maryam Escudero take warfarin 3.75 mg on [place], Tues, Thurs, and 7.5 mg daily all other days of the week and recheck INR in 1 and 1/2 week(s). Patient acknowledges working in consult agreement with pharmacist as referred by his/her physician.           CLINICAL PHARMACY CONSULT: MED RECONCILIATION/REVIEW ADDENDUM    For Pharmacy Admin Tracking Only    PHSO: No  Total # of Interventions Recommended: 1  - Decreased Dose #: 1  - Maintenance Safety Lab Monitoring #: 1  Total Interventions Accepted: 1  Time Spent (min): 400 [facility], 251 [facility]

## 2020-08-10 ENCOUNTER — TELEPHONE (OUTPATIENT)
Dept: PHARMACY | Age: 45
End: 2020-08-10

## 2020-08-10 NOTE — TELEPHONE ENCOUNTER
COVID-19 phone screening     Call placed to screen patient prior to upcoming Medication Management visit for Anticoagulation. Does patient have fever and/or lower respiratory symptoms (SOB, difficulty breathing, cough)? [] Yes    [x] No    If yes, complete Travel Screening and ask the following:   [] [Fever OR s/sxs of lower respiratory illness]  AND  [close contact with lab-confirmed COVID-19 patient within 14 days of symptom onset   [] [Fever AND s/sxs of lower respiratory illness requiring hospitalization] AND [history of travel to [place], [place], [place], [facility], [place] within 14 days of sx onset]  [] [Fever with severe acute lower respiratory illness (I.e. PNA, ARDS) requiring hospitalization and without alternative explanatory diagnosis (I.e. Influenza)] AND [no source of exposure identified]    [x] None of the following; patient confirmed for regularly scheduled INR check and instructed to call the clinic immediately if any symptoms develop prior to upcoming appointment. Told Zakia Solitarioers to have Gianfranco Hernandez continue current dosing of 1/2 tablet on Tuesday and 1 tablet on Wednesday.

## 2020-08-13 ENCOUNTER — HOSPITAL ENCOUNTER (OUTPATIENT)
Dept: PHARMACY | Age: 45
Setting detail: THERAPIES SERIES
Discharge: HOME | End: 2020-08-13
Payer: MEDICARE

## 2020-08-13 ENCOUNTER — TELEPHONE (OUTPATIENT)
Dept: PHARMACY | Age: 45
End: 2020-08-13

## 2020-08-13 DIAGNOSIS — Z79.01 LONG TERM CURRENT USE OF ANTICOAGULANT THERAPY: Primary | ICD-10-CM

## 2020-08-13 DIAGNOSIS — D68.61 ANTIPHOSPHOLIPID ANTIBODY SYNDROME (HCC): ICD-10-CM

## 2020-08-13 NOTE — TELEPHONE ENCOUNTER
Merrick's mom Ezra Young called today to cancel his appt as Adriane Adair is \"not feeling well\". Mom was told the next available appt would be next Thursday or we could send a lab order and they could come when he feels up to it.  Ezra Young stated she thought the lab order would be easier for her and requested it be sent to the [facility] and they would go tomorrow Friday 8/14/20

## 2020-08-14 LAB — INTERNATIONAL NORMALIZATION RATIO, POC: 2.9

## 2020-08-17 PROCEDURE — 99211 OFF/OP EST MAY X REQ PHY/QHP: CPT

## 2020-08-17 PROCEDURE — 85610 PROTHROMBIN TIME: CPT

## 2020-08-18 NOTE — PROGRESS NOTES
Received a call from [facility] regarding Merrick's recent results.

## 2020-09-03 ENCOUNTER — TELEPHONE (OUTPATIENT)
Dept: PHARMACY | Age: 45
End: 2020-09-03

## 2020-09-03 ENCOUNTER — APPOINTMENT (OUTPATIENT)
Dept: PHARMACY | Age: 45
End: 2020-09-03
Payer: MEDICARE

## 2020-09-03 NOTE — TELEPHONE ENCOUNTER
COVID-19 phone screening     Call placed to screen patient prior to upcoming Medication Management visit for Anticoagulation. Does patient have fever and/or lower respiratory symptoms (SOB, difficulty breathing, cough)? [] Yes    [x] No    If yes, complete Travel Screening and ask the following:   [] [Fever OR s/sxs of lower respiratory illness]  AND  [close contact with lab-confirmed COVID-19 patient within 14 days of symptom onset   [] [Fever AND s/sxs of lower respiratory illness requiring hospitalization] AND [history of travel to [place], [place], [place], [facility], [place] within 14 days of sx onset]  [] [Fever with severe acute lower respiratory illness (I.e. PNA, ARDS) requiring hospitalization and without alternative explanatory diagnosis (I.e. Influenza)] AND [no source of exposure identified]    [x] None of the following; patient confirmed for regularly scheduled INR check and instructed to call the clinic immediately if any symptoms develop prior to upcoming appointment.

## 2020-09-04 ENCOUNTER — HOSPITAL ENCOUNTER (OUTPATIENT)
Dept: PHARMACY | Age: 45
Setting detail: THERAPIES SERIES
Discharge: HOME | End: 2020-09-04
Payer: MEDICARE

## 2020-09-04 VITALS — TEMPERATURE: 98.5 F

## 2020-09-04 LAB — INR BLD: 3.9

## 2020-09-04 PROCEDURE — 99211 OFF/OP EST MAY X REQ PHY/QHP: CPT

## 2020-09-04 PROCEDURE — 85610 PROTHROMBIN TIME: CPT

## 2020-09-04 RX ORDER — ACETAMINOPHEN 500 MG
1000 TABLET ORAL DAILY PRN
COMMUNITY

## 2020-09-04 NOTE — PROGRESS NOTES
Fingerstick INR drawn per clinic protocol. Patient states no visible blood in urine and no black tarry stool. Denies any missed doses of warfarin. Erendira More tells me that he did take \"2\" tablets of Melatonin last night because he had more trouble sleeping. He has been taking \"only 1\" tablet \"most\" nights, but still doesn't sleep well. Since Melatonin can increase the INR, this could be contributing to his slightly supra-therapeutic INR today. He says he has a lot of pain, especially in the lower back. He has been seeing a pain specialist and may have a procedure in \"November\" to deaden some of the nerve endings in his lower back and hip area in hopes to relieve some of the pain and pressure. He also plans to start aqua therapy at the [facility] in [place] in 2 weeks. No change in other maintenance medications or in diet. For now, we will have him take only 3.75 mg warfarin (1/2 of the 7.5 mg tablet) tonight instead of a whole tablet, but then he will resume current weekly warfarin regimen as noted on his dosing calendar. We will recheck INR in 4 weeks. Patient acknowledges working in consult agreement with pharmacist as referred by his/her physician.       CLINICAL PHARMACY CONSULT: MED RECONCILIATION/REVIEW ADDENDUM    For Pharmacy Admin Tracking Only    PHSO: No  Total # of Interventions Recommended: 1  - Decreased Dose #: 1  - Maintenance Safety Lab Monitoring #: 1    Total Interventions Accepted: 1  Time Spent (min): 30    Paulette Cornejo PharmD

## 2020-09-30 ENCOUNTER — TELEPHONE (OUTPATIENT)
Dept: PHARMACY | Age: 45
End: 2020-09-30

## 2020-09-30 NOTE — TELEPHONE ENCOUNTER
COVID-19 phone screening     Call placed to screen patient prior to upcoming Medication Management visit for Anticoagulation. Does patient have fever and/or lower respiratory symptoms (SOB, difficulty breathing, cough)? [] Yes    [x] No    If yes, complete Travel Screening and ask the following:   [] [Fever OR s/sxs of lower respiratory illness]  AND  [close contact with lab-confirmed COVID-19 patient within 14 days of symptom onset   [] [Fever AND s/sxs of lower respiratory illness requiring hospitalization] AND [history of travel to [place], [place], [place], [facility], [place] within 14 days of sx onset]  [] [Fever with severe acute lower respiratory illness (I.e. PNA, ARDS) requiring hospitalization and without alternative explanatory diagnosis (I.e. Influenza)] AND [no source of exposure identified]    [x] None of the following; patient confirmed for regularly scheduled INR check and instructed to call the clinic immediately if any symptoms develop prior to upcoming appointment.

## 2020-10-01 ENCOUNTER — HOSPITAL ENCOUNTER (OUTPATIENT)
Dept: PHARMACY | Age: 45
Setting detail: THERAPIES SERIES
Discharge: HOME | End: 2020-10-01
Payer: MEDICARE

## 2020-10-01 VITALS — TEMPERATURE: 97.9 F

## 2020-10-01 LAB — INR BLD: 3

## 2020-10-01 PROCEDURE — 85610 PROTHROMBIN TIME: CPT

## 2020-10-01 PROCEDURE — 99211 OFF/OP EST MAY X REQ PHY/QHP: CPT

## 2020-10-01 NOTE — PROGRESS NOTES
Fingerstick INR drawn per clinic protocol. Caitlyn Haley is accompanied by his mom, Gladys Rubinstein, today. Patient states no visible blood in urine and no black tarry stool. Caitlyn Haley states he \"stubbed\" his toe and \"ripped\" his nail off and \"it bled forever. \"  Caitlyn Haley states he intentionally missed 1 dose of warfarin and only took 1/2 tablet instead 1 tablet in the last week because he thought his \"blood was too thin. \" Merrick's vitamin D level was low and he was told to increase to 2000 units daily and repeat lab in 12/2020. Caitlyn Haley saw Dr. Hyatt Mom on 9/18 and was told he \"doesn't have lupus and the doctor couldn't help him anymore. \" Caitlyn Haley has been doing water therapy twice weekly in which he states isn't helping, but Dyanne Rubinstein says it is. Dyanne Rubinstein states that Caitlyn Haley saw a \"pot doctor\" Dr. Opal Graham and they are evaluating if he is a candidate. Dyanne Rubinstein states she has been giving Caitlyn Haley 1 teaspoon of CBD oil BID and it is helping with his mood, but not his pain. Dyanne Rubinstein states they saw a NP in [place] and Caitlyn Haley will have an ablation in his hip in 11/2020. Since Merrick's INR is therapeutic, and per his and his mom's request, we will continue current weekly warfarin regimen and recheck INR in 5 week(s). Patient acknowledges working in consult agreement with pharmacist as referred by his/her physician.           CLINICAL PHARMACY CONSULT: MED RECONCILIATION/REVIEW ADDENDUM    For Pharmacy Admin Tracking Only    PHSO: No  Total # of Interventions Recommended: 0  - Maintenance Safety Lab Monitoring #: 1  Total Interventions Accepted: 0  Time Spent (min): 400 [facility], 251 [facility]

## 2020-11-04 ENCOUNTER — TELEPHONE (OUTPATIENT)
Dept: PHARMACY | Age: 45
End: 2020-11-04

## 2020-11-04 NOTE — TELEPHONE ENCOUNTER
COVID-19 phone screening     Call placed to screen patient prior to upcoming Medication Management visit for Anticoagulation. Does patient have fever and/or lower respiratory symptoms (SOB, difficulty breathing, cough)? [] Yes    [x] No    If yes, complete Travel Screening and ask the following:   [] [Fever OR s/sxs of lower respiratory illness]  AND  [close contact with lab-confirmed COVID-19 patient within 14 days of symptom onset   [] [Fever AND s/sxs of lower respiratory illness requiring hospitalization] AND [history of travel to [place], [place], [place], [facility], [place] [employer] [place] [place] within 14 days of sx onset]  [] [Fever with severe acute lower respiratory illness (I.e. PNA, ARDS) requiring hospitalization and without alternative explanatory diagnosis (I.e. Influenza)] AND [no source of exposure identified]    [x] None of the following; patient confirmed for regularly scheduled INR check and instructed to call the clinic immediately if any symptoms develop prior to upcoming appointment.

## 2020-11-05 ENCOUNTER — HOSPITAL ENCOUNTER (OUTPATIENT)
Dept: PHARMACY | Age: 45
Setting detail: THERAPIES SERIES
Discharge: HOME | End: 2020-11-05
Payer: MEDICARE

## 2020-11-05 LAB — INR BLD: 4.5

## 2020-11-05 PROCEDURE — 85610 PROTHROMBIN TIME: CPT

## 2020-11-05 PROCEDURE — 99211 OFF/OP EST MAY X REQ PHY/QHP: CPT

## 2020-11-05 NOTE — PROGRESS NOTES
No  Total # of Interventions Recommended: 1  - Decreased Dose #: 1  - Maintenance Safety Lab Monitoring #: 1    Total Interventions Accepted: 1  Time Spent (min): [address], PharmD

## 2020-11-19 ENCOUNTER — HOSPITAL ENCOUNTER (OUTPATIENT)
Dept: PHARMACY | Age: 45
Setting detail: THERAPIES SERIES
Discharge: HOME | End: 2020-11-19
Payer: MEDICARE

## 2020-11-19 VITALS — SYSTOLIC BLOOD PRESSURE: 98 MMHG | HEART RATE: 91 BPM | DIASTOLIC BLOOD PRESSURE: 59 MMHG

## 2020-11-19 LAB — INR BLD: 2

## 2020-11-19 PROCEDURE — 99212 OFFICE O/P EST SF 10 MIN: CPT

## 2020-11-19 PROCEDURE — 85610 PROTHROMBIN TIME: CPT

## 2020-11-19 RX ORDER — WARFARIN SODIUM 7.5 MG/1
TABLET ORAL
Qty: 30 TABLET | Refills: 3 | Status: SHIPPED
Start: 2020-11-19 | End: 2021-01-20 | Stop reason: DRUGHIGH

## 2020-11-19 NOTE — PROGRESS NOTES
Fingerstick INR drawn per clinic protocol. Merrick's mom, Tree Echols, is with him today. Patient states no visible blood in urine and no black tarry stool, although Lissette still struggles with diarrhea and bright red blood in stool. Denies any missed doses of warfarin. No change in diet. Lissette states he has been taking less melatonin at night. Since Merrick's INR has decreased from 4.5 to 2.0 over the last 2 weeks, and is subtherapeutic today, we will increase current weekly warfarin regimen by 9.1% and recheck INR in 4 week(s). Patient acknowledges working in consult agreement with pharmacist as referred by his/her physician.           CLINICAL PHARMACY CONSULT: MED RECONCILIATION/REVIEW ADDENDUM    For Pharmacy Admin Tracking Only    PHSO: No  Total # of Interventions Recommended: 1  - Increased Dose #: 1  - Maintenance Safety Lab Monitoring #: 1  Total Interventions Accepted: 1  Time Spent (min): 400 [facility], [address] [place] [address]

## 2020-12-17 ENCOUNTER — HOSPITAL ENCOUNTER (OUTPATIENT)
Dept: PHARMACY | Age: 45
Setting detail: THERAPIES SERIES
Discharge: HOME | End: 2020-12-17
Payer: MEDICARE

## 2020-12-17 LAB — INR BLD: 3.3

## 2020-12-17 PROCEDURE — 99211 OFF/OP EST MAY X REQ PHY/QHP: CPT

## 2020-12-17 PROCEDURE — 85610 PROTHROMBIN TIME: CPT

## 2020-12-17 NOTE — PROGRESS NOTES
Fingerstick INR drawn per clinic protocol. Patient states no visible blood in urine and no black tarry stool. Denies any missed doses of warfarin. No change in other maintenance medications or in diet. Edwin Hwang went to [facility] for an injection on right hip on 11/30 but was denied for temp greater than 100, returned on 12/7 and received injection. Edwin Hwang will have an ablation on right hip when approved by the insurance. Edwin Hwang is scheduled to see Dr. Lolis Marrufo on 12/29. Since Merrick's INR is therapeutic today, we will continue current weekly warfarin regimen (warfarin 3.75 mg Tues and Sat and 7.5 mg daily all other days of the week) and recheck INR in 4 week(s). Patient acknowledges working in consult agreement with pharmacist as referred by his/her physician.           CLINICAL PHARMACY CONSULT: MED RECONCILIATION/REVIEW ADDENDUM    For Pharmacy Admin Tracking Only    PHSO: No  Total # of Interventions Recommended: 0  - Maintenance Safety Lab Monitoring #: 1  Total Interventions Accepted: 0  Time Spent (min): 400 [facility], 251 [facility]

## 2021-01-19 ENCOUNTER — TELEPHONE (OUTPATIENT)
Dept: PHARMACY | Age: 46
End: 2021-01-19

## 2021-01-19 NOTE — TELEPHONE ENCOUNTER
COVID-19 phone screening     Call placed to screen patient prior to upcoming Medication Management visit for Anticoagulation. Does patient have fever and/or lower respiratory symptoms (SOB, difficulty breathing, cough)? [] Yes    [x] No    If yes, complete Travel Screening and ask the following:   [] [Fever OR s/sxs of lower respiratory illness]  AND  [close contact with lab-confirmed COVID-19 patient within 14 days of symptom onset   [] [Fever AND s/sxs of lower respiratory illness requiring hospitalization] AND [history of travel to [place], [place], [place], [facility], [place] within 14 days of sx onset]  [] [Fever with severe acute lower respiratory illness (I.e. PNA, ARDS) requiring hospitalization and without alternative explanatory diagnosis (I.e. Influenza)] AND [no source of exposure identified]    [x] None of the following; patient confirmed for regularly scheduled INR check and instructed to call the clinic immediately if any symptoms develop prior to upcoming appointment.

## 2021-01-20 ENCOUNTER — HOSPITAL ENCOUNTER (OUTPATIENT)
Dept: PHARMACY | Age: 46
Setting detail: THERAPIES SERIES
Discharge: HOME | End: 2021-01-20
Payer: MEDICARE

## 2021-01-20 VITALS — DIASTOLIC BLOOD PRESSURE: 64 MMHG | HEART RATE: 68 BPM | SYSTOLIC BLOOD PRESSURE: 93 MMHG

## 2021-01-20 DIAGNOSIS — D68.61 ANTIPHOSPHOLIPID ANTIBODY SYNDROME (HCC): ICD-10-CM

## 2021-01-20 DIAGNOSIS — Z79.01 LONG TERM CURRENT USE OF ANTICOAGULANT THERAPY: ICD-10-CM

## 2021-01-20 LAB — INR BLD: 3.4

## 2021-01-20 PROCEDURE — 99212 OFFICE O/P EST SF 10 MIN: CPT

## 2021-01-20 PROCEDURE — 85610 PROTHROMBIN TIME: CPT

## 2021-01-20 RX ORDER — WARFARIN SODIUM 7.5 MG/1
TABLET ORAL
Qty: 30 TABLET | Refills: 3 | Status: SHIPPED
Start: 2021-01-20 | End: 2021-02-09 | Stop reason: SDUPTHER

## 2021-01-20 RX ORDER — DICLOFENAC SODIUM 75 MG/1
75 TABLET, DELAYED RELEASE ORAL DAILY
COMMUNITY
Start: 2020-12-28 | End: 2021-04-23

## 2021-01-20 RX ORDER — DULOXETIN HYDROCHLORIDE 30 MG/1
30 CAPSULE, DELAYED RELEASE ORAL DAILY
COMMUNITY
Start: 2021-01-19 | End: 2021-07-26

## 2021-02-09 ENCOUNTER — TELEPHONE (OUTPATIENT)
Dept: PHARMACY | Age: 46
End: 2021-02-09

## 2021-02-09 RX ORDER — WARFARIN SODIUM 7.5 MG/1
TABLET ORAL
Qty: 30 TABLET | Refills: 3 | OUTPATIENT
Start: 2021-02-09 | End: 2021-02-19 | Stop reason: DRUGHIGH

## 2021-02-09 NOTE — TELEPHONE ENCOUNTER
Tierra Eri called and left a message stating she went to the pharmacy and was only able to get #2 pills. Called in new rx to [facility] for warfarin 7.5mg  #30 plus 3 refills.

## 2021-02-10 ENCOUNTER — TELEPHONE (OUTPATIENT)
Dept: PHARMACY | Age: 46
End: 2021-02-10

## 2021-02-10 NOTE — TELEPHONE ENCOUNTER
Tim Octaviano called today to inquire about the message she left yesterday. Pts mom was informed that we called in a new RX to [facility] in [facility]. Tim Brumfield then informed us that she would like all rx sent to [facility] in [place]. I called [facility] and requested the RX be transferred there for her to  today.

## 2021-02-18 ENCOUNTER — TELEPHONE (OUTPATIENT)
Dept: PHARMACY | Age: 46
End: 2021-02-18

## 2021-02-18 DIAGNOSIS — D68.61 ANTIPHOSPHOLIPID ANTIBODY SYNDROME (HCC): ICD-10-CM

## 2021-02-18 DIAGNOSIS — Z79.01 LONG TERM CURRENT USE OF ANTICOAGULANT THERAPY: Primary | ICD-10-CM

## 2021-02-18 NOTE — TELEPHONE ENCOUNTER
Pedro Graham called requesting we send a lab order to [facility] for venipuncture inr bc of weather and it is hard for laci to get out. Faxed.

## 2021-02-19 ENCOUNTER — HOSPITAL ENCOUNTER (OUTPATIENT)
Dept: PHARMACY | Age: 46
Setting detail: THERAPIES SERIES
Discharge: HOME | End: 2021-02-19
Payer: MEDICARE

## 2021-02-19 DIAGNOSIS — D68.61 ANTIPHOSPHOLIPID ANTIBODY SYNDROME (HCC): ICD-10-CM

## 2021-02-19 DIAGNOSIS — Z79.01 LONG TERM CURRENT USE OF ANTICOAGULANT THERAPY: ICD-10-CM

## 2021-02-19 LAB — INTERNATIONAL NORMALIZATION RATIO, POC: 1.7

## 2021-02-19 PROCEDURE — 99211 OFF/OP EST MAY X REQ PHY/QHP: CPT

## 2021-02-19 RX ORDER — WARFARIN SODIUM 7.5 MG/1
TABLET ORAL
Qty: 30 TABLET | Refills: 3 | Status: SHIPPED
Start: 2021-02-19 | End: 2021-03-11 | Stop reason: DRUGHIGH

## 2021-03-11 ENCOUNTER — HOSPITAL ENCOUNTER (OUTPATIENT)
Dept: PHARMACY | Age: 46
Setting detail: THERAPIES SERIES
Discharge: HOME | End: 2021-03-11
Payer: MEDICARE

## 2021-03-11 VITALS — DIASTOLIC BLOOD PRESSURE: 74 MMHG | HEART RATE: 77 BPM | SYSTOLIC BLOOD PRESSURE: 107 MMHG

## 2021-03-11 DIAGNOSIS — Z79.01 LONG TERM CURRENT USE OF ANTICOAGULANT THERAPY: ICD-10-CM

## 2021-03-11 DIAGNOSIS — D68.61 ANTIPHOSPHOLIPID ANTIBODY SYNDROME (HCC): ICD-10-CM

## 2021-03-11 LAB — INR BLD: 2.8

## 2021-03-11 PROCEDURE — 99211 OFF/OP EST MAY X REQ PHY/QHP: CPT

## 2021-03-11 PROCEDURE — 85610 PROTHROMBIN TIME: CPT

## 2021-03-11 RX ORDER — HYDROXYCHLOROQUINE SULFATE 200 MG/1
400 TABLET, FILM COATED ORAL DAILY
COMMUNITY
Start: 2021-03-04 | End: 2022-03-04

## 2021-03-11 RX ORDER — WARFARIN SODIUM 7.5 MG/1
TABLET ORAL
Qty: 30 TABLET | Refills: 3 | Status: SHIPPED
Start: 2021-03-11 | End: 2021-04-09 | Stop reason: DRUGHIGH

## 2021-03-11 RX ORDER — PREDNISONE 2.5 MG
2.5 TABLET ORAL DAILY
COMMUNITY
Start: 2021-03-04 | End: 2021-04-03

## 2021-03-11 NOTE — PROGRESS NOTES
Fingerstick INR drawn per clinic protocol. Nayan Mason is accompanied by his mom, Robina Kim. Patient states no visible blood in urine and no black tarry stool. Denies any missed doses of warfarin, although Nayan Mason states he has been taking warfarin 3.75 mg T,R, Sat and 7.5 mg daily all other days of the week (8.3% lower weekly warfarin dosage than instructed). No change in diet. Nayan Mason has 3 new doctors he is seeing. He saw Dr. Loco Aleman on 2/5 and will have an echocardiogram on 3/15 and possibly a stress test in the future and if does okay may be a candidate for hip surgery. He saw Dr. Reba Chong on 3/9 after receiving 3 iv infusions of venofer. Nayan Mason saw Dr. Regina Garcia and was started on plaquenil 400 mg daily and prednisone 5 mg was decreased to 2.5 mg daily x 30 days, then 1 mg x 30 days then stopped. Nayan Mason is on a list for covid vaccine at his PCP office. Since Merrick's INR is therapeutic today, we will continue current weekly warfarin regimen (3.75 mg T, R, Sat and 7.5 mg daily all other days of the week) and recheck INR in 4 week(s). Patient acknowledges working in consult agreement with pharmacist as referred by his/her physician.           CLINICAL PHARMACY CONSULT: MED RECONCILIATION/REVIEW ADDENDUM    For Pharmacy Admin Tracking Only    PHSO: No  Total # of Interventions Recommended: 3  - Discontinued Medication #: 1 Discontinue Reason(s): Acute Therapy Complete  - New Order #: 2 New Medication Order Reason(s): Needs Additional Medication Therapy  - Maintenance Safety Lab Monitoring #: 1  Total Interventions Accepted: 0  Time Spent (min): 214 [facility], 251 [facility]

## 2021-03-17 LAB — INR BLD: 2.2

## 2021-03-18 LAB — INR BLD: 2

## 2021-03-19 LAB — INR BLD: 2

## 2021-04-09 ENCOUNTER — HOSPITAL ENCOUNTER (OUTPATIENT)
Dept: PHARMACY | Age: 46
Setting detail: THERAPIES SERIES
Discharge: HOME | End: 2021-04-09
Payer: MEDICARE

## 2021-04-09 VITALS — DIASTOLIC BLOOD PRESSURE: 55 MMHG | HEART RATE: 74 BPM | SYSTOLIC BLOOD PRESSURE: 97 MMHG

## 2021-04-09 DIAGNOSIS — Z79.01 LONG TERM CURRENT USE OF ANTICOAGULANT THERAPY: ICD-10-CM

## 2021-04-09 DIAGNOSIS — D68.61 ANTIPHOSPHOLIPID ANTIBODY SYNDROME (HCC): ICD-10-CM

## 2021-04-09 LAB — INR BLD: 1.9

## 2021-04-09 PROCEDURE — 85610 PROTHROMBIN TIME: CPT

## 2021-04-09 PROCEDURE — 99212 OFFICE O/P EST SF 10 MIN: CPT

## 2021-04-09 RX ORDER — PREDNISONE 1 MG/1
1 TABLET ORAL DAILY
COMMUNITY
Start: 2021-04-04 | End: 2021-05-04

## 2021-04-09 RX ORDER — WARFARIN SODIUM 7.5 MG/1
TABLET ORAL
Qty: 30 TABLET | Refills: 3 | Status: SHIPPED
Start: 2021-04-09 | End: 2021-05-24 | Stop reason: DRUGHIGH

## 2021-04-09 RX ORDER — LEVETIRACETAM 500 MG/1
500 TABLET ORAL 2 TIMES DAILY
COMMUNITY
Start: 2021-03-19 | End: 2021-04-18

## 2021-04-09 NOTE — PROGRESS NOTES
[address]  Medication Management  ANTICOAGULATION    Referring Doctor: Dr. Jaylen Cruz INR: 2.5-3.5    TODAY'S INR: 1.9    WARFARIN Dosage: 3.75 mg T and R and 7.5 mg daily all other days of the week     INR (no units)   Date Value   04/09/2021 1.9   03/19/2021 2.00   03/18/2021 2.00   03/17/2021 2.20   03/11/2021 2.8   02/19/2021 1.7   01/20/2021 3.4   12/17/2020 3.3       Medication changes:  keppra 500 mg po bid, 1st covid vaccine 3/25/2021, prednisone 2.5 mg daily  decreased to 1 mg daily   Notes:    Fingerstick INR drawn per clinic protocol. Patient states no visible blood in urine and no black tarry stool. Denies any missed doses of warfarin. No change in diet. Bakari Whitmore was taken by squad to ED for seizure on 3/17/2021 (which Chen Velazquez states he hasn't had in a \"long time\") and was hospitalized and discharged on 3/19/21 on keppra 500 mg BID (INR=2.2 on 3/17(warfarin 7.5), 2.0 (7.5 mg) on 3/18, 2.0 on 3/19). Bakari Whitmore was discharged home on augmentin 875 mg BID x 3 days. Bakari Whitmore received his 1st moderna covid vaccine on 3/25/2021, immunization record updated. Rips prednisone 2.5 mg daily is being titrated down to 1mg daily. Since Rips INR is subtherapeutic today and was upon admission to hospital, we will increase weekly warfarin dosage by 9.1% and will recheck INR in 2 weeks. Patient acknowledges working in consult agreement with pharmacist as referred by his/her physician. CLINICAL PHARMACY CONSULT: MED RECONCILIATION/REVIEW ADDENDUM    For Pharmacy Admin Tracking Only    PHSO: No  Total # of Interventions Recommended: 4  - Increased Dose #: 1  - New Order #: 2 New Medication Order Reason(s): Needs Additional Medication Therapy  - Updated Order #: 1 Updated Order Reason(s):  Other  - Maintenance Safety Lab Monitoring #: 1  Total Interventions Accepted: 1  Time Spent (min): [address], PharmD

## 2021-04-23 ENCOUNTER — HOSPITAL ENCOUNTER (OUTPATIENT)
Dept: PHARMACY | Age: 46
Setting detail: THERAPIES SERIES
Discharge: HOME | End: 2021-04-23
Payer: MEDICARE

## 2021-04-23 ENCOUNTER — APPOINTMENT (OUTPATIENT)
Dept: PHARMACY | Age: 46
End: 2021-04-23
Payer: MEDICARE

## 2021-04-23 VITALS — DIASTOLIC BLOOD PRESSURE: 79 MMHG | SYSTOLIC BLOOD PRESSURE: 110 MMHG | HEART RATE: 69 BPM

## 2021-04-23 DIAGNOSIS — D68.61 ANTIPHOSPHOLIPID ANTIBODY SYNDROME (HCC): ICD-10-CM

## 2021-04-23 DIAGNOSIS — Z79.01 LONG TERM CURRENT USE OF ANTICOAGULANT THERAPY: ICD-10-CM

## 2021-04-23 LAB — INR BLD: 3.4

## 2021-04-23 PROCEDURE — 99211 OFF/OP EST MAY X REQ PHY/QHP: CPT

## 2021-04-23 PROCEDURE — 85610 PROTHROMBIN TIME: CPT

## 2021-04-23 RX ORDER — LEVETIRACETAM 500 MG/1
500 TABLET ORAL 2 TIMES DAILY
COMMUNITY
Start: 2021-04-19 | End: 2021-05-19

## 2021-04-23 RX ORDER — LANOLIN ALCOHOL/MO/W.PET/CERES
325 CREAM (GRAM) TOPICAL 2 TIMES DAILY
COMMUNITY
Start: 2021-04-20 | End: 2022-03-04 | Stop reason: ALTCHOICE

## 2021-05-20 ENCOUNTER — TELEPHONE (OUTPATIENT)
Dept: PHARMACY | Age: 46
End: 2021-05-20

## 2021-05-20 NOTE — TELEPHONE ENCOUNTER
COVID-19 phone screening     Call placed to screen patient prior to upcoming Medication Management visit for Anticoagulation. Does patient have fever and/or lower respiratory symptoms (SOB, difficulty breathing, cough)? [] Yes    [x] No    If yes, complete Travel Screening and ask the following:   [] [Fever OR s/sxs of lower respiratory illness]  AND  [close contact with lab-confirmed COVID-19 patient within 14 days of symptom onset   [] [Fever AND s/sxs of lower respiratory illness requiring hospitalization] AND [history of travel to [place], [place], [place], [facility], [place] within 14 days of sx onset]  [] [Fever with severe acute lower respiratory illness (I.e. PNA, ARDS) requiring hospitalization and without alternative explanatory diagnosis (I.e. Influenza)] AND [no source of exposure identified]    [x] None of the following; patient confirmed for regularly scheduled INR check and instructed to call the clinic immediately if any symptoms develop prior to upcoming appointment.

## 2021-05-24 ENCOUNTER — HOSPITAL ENCOUNTER (OUTPATIENT)
Dept: PHARMACY | Age: 46
Setting detail: THERAPIES SERIES
Discharge: HOME | End: 2021-05-24
Payer: MEDICARE

## 2021-05-24 VITALS — HEART RATE: 69 BPM | SYSTOLIC BLOOD PRESSURE: 113 MMHG | DIASTOLIC BLOOD PRESSURE: 73 MMHG

## 2021-05-24 DIAGNOSIS — D68.61 ANTIPHOSPHOLIPID ANTIBODY SYNDROME (HCC): ICD-10-CM

## 2021-05-24 DIAGNOSIS — Z79.01 LONG TERM CURRENT USE OF ANTICOAGULANT THERAPY: Primary | ICD-10-CM

## 2021-05-24 LAB — INR BLD: 1.9

## 2021-05-24 PROCEDURE — 99211 OFF/OP EST MAY X REQ PHY/QHP: CPT

## 2021-05-24 PROCEDURE — 85610 PROTHROMBIN TIME: CPT

## 2021-05-24 RX ORDER — WARFARIN SODIUM 7.5 MG/1
TABLET ORAL
Qty: 30 TABLET | Refills: 3 | Status: SHIPPED
Start: 2021-05-24 | End: 2022-07-25 | Stop reason: ALTCHOICE

## 2021-05-24 NOTE — PROGRESS NOTES
[facility]  Medication Management  ANTICOAGULATION    Referring Doctor: Dr. Nir Hwang     GOAL INR: 2.5-3.5     TODAY'S INR: 1.9     WARFARIN Dosage: 11.25 mg x 1 dose, then resume 3.75 mg Tues/Thur/Sat and 7.5 mg daily all other days of the week     INR (no units)   Date Value   2021 1.9   2021 3.4   2021 1.9   2021 2.00   2021 2.00   2021 2.20   2021 2.8       Medication changes:  None    Notes:    Fingerstick INR drawn per clinic protocol. Patient states no visible blood in urine and no black tarry stool. Denies any missed doses of warfarin. No change in other maintenance medications. Upon reviewing his diet, Timo Mcintosh says that he has been eating more vitamin-k rich foods, including \"fettuchini with broccoli\", \"coleslaw\", and \"spinach dip\" this past week, which could be contributing to his sub-therapeutic INR today. He is resistant to changing his weekly warfarin regimen and says he \"won't take anymore\", but does agree to take a booster dose of 11.25 mg warfarin tomorrow (since he takes his warfarin in the AM and has already taken his dose today). Thereafter, he will continue current weekly warfarin regimen and we will recheck INR in 4 weeks. Patient acknowledges working in consult agreement with pharmacist as referred by his/her physician.                   CLINICAL PHARMACY CONSULT: MED RECONCILIATION/REVIEW ADDENDUM    For Pharmacy Admin Tracking Only     Intervention Detail: Adherence Monitorin and Dose Adjustment: 1: reason: Therapy Optimization   Total # of Interventions Recommended: 2   Total # of Interventions Accepted: 2   Time Spent (min): [address] [facility], [facility], PharmD

## 2021-06-21 ENCOUNTER — HOSPITAL ENCOUNTER (OUTPATIENT)
Dept: PHARMACY | Age: 46
Setting detail: THERAPIES SERIES
Discharge: HOME | End: 2021-06-21
Payer: MEDICARE

## 2021-06-21 DIAGNOSIS — D68.61 ANTIPHOSPHOLIPID ANTIBODY SYNDROME (HCC): ICD-10-CM

## 2021-06-21 DIAGNOSIS — Z79.01 LONG TERM CURRENT USE OF ANTICOAGULANT THERAPY: Primary | ICD-10-CM

## 2021-06-21 LAB — INR BLD: 2.2

## 2021-06-21 PROCEDURE — 99211 OFF/OP EST MAY X REQ PHY/QHP: CPT

## 2021-06-21 PROCEDURE — 85610 PROTHROMBIN TIME: CPT

## 2021-06-25 ENCOUNTER — OFFICE VISIT (OUTPATIENT)
Dept: PAIN MANAGEMENT | Age: 46
End: 2021-06-25
Payer: MEDICARE

## 2021-06-25 VITALS
SYSTOLIC BLOOD PRESSURE: 126 MMHG | WEIGHT: 290.4 LBS | OXYGEN SATURATION: 98 % | DIASTOLIC BLOOD PRESSURE: 84 MMHG | BODY MASS INDEX: 39.39 KG/M2 | TEMPERATURE: 100.4 F

## 2021-06-25 DIAGNOSIS — M79.7 FIBROMYALGIA: Primary | ICD-10-CM

## 2021-06-25 PROCEDURE — G8417 CALC BMI ABV UP PARAM F/U: HCPCS | Performed by: PHYSICAL MEDICINE & REHABILITATION

## 2021-06-25 PROCEDURE — G8427 DOCREV CUR MEDS BY ELIG CLIN: HCPCS | Performed by: PHYSICAL MEDICINE & REHABILITATION

## 2021-06-25 PROCEDURE — 4004F PT TOBACCO SCREEN RCVD TLK: CPT | Performed by: PHYSICAL MEDICINE & REHABILITATION

## 2021-06-25 PROCEDURE — 99204 OFFICE O/P NEW MOD 45 MIN: CPT | Performed by: PHYSICAL MEDICINE & REHABILITATION

## 2021-06-25 RX ORDER — PREGABALIN 75 MG/1
75 CAPSULE ORAL 2 TIMES DAILY
Qty: 60 CAPSULE | Refills: 3 | Status: SHIPPED | OUTPATIENT
Start: 2021-06-25 | End: 2021-07-09 | Stop reason: SDUPTHER

## 2021-06-25 NOTE — PROGRESS NOTES
Pain Management Consultation    [id number] [place] [address] [address] [place] [id number]  Dept: [phone]    No referring provider defined for this encounter. Codi Stiles is a 51 y.o. male, who came today due to  Whole body pain, back pain, neck pain, shoulder pain, hip pain and knee pain    Cause of the symptom(s): degenerative (arthritis)    Onset: 20+years    Quality of Symptoms: aching, throbbing, burning, shooting, numbness and tingling    Aggravating factors: lifting, twisting, bending forward, bending backward, coughing and sneezing    Relieving factors: Medication helps pain    Red Flags: pain at night, pain with lying down and osteoporosis    Treatment done: physical therapy, injection, anti-inflammatory medication, muscle relaxant, steroid and opioid      Current pain level: 10 on the scale of 0-10 ( 10 being worst)       Allergies   Allergen Reactions    Uncaria Tomentosa (Cats Claw) Other (See Comments)     itch,sneezing       Social History     Socioeconomic History    Marital status:      Spouse name: Not on file    Number of children: Not on file    Years of education: Not on file    Highest education level: Not on file   Occupational History    Not on file   Tobacco Use    Smoking status: Never Smoker    Smokeless tobacco: Current User     Types: Chew   Vaping Use    Vaping Use: Never used   Substance and Sexual Activity    Alcohol use: Not Currently     Alcohol/week: 2.0 standard drinks     Types: 2 Cans of beer per week     Comment: occasional    Drug use:  Yes    Sexual activity: Not Currently     Partners: Female   Other Topics Concern    Not on file   Social History Narrative    Not on file     Social Determinants of Health     Financial Resource Strain:     Difficulty of Paying Living Expenses:    Food Insecurity:     Worried About Running Out of Food in the Last Year:     [address] in the daily  Historical Provider, MD   ferrous sulfate (FE TABS 325) 325 (65 Fe) MG EC tablet Take 325 mg by mouth 2 times daily  Historical Provider, MD   hydroxychloroquine (PLAQUENIL) 200 MG tablet Take 400 mg by mouth daily  Historical Provider, MD   acetaminophen (TYLENOL) 500 MG tablet Take 1,000 mg by mouth daily as needed   Historical Provider, MD   pyridoxine (B-6) 100 MG tablet Take 100 mg by mouth every other day   Historical Provider, MD   lidocaine (LIDODERM) 5 % Place 3 patches onto the skin daily 12 hours on, 12 hours off. Historical Provider, MD   Cholecalciferol (VITAMIN D3) 50 MCG (2000 UT) CAPS Take 1 capsule by mouth daily  Historical Provider, MD   atorvastatin (LIPITOR) 40 MG tablet Take 40 mg by mouth nightly  Historical Provider, MD   melatonin 5 MG TABS tablet Take 5 mg by mouth nightly  Historical Provider, MD   B-Complex-C TABS Take 1 tablet by mouth daily  Historical Provider, MD   Compression Stockings MISC by Does not apply route Graduated compression  Silas Donaldson MD   atenolol (TENORMIN) 25 MG tablet Take 25 mg by mouth daily   Historical Provider, MD       Family History   Problem Relation Age of Onset    Heart Disease Maternal Grandmother     Heart Disease Maternal Grandfather     Stroke Maternal Grandfather        Review of Systems : All systems reviewed, all unremarkable other than HPI/subjective. No change since last visit. Denies  fever, chills, infection or non healing wound. Physical Exam  Constitutional:       General: He is not in acute distress. HENT:      Head: Atraumatic. Pulmonary:      Effort: Pulmonary effort is normal. No respiratory distress. Musculoskeletal:         General: Tenderness present. Comments: Lumbar paraspinals    Neurological:      Mental Status: He is alert and oriented to person, place, and time. Motor: No weakness.    Psychiatric:         Behavior: Behavior normal.             Assessment and Plan:      Diagnosis Orders 1. Fibromyalgia  DISCONTINUED: pregabalin (LYRICA) 75 MG capsule         Trial of lyrica 75 BID    Continue cymbalta 60 mg per day. FF up in 3 months. I have reviewed the chief complaint and HPI including the [facility] and Vital documentation by my staff and I agree with their documentation and have added where applicable. Time spent with patient was 45  minutes. More than 50% was spent counseling/coordinating the patient's care.        Alejandro Johnson MD   Spine Medicine/PM&R

## 2021-06-25 NOTE — PATIENT INSTRUCTIONS
Instruction for Lyrica:    Trial of pregabalin (Lyrica) -take 1 tab at bed ( 10 pm) for 3 days then twice a day ( 10 pm, 8 am) . Stop if you develop any reaction, including dizziness, rash among others.      Please stop at the most effective dose ( even if it is once a day to prevent any severe side effects)

## 2021-07-26 ENCOUNTER — HOSPITAL ENCOUNTER (OUTPATIENT)
Dept: PHARMACY | Age: 46
Setting detail: THERAPIES SERIES
Discharge: HOME | End: 2021-07-26
Payer: MEDICARE

## 2021-07-26 DIAGNOSIS — D68.61 ANTIPHOSPHOLIPID ANTIBODY SYNDROME (HCC): ICD-10-CM

## 2021-07-26 DIAGNOSIS — Z79.01 LONG TERM CURRENT USE OF ANTICOAGULANT THERAPY: Primary | ICD-10-CM

## 2021-07-26 LAB — INR BLD: 1.4

## 2021-07-26 PROCEDURE — 99211 OFF/OP EST MAY X REQ PHY/QHP: CPT

## 2021-07-26 PROCEDURE — 85610 PROTHROMBIN TIME: CPT

## 2021-07-26 RX ORDER — PREGABALIN 75 MG/1
75 CAPSULE ORAL 2 TIMES DAILY
COMMUNITY
End: 2021-08-06 | Stop reason: SDUPTHER

## 2021-07-26 RX ORDER — DULOXETIN HYDROCHLORIDE 30 MG/1
30 CAPSULE, DELAYED RELEASE ORAL 2 TIMES DAILY
COMMUNITY
Start: 2021-07-06 | End: 2021-08-06

## 2021-07-26 RX ORDER — LEVETIRACETAM 500 MG/1
1 TABLET ORAL 2 TIMES DAILY
COMMUNITY
Start: 2021-06-30 | End: 2022-01-13 | Stop reason: ALTCHOICE

## 2021-07-26 RX ORDER — AMOXICILLIN 250 MG
1 CAPSULE ORAL 2 TIMES DAILY
COMMUNITY
Start: 2021-07-21 | End: 2021-08-06 | Stop reason: SDUPTHER

## 2021-07-27 NOTE — PROGRESS NOTES
[facility] [address] [place] [address]  Medication Management  ANTICOAGULATION    Referring Doctor: Dr. Yecenia Cortez     GOAL INR: 2.5-3.5     TODAY'S INR: 1.4     WARFARIN Dosage: 3.75 mg Tues/Thur/Sat and 7.5 mg daily all other days of the week     INR (no units)   Date Value   07/26/2021 1.4   06/21/2021 2.2   05/24/2021 1.9   04/23/2021 3.4   04/09/2021 1.9   03/19/2021 2.00   03/18/2021 2.00       Medication changes:  lyrica 75 mg po bid started on 6/25, increased to tid on 7/9 x 2 days, but then Lissette decreased back to bid because of constipation. Lissette restarted keppra 500 mg po bid because he states he had a seizure. Also of note he is now taking cymbalta 30 mg po bid, but is unsure when it changed. Notes:    Fingerstick INR drawn per clinic protocol. Patient states no visible blood in urine and no black tarry stool. Lissette missed 2 doses of warfarin, yesterday and today (he takes his warfarin around 1200pm) because he had a cut on his finger yesterday and \"it wouldn't stop bleeding\" so he thought his \"blood was too thin\". No change in diet Donald Salvage refuses weight but recent document weight at Dr. Rebeca Coulter on 7/2119=434 lb). Lissette saw Dr. Shilpa Nuñez on 6/25 and was started on lyrica 75 mg po bid and had a virtual visit on 7/9 and was increased to TID  But after 2 days Lissette states he decreased to BID because of constipation. He is scheduled to see Dr. Shilpa Nuñez again on 8/6. Lissette restarted keppra 500 mg po bid because he states he had a seizure. Also of note he is now taking cymbalta 30 mg po bid, but is unsure when it changed. Since Merrick's INR is subtherapeutic, but he missed 36% of his weekly warfarin dosage, we will have him take warfarin 11.25 mg today and then continue current weekly warfarin dosage and will recheck INR in 1 and 1/2 weeks while he is town to see Dr. Shilpa Nuñez. Patient acknowledges working in consult agreement with pharmacist as referred by his/her physician.                   For Pharmacy Admin Tracking Only     Intervention Detail: Adherence Monitorin, Dose Adjustment: 1, reason: Therapy Optimization and New Rx: 1, reason: Needs Additional Therapy   Total # of Interventions Recommended: 2   Total # of Interventions Accepted: 2   Time Spent (min): [address], [address] [facility], PharmD

## 2021-08-06 ENCOUNTER — HOSPITAL ENCOUNTER (OUTPATIENT)
Dept: PHARMACY | Age: 46
Setting detail: THERAPIES SERIES
Discharge: HOME | End: 2021-08-06
Payer: MEDICARE

## 2021-08-06 ENCOUNTER — OFFICE VISIT (OUTPATIENT)
Dept: PAIN MANAGEMENT | Age: 46
End: 2021-08-06
Payer: MEDICARE

## 2021-08-06 VITALS
SYSTOLIC BLOOD PRESSURE: 130 MMHG | DIASTOLIC BLOOD PRESSURE: 84 MMHG | BODY MASS INDEX: 40.88 KG/M2 | OXYGEN SATURATION: 98 % | WEIGHT: 301.4 LBS

## 2021-08-06 VITALS — HEART RATE: 78 BPM | SYSTOLIC BLOOD PRESSURE: 122 MMHG | DIASTOLIC BLOOD PRESSURE: 75 MMHG

## 2021-08-06 DIAGNOSIS — D68.61 ANTIPHOSPHOLIPID ANTIBODY SYNDROME (HCC): ICD-10-CM

## 2021-08-06 DIAGNOSIS — Z79.01 LONG TERM CURRENT USE OF ANTICOAGULANT THERAPY: Primary | ICD-10-CM

## 2021-08-06 DIAGNOSIS — M79.7 FIBROMYALGIA: Primary | ICD-10-CM

## 2021-08-06 LAB — INR BLD: 2.8

## 2021-08-06 PROCEDURE — 99213 OFFICE O/P EST LOW 20 MIN: CPT | Performed by: PHYSICAL MEDICINE & REHABILITATION

## 2021-08-06 PROCEDURE — G8417 CALC BMI ABV UP PARAM F/U: HCPCS | Performed by: PHYSICAL MEDICINE & REHABILITATION

## 2021-08-06 PROCEDURE — 99211 OFF/OP EST MAY X REQ PHY/QHP: CPT

## 2021-08-06 PROCEDURE — 85610 PROTHROMBIN TIME: CPT

## 2021-08-06 PROCEDURE — G8427 DOCREV CUR MEDS BY ELIG CLIN: HCPCS | Performed by: PHYSICAL MEDICINE & REHABILITATION

## 2021-08-06 PROCEDURE — 4004F PT TOBACCO SCREEN RCVD TLK: CPT | Performed by: PHYSICAL MEDICINE & REHABILITATION

## 2021-08-06 RX ORDER — AMOXICILLIN 250 MG
1 CAPSULE ORAL 3 TIMES DAILY
Qty: 90 TABLET | Refills: 0 | Status: SHIPPED | OUTPATIENT
Start: 2021-08-06 | End: 2021-08-26

## 2021-08-06 RX ORDER — ATORVASTATIN CALCIUM 10 MG/1
10 TABLET, FILM COATED ORAL DAILY
COMMUNITY
Start: 2021-07-19

## 2021-08-06 RX ORDER — PREGABALIN 75 MG/1
75 CAPSULE ORAL 2 TIMES DAILY
Qty: 60 CAPSULE | Refills: 2 | Status: SHIPPED | OUTPATIENT
Start: 2021-08-06 | End: 2021-09-17 | Stop reason: SDUPTHER

## 2021-08-06 RX ORDER — DULOXETIN HYDROCHLORIDE 60 MG/1
60 CAPSULE, DELAYED RELEASE ORAL DAILY
Qty: 30 CAPSULE | Refills: 3 | Status: SHIPPED | OUTPATIENT
Start: 2021-08-06 | End: 2022-01-13 | Stop reason: DRUGHIGH

## 2021-08-06 NOTE — PROGRESS NOTES
FOLLOW UP APPOINTMENT:             8/6/2021       Victoria Gomez is a 51 y.o. male, who came for a  follow up to discuss treatment options    Cause of the symptom(s): degenerative (arthritis)    Onset: 20+years      Prior reatment done: medications    Current pain level: 9 on the scale of 0-10 ( 10 being worst)     Quality of Symptoms: aching, throbbing, burning, shooting, numbness, tingling and all of the above    Aggravating factors: activity, lifting, twisting, bending forward and bending backward    Relieving factors: use of medication        Allergies   Allergen Reactions    Uncaria Tomentosa (Cats Claw) Other (See Comments)     itch,sneezing       Social History     Socioeconomic History    Marital status:      Spouse name: Not on file    Number of children: Not on file    Years of education: Not on file    Highest education level: Not on file   Occupational History    Not on file   Tobacco Use    Smoking status: Never Smoker    Smokeless tobacco: Current User     Types: Chew   Vaping Use    Vaping Use: Never used   Substance and Sexual Activity    Alcohol use: Not Currently     Alcohol/week: 2.0 standard drinks     Types: 2 Cans of beer per week     Comment: occasional    Drug use: Yes    Sexual activity: Not Currently     Partners: Female   Other Topics Concern    Not on file   Social History Narrative    Not on file     Social Determinants of Health     Financial Resource Strain:     Difficulty of Paying Living Expenses:    Food Insecurity:     Worried About Running Out of Food in the Last Year:     [address] in the Last Year:    Transportation Needs:     Lack of Transportation (Medical):      Lack of Transportation (Non-Medical):    Physical Activity:     Days of Exercise per Week:     Minutes of Exercise per Session:    Stress:     Feeling of Stress :    Social Connections:     Frequency of Communication with Friends and Family:     Frequency of Social Gatherings MD   hydroxychloroquine (PLAQUENIL) 200 MG tablet Take 400 mg by mouth daily Yes Historical Provider, MD   acetaminophen (TYLENOL) 500 MG tablet Take 1,000 mg by mouth daily as needed  Yes Historical Provider, MD   pyridoxine (B-6) 100 MG tablet Take 100 mg by mouth every other day  Yes Historical Provider, MD   lidocaine (LIDODERM) 5 % Place 3 patches onto the skin daily 12 hours on, 12 hours off. Yes Historical Provider, MD   Cholecalciferol (VITAMIN D3) 50 MCG (2000 UT) CAPS Take 1 capsule by mouth daily Yes Historical Provider, MD   atorvastatin (LIPITOR) 40 MG tablet Take 40 mg by mouth nightly Yes Historical Provider, MD   melatonin 5 MG TABS tablet Take 5 mg by mouth nightly Yes Historical Provider, MD   B-Complex-C TABS Take 1 tablet by mouth daily Yes Historical Provider, MD   Compression Stockings MISC by Does not apply route Graduated compression Yes Armond Denver, MD   atenolol (TENORMIN) 25 MG tablet Take 25 mg by mouth daily  Yes Historical Provider, MD         Review of Systems : All systems reviewed, all unremarkable other than HPI/subjective. No change since last visit. Denies  fever, chills, infection or non healing wound. /84 (Site: Right Upper Arm, Position: Sitting, Cuff Size: Medium Adult)   Wt (!) 301 lb 6.4 oz (136.7 kg)   SpO2 98%   BMI 40.88 kg/m²       Physical Exam  Constitutional:       Appearance: He is obese. HENT:      Head: Atraumatic. Pulmonary:      Effort: Pulmonary effort is normal. No respiratory distress. Musculoskeletal:         General: Tenderness present. Comments: Multiple tender points    Skin:     General: Skin is dry. Neurological:      Mental Status: He is alert and oriented to person, place, and time. Psychiatric:         Behavior: Behavior normal.       Assessment and Plan:      Diagnosis Orders   1.  Fibromyalgia  pregabalin (LYRICA) 75 MG capsule         Refer to Medical Marijuana Evaluation    Refill Duloxetine, cymbalta    FF up 3 months     All questions were answered and imaging studies reviewed with the patient. I have reviewed the chief complaint and HPI including the [facility] and Vital documentation by my staff and I agree with their documentation and have added where applicable. Time spent with patient was 25  minutes. More than 50% was spent counseling/coordinating the patient's care.          Malissa Valadez MD   Spine Medicine/PM&R

## 2021-09-09 ENCOUNTER — ANTI-COAG VISIT (OUTPATIENT)
Dept: PHARMACY | Age: 46
End: 2021-09-09

## 2021-09-09 ENCOUNTER — TELEPHONE (OUTPATIENT)
Dept: PHARMACY | Age: 46
End: 2021-09-09

## 2021-09-09 NOTE — TELEPHONE ENCOUNTER
COVID-19 phone screening     Call placed to screen patient prior to upcoming Medication Management visit for Anticoagulation. Does patient have fever and/or lower respiratory symptoms (SOB, difficulty breathing, cough)? [] Yes    [] No    If yes, complete Travel Screening and ask the following:   [] [Fever OR s/sxs of lower respiratory illness]  AND  [close contact with lab-confirmed COVID-19 patient within 14 days of symptom onset   [] [Fever AND s/sxs of lower respiratory illness requiring hospitalization] AND [history of travel to [place], [place], [place], [facility], [place] within 14 days of sx onset]  [] [Fever with severe acute lower respiratory illness (I.e. PNA, ARDS) requiring hospitalization and without alternative explanatory diagnosis (I.e. Influenza)] AND [no source of exposure identified]    [x] None of the following; patient confirmed for regularly scheduled INR check and instructed to call the clinic immediately if any symptoms develop prior to upcoming appointment.

## 2021-09-13 DIAGNOSIS — M79.7 FIBROMYALGIA: ICD-10-CM

## 2021-09-13 NOTE — TELEPHONE ENCOUNTER
Alex Sage called requesting a refill on the following medications:  Requested Prescriptions     Pending Prescriptions Disp Refills    pregabalin (LYRICA) 75 MG capsule 60 capsule 2     Sig: Take 1 capsule by mouth 2 times daily for 30 days. Pharmacy verified:[facility]  . pv      Date of last visit:   Date of next visit (if applicable): Visit date not found

## 2021-09-14 ENCOUNTER — TELEPHONE (OUTPATIENT)
Dept: PAIN MANAGEMENT | Age: 46
End: 2021-09-14

## 2021-09-14 NOTE — TELEPHONE ENCOUNTER
Kelvin Stone called stating the patient's mother states the patient is supposed to be taking the pregabalin 75 mg TID but the last script sent on 8/6/21 was written for BID. The patient is now out of medication and is requesting a new prescription where he has enough for TID.

## 2021-09-17 ENCOUNTER — TELEPHONE (OUTPATIENT)
Dept: PAIN MANAGEMENT | Age: 46
End: 2021-09-17

## 2021-09-17 ENCOUNTER — HOSPITAL ENCOUNTER (OUTPATIENT)
Dept: PHARMACY | Age: 46
Setting detail: THERAPIES SERIES
Discharge: HOME | End: 2021-09-17
Payer: MEDICARE

## 2021-09-17 DIAGNOSIS — D68.61 ANTIPHOSPHOLIPID ANTIBODY SYNDROME (HCC): ICD-10-CM

## 2021-09-17 DIAGNOSIS — M79.7 FIBROMYALGIA: ICD-10-CM

## 2021-09-17 DIAGNOSIS — Z79.01 LONG TERM CURRENT USE OF ANTICOAGULANT THERAPY: Primary | ICD-10-CM

## 2021-09-17 LAB — INR BLD: 3.1

## 2021-09-17 PROCEDURE — 99211 OFF/OP EST MAY X REQ PHY/QHP: CPT

## 2021-09-17 PROCEDURE — 85610 PROTHROMBIN TIME: CPT

## 2021-09-17 RX ORDER — PREGABALIN 75 MG/1
75 CAPSULE ORAL 3 TIMES DAILY
Qty: 90 CAPSULE | Refills: 2 | Status: SHIPPED | OUTPATIENT
Start: 2021-09-17 | End: 2022-03-04 | Stop reason: ALTCHOICE

## 2021-09-17 NOTE — TELEPHONE ENCOUNTER
Patients mother, Masoud Ayala, called asking if Lyrica or zyrtec would show up on a drug test. 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 Please advise.

## 2021-09-17 NOTE — PROGRESS NOTES
[facility]/[place]  Medication Management  ANTICOAGULATION    Referring Doctor: Dr. Jael Pike INR: 2.5-3.5     TODAY'S INR: 3.1     WARFARIN Dosage: 3.75 mg Tues/Thur/Sat and 7.5 mg daily all other days of the week    INR (no units)   Date Value   2021 3.1   2021 2.8   2021 1.4   2021 2.2   2021 1.9   2021 3.4   2021 1.9   2021 2.00       Medication changes:  None    Notes:    Fingerstick INR drawn per clinic protocol. Patient states no visible blood in urine and no black tarry stool. Denies any missed doses of warfarin. No change in other maintenance medications or in diet. Will recheck INR in 6 weeks. Patient acknowledges working in consult agreement with pharmacist as referred by his/her physician.                   For Pharmacy Admin Tracking Only     Intervention Detail: Adherence Monitorin   Total # of Interventions Recommended: 1   Total # of Interventions Accepted: 1   Time Spent (min): [address], [address] [facility], PharmD

## 2021-09-23 RX ORDER — PREGABALIN 75 MG/1
75 CAPSULE ORAL 2 TIMES DAILY
Qty: 60 CAPSULE | Refills: 2 | Status: SHIPPED | OUTPATIENT
Start: 2021-09-23 | End: 2022-01-13 | Stop reason: SDUPTHER

## 2021-10-01 ENCOUNTER — OFFICE VISIT (OUTPATIENT)
Dept: PAIN MANAGEMENT | Age: 46
End: 2021-10-01
Payer: MEDICARE

## 2021-10-01 ENCOUNTER — HOSPITAL ENCOUNTER (OUTPATIENT)
Age: 46
Discharge: HOME | End: 2021-10-01
Payer: MEDICARE

## 2021-10-01 VITALS
HEIGHT: 72 IN | BODY MASS INDEX: 41.72 KG/M2 | WEIGHT: 308 LBS | DIASTOLIC BLOOD PRESSURE: 88 MMHG | SYSTOLIC BLOOD PRESSURE: 132 MMHG

## 2021-10-01 DIAGNOSIS — M47.816 LUMBAR SPONDYLOSIS: Primary | ICD-10-CM

## 2021-10-01 DIAGNOSIS — M47.816 LUMBAR SPONDYLOSIS: ICD-10-CM

## 2021-10-01 LAB
AMPHETAMINE SCREEN URINE: NEGATIVE
BARBITURATE SCREEN URINE: NEGATIVE
BENZODIAZEPINE SCREEN, URINE: NEGATIVE
BUPRENORPHINE URINE: NORMAL
CANNABINOID SCREEN URINE: NEGATIVE
COCAINE METABOLITE, URINE: NEGATIVE
MDMA URINE: NORMAL
METHADONE SCREEN, URINE: NEGATIVE
METHAMPHETAMINE, URINE: NEGATIVE
OPIATES, URINE: NEGATIVE
OXYCODONE SCREEN URINE: NEGATIVE
PHENCYCLIDINE, URINE: NEGATIVE
PROPOXYPHENE, URINE: NEGATIVE
TEST INFORMATION: NORMAL
TRICYCLIC ANTIDEPRESSANTS, UR: NEGATIVE

## 2021-10-01 PROCEDURE — 4004F PT TOBACCO SCREEN RCVD TLK: CPT | Performed by: PHYSICAL MEDICINE & REHABILITATION

## 2021-10-01 PROCEDURE — 99213 OFFICE O/P EST LOW 20 MIN: CPT | Performed by: PHYSICAL MEDICINE & REHABILITATION

## 2021-10-01 PROCEDURE — G8417 CALC BMI ABV UP PARAM F/U: HCPCS | Performed by: PHYSICAL MEDICINE & REHABILITATION

## 2021-10-01 PROCEDURE — G8427 DOCREV CUR MEDS BY ELIG CLIN: HCPCS | Performed by: PHYSICAL MEDICINE & REHABILITATION

## 2021-10-01 PROCEDURE — 80306 DRUG TEST PRSMV INSTRMNT: CPT

## 2021-10-01 PROCEDURE — G8484 FLU IMMUNIZE NO ADMIN: HCPCS | Performed by: PHYSICAL MEDICINE & REHABILITATION

## 2021-10-01 RX ORDER — DULOXETIN HYDROCHLORIDE 60 MG/1
60 CAPSULE, DELAYED RELEASE ORAL 2 TIMES DAILY
Qty: 60 CAPSULE | Refills: 3 | Status: SHIPPED | OUTPATIENT
Start: 2021-10-01

## 2021-10-01 RX ORDER — CETIRIZINE HYDROCHLORIDE 10 MG/1
TABLET ORAL
COMMUNITY
Start: 2021-08-08 | End: 2022-03-04 | Stop reason: ALTCHOICE

## 2021-10-01 NOTE — PROGRESS NOTES
FOLLOW UP APPOINTMENT:         Sophia Morris MD    10/1/2021       Peace Mack is a 51 y.o. male, who came for a  follow up to discuss treatment options    Cause of the symptom(s): chronic pain, myofascial pain     Onset: chronic     Current pain level: 8 on the scale of 0-10 ( 10 being worst)     Quality of Symptoms: aching, throbbing and tingling    Aggravating factors: activity    Relieving factors: rest and use of medication        Allergies   Allergen Reactions    Heparin Other (See Comments)     O.D. value 1.330 on 2/4/2020      Uncaria Tomentosa (Cats Claw) Other (See Comments)     itch,sneezing       Social History     Socioeconomic History    Marital status:      Spouse name: Not on file    Number of children: Not on file    Years of education: Not on file    Highest education level: Not on file   Occupational History    Not on file   Tobacco Use    Smoking status: Never Smoker    Smokeless tobacco: Current User     Types: Chew   Vaping Use    Vaping Use: Never used   Substance and Sexual Activity    Alcohol use: Not Currently     Alcohol/week: 2.0 standard drinks     Types: 2 Cans of beer per week     Comment: occasional    Drug use: Yes    Sexual activity: Not Currently     Partners: Female   Other Topics Concern    Not on file   Social History Narrative    Not on file     Social Determinants of Health     Financial Resource Strain:     Difficulty of Paying Living Expenses:    Food Insecurity:     Worried About Running Out of Food in the Last Year:     [address] in the Last Year:    Transportation Needs:     Lack of Transportation (Medical):      Lack of Transportation (Non-Medical):    Physical Activity:     Days of Exercise per Week:     Minutes of Exercise per Session:    Stress:     Feeling of Stress :    Social Connections:     Frequency of Communication with Friends and Family:     Frequency of Social Gatherings with Friends and Family:     Attends Baptism Services:     Active Member of Clubs or Organizations:     Attends Club or Organization Meetings:     Marital Status:    Intimate Partner Violence:     Fear of Current or Ex-Partner:     Emotionally Abused:     Physically Abused:     Sexually Abused:        Past Medical History:   Diagnosis Date    Antiphospholipid antibody with hemorrhagic disorder ([facility] [address]     Arthritis     Asthma     Bronchitis     Cerebral artery occlusion with cerebral infarction ([facility] [address]     Disease of blood and blood forming organ     Hypertension     Long-term (current) use of anticoagulants, INR goal 2.5-3.5     Pleurisy     Pneumonia     Seizures (HCC)        Past Surgical History:   Procedure Laterality Date    CARDIAC CATHETERIZATION      TONSILLECTOMY         Family History   Problem Relation Age of Onset    Heart Disease Maternal Grandmother     Heart Disease Maternal Grandfather     Stroke Maternal Grandfather         Prior to Visit Medications    Medication Sig Taking? Authorizing Provider   cetirizine (ZYRTEC) 10 MG tablet Take by mouth Yes Historical Provider, MD   pregabalin (LYRICA) 75 MG capsule Take 1 capsule by mouth 3 times daily for 30 days. Yes Mita Marino MD   DULoxetine (CYMBALTA) 60 MG extended release capsule Take 1 capsule by mouth daily Yes Mita Marino MD   atorvastatin (LIPITOR) 10 MG tablet Take 10 mg by mouth daily Yes Historical Provider, MD   levETIRAcetam (KEPPRA) 500 MG tablet Take 1 tablet by mouth 2 times daily Yes Historical Provider, MD   warfarin (COUMADIN) 7.5 MG tablet Take 1/2 tablet on Tuesdays, Thursdays and Saturdays and 1 whole tablet all other days of the week or as directed.  Managed by Soren English Yes Evelyn Mata MD   diclofenac sodium (VOLTAREN) 1 % GEL Apply topically 4 times daily Yes Historical Provider, MD   ferrous sulfate (FE TABS 325) 325 (65 Fe) MG EC tablet Take 325 mg by mouth 2 times daily Yes Historical Provider, MD   hydroxychloroquine (PLAQUENIL) 200 MG tablet Take 400 mg by mouth daily Yes Historical Provider, MD   acetaminophen (TYLENOL) 500 MG tablet Take 1,000 mg by mouth daily as needed  Yes Historical Provider, MD   pyridoxine (B-6) 100 MG tablet Take 100 mg by mouth every other day  Yes Historical Provider, MD   lidocaine (LIDODERM) 5 % Place 3 patches onto the skin daily 12 hours on, 12 hours off. Yes Historical Provider, MD   Cholecalciferol (VITAMIN D3) 50 MCG (2000 UT) CAPS Take 1 capsule by mouth daily Yes Historical Provider, MD   melatonin 5 MG TABS tablet Take 5 mg by mouth nightly Yes Historical Provider, MD   B-Complex-C TABS Take 1 tablet by mouth daily Yes Historical Provider, MD   Compression Stockings MISC by Does not apply route Graduated compression Yes Cesilia Connell MD   atenolol (TENORMIN) 25 MG tablet Take 25 mg by mouth daily  Yes Historical Provider, MD   pregabalin (LYRICA) 75 MG capsule Take 1 capsule by mouth 2 times daily for 30 days. Patient not taking: Reported on 10/1/2021  Chad Arboleda MD   atorvastatin (LIPITOR) 40 MG tablet Take 40 mg by mouth nightly  Patient not taking: Reported on 10/1/2021  Historical Provider, MD         Review of Systems : All systems reviewed, all unremarkable other than HPI. No fever, chills, shortness breath, gastric ulcer or acid reflux,MI/CAD, stroke, cancer. /88   Ht 6' (1.829 m)   Wt (!) 308 lb (139.7 kg)   BMI 41.77 kg/m²       Physical Exam  Constitutional:       Appearance: He is obese. HENT:      Head: Atraumatic. Pulmonary:      Breath sounds: Normal breath sounds. Skin:     General: Skin is dry. Neurological:      Mental Status: He is alert and oriented to person, place, and time. Psychiatric:         Behavior: Behavior normal.           Assessment and Plan:      Diagnosis Orders   1. Lumbar spondylosis  Urine Drug Screen         Increase cymbalta 120 mg per day.   Advised that this is off label for the dosing. Monitor BP and any untoward side effects. Urine drug screen today     All questions were answered and imaging studies reviewed with the patient. I have reviewed the chief complaint and HPI including the [facility] and Vital documentation by my staff and I agree with their documentation and have added where applicable. Time spent with patient was 25  minutes. More than 50% was spent counseling/coordinating the patient's care.          Costa Covington MD   Spine Medicine/PM&R

## 2021-10-28 ENCOUNTER — TELEPHONE (OUTPATIENT)
Dept: PHARMACY | Age: 46
End: 2021-10-28

## 2021-10-28 NOTE — TELEPHONE ENCOUNTER
COVID-19 phone screening     Call placed to screen patient prior to upcoming Medication Management visit for Anticoagulation on 10/29/21. Does patient have any of the following symptoms? [] Fever    [] Lower respiratory symptoms (SOB, difficulty breathing, cough)  [x] None    Travel Screening completed.

## 2021-11-04 ENCOUNTER — TELEPHONE (OUTPATIENT)
Dept: PHARMACY | Age: 46
End: 2021-11-04

## 2021-11-04 NOTE — TELEPHONE ENCOUNTER
COVID-19 phone screening     Call placed to screen patient prior to upcoming Medication Management visit for Anticoagulation on 11/5/21. Does patient have any of the following symptoms? [] Fever    [] Lower respiratory symptoms (SOB, difficulty breathing, cough)  [x] None    Travel Screening completed.

## 2021-11-05 ENCOUNTER — HOSPITAL ENCOUNTER (OUTPATIENT)
Dept: PHARMACY | Age: 46
Setting detail: THERAPIES SERIES
Discharge: HOME | End: 2021-11-05
Payer: MEDICARE

## 2021-11-05 VITALS — HEART RATE: 82 BPM | DIASTOLIC BLOOD PRESSURE: 81 MMHG | SYSTOLIC BLOOD PRESSURE: 128 MMHG

## 2021-11-05 DIAGNOSIS — Z79.01 LONG TERM CURRENT USE OF ANTICOAGULANT THERAPY: Primary | ICD-10-CM

## 2021-11-05 DIAGNOSIS — D68.61 ANTIPHOSPHOLIPID ANTIBODY SYNDROME (HCC): ICD-10-CM

## 2021-11-05 LAB — INR BLD: 2.3

## 2021-11-05 PROCEDURE — 99211 OFF/OP EST MAY X REQ PHY/QHP: CPT

## 2021-11-05 PROCEDURE — 85610 PROTHROMBIN TIME: CPT

## 2021-11-05 NOTE — PROGRESS NOTES
[facility] [address] [place] [address]/[place]  Medication Management  ANTICOAGULATION    Referring Doctor: Dr. Viry Hwang     GOAL INR: 2.5-3.5     TODAY'S INR: 2.3     WARFARIN Dosage: 3.75 mg Tues/Thur/Sat and 7.5 mg daily all other days of the week    INR (no units)   Date Value   2021 2.3   2021 3.1   2021 2.8   2021 1.4   2021 2.2   2021 1.9   2021 3.4       Medication changes:  None    Notes:    Fingerstick INR drawn per clinic protocol. Patient states no visible blood in urine and no black tarry stool. Denies any missed doses of warfarin. No change in other maintenance medications or in diet. Will recheck INR in 6 weeks. Patient acknowledges working in consult agreement with pharmacist as referred by his/her physician.                   For Pharmacy Admin Tracking Only     Intervention Detail: Adherence Monitorin   Total # of Interventions Recommended: 1   Total # of Interventions Accepted: 1   Time Spent (min): 409 [address], [facility], PharmD

## 2021-11-19 ENCOUNTER — OFFICE VISIT (OUTPATIENT)
Dept: PAIN MANAGEMENT | Age: 46
End: 2021-11-19
Payer: MEDICARE

## 2021-11-19 VITALS
HEIGHT: 72 IN | SYSTOLIC BLOOD PRESSURE: 132 MMHG | TEMPERATURE: 98.6 F | DIASTOLIC BLOOD PRESSURE: 86 MMHG | RESPIRATION RATE: 18 BRPM | OXYGEN SATURATION: 98 % | BODY MASS INDEX: 39.58 KG/M2 | WEIGHT: 292.2 LBS | HEART RATE: 79 BPM

## 2021-11-19 DIAGNOSIS — M47.816 LUMBAR SPONDYLOSIS: Primary | ICD-10-CM

## 2021-11-19 PROCEDURE — G8484 FLU IMMUNIZE NO ADMIN: HCPCS | Performed by: PHYSICAL MEDICINE & REHABILITATION

## 2021-11-19 PROCEDURE — 99213 OFFICE O/P EST LOW 20 MIN: CPT | Performed by: PHYSICAL MEDICINE & REHABILITATION

## 2021-11-19 PROCEDURE — G8417 CALC BMI ABV UP PARAM F/U: HCPCS | Performed by: PHYSICAL MEDICINE & REHABILITATION

## 2021-11-19 PROCEDURE — G8427 DOCREV CUR MEDS BY ELIG CLIN: HCPCS | Performed by: PHYSICAL MEDICINE & REHABILITATION

## 2021-11-19 PROCEDURE — 4004F PT TOBACCO SCREEN RCVD TLK: CPT | Performed by: PHYSICAL MEDICINE & REHABILITATION

## 2021-11-19 RX ORDER — PREGABALIN 150 MG/1
150 CAPSULE ORAL 2 TIMES DAILY
Qty: 180 CAPSULE | Refills: 1 | Status: SHIPPED | OUTPATIENT
Start: 2021-11-19 | End: 2022-02-07 | Stop reason: SDUPTHER

## 2021-11-19 RX ORDER — LIDOCAINE 50 MG/G
1 PATCH TOPICAL DAILY
Qty: 90 PATCH | Refills: 0 | Status: SHIPPED | OUTPATIENT
Start: 2021-11-19 | End: 2021-12-19

## 2021-11-19 RX ORDER — TOPIRAMATE 50 MG/1
TABLET, FILM COATED ORAL
COMMUNITY
Start: 2021-11-03 | End: 2022-01-13 | Stop reason: ALTCHOICE

## 2021-11-19 NOTE — PROGRESS NOTES
FOLLOW UP APPOINTMENT:         Jones Marie MD    11/19/2021       Karissa Desai is a 51 y.o. male, who came for a  follow up to discuss treatment options    Cause of the symptom(s): degenerative (arthritis)    Onset: chronic right hip and knee pain. Prior reatment done: physical therapy, injection, anti-inflammatory medication and muscle relaxant    Current pain level: 7 on the scale of 0-10 ( 10 being worst)     Quality of Symptoms: aching, throbbing and shooting      Allergies   Allergen Reactions    Heparin Other (See Comments)     O.D. value 1.330 on 2/4/2020      Uncaria Tomentosa (Cats Claw) Other (See Comments)     itch,sneezing       Social History     Socioeconomic History    Marital status:      Spouse name: Not on file    Number of children: Not on file    Years of education: Not on file    Highest education level: Not on file   Occupational History    Not on file   Tobacco Use    Smoking status: Never Smoker    Smokeless tobacco: Current User     Types: Chew   Vaping Use    Vaping Use: Never used   Substance and Sexual Activity    Alcohol use: Not Currently     Alcohol/week: 2.0 standard drinks     Types: 2 Cans of beer per week     Comment: occasional    Drug use: Yes    Sexual activity: Not Currently     Partners: Female   Other Topics Concern    Not on file   Social History Narrative    Not on file     Social Determinants of Health     Financial Resource Strain:     Difficulty of Paying Living Expenses: Not on file   Food Insecurity:     Worried About Running Out of Food in the Last Year: Not on file    Karla of Food in the Last Year: Not on file   Transportation Needs:     Lack of Transportation (Medical): Not on file    Lack of Transportation (Non-Medical):  Not on file   Physical Activity:     Days of Exercise per Week: Not on file    Minutes of Exercise per Session: Not on file   Stress:     Feeling of Stress : Not on file   Social Connections:  Frequency of Communication with Friends and Family: Not on file    Frequency of Social Gatherings with Friends and Family: Not on file    Attends Cheondoism Services: Not on file    Active Member of Clubs or Organizations: Not on file    Attends Club or Organization Meetings: Not on file    Marital Status: Not on file   Intimate Partner Violence:     Fear of Current or Ex-Partner: Not on file    Emotionally Abused: Not on file    Physically Abused: Not on file    Sexually Abused: Not on file   Housing Stability:     Unable to Pay for Housing in the Last Year: Not on file    Number of [place] in the Last Year: Not on file    Unstable Housing in the Last Year: Not on file       Past Medical History:   Diagnosis Date    Antiphospholipid antibody [facility]     Arthritis     Asthma     Bronchitis     Cerebral artery occlusion with cerebral infarction ([facility]     Disease of blood and blood forming organ     Hypertension     Long-term (current) use of anticoagulants, INR goal 2.5-3.5     Pleurisy     Pneumonia     Seizures ([facility]        Past Surgical History:   Procedure Laterality Date    CARDIAC CATHETERIZATION      TONSILLECTOMY         Family History   Problem Relation Age of Onset    Heart Disease Maternal Grandmother     Heart Disease Maternal Grandfather     Stroke Maternal Grandfather         Prior to Visit Medications    Medication Sig Taking? Authorizing Provider   lidocaine (LIDODERM) 5 % Place 1 patch onto the skin daily 12 hours on, 12 hours off. Max of 3 per day Yes Chad Arboleda MD   pregabalin (LYRICA) 150 MG capsule Take 1 capsule by mouth 2 times daily for 60 days. Yes Chad Arboleda MD   DULoxetine (CYMBALTA) 60 MG extended release capsule Take 1 capsule by mouth 2 times daily Yes Chad Arboleda MD   pregabalin (LYRICA) 75 MG capsule Take 1 capsule by mouth 3 times daily for 30 days.  Yes Chad Arboleda MD   warfarin (COUMADIN) 7.5 MG tablet Take 1/2 tablet on Tuesdays, Thursdays and Saturdays and 1 whole tablet all other days of the week or as directed. Managed by Silke Kaplan Yes Nate Valenzuela MD   diclofenac sodium (VOLTAREN) 1 % GEL Apply topically 4 times daily Yes Historical Provider, MD   hydroxychloroquine (PLAQUENIL) 200 MG tablet Take 400 mg by mouth daily Yes Historical Provider, MD   acetaminophen (TYLENOL) 500 MG tablet Take 1,000 mg by mouth daily as needed  Yes Historical Provider, MD   pyridoxine (B-6) 100 MG tablet Take 100 mg by mouth every other day  Yes Historical Provider, MD   lidocaine (LIDODERM) 5 % Place 3 patches onto the skin daily 12 hours on, 12 hours off. Yes Historical Provider, MD   Cholecalciferol (VITAMIN D3) 50 MCG (2000 UT) CAPS Take 1 capsule by mouth daily Yes Historical Provider, MD   atorvastatin (LIPITOR) 40 MG tablet Take 40 mg by mouth nightly  Yes Historical Provider, MD   melatonin 5 MG TABS tablet Take 5 mg by mouth nightly Yes Historical Provider, MD   B-Complex-C TABS Take 1 tablet by mouth daily Yes Historical Provider, MD   atenolol (TENORMIN) 25 MG tablet Take 25 mg by mouth daily  Yes Historical Provider, MD   topiramate (TOPAMAX) 50 MG tablet Take 1 (one) tablet (50 mg total) by mouth 2 (two) times a day. Historical Provider, MD   cetirizine (ZYRTEC) 10 MG tablet Take by mouth  Patient not taking: Reported on 11/19/2021  Historical Provider, MD   pregabalin (LYRICA) 75 MG capsule Take 1 capsule by mouth 2 times daily for 30 days.   Patient not taking: Reported on 10/1/2021  Danika Schuler MD   DULoxetine (CYMBALTA) 60 MG extended release capsule Take 1 capsule by mouth daily  Patient not taking: Reported on 11/19/2021  Danika Schuler MD   atorvastatin (LIPITOR) 10 MG tablet Take 10 mg by mouth daily  Patient not taking: Reported on 11/19/2021  Historical Provider, MD   levETIRAcetam (KEPPRA) 500 MG tablet Take 1 tablet by mouth 2 times daily  Patient not taking: Reported on 11/19/2021  Historical Provider, MD   ferrous sulfate (FE TABS 325) 325 (65 Fe) MG EC tablet Take 325 mg by mouth 2 times daily  Patient not taking: Reported on 11/19/2021  Historical Provider, MD   Compression Stockings MISC by Does not apply route Graduated compression  Patient not taking: Reported on 11/19/2021  Sarah Banuelos MD         Review of Systems : All systems reviewed, all unremarkable other than HPI/subjective. No change since last visit. Denies  fever, chills, infection or non healing wound. /86 (Site: Right Upper Arm, Position: Sitting, Cuff Size: Large Adult)   Pulse 79   Temp 98.6 °F (37 °C) (Temporal)   Resp 18   Ht 6' (1.829 m)   Wt 292 lb 3.2 oz (132.5 kg)   SpO2 98%   BMI 39.63 kg/m²       Physical Exam  HENT:      Head: Atraumatic. Pulmonary:      Breath sounds: Normal breath sounds. Skin:     General: Skin is dry. Neurological:      Mental Status: He is alert and oriented to person, place, and time. Assessment and Plan:      Diagnosis Orders   1. Lumbar spondylosis  pregabalin (LYRICA) 150 MG capsule       Increase 150 mg BID for lyrica. Trial of lidoderm patch. FF up in 3 months. All questions were answered and imaging studies reviewed with the patient. I have reviewed the chief complaint and HPI including the [facility] and Vital documentation by my staff and I agree with their documentation and have added where applicable. Time spent with patient was 25 minutes. More than 50% was spent counseling/coordinating the patient's care.          Zak Youngblood MD   Spine Medicine/PM&R

## 2022-01-12 ENCOUNTER — TELEPHONE (OUTPATIENT)
Dept: PHARMACY | Age: 47
End: 2022-01-12

## 2022-01-12 DIAGNOSIS — Z79.01 LONG TERM CURRENT USE OF ANTICOAGULANT THERAPY: Primary | ICD-10-CM

## 2022-01-12 NOTE — TELEPHONE ENCOUNTER
Merrick's mom, Gisela García, called to request an order for INR be sent to [facility] today due to having difficulty getting to clinic for an appt. Order for 1 time INR check sent to [facility] via fax at [phone].

## 2022-01-13 ENCOUNTER — HOSPITAL ENCOUNTER (OUTPATIENT)
Dept: PHARMACY | Age: 47
Setting detail: THERAPIES SERIES
Discharge: HOME | End: 2022-01-13
Payer: MEDICARE

## 2022-01-13 DIAGNOSIS — D68.61 ANTIPHOSPHOLIPID ANTIBODY SYNDROME (HCC): ICD-10-CM

## 2022-01-13 DIAGNOSIS — Z79.01 LONG TERM CURRENT USE OF ANTICOAGULANT THERAPY: Primary | ICD-10-CM

## 2022-01-13 LAB — INR BLD: 1.8

## 2022-01-13 PROCEDURE — 99212 OFFICE O/P EST SF 10 MIN: CPT | Performed by: FAMILY MEDICINE

## 2022-01-13 RX ORDER — LAMOTRIGINE 25 MG/1
TABLET ORAL SEE ADMIN INSTRUCTIONS
COMMUNITY
Start: 2022-01-06 | End: 2022-03-04 | Stop reason: DRUGHIGH

## 2022-01-13 NOTE — PROGRESS NOTES
[address]/[place]  Medication Management  ANTICOAGULATION    Referring Provider: Dr Francee Cowden INR: 2.5-3.5    TODAY'S INR: 1.8    WARFARIN Dosage: 7.5mg today then resume 3.75mg TRSat, 7.5mg all other days    INR (no units)   Date Value   2022 1.8   2021 2.3   2021 3.1   2021 2.8   2021 1.4   2021 2.2   2021 1.9       Medication changes:  Discontinued Topamax  Started Lamictal about 10 days ago  Notes:    [facility]ip[facility] IN[facility] due to transportation issue. All communication via telephone with mother, Nerissa Valencia. Patient states no visible blood in urine and no black tarry stool. Denies any missed doses of warfarin. No change in other maintenance medications or in diet. Will recheck INR in 6 weeks per motherDeonna's request, after appt with Dr Maylin Vu. Patient may have missed a dose recently but Nerissa Valencia was not at home to confirm medications and details. Patient will take 7.5mg warfarin today then resume previously stable dosing  Patient acknowledges working in consult agreement with pharmacist as referred by his/her physician.                   For Pharmacy Admin Tracking Only     Intervention Detail: Adherence Monitorin and Dose Adjustment: 1, reason: Therapy Optimization   Total # of Interventions Recommended: 3   Total # of Interventions Accepted: 3   Time Spent (min): 20      Nick Lemons R.Ph., 2022,11:15 AM

## 2022-01-13 NOTE — PATIENT INSTRUCTIONS
Please take 7.5mg warfarin today then resume previous dosing of 3.75mg Tuesday, Thursday and Saturday and 7.5mg all other days. Continue to monitor for signs of bleeding. Return to coumadin clinic in 6 weeks.
Attending Attestation (For Attendings USE Only)...

## 2022-02-07 DIAGNOSIS — M47.816 LUMBAR SPONDYLOSIS: ICD-10-CM

## 2022-02-09 RX ORDER — PREGABALIN 150 MG/1
150 CAPSULE ORAL 3 TIMES DAILY
Qty: 180 CAPSULE | Refills: 1 | Status: SHIPPED | OUTPATIENT
Start: 2022-02-09 | End: 2022-04-29

## 2022-02-25 ENCOUNTER — TELEMEDICINE (OUTPATIENT)
Dept: PAIN MANAGEMENT | Age: 47
End: 2022-02-25
Payer: MEDICARE

## 2022-02-25 DIAGNOSIS — M79.7 FIBROMYALGIA: Primary | ICD-10-CM

## 2022-02-25 PROCEDURE — 99443 PR PHYS/QHP TELEPHONE EVALUATION 21-30 MIN: CPT | Performed by: PHYSICAL MEDICINE & REHABILITATION

## 2022-02-25 RX ORDER — PREGABALIN 150 MG/1
150 CAPSULE ORAL 3 TIMES DAILY
Qty: 60 CAPSULE | Refills: 3 | Status: SHIPPED | OUTPATIENT
Start: 2022-02-25 | End: 2022-04-29 | Stop reason: SDUPTHER

## 2022-02-25 NOTE — PROGRESS NOTES
Cristina Paul MD    2/25/2022       Michael Francois is a 51 y.o. male, who came for a  follow up to discuss treatment options    Cause of the symptom(s):  DEGENERATIVE    Onset: Chronic     Current pain level: 4 on the scale of 0-10 ( 10 being worst)     Quality of Symptoms: aching and throbbing    Denies side effects from the lyrica. Allergies   Allergen Reactions    Heparin Other (See Comments)     O.D. value 1.330 on 2/4/2020      Uncaria Tomentosa (Cats Claw) Other (See Comments)     itch,sneezing       Social History     Socioeconomic History    Marital status:      Spouse name: Not on file    Number of children: Not on file    Years of education: Not on file    Highest education level: Not on file   Occupational History    Not on file   Tobacco Use    Smoking status: Never Smoker    Smokeless tobacco: Current User     Types: Chew   Vaping Use    Vaping Use: Never used   Substance and Sexual Activity    Alcohol use: Not Currently     Alcohol/week: 2.0 standard drinks     Types: 2 Cans of beer per week     Comment: occasional    Drug use: Yes    Sexual activity: Not Currently     Partners: Female   Other Topics Concern    Not on file   Social History Narrative    Not on file     Social Determinants of Health     Financial Resource Strain:     Difficulty of Paying Living Expenses: Not on file   Food Insecurity:     Worried About Running Out of Food in the Last Year: Not on file    Karla of Food in the Last Year: Not on file   Transportation Needs:     Lack of Transportation (Medical): Not on file    Lack of Transportation (Non-Medical):  Not on file   Physical Activity:     Days of Exercise per Week: Not on file    Minutes of Exercise per Session: Not on file   Stress:     Feeling of Stress : Not on file   Social Connections:     Frequency of Communication with Friends and Family: Not on file    Frequency of Social Gatherings with Friends and Family: Not on file    Attends Tenriism Services: Not on file    Active Member of Clubs or Organizations: Not on file    Attends Club or Organization Meetings: Not on file    Marital Status: Not on file   Intimate Partner Violence:     Fear of Current or Ex-Partner: Not on file    Emotionally Abused: Not on file    Physically Abused: Not on file    Sexually Abused: Not on file   Housing Stability:     Unable to Pay for Housing in the Last Year: Not on file    Number of [place] in the Last Year: Not on file    Unstable Housing in the Last Year: Not on file       Past Medical History:   Diagnosis Date    Antiphospholipid antibody [facility] hemorrh[facility]     Arthritis     Asthma     Bronchitis     Cerebral artery occlusion with cerebral infarction ([facility]     Disease of blood and blood forming organ     Hypertension     Long-term (current) use of anticoagulants, INR goal 2.5-3.5     Pleurisy     Pneumonia     Seizures ([facility]        Past Surgical History:   Procedure Laterality Date    CARDIAC CATHETERIZATION      TONSILLECTOMY         Family History   Problem Relation Age of Onset    Heart Disease Maternal Grandmother     Heart Disease Maternal Grandfather     Stroke Maternal Grandfather         Prior to Visit Medications    Medication Sig Taking? Authorizing Provider   pregabalin (LYRICA) 150 MG capsule Take 1 capsule by mouth 3 times daily for 60 days.   Annette Marie MD   lamoTRIgine (LAMICTAL) 25 MG tablet Take by mouth See Admin Instructions 1 tablet daily for 14 days, then 1 tablet BID for 14 days, then 2 tablets BID thereafter  Historical Provider, MD   cetirizine (ZYRTEC) 10 MG tablet Take by mouth  Patient not taking: Reported on 11/19/2021  Historical Provider, MD   DULoxetine (CYMBALTA) 60 MG extended release capsule Take 1 capsule by mouth 2 times daily  Annette Marie MD   pregabalin (LYRICA) 75 MG capsule Take 1 capsule by mouth 3 times daily for 30 days.  Roopa King MD   atorvastatin (LIPITOR) 10 MG tablet Take 10 mg by mouth daily  Patient not taking: Reported on 11/19/2021  Historical Provider, MD   warfarin (COUMADIN) 7.5 MG tablet Take 1/2 tablet on Tuesdays, Thursdays and Saturdays and 1 whole tablet all other days of the week or as directed. Managed by [address] [facility] [address]  Edouard Simpson MD   diclofenac sodium (VOLTAREN) 1 % GEL Apply topically 4 times daily  Historical Provider, MD   ferrous sulfate (FE TABS 325) 325 (65 Fe) MG EC tablet Take 325 mg by mouth 2 times daily  Patient not taking: Reported on 11/19/2021  Historical Provider, MD   hydroxychloroquine (PLAQUENIL) 200 MG tablet Take 400 mg by mouth daily  Historical Provider, MD   acetaminophen (TYLENOL) 500 MG tablet Take 1,000 mg by mouth daily as needed   Historical Provider, MD   pyridoxine (B-6) 100 MG tablet Take 100 mg by mouth every other day   Historical Provider, MD   lidocaine (LIDODERM) 5 % Place 3 patches onto the skin daily 12 hours on, 12 hours off. Historical Provider, MD   Cholecalciferol (VITAMIN D3) 50 MCG (2000 UT) CAPS Take 1 capsule by mouth daily  Historical Provider, MD   melatonin 5 MG TABS tablet Take 5 mg by mouth nightly  Historical Provider, MD   B-Complex-C TABS Take 1 tablet by mouth daily  Historical Provider, MD   Compression Stockings MISC by Does not apply route Graduated compression  Patient not taking: Reported on 11/19/2021  Júnior Erazo MD   atenolol (TENORMIN) 25 MG tablet Take 25 mg by mouth daily   Historical Provider, MD         Review of Systems: All systems reviewed, all unremarkable other than HPI/subjective. No change since last visit. Denies  fever, chills, infection or non healing wound. Assessment and Plan:      Diagnosis Orders   1. Fibromyalgia       Refill lyrica 150 mg TID . Denies side effects. Reports pain reduction. FF up in 3 months.        Gurpreet Ramírez is a 51 y.o. male evaluated via telephone on 2/25/2022. Consent:  He and/or health care decision maker is aware that that he may receive a bill for this telephone service, which includes applicable co-pays, depending on his insurance coverage, and has provided verbal consent to proceed. I affirm this is a Patient Initiated Episode with a Patient who has not had a related appointment within my department in the past 7 days or scheduled within the next 24 hours. Patient identification was verified at the start of the visit: Yes    Total Time: minutes: 21-30 minutes    Jose Manuel Perez was evaluated through a synchronous (real-time) audio encounter. The patient was located at home in a state where the provider was licensed to provide care.     Note: not billable if this call serves to triage the patient into an appointment for the relevant concern      Autumn English MD

## 2022-03-04 ENCOUNTER — HOSPITAL ENCOUNTER (OUTPATIENT)
Dept: PHARMACY | Age: 47
Setting detail: THERAPIES SERIES
Discharge: HOME | End: 2022-03-04
Payer: MEDICARE

## 2022-03-04 VITALS — SYSTOLIC BLOOD PRESSURE: 121 MMHG | DIASTOLIC BLOOD PRESSURE: 78 MMHG | HEART RATE: 73 BPM

## 2022-03-04 LAB — INR BLD: 2.5

## 2022-03-04 PROCEDURE — 85610 PROTHROMBIN TIME: CPT

## 2022-03-04 PROCEDURE — 99211 OFF/OP EST MAY X REQ PHY/QHP: CPT

## 2022-03-04 RX ORDER — LAMOTRIGINE 100 MG/1
100 TABLET ORAL 2 TIMES DAILY
COMMUNITY

## 2022-03-04 NOTE — PROGRESS NOTES
[facility] [address] [place] [address]/[place]  Medication Management  ANTICOAGULATION    Referring Provider: Dr Shahana Campos     GOAL INR: 2.5-3.5     TODAY'S INR: 2.5     WARFARIN Dosage: 3.75mg TRSat, 7.5mg all other days    INR (no units)   Date Value   2022 2.5   2022 1.8   2021 2.3   2021 3.1   2021 2.8   2021 1.4   2021 2.2       Medication changes:  None    Notes:    Fingerstick INR drawn per clinic protocol. Patient states no visible blood in urine and no black tarry stool. Denies any missed doses of warfarin. No change in other maintenance medications or in diet. Will recheck INR in 6 weeks. Patient acknowledges working in consult agreement with pharmacist as referred by his/her physician.                   For Pharmacy Admin Tracking Only     Intervention Detail: Adherence Monitorin   Total # of Interventions Recommended: 1   Total # of Interventions Accepted: 1   Time Spent (min): 409 [address], [address], PharmD

## 2022-04-29 ENCOUNTER — HOSPITAL ENCOUNTER (OUTPATIENT)
Dept: PHARMACY | Age: 47
Setting detail: THERAPIES SERIES
Discharge: HOME | End: 2022-04-29
Payer: MEDICARE

## 2022-04-29 VITALS — HEART RATE: 89 BPM | DIASTOLIC BLOOD PRESSURE: 89 MMHG | SYSTOLIC BLOOD PRESSURE: 129 MMHG

## 2022-04-29 DIAGNOSIS — D68.61 ANTIPHOSPHOLIPID ANTIBODY SYNDROME (HCC): ICD-10-CM

## 2022-04-29 DIAGNOSIS — Z79.01 LONG TERM CURRENT USE OF ANTICOAGULANT THERAPY: Primary | ICD-10-CM

## 2022-04-29 LAB — INR BLD: 2.2

## 2022-04-29 PROCEDURE — 85610 PROTHROMBIN TIME: CPT

## 2022-04-29 PROCEDURE — 99211 OFF/OP EST MAY X REQ PHY/QHP: CPT

## 2022-04-29 NOTE — PROGRESS NOTES
[facility] [address] [place] [address]/[place]  Medication Management  ANTICOAGULATION    Referring Francine BauerMILLER     GOAL INR: 2.5-3.5     TODAY'S INR: 2.2     WARFARIN Dosage: 11.25 mg warfarin tonight x 1 dose, then resume 3.75mg TRSat, 7.5mg all other days    INR (no units)   Date Value   2022 2.2   2022 2.5   2022 1.8   2021 2.3   2021 3.1   2021 2.8   2021 1.4   2021 2.2       Medication changes:  None    Notes:    Fingerstick INR drawn per clinic protocol. Patient states no visible blood in urine and no black tarry stool. Josselin Medel says he thought his \"blood might be too thin\" so he skipped his warfarin dose last evening prior to this INR check today. He denies any other missed doses of warfarin. No change in other maintenance medications or in diet. Since his INR is slightly sub-therapeutic today, we will have him take 11.25 mg warfarin tonight and then resume current weekly warfarin regimen as noted on his dosing calendar. We will recheck INR in 6 weeks. Patient acknowledges working in consult agreement with pharmacist as referred by his/her physician.                   For Pharmacy Admin Tracking Only     Intervention Detail: Adherence Monitorin and Dose Adjustment: 1, reason: Therapy Optimization   Total # of Interventions Recommended: 2   Total # of Interventions Accepted: 2   Time Spent (min): [address] [facility], PharmD

## 2022-06-08 DIAGNOSIS — Z79.01 LONG TERM CURRENT USE OF ANTICOAGULANT THERAPY: Primary | ICD-10-CM

## 2022-06-08 DIAGNOSIS — D68.61 ANTIPHOSPHOLIPID ANTIBODY SYNDROME (HCC): ICD-10-CM

## 2022-06-09 ENCOUNTER — HOSPITAL ENCOUNTER (OUTPATIENT)
Dept: PHARMACY | Age: 47
Setting detail: THERAPIES SERIES
Discharge: HOME | End: 2022-06-09
Payer: MEDICARE

## 2022-06-09 DIAGNOSIS — Z79.01 LONG TERM CURRENT USE OF ANTICOAGULANT THERAPY: Primary | ICD-10-CM

## 2022-06-09 DIAGNOSIS — D68.61 ANTIPHOSPHOLIPID ANTIBODY SYNDROME (HCC): ICD-10-CM

## 2022-06-09 LAB — INTERNATIONAL NORMALIZATION RATIO, POC: 2.1

## 2022-06-09 PROCEDURE — 99211 OFF/OP EST MAY X REQ PHY/QHP: CPT

## 2022-06-09 PROCEDURE — 85610 PROTHROMBIN TIME: CPT

## 2022-06-09 NOTE — PROGRESS NOTES
[facility] [address] [place] [address]/[place]  Medication Management  ANTICOAGULATION    Referring Taqueria Mckay CNP     GOAL INR: 2.5-3.5     TODAY'S INR: 2.1     WARFARIN Dosage: 3.75mg TRSat, 7.5mg all other days    INR (no units)   Date Value   2022 2.1   2022 2.2   2022 2.5   2022 1.8   2021 2.3   2021 3.1   2021 2.8   2021 1.4     Medication changes:  None    Notes:    Fingerstick INR drawn per clinic protocol. Patient states no visible blood in urine and no black tarry stool. Denies any missed doses of warfarin. No change in other maintenance medications or in diet. Will recheck INR in 6 weeks. Patient acknowledges working in consult agreement with pharmacist as referred by his/her physician.                   For Pharmacy Admin Tracking Only     Intervention Detail: Adherence Monitorin   Total # of Interventions Recommended: 1   Total # of Interventions Accepted: 1   Time Spent (min): 409 [address], [facility], PharmD

## 2022-07-08 DIAGNOSIS — M53.3 SACROILIAC DYSFUNCTION: Primary | ICD-10-CM

## 2022-07-22 ENCOUNTER — HOSPITAL ENCOUNTER (OUTPATIENT)
Dept: PHARMACY | Age: 47
Setting detail: THERAPIES SERIES
Discharge: HOME | End: 2022-07-22
Payer: MEDICARE

## 2022-07-22 DIAGNOSIS — Z79.01 LONG TERM CURRENT USE OF ANTICOAGULANT THERAPY: Primary | ICD-10-CM

## 2022-07-22 DIAGNOSIS — I63.50 CEREBRAL ARTERY OCCLUSION WITH CEREBRAL INFARCTION (HCC): ICD-10-CM

## 2022-07-22 DIAGNOSIS — D68.61 ANTIPHOSPHOLIPID ANTIBODY SYNDROME (HCC): ICD-10-CM

## 2022-07-22 LAB — INTERNATIONAL NORMALIZATION RATIO, POC: 1.9

## 2022-07-22 PROCEDURE — 99211 OFF/OP EST MAY X REQ PHY/QHP: CPT

## 2022-07-25 RX ORDER — APIXABAN 5 MG/1
5 TABLET, FILM COATED ORAL 2 TIMES DAILY
COMMUNITY
Start: 2022-07-21 | End: 2022-09-21 | Stop reason: ALTCHOICE

## 2022-07-25 NOTE — PROGRESS NOTES
[address]/[place]  Medication Management  ANTICOAGULATION    Referring Provider: Cristal Calderon CNP     GOAL INR: 2.5-3.5     TODAY'S INR: 1.9     WARFARIN Dosage: stop warfarin and start taking Eliquis     INR (no units)   Date Value   07/22/2022 1.9   06/09/2022 2.1   04/29/2022 2.2   03/04/2022 2.5   01/13/2022 1.8   11/05/2021 2.3   09/17/2021 3.1   08/06/2021 2.8   07/26/2021 1.4       Medication changes:  Paxlovid 150/100 mg tablet therapy pack twice daily x 5 days   Stop warfarin and start Eliquis 5 mg BID    Notes:    Venipuncture INR drawn per [facility] lab protocol and result faxed to our clinic for warfarin therapy management. All communication is with the patient and his mom, Buzz Cevallos, who helps him with his medications. Patient states no visible blood in urine and no black tarry stool. Merrick's mom, Lary Paige, tells me that he was in the ER at [facility] on 7/21/2022 and diagnosed with COVID-19 (his INR was 2.2 at the time). The ER physician, Dr. Mandy Franklin, instructed him to have an INR checked the following day and to stop warfarin and start Eliquis 5 mg BID as soon as INR level was \"less than 2\". He was also given a new Rx for Paxlovid 150/100 mg tablet therapy pack twice daily x 5 days and instructed to stop taking his Atorvastatin and not restart until 48 hours after taking the last Paxlovid tablet. Denies any missed doses of warfarin. No change in other maintenance medications or in diet. Since his INR has fallen less than 2, he will remain off warfarin and start Eliquis 5 mg BID. Lary Paige says he was given a \"2 week supply\" and will follow up with his PCP, Cristal Calderon, next week for more refills of Eliquis. Chantel also says he will see, Dr. Dee Pepper, nephrologist, in [place] next week. We will discharge Rashaad Mitchell from our clinic since he has been switched to Eliquis at this time.  Patient acknowledges working in consult agreement with pharmacist as referred by his/her physician. We want to confirm that, for purposes of billing, this is a virtual visit with your provider for which we will submit a claim for reimbursement with your insurance company. You may be responsible for any copays, coinsurance amounts or other amounts not covered by your insurance company. If you do not accept this, unfortunately we will not be able to schedule a virtual visit with the provider.              For Pharmacy Admin Tracking Only    Intervention Detail: Adherence Monitorin and Dose Adjustment: 1, reason: Therapy Optimization  Total # of Interventions Recommended: 2  Total # of Interventions Accepted: 2  Time Spent (min): [address] [place], [facility], PharmD

## 2022-07-29 ENCOUNTER — APPOINTMENT (OUTPATIENT)
Dept: PREADMISSION TESTING | Age: 47
End: 2022-07-29
Payer: MEDICARE

## 2022-08-03 RX ORDER — HYDROXYCHLOROQUINE SULFATE 200 MG/1
2 TABLET, FILM COATED ORAL DAILY
COMMUNITY
Start: 2022-06-13

## 2022-08-03 RX ORDER — PANTOPRAZOLE SODIUM 40 MG/1
1 TABLET, DELAYED RELEASE ORAL DAILY
COMMUNITY
Start: 2022-06-06

## 2022-08-03 RX ORDER — MIRTAZAPINE 15 MG/1
30 TABLET, FILM COATED ORAL DAILY
COMMUNITY
Start: 2022-07-11

## 2022-08-03 NOTE — PROGRESS NOTES
[facility]   Preadmission Testing    Name: Jenell Cranker  : 1975  Patient Phone: [phone] (home)     Procedure: BILATERAL SI JOINT INJECTION - Bilateral  Date of Procedure: 2022  Surgeon: Richmond Jones MD    Ht:  6' (182.9 cm)  Wt: 300 lb (136.1 kg)  Wt method: Stated    Allergies: Allergies   Allergen Reactions    Heparin Other (See Comments)     O.D. value 1.330 on 2020      Uncaria Tomentosa (Cats Claw) Other (See Comments)     itch,sneezing                There were no vitals filed for this visit. No LMP for male patient. Do you take blood thinners? [x] Yes    [] No         Instructed to stop blood thinners prior to procedure? [x] Yes    [] No      [] N/A   Do you have sleep apnea? [x] Yes    [] No     Do you have acid reflux ? [] Yes    [x] No     Have you had a respiratory infection or sore throat in last 4 weeks before surgery? [] Yes    [x] No     Do you have poorly controlled asthma or COPD? [] Yes    [x] No         Have you had an EKG in last 12 months? [] Yes    [] No          Do you smoke? [] Yes    [x] No      Please refrain from smoking on the day of surgery. Patient instructed on: [x] NPO Status   [x] Meds to Take  [x] Ride Home  []No Jewelry/Contact Lenses/Nail [place]     DOS Patient Needs [] HCG   [] Blood Sugar  [] PT/INR         COVID Vaccinated? [x] Yes    [] No                     Patient instructed on the pre-operative, intra-operative, and post-operative process? Yes  Medication instructions reviewed with patient?   Yes

## 2022-08-05 ENCOUNTER — HOSPITAL ENCOUNTER (OUTPATIENT)
Age: 47
Setting detail: OUTPATIENT SURGERY
Discharge: HOME | End: 2022-08-05
Attending: PHYSICAL MEDICINE & REHABILITATION | Admitting: PHYSICAL MEDICINE & REHABILITATION
Payer: MEDICARE

## 2022-08-05 ENCOUNTER — APPOINTMENT (OUTPATIENT)
Dept: GENERAL RADIOLOGY | Age: 47
End: 2022-08-05
Attending: PHYSICAL MEDICINE & REHABILITATION
Payer: MEDICARE

## 2022-08-05 VITALS
RESPIRATION RATE: 13 BRPM | SYSTOLIC BLOOD PRESSURE: 135 MMHG | DIASTOLIC BLOOD PRESSURE: 107 MMHG | BODY MASS INDEX: 40.63 KG/M2 | TEMPERATURE: 98.1 F | OXYGEN SATURATION: 100 % | WEIGHT: 300 LBS | HEIGHT: 72 IN | HEART RATE: 70 BPM

## 2022-08-05 PROCEDURE — 3600000055 HC PAIN LEVEL 3 ADDL 15 MIN: Performed by: PHYSICAL MEDICINE & REHABILITATION

## 2022-08-05 PROCEDURE — 76000 FLUOROSCOPY <1 HR PHYS/QHP: CPT

## 2022-08-05 PROCEDURE — 2709999900 HC NON-CHARGEABLE SUPPLY: Performed by: PHYSICAL MEDICINE & REHABILITATION

## 2022-08-05 PROCEDURE — 3600000054 HC PAIN LEVEL 3 BASE: Performed by: PHYSICAL MEDICINE & REHABILITATION

## 2022-08-05 PROCEDURE — 27096 INJECT SACROILIAC JOINT: CPT | Performed by: PHYSICAL MEDICINE & REHABILITATION

## 2022-08-05 PROCEDURE — 7100000010 HC PHASE II RECOVERY - FIRST 15 MIN: Performed by: PHYSICAL MEDICINE & REHABILITATION

## 2022-08-05 PROCEDURE — 6360000002 HC RX W HCPCS: Performed by: PHYSICAL MEDICINE & REHABILITATION

## 2022-08-05 PROCEDURE — 7100000011 HC PHASE II RECOVERY - ADDTL 15 MIN: Performed by: PHYSICAL MEDICINE & REHABILITATION

## 2022-08-05 PROCEDURE — 2500000003 HC RX 250 WO HCPCS: Performed by: PHYSICAL MEDICINE & REHABILITATION

## 2022-08-05 PROCEDURE — 2580000003 HC RX 258: Performed by: PHYSICAL MEDICINE & REHABILITATION

## 2022-08-05 PROCEDURE — 99152 MOD SED SAME PHYS/QHP 5/>YRS: CPT | Performed by: PHYSICAL MEDICINE & REHABILITATION

## 2022-08-05 RX ORDER — FENTANYL CITRATE 50 UG/ML
INJECTION, SOLUTION INTRAMUSCULAR; INTRAVENOUS PRN
Status: DISCONTINUED | OUTPATIENT
Start: 2022-08-05 | End: 2022-08-05 | Stop reason: ALTCHOICE

## 2022-08-05 RX ORDER — TRIAMCINOLONE ACETONIDE 40 MG/ML
INJECTION, SUSPENSION INTRA-ARTICULAR; INTRAMUSCULAR PRN
Status: DISCONTINUED | OUTPATIENT
Start: 2022-08-05 | End: 2022-08-05 | Stop reason: ALTCHOICE

## 2022-08-05 RX ORDER — MIDAZOLAM HYDROCHLORIDE 1 MG/ML
INJECTION INTRAMUSCULAR; INTRAVENOUS PRN
Status: DISCONTINUED | OUTPATIENT
Start: 2022-08-05 | End: 2022-08-05 | Stop reason: ALTCHOICE

## 2022-08-05 RX ORDER — SODIUM CHLORIDE, SODIUM LACTATE, POTASSIUM CHLORIDE, CALCIUM CHLORIDE 600; 310; 30; 20 MG/100ML; MG/100ML; MG/100ML; MG/100ML
INJECTION, SOLUTION INTRAVENOUS CONTINUOUS
Status: DISCONTINUED | OUTPATIENT
Start: 2022-08-05 | End: 2022-08-05 | Stop reason: HOSPADM

## 2022-08-05 RX ORDER — LIDOCAINE HYDROCHLORIDE 10 MG/ML
INJECTION, SOLUTION INFILTRATION; PERINEURAL PRN
Status: DISCONTINUED | OUTPATIENT
Start: 2022-08-05 | End: 2022-08-05 | Stop reason: ALTCHOICE

## 2022-08-05 RX ADMIN — SODIUM CHLORIDE, POTASSIUM CHLORIDE, SODIUM LACTATE AND CALCIUM CHLORIDE: 600; 310; 30; 20 INJECTION, SOLUTION INTRAVENOUS at 08:34

## 2022-08-05 ASSESSMENT — PAIN DESCRIPTION - DESCRIPTORS: DESCRIPTORS: ACHING

## 2022-08-05 ASSESSMENT — PAIN - FUNCTIONAL ASSESSMENT
PAIN_FUNCTIONAL_ASSESSMENT: PREVENTS OR INTERFERES SOME ACTIVE ACTIVITIES AND ADLS
PAIN_FUNCTIONAL_ASSESSMENT: 0-10

## 2022-08-05 NOTE — OP NOTE
Operative Note      Patient: Comfort Navarro  YOB: 1975  MRN: [id number]    Date of Procedure: 8/5/2022    Pre-Op Diagnosis: Sacroiliac dysfunction [M53.3]    Post-Op Diagnosis: Same       Procedure(s):  BILATERAL SI JOINT INJECTION    Surgeon(s):  Danika Schuler MD    Assistant:   * No surgical staff found *    Anesthesia: IV Sedation    Estimated Blood Loss (mL): Minimal    Complications: None    Anesthesia: Moderate sedation using 2 mg IV versed & 100 mcg IV fentanyl. CONSENT:     The risks, benefits, alternatives, plan, complications, and personnel were discussed prior to the procedure. The patient understood and agreed to proceed. The patient was positioned prone. Sign in and time out was performed. Identifying the site, in this case, both sides sacroiliac joint. Sterile technique was done in the usual manner using chlorhexidine. This was followed by infiltration of   10 ml of 1% preservative-free lidocaine. A 3.5 -inch, 22-gauge spinal needle was positioned under fluoroscopic guidance. Upon confirmation that the needle was properly positioned, 0.5 cc of 240 mg of Omnipaque was injected. No extraarticular spread was noted. This was followed by injecting solution of 80 mg of triamcinolone and  4ml of 1% preservative-free lidocaine was injected without difficulty. Patient went to the recovery room with stable vital signs. Moderate Sedation Time: less 15 minutes with a nurse administering the medication(s) under my supervision. The patient was independently monitored by a Registered Nurse assigned to the Procedure Room ( automated blood pressure, continuous EKG, Capnography and continuous pulse oximetry)    Start time:  0935  End Time:   0945       PLAN:     Home instructions were provided. The patient will follow up in 4 to 6 weeks or earlier if needed.      Electronically signed by Danika Schuler MD on 8/5/2022 at 9:46 AM

## 2022-08-05 NOTE — H&P
Richy Story MD      Renu Robles is a 49 y.o. male, who came in for a procedure,  bilateral sacroiliac joint injection . No change since last visit. Treatment done: physical therapy, anti-inflammatory medication and muscle relaxant    Allergies   Allergen Reactions    Heparin Other (See Comments)     O.D. value 1.330 on 2/4/2020      Uncaria Tomentosa (Cats Claw) Other (See Comments)     itch,sneezing       Social History     Socioeconomic History    Marital status:       Spouse name: Not on file    Number of children: Not on file    Years of education: Not on file    Highest education level: Not on file   Occupational History    Not on file   Tobacco Use    Smoking status: Never    Smokeless tobacco: Current     Types: Chew   Vaping Use    Vaping Use: Never used   Substance and Sexual Activity    Alcohol use: Not Currently     Alcohol/week: 2.0 standard drinks     Types: 2 Cans of beer per week     Comment: occasional    Drug use: Yes    Sexual activity: Not Currently     Partners: Female   Other Topics Concern    Not on file   Social History Narrative    Not on file     Social Determinants of Health     Financial Resource Strain: Not on file   Food Insecurity: Not on file   Transportation Needs: Not on file   Physical Activity: Not on file   Stress: Not on file   Social Connections: Not on file   Intimate Partner Violence: Not on file   Housing Stability: Not on file       Past Medical History:   Diagnosis Date    Antiphospholipid antibody with hemorrhagic disorder ([address]     Arthritis     Asthma     Bronchitis     Cerebral artery occlusion with cerebral infarction ([address]     Disease of blood and blood forming organ     Hypertension     Long-term (current) use of anticoagulants, INR goal 2.5-3.5     Pleurisy     Pneumonia     Seizures (HCC)        Past Surgical History:   Procedure Laterality Date    CARDIAC CATHETERIZATION      TONSILLECTOMY         Prior to Visit Medications Medication Sig Taking? Authorizing Provider   hydroxychloroquine (PLAQUENIL) 200 MG tablet Take 2 tablets by mouth in the morning. Historical Provider, MD   mirtazapine (REMERON) 15 MG tablet Take 1 tablet by mouth in the morning. Historical Provider, MD   pantoprazole (PROTONIX) 40 MG tablet Take 1 tablet by mouth in the morning. Historical Provider, MD   Nirmatrelvir & Ritonavir (PAXLOVID) 10 x 150 MG & 10 x 100MG TBPK Take by mouth in the morning and at bedtime  Historical Provider, MD   ELIQUIS 5 MG TABS tablet Take 5 mg by mouth in the morning and at bedtime  Historical Provider, MD   lamoTRIgine (LAMICTAL) 100 MG tablet Take 100 mg by mouth 2 times daily  Historical Provider, MD   pregabalin (LYRICA) 150 MG capsule Take 1 capsule by mouth 3 times daily for 60 days. Dheeraj Redmond MD   DULoxetine (CYMBALTA) 60 MG extended release capsule Take 1 capsule by mouth 2 times daily  Dheeraj Redmond MD   atorvastatin (LIPITOR) 10 MG tablet Take 10 mg by mouth daily   Historical Provider, MD   acetaminophen (TYLENOL) 500 MG tablet Take 1,000 mg by mouth daily as needed   Historical Provider, MD   pyridoxine (B-6) 100 MG tablet Take 100 mg by mouth daily   Historical Provider, MD   lidocaine (LIDODERM) 5 % Place 3 patches onto the skin daily 12 hours on, 12 hours off.   Historical Provider, MD   Cholecalciferol (VITAMIN D3) 50 MCG (2000 UT) CAPS Take 1 capsule by mouth daily  Historical Provider, MD   melatonin 5 MG TABS tablet Take 5 mg by mouth nightly  Historical Provider, MD   B-Complex-C TABS Take 1 tablet by mouth daily  Historical Provider, MD   Compression Stockings MISC by Does not apply route Graduated compression  Yeny Mack MD   atenolol (TENORMIN) 25 MG tablet Take 25 mg by mouth daily   Historical Provider, MD       Family History   Problem Relation Age of Onset    Heart Disease Maternal Grandmother     Heart Disease Maternal Grandfather     Stroke Maternal Grandfather Review of Systems : All systems reviewed, all unremarkable other than HPI. No change since last visit. Physical Exam  Constitutional:       General: He is not in acute distress. Appearance: Normal appearance. HENT:      Head: Atraumatic. Cardiovascular:      Rate and Rhythm: Normal rate and regular rhythm. Pulmonary:      Effort: Pulmonary effort is normal.      Breath sounds: Normal breath sounds. Neurological:      General: No focal deficit present. Mental Status: He is alert and oriented to person, place, and time. Psychiatric:         Behavior: Behavior normal.   :    Cervical Spine Exam: Full ROM, without limitation     Access: Adequate mouth opening       Assessment:   Sacroiliac Joint Pain     PLAN:     As advertised. Planned duration of treatment: 4 weeks. Further assessment and followup in  4 weeks for follow up post injection.        Electronically signed by Justin Dunaway MD on 8/5/2022 at 8:40 AM

## 2022-08-05 NOTE — DISCHARGE INSTRUCTIONS
You have received an injection of local anesthetic and corticosteroids. The local anesthetic effect typically last 4-6 hours. It may take 3-7 days for the corticosteroids to reach optimal effect. Please pay close attention to the following information/instructions: You may not drive for 24 hours after your injection. It is common to experience mild soreness at the injection site(s) for 24-48 hours. Ice is the best remedy. You may apply ice for 20 minutes at a time several times a day as needed. Avoid heat to the injection area for 72 hours. No hot packs, saunas, or steam rooms during this time. A regular shower is OK. You may immediately restart your regular medication regimen, including pain medications, anti-inflammatory, and blood thinners. Please remove the sterile dressing/band-aid tonight or tomorrow morning. Do not leave it on after you have taken a shower or gotten it wet. Corticosteroid side effects can occur after this injection, but they usually resolve after several days. These side effects can include flushing, hot flashes, mild palpitations, insomnia, water retention, feeling anxious/restless, or headaches. While it is extremely rare to get an infection after a spinal procedure, please call the office if you develop any signs of infection. These signs include fevers/chills, severely increased pain, redness at the injections site, or any drainage from the injection site. Remember that the corticosteroid benefit (long-term pain relief) from an injection can take as long as 10 days to occur. You may have a period of slightly increased pain after your injection before the cortisone takes effect. You may resume all of your normal daily activities 24 hours after your injection. It is OK to restart your exercise or physical therapy program as soon as you feel comfortable doing so. Please complete the pain diary given to you after your injection.  The information you provide on this diary is used to guide your treatment plan. You should schedule a follow-up visit in 2-3 weeks to review your response to this injection. Please bring your pain diary with you to this appointment. Education Materials Received: yes  Belongings Returned: yes    Resume all current outpatient medication(s). I understand and acknowledge receipt of the above instructions. Patient or Guardian Signature                                                         Date/Time                                                                                                                                            Physician's or R.N.'s Signature                                                                  Date/Time      The discharge instructions have been reviewed with the patient and/or Guardian. Patient and/or Guardian signed and retained a printed copy.         TO REACH DR DEWEY FOR EMERGENCY,OR PAIN IS SEVERE POST INJECTION- PLEASE CALL [phone] ; CALL [phone] FOR EMERGENCY

## 2022-08-05 NOTE — PROGRESS NOTES

## 2022-08-19 ENCOUNTER — SCHEDULED TELEPHONE ENCOUNTER (OUTPATIENT)
Dept: PAIN MANAGEMENT | Age: 47
End: 2022-08-19
Payer: MEDICARE

## 2022-08-19 DIAGNOSIS — M53.3 SACROILIAC DYSFUNCTION: Primary | ICD-10-CM

## 2022-08-19 PROCEDURE — 99441 PR PHYS/QHP TELEPHONE EVALUATION 5-10 MIN: CPT | Performed by: PHYSICAL MEDICINE & REHABILITATION

## 2022-08-19 NOTE — PROGRESS NOTES
Ponce Denise is a 49 y.o. male evaluated via telephone on 8/19/2022 for No chief complaint on file. .        Documentation:    I communicated with the patient and/or health care decision maker about post SI injection. Details of this discussion including any medical advice provided:     He wants to try medical marijuana for chronic pain. Letter of referral generated. Total Time: minutes: 5-10 minutes    Ponce Denise was evaluated through a synchronous (real-time) audio encounter. Patient identification was verified at the start of the visit. He (or guardian if applicable) is aware that this is a billable service, which includes applicable co-pays. This visit was conducted with the patient's (and/or legal guardian's) verbal consent. He has not had a related appointment within my department in the past 7 days or scheduled within the next 24 hours. The patient was located at Home: [place] [address]. The provider was located at Home ([address]: [place].     Note: not billable if this call serves to triage the patient into an appointment for the relevant concern    Dana Cunha MD

## 2022-09-09 ENCOUNTER — SCHEDULED TELEPHONE ENCOUNTER (OUTPATIENT)
Dept: PAIN MANAGEMENT | Age: 47
End: 2022-09-09
Payer: MEDICARE

## 2022-09-09 DIAGNOSIS — M53.3 SACROILIAC DYSFUNCTION: Primary | ICD-10-CM

## 2022-09-09 PROCEDURE — 99442 PR PHYS/QHP TELEPHONE EVALUATION 11-20 MIN: CPT | Performed by: PHYSICAL MEDICINE & REHABILITATION

## 2022-09-09 NOTE — PROGRESS NOTES
Coleman Quijano is a 49 y.o. male evaluated via telephone on 9/9/2022 for sacroliac joint pain. Documentation: I communicated with the patient and/or health care decision maker about future injection. He was able to get a Medical Marijuana provider and I advised him to bring the records. In the interim - schedule for sacroiliac joint injection) performing right L5 medial branch, S1, S2. S3 lateral branch injection. He responded well, at least 70% relief for a few days, then the pain starts to increase. Total Time:  11-20 minutes    Coleman Quijano was evaluated through a synchronous (real-time) audio encounter. Patient identification was verified at the start of the visit. He (or guardian if applicable) is aware that this is a billable service, which includes applicable co-pays. This visit was conducted with the patient's (and/or legal guardian's) verbal consent. He has not had a related appointment within my department in the past 7 days or scheduled within the next 24 hours. The patient was located at Home: [place] [address]. The provider was located at Home ([address]: [place].     Note: not billable if this call serves to triage the patient into an appointment for the relevant concern    Prashanth Levine MD

## 2022-09-13 NOTE — PROGRESS NOTES
[facility]   Preadmission Testing    Name: Wili Rubalcava  : 1975  Patient Phone: [phone] (home)     Procedure: SACROILIAC JOINT INJECTION RIGHT L5 MEDIAL BRANCH S1, S2, S3 LATERAL BRANCH - Right    Date of Procedure: 22  Surgeon: Mike Newman MD    Ht:  6' (182.9 cm)  Wt: 300 lb (136.1 kg)  Wt method: Allergies: Allergies   Allergen Reactions    Heparin Other (See Comments)     O.D. value 1.330 on 2020      Uncaria Tomentosa (Cats Claw) Other (See Comments)     itch,sneezing                There were no vitals filed for this visit. No LMP for male patient. Do you take blood thinners? [x] Yes    [] No         Instructed to stop blood thinners prior to procedure? [x] Yes    [] No      [] N/A   Do you have sleep apnea? [x] Yes    [] No     Do you have acid reflux ? [] Yes    [x] No     Have you had a respiratory infection or sore throat in last 4 weeks before surgery? [] Yes    [x] No     Do you have poorly controlled asthma or COPD? [] Yes    [x] No         Have you had an EKG in last 12 months? [] Yes    [x] No          Do you smoke? [] Yes    [x] No      Please refrain from smoking on the day of surgery. Patient instructed on: [x] NPO Status   [x] Meds to Take  [x] Ride Home  []No Jewelry/Contact Lenses/Nail [place]     DOS Patient Needs [] HCG   [] Blood Sugar  [] PT/INR         COVID Vaccinated? [x] Yes    [] No                     Patient instructed on the pre-operative, intra-operative, and post-operative process? Yes  Medication instructions reviewed with patient?   Yes

## 2022-09-16 ENCOUNTER — APPOINTMENT (OUTPATIENT)
Dept: GENERAL RADIOLOGY | Age: 47
End: 2022-09-16
Attending: PHYSICAL MEDICINE & REHABILITATION
Payer: MEDICARE

## 2022-09-16 ENCOUNTER — HOSPITAL ENCOUNTER (OUTPATIENT)
Dept: PREADMISSION TESTING | Age: 47
Setting detail: SPECIMEN
Discharge: HOME | End: 2022-09-16
Payer: MEDICARE

## 2022-09-16 ENCOUNTER — HOSPITAL ENCOUNTER (OUTPATIENT)
Age: 47
Setting detail: OUTPATIENT SURGERY
Discharge: HOME | End: 2022-09-16
Attending: PHYSICAL MEDICINE & REHABILITATION | Admitting: PHYSICAL MEDICINE & REHABILITATION
Payer: MEDICARE

## 2022-09-16 VITALS
OXYGEN SATURATION: 93 % | WEIGHT: 302.6 LBS | HEIGHT: 72 IN | RESPIRATION RATE: 16 BRPM | SYSTOLIC BLOOD PRESSURE: 125 MMHG | HEART RATE: 87 BPM | DIASTOLIC BLOOD PRESSURE: 86 MMHG | TEMPERATURE: 97.5 F | BODY MASS INDEX: 40.99 KG/M2

## 2022-09-16 LAB
SARS-COV-2, RAPID: NOT DETECTED
SPECIMEN DESCRIPTION: NORMAL

## 2022-09-16 PROCEDURE — C9803 HOPD COVID-19 SPEC COLLECT: HCPCS

## 2022-09-16 PROCEDURE — 27096 INJECT SACROILIAC JOINT: CPT | Performed by: PHYSICAL MEDICINE & REHABILITATION

## 2022-09-16 PROCEDURE — 99152 MOD SED SAME PHYS/QHP 5/>YRS: CPT | Performed by: PHYSICAL MEDICINE & REHABILITATION

## 2022-09-16 PROCEDURE — 7100000011 HC PHASE II RECOVERY - ADDTL 15 MIN: Performed by: PHYSICAL MEDICINE & REHABILITATION

## 2022-09-16 PROCEDURE — 76000 FLUOROSCOPY <1 HR PHYS/QHP: CPT

## 2022-09-16 PROCEDURE — 6360000002 HC RX W HCPCS: Performed by: PHYSICAL MEDICINE & REHABILITATION

## 2022-09-16 PROCEDURE — 2580000003 HC RX 258: Performed by: PHYSICAL MEDICINE & REHABILITATION

## 2022-09-16 PROCEDURE — 3600000054 HC PAIN LEVEL 3 BASE: Performed by: PHYSICAL MEDICINE & REHABILITATION

## 2022-09-16 PROCEDURE — 7100000010 HC PHASE II RECOVERY - FIRST 15 MIN: Performed by: PHYSICAL MEDICINE & REHABILITATION

## 2022-09-16 PROCEDURE — 2500000003 HC RX 250 WO HCPCS: Performed by: PHYSICAL MEDICINE & REHABILITATION

## 2022-09-16 PROCEDURE — 3600000055 HC PAIN LEVEL 3 ADDL 15 MIN: Performed by: PHYSICAL MEDICINE & REHABILITATION

## 2022-09-16 PROCEDURE — 2709999900 HC NON-CHARGEABLE SUPPLY: Performed by: PHYSICAL MEDICINE & REHABILITATION

## 2022-09-16 PROCEDURE — 87635 SARS-COV-2 COVID-19 AMP PRB: CPT

## 2022-09-16 RX ORDER — LIDOCAINE HYDROCHLORIDE 10 MG/ML
INJECTION, SOLUTION INFILTRATION; PERINEURAL PRN
Status: DISCONTINUED | OUTPATIENT
Start: 2022-09-16 | End: 2022-09-16 | Stop reason: ALTCHOICE

## 2022-09-16 RX ORDER — FENTANYL CITRATE 50 UG/ML
INJECTION, SOLUTION INTRAMUSCULAR; INTRAVENOUS PRN
Status: DISCONTINUED | OUTPATIENT
Start: 2022-09-16 | End: 2022-09-16 | Stop reason: ALTCHOICE

## 2022-09-16 RX ORDER — SODIUM CHLORIDE, SODIUM LACTATE, POTASSIUM CHLORIDE, CALCIUM CHLORIDE 600; 310; 30; 20 MG/100ML; MG/100ML; MG/100ML; MG/100ML
INJECTION, SOLUTION INTRAVENOUS CONTINUOUS
Status: DISCONTINUED | OUTPATIENT
Start: 2022-09-16 | End: 2022-09-16 | Stop reason: HOSPADM

## 2022-09-16 RX ORDER — MIDAZOLAM HYDROCHLORIDE 1 MG/ML
INJECTION INTRAMUSCULAR; INTRAVENOUS PRN
Status: DISCONTINUED | OUTPATIENT
Start: 2022-09-16 | End: 2022-09-16 | Stop reason: ALTCHOICE

## 2022-09-16 RX ADMIN — SODIUM CHLORIDE, POTASSIUM CHLORIDE, SODIUM LACTATE AND CALCIUM CHLORIDE: 600; 310; 30; 20 INJECTION, SOLUTION INTRAVENOUS at 08:09

## 2022-09-16 NOTE — H&P
Sophia Morris MD      Peace Mack is a 49 y.o. male, who came in for a procedure, right L5 medial branch, S1, S2. S3 lateral branch injection. No change since last visit. Treatment done: physical therapy, anti-inflammatory medication and muscle relaxant    Allergies   Allergen Reactions    Heparin Other (See Comments)     O.D. value 1.330 on 2/4/2020      Uncaria Tomentosa (Cats Claw) Other (See Comments)     itch,sneezing       Social History     Socioeconomic History    Marital status:       Spouse name: Not on file    Number of children: Not on file    Years of education: Not on file    Highest education level: Not on file   Occupational History    Not on file   Tobacco Use    Smoking status: Never    Smokeless tobacco: Current     Types: Chew   Vaping Use    Vaping Use: Never used   Substance and Sexual Activity    Alcohol use: Not Currently     Alcohol/week: 2.0 standard drinks     Types: 2 Cans of beer per week     Comment: occasional    Drug use: Yes    Sexual activity: Not Currently     Partners: Female   Other Topics Concern    Not on file   Social History Narrative    Not on file     Social Determinants of Health     Financial Resource Strain: Not on file   Food Insecurity: Not on file   Transportation Needs: Not on file   Physical Activity: Not on file   Stress: Not on file   Social Connections: Not on file   Intimate Partner Violence: Not on file   Housing Stability: Not on file       Past Medical History:   Diagnosis Date    Antiphospholipid antibody with hemorrhagic disorder [address]     Arthritis     Asthma     Bronchitis     Cerebral artery occlusion with cerebral infarction ([facility] [address]     Disease of blood and blood forming organ     Hypertension     Long-term (current) use of anticoagulants, INR goal 2.5-3.5     Pleurisy     Pneumonia     Seizures (HCC)        Past Surgical History:   Procedure Laterality Date    BACK INJECTION Bilateral 8/5/2022    BILATERAL SI JOINT INJECTION performed by Ger Milian MD at [address]         Prior to Visit Medications    Medication Sig Taking? Authorizing Provider   hydroxychloroquine (PLAQUENIL) 200 MG tablet Take 2 tablets by mouth in the morning. Historical Provider, MD   mirtazapine (REMERON) 15 MG tablet Take 1 tablet by mouth in the morning. Historical Provider, MD   pantoprazole (PROTONIX) 40 MG tablet Take 1 tablet by mouth in the morning. Historical Provider, MD   Nirmatrelvir & Ritonavir (PAXLOVID) 10 x 150 MG & 10 x 100MG TBPK Take by mouth in the morning and at bedtime  Historical Provider, MD   ELIQUIS 5 MG TABS tablet Take 5 mg by mouth in the morning and at bedtime  Historical Provider, MD   lamoTRIgine (LAMICTAL) 100 MG tablet Take 100 mg by mouth 2 times daily  Historical Provider, MD   pregabalin (LYRICA) 150 MG capsule Take 1 capsule by mouth 3 times daily for 60 days. Ger Milian MD   DULoxetine (CYMBALTA) 60 MG extended release capsule Take 1 capsule by mouth 2 times daily  Ger Milian MD   atorvastatin (LIPITOR) 10 MG tablet Take 10 mg by mouth daily   Historical Provider, MD   acetaminophen (TYLENOL) 500 MG tablet Take 1,000 mg by mouth daily as needed   Historical Provider, MD   pyridoxine (B-6) 100 MG tablet Take 100 mg by mouth daily   Historical Provider, MD   lidocaine (LIDODERM) 5 % Place 3 patches onto the skin daily 12 hours on, 12 hours off.   Historical Provider, MD   Cholecalciferol (VITAMIN D3) 50 MCG (2000 UT) CAPS Take 1 capsule by mouth daily  Historical Provider, MD   melatonin 5 MG TABS tablet Take 5 mg by mouth nightly  Historical Provider, MD   B-Complex-C TABS Take 1 tablet by mouth daily  Historical Provider, MD   Compression Stockings MISC by Does not apply route Graduated compression  Norma Cagle MD   atenolol (TENORMIN) 25 MG tablet Take 25 mg by mouth daily   Historical Provider, MD       Family History   Problem Relation Age of Onset    Heart Disease Maternal Grandmother     Heart Disease Maternal Grandfather     Stroke Maternal Grandfather          Review of Systems : All systems reviewed, all unremarkable other than HPI. No change since last visit. Physical Exam  Constitutional:       General: He is not in acute distress. Appearance: Normal appearance. HENT:      Head: Normocephalic and atraumatic. Cardiovascular:      Rate and Rhythm: Normal rate and regular rhythm. Pulses: Normal pulses. Heart sounds: Normal heart sounds. Pulmonary:      Effort: Pulmonary effort is normal.      Breath sounds: Normal breath sounds. Musculoskeletal:      Cervical back: Neck supple. Neurological:      Mental Status: He is alert and oriented to person, place, and time.   :    Cervical Spine Exam: Full ROM, without limitation     Access: Adequate mouth opening       Assessment:   Lumbar spondylosis    PLAN:     As advertised. Planned duration of treatment: 4 weeks. Further assessment and followup in  4 weeks for follow up post injection.        Electronically signed by Issa Cole MD on 9/16/2022 at 8:16 AM

## 2022-09-16 NOTE — DISCHARGE INSTRUCTIONS
You have received an injection of local anesthetic and corticosteroids. The local anesthetic effect typically last 4-6 hours. It may take 3-7 days for the corticosteroids to reach optimal effect. Please pay close attention to the following information/instructions: You may not drive for 12 hours after your injection. It is common to experience mild soreness at the injection site(s) for 24-48 hours. Ice is the best remedy. You may apply ice for 20 minutes at a time several times a day as needed. Avoid heat to the injection area for 72 hours. No hot packs, saunas, or steam rooms during this time. A regular shower is OK. You may immediately restart your regular medication regimen, including pain medications, anti-inflammatory, and blood thinners. Please remove the sterile dressing/band-aid tonight or tomorrow morning. Do not leave it on after you have taken a shower or gotten it wet. Corticosteroid side effects can occur after this injection, but they usually resolve after several days. These side effects can include flushing, hot flashes, mild palpitations, insomnia, water retention, feeling anxious/restless, or headaches. While it is extremely rare to get an infection after a spinal procedure, please call the office if you develop any signs of infection. These signs include fevers/chills, severely increased pain, redness at the injections site, or any drainage from the injection site. Remember that the corticosteroid benefit (long-term pain relief) from an injection can take as long as 10 days to occur. You may have a period of slightly increased pain after your injection before the cortisone takes effect. You may resume all of your normal daily activities 24 hours after your injection. It is OK to restart your exercise or physical therapy program as soon as you feel comfortable doing so. Please complete the pain diary given to you after your injection.  The information you provide on this diary is used to guide your treatment plan. You should schedule a follow-up visit in 2-3 weeks to review your response to this injection. Please bring your pain diary with you to this appointment. Education Materials Received: yes  Belongings Returned: yes    Resume all current outpatient medication(s). I understand and acknowledge receipt of the above instructions. Patient or Guardian Signature                                                         Date/Time                                                                                                                                            Physician's or R.N.'s Signature                                                                  Date/Time      The discharge instructions have been reviewed with the patient and/or Guardian. Patient and/or Guardian signed and retained a printed copy.         TO REACH DR DEWEY FOR EMERGENCY,OR PAIN IS SEVERE POST INJECTION- PLEASE CALL [phone] ; CALL [phone] FOR EMERGENCY

## 2022-09-16 NOTE — OP NOTE
Operative Note      Patient: Opal Boothe  YOB: 1975  MRN: [id number]    Date of Procedure: 9/16/2022    Pre-Op Diagnosis: Sacroiliac dysfunction [M53.3]    Post-Op Diagnosis: Same       Procedure(s):  SACROILIAC JOINT INJECTION RIGHT L5 MEDIAL BRANCH S1, S2, S3 LATERAL BRANCH    Surgeon(s):  Kamar Hui MD    Assistant:   * No surgical staff found *    Anesthesia: IV Sedation    Estimated Blood Loss (mL): Minimal    Complications: None      Anesthesia: Moderate sedation using 2 mg IV versed & 100 mcg IV fentanyl. CONSENT:     The risks, benefits, alternatives, plan, complications, and personnel were discussed prior to the procedure. The patient understood and agreed to proceed. The patient was positioned prone. Sign-in and time-out was performed. Identifying the   site, in this case, right side  L5 medial branch, S1. S2. S3 lateral nerves to address the sacroiliac joint prior to radiofrequency ablation. Sterile technique was done in the usual manner using chlorhexidine. This was followed by infiltration of a  9 ml  of 1% preservative-free lidocaine. A 3.5 inch  22-gauge spinal needle(s)  were positioned under fluoroscopic guidance. A total of  4 needles were positioned. Upon confirmation that the needle was properly positioned, a solution of 40 mg of triamcinolone and   3 ml  of 1% preservative-free lidocaine was injected without difficulty. The patient tolerated the procedure well and went to the recovery room with stable vital signs. Moderate Sedation Time: > 15 minutes with a nurse administering the medication(s) under my supervision. The patient was independently monitored by a Registered Nurse assigned to the Procedure Room ( automated blood pressure, continuous EKG, Capnography and continuous pulse oximetry)    Start time:  0943  End Time:  1000         PLAN:     Home instructions were provided.      The patient will follow up in 4 to 6 weeks or earlier

## 2022-09-21 ENCOUNTER — HOSPITAL ENCOUNTER (OUTPATIENT)
Dept: PHARMACY | Age: 47
Setting detail: THERAPIES SERIES
Discharge: HOME | End: 2022-09-21
Payer: MEDICARE

## 2022-09-21 VITALS
HEART RATE: 86 BPM | DIASTOLIC BLOOD PRESSURE: 64 MMHG | BODY MASS INDEX: 41.26 KG/M2 | WEIGHT: 304.2 LBS | SYSTOLIC BLOOD PRESSURE: 108 MMHG

## 2022-09-21 LAB — INR BLD: 4.2

## 2022-09-21 PROCEDURE — 85610 PROTHROMBIN TIME: CPT

## 2022-09-21 PROCEDURE — 99211 OFF/OP EST MAY X REQ PHY/QHP: CPT

## 2022-09-21 RX ORDER — WARFARIN SODIUM 7.5 MG/1
7.5 TABLET ORAL DAILY
COMMUNITY
End: 2022-09-21 | Stop reason: DRUGHIGH

## 2022-09-21 RX ORDER — WARFARIN SODIUM 7.5 MG/1
7.5 TABLET ORAL DAILY
Qty: 30 TABLET | Refills: 0 | Status: SHIPPED
Start: 2022-09-16 | End: 2022-10-07 | Stop reason: SDUPTHER

## 2022-09-28 ENCOUNTER — HOSPITAL ENCOUNTER (OUTPATIENT)
Dept: PHARMACY | Age: 47
Setting detail: THERAPIES SERIES
Discharge: HOME | End: 2022-09-28
Payer: MEDICARE

## 2022-09-28 VITALS
SYSTOLIC BLOOD PRESSURE: 128 MMHG | BODY MASS INDEX: 40.69 KG/M2 | WEIGHT: 300 LBS | HEART RATE: 70 BPM | DIASTOLIC BLOOD PRESSURE: 91 MMHG

## 2022-09-28 LAB — INR BLD: 3.6

## 2022-09-28 PROCEDURE — 99211 OFF/OP EST MAY X REQ PHY/QHP: CPT

## 2022-09-28 PROCEDURE — 85610 PROTHROMBIN TIME: CPT

## 2022-09-28 NOTE — PROGRESS NOTES
[address]  Medication Management  ANTICOAGULATION    Referring Provider: Flor Bella CNP     GOAL INR: 2.5-3.5     TODAY'S INR: 3.6     WARFARIN Dosage: 3.75 mg //, 7.5 mg all other days of the week    INR (no units)   Date Value   2022 3.6   2022 4.2   2022 2.2   2022 2.5   2022 1.8   2021 2.3   2021 3.1     INR,(POC) (no units)   Date Value   2022 1.9   2022 2.1       Medication changes:  APAP use down after injection by Dr Rebeka Branch 3 weeks ago  Started Augmentin 875/125 mg BID x 10 days (ED visit : Pleurisy/Bronchitis)    Notes:    Fingerstick INR drawn per clinic protocol. Patient states no visible blood in urine and no black tarry stool. Denies any missed or extra doses of warfarin. APAP use is down after an injection by Dr Rebeka Branch 3 weeks ago. Diagnosed with Pleurisy/Bronchitis during an ED visit on  and was started on Augmentin 875/125 mg BID x 10 days. No change in other maintenance medications or in diet. Still slightly supratherapeutic but should continue to decrease as APAP use decreases and Augmentin is completed. No dose change warranted at this time. Will recheck INR in 1.5 weeks. Patient acknowledges working in consult agreement with pharmacist as referred by his/her physician.                   For Pharmacy Admin Tracking Only    Intervention Detail: Adherence Monitorin  Total # of Interventions Recommended: 1  Total # of Interventions Accepted: 1  Time Spent (min): 20    Efren Francois PharmD 2022 1:02 PM

## 2022-10-07 ENCOUNTER — HOSPITAL ENCOUNTER (OUTPATIENT)
Dept: PHARMACY | Age: 47
Setting detail: THERAPIES SERIES
Discharge: HOME | End: 2022-10-07
Payer: MEDICARE

## 2022-10-07 ENCOUNTER — SCHEDULED TELEPHONE ENCOUNTER (OUTPATIENT)
Dept: PAIN MANAGEMENT | Age: 47
End: 2022-10-07
Payer: MEDICARE

## 2022-10-07 VITALS
DIASTOLIC BLOOD PRESSURE: 89 MMHG | HEART RATE: 91 BPM | SYSTOLIC BLOOD PRESSURE: 168 MMHG | WEIGHT: 308 LBS | BODY MASS INDEX: 41.77 KG/M2

## 2022-10-07 DIAGNOSIS — D68.61 ANTIPHOSPHOLIPID ANTIBODY SYNDROME (HCC): ICD-10-CM

## 2022-10-07 DIAGNOSIS — Z79.01 LONG TERM CURRENT USE OF ANTICOAGULANT THERAPY: Primary | ICD-10-CM

## 2022-10-07 DIAGNOSIS — M53.3 SACROILIAC DYSFUNCTION: Primary | ICD-10-CM

## 2022-10-07 LAB — INR BLD: 3.6

## 2022-10-07 PROCEDURE — 99211 OFF/OP EST MAY X REQ PHY/QHP: CPT | Performed by: FAMILY MEDICINE

## 2022-10-07 PROCEDURE — 85610 PROTHROMBIN TIME: CPT | Performed by: FAMILY MEDICINE

## 2022-10-07 PROCEDURE — 99441 PR PHYS/QHP TELEPHONE EVALUATION 5-10 MIN: CPT | Performed by: PHYSICAL MEDICINE & REHABILITATION

## 2022-10-07 RX ORDER — WARFARIN SODIUM 7.5 MG/1
7.5 TABLET ORAL DAILY
Qty: 90 TABLET | Refills: 3 | OUTPATIENT
Start: 2022-10-07

## 2022-10-07 NOTE — PROGRESS NOTES
Leigh Ann Pace is a 49 y.o. male evaluated via telephone on 10/7/2022 for post injection. Had a right L5 medial branch, S1, S2. S3 lateral branch block in preparation of RFA. He reported at least 80% relief. Denies side effects from the injection. Doing well. FF up with Dr. Jacky Adhikari. Total Time: minutes: 5-10 minutes    Leigh Ann Pace was evaluated through a synchronous (real-time) audio encounter. Patient identification was verified at the start of the visit. He (or guardian if applicable) is aware that this is a billable service, which includes applicable co-pays. This visit was conducted with the patient's (and/or legal guardian's) verbal consent. He has not had a related appointment within my department in the past 7 days or scheduled within the next 24 hours. The patient was located at Home: [place] [address]. The provider was located at Home ([address]: [place].     Note: not billable if this call serves to triage the patient into an appointment for the relevant concern    Mckinley Clements MD

## 2022-10-07 NOTE — PROGRESS NOTES
[facility] [address] [place] [address]/[place]  Medication Management  ANTICOAGULATION    Referring Provider: Christopher Ennis CNP    GOAL INR: 2.5-3.5    TODAY'S INR: 3.6    WARFARIN Dosage: continue 3.75mg TRSat, 7.5mg all other days    INR (no units)   Date Value   10/07/2022 3.6   2022 3.6   2022 4.2   2022 2.2   2022 2.5   2022 1.8   2021 2.3     INR,(POC) (no units)   Date Value   2022 1.9   2022 2.1       Medication changes:    Completed Augmentin on Saturday 10/1/22  Picking up prescription for \"nebulizers\" today-did not know which kind    Notes:    Fingerstick INR drawn per clinic protocol. Patient states no visible blood in urine and no black tarry stool. Denies any missed doses of warfarin. No change in other maintenance medications or in diet. Will recheck INR in 2 weeks. Patient continues to be SOB and states it has not improved since completing antibiotic. Patient is getting prescription for nebulizers today. Patient states his pain in hips, knees and back are worse today than normal. Prescription for warfarin called to [facility]. Patient acknowledges working in consult agreement with pharmacist as referred by his/her physician.                   For Pharmacy Admin Tracking Only    Intervention Detail: Adherence Monitorin, Dose Adjustment: 1, reason: Therapy De-escalation, and Refill(s) Provided  Total # of Interventions Recommended: 4  Total # of Interventions Accepted: 4  Time Spent (min): 30      Ramonita Mao R.Ph., 10/7/2022,2:36 PM

## 2022-10-27 ENCOUNTER — HOSPITAL ENCOUNTER (OUTPATIENT)
Dept: PHARMACY | Age: 47
Setting detail: THERAPIES SERIES
Discharge: HOME | End: 2022-10-27
Payer: MEDICARE

## 2022-10-27 VITALS — SYSTOLIC BLOOD PRESSURE: 136 MMHG | DIASTOLIC BLOOD PRESSURE: 89 MMHG | HEART RATE: 72 BPM

## 2022-10-27 DIAGNOSIS — Z79.01 LONG TERM CURRENT USE OF ANTICOAGULANT THERAPY: ICD-10-CM

## 2022-10-27 DIAGNOSIS — D68.61 ANTIPHOSPHOLIPID ANTIBODY SYNDROME (HCC): Primary | ICD-10-CM

## 2022-10-27 LAB — INR BLD: 2.9

## 2022-10-27 PROCEDURE — 99211 OFF/OP EST MAY X REQ PHY/QHP: CPT

## 2022-10-27 PROCEDURE — 85610 PROTHROMBIN TIME: CPT

## 2022-10-27 NOTE — PROGRESS NOTES
[facility] [address] [place] [address]/[place]  Medication Management  ANTICOAGULATION    Referring Provider: Dr. Anatoly Starr INR: 2.5-3.5     TODAY'S INR: 2.9     WARFARIN Dosage: continue 3.75mg TRSat, 7.5mg all other days    INR (no units)   Date Value   10/27/2022 2.9   10/07/2022 3.6   2022 3.6   2022 4.2   2022 2.2   2022 2.5   2022 1.8     INR,(POC) (no units)   Date Value   2022 1.9   2022 2.1       Medication changes:  None    Notes:    Fingerstick INR drawn per clinic protocol. Patient states no visible blood in urine and no black tarry stool. Denies any missed doses of warfarin. No change in other maintenance medications or in diet. Will recheck INR in 6 weeks. Patient acknowledges working in consult agreement with pharmacist as referred by his/her physician.                   For Pharmacy Admin Tracking Only    Intervention Detail: Adherence Monitorin  Total # of Interventions Recommended: 1  Total # of Interventions Accepted: 1  Time Spent (min): [facility], [facility], PharmD

## 2022-12-15 ENCOUNTER — HOSPITAL ENCOUNTER (OUTPATIENT)
Dept: PHARMACY | Age: 47
Setting detail: THERAPIES SERIES
Discharge: HOME | End: 2022-12-15
Payer: MEDICARE

## 2022-12-15 VITALS — DIASTOLIC BLOOD PRESSURE: 77 MMHG | SYSTOLIC BLOOD PRESSURE: 112 MMHG | HEART RATE: 89 BPM

## 2022-12-15 DIAGNOSIS — D68.61 ANTIPHOSPHOLIPID ANTIBODY SYNDROME (HCC): Primary | ICD-10-CM

## 2022-12-15 DIAGNOSIS — Z79.01 LONG TERM CURRENT USE OF ANTICOAGULANT THERAPY: ICD-10-CM

## 2022-12-15 LAB — INR BLD: 1.5

## 2022-12-15 PROCEDURE — 99211 OFF/OP EST MAY X REQ PHY/QHP: CPT

## 2022-12-15 PROCEDURE — 85610 PROTHROMBIN TIME: CPT

## 2022-12-15 RX ORDER — POLYETHYLENE GLYCOL 3350 17 G/17G
17 POWDER, FOR SOLUTION ORAL 2 TIMES DAILY
COMMUNITY
Start: 2022-12-06

## 2022-12-15 RX ORDER — DICYCLOMINE HCL 20 MG
20 TABLET ORAL EVERY 6 HOURS
COMMUNITY
Start: 2022-12-06

## 2022-12-15 NOTE — PROGRESS NOTES
[facility] [address] [place] [address]  Medication Management  ANTICOAGULATION    Referring Provider: Dr. Erick Branch INR: 2.5-3.5     TODAY'S INR: 1.5     WARFARIN Dosage: 11.25 mg x 2 doses, then resume 3.75mg TRSat, 7.5mg all other days    INR (no units)   Date Value   12/15/2022 1.5   10/27/2022 2.9   10/07/2022 3.6   2022 3.6   2022 4.2   2022 2.2   2022 2.5     INR,(POC) (no units)   Date Value   2022 1.9   2022 2.1       Medication changes:  Started using medical marijuana sine his last visit here in the clinic    Notes:    Fingerstick INR drawn per clinic protocol. Patient states no visible blood in urine and no black tarry stool. Alea Cummins says he has missed a \"couple\" doses of his warfarin this past week. He says he \"forgets what day it is\" and then \"takes the wrong dose\". He says he keeps all his medications in a pill-minder (\"except his warfarin\") so I have recommended that he start placing his warfarin in the pill-minder and filling it once weekly to help with compliance. Alea Cummins also tells me that he has started using medical marijuana for pain relief since his last visit here in the clinic. He has an appointment with pain specialist, Dr. Giacomo Valdez, here in [facility] on 2023. Alea Cummins also says he was given \"stomach\" medicine, but is not taking it right now. No change in other maintenance medications or in diet. Since INR is sub-therapeutic today, we will have him take 11.25 mg warfarin tonight and tomorrow night x 2 booster doses, but then resume previously stable weekly warfarin regimen as noted on his dosing calendar. We will recheck INR in 3 weeks after his appointment with Dr. Giacomo Valdez on 2023. Patient acknowledges working in consult agreement with pharmacist as referred by his/her physician.                   For Pharmacy Admin Tracking Only    Intervention Detail: Adherence Monitorin and Dose Adjustment: 1, reason: Therapy Optimization  Total # of Interventions Recommended: 2  Total # of Interventions Accepted: 2  Time Spent (min): [address] [place], NORIEGA CHARLES [facility] [place], PharmD

## 2023-01-05 ENCOUNTER — OFFICE VISIT (OUTPATIENT)
Dept: PAIN MANAGEMENT | Age: 48
End: 2023-01-05
Payer: MEDICARE

## 2023-01-05 ENCOUNTER — HOSPITAL ENCOUNTER (OUTPATIENT)
Dept: PHARMACY | Age: 48
Setting detail: THERAPIES SERIES
Discharge: HOME | End: 2023-01-05
Payer: MEDICARE

## 2023-01-05 ENCOUNTER — HOSPITAL ENCOUNTER (OUTPATIENT)
Age: 48
Discharge: HOME | End: 2023-01-07
Payer: MEDICARE

## 2023-01-05 ENCOUNTER — HOSPITAL ENCOUNTER (OUTPATIENT)
Dept: GENERAL RADIOLOGY | Age: 48
Discharge: HOME | End: 2023-01-07
Payer: MEDICARE

## 2023-01-05 VITALS — WEIGHT: 289 LBS | BODY MASS INDEX: 39.2 KG/M2

## 2023-01-05 VITALS
OXYGEN SATURATION: 98 % | WEIGHT: 289 LBS | HEART RATE: 79 BPM | HEIGHT: 72 IN | RESPIRATION RATE: 19 BRPM | BODY MASS INDEX: 39.14 KG/M2

## 2023-01-05 DIAGNOSIS — Z79.01 LONG TERM CURRENT USE OF ANTICOAGULANT THERAPY: ICD-10-CM

## 2023-01-05 DIAGNOSIS — M51.36 LUMBAR DEGENERATIVE DISC DISEASE: ICD-10-CM

## 2023-01-05 DIAGNOSIS — M54.16 LUMBAR RADICULOPATHY: Primary | ICD-10-CM

## 2023-01-05 DIAGNOSIS — M47.816 LUMBAR SPONDYLOSIS: ICD-10-CM

## 2023-01-05 DIAGNOSIS — M54.16 LUMBAR RADICULOPATHY: ICD-10-CM

## 2023-01-05 DIAGNOSIS — D68.61 ANTIPHOSPHOLIPID ANTIBODY SYNDROME (HCC): Primary | ICD-10-CM

## 2023-01-05 DIAGNOSIS — M25.559 HIP PAIN: ICD-10-CM

## 2023-01-05 LAB — INR BLD: 3.7

## 2023-01-05 PROCEDURE — 85610 PROTHROMBIN TIME: CPT

## 2023-01-05 PROCEDURE — 4004F PT TOBACCO SCREEN RCVD TLK: CPT | Performed by: STUDENT IN AN ORGANIZED HEALTH CARE EDUCATION/TRAINING PROGRAM

## 2023-01-05 PROCEDURE — G8427 DOCREV CUR MEDS BY ELIG CLIN: HCPCS | Performed by: STUDENT IN AN ORGANIZED HEALTH CARE EDUCATION/TRAINING PROGRAM

## 2023-01-05 PROCEDURE — 99214 OFFICE O/P EST MOD 30 MIN: CPT | Performed by: STUDENT IN AN ORGANIZED HEALTH CARE EDUCATION/TRAINING PROGRAM

## 2023-01-05 PROCEDURE — 72100 X-RAY EXAM L-S SPINE 2/3 VWS: CPT

## 2023-01-05 PROCEDURE — 99211 OFF/OP EST MAY X REQ PHY/QHP: CPT

## 2023-01-05 PROCEDURE — G8484 FLU IMMUNIZE NO ADMIN: HCPCS | Performed by: STUDENT IN AN ORGANIZED HEALTH CARE EDUCATION/TRAINING PROGRAM

## 2023-01-05 PROCEDURE — 73521 X-RAY EXAM HIPS BI 2 VIEWS: CPT

## 2023-01-05 PROCEDURE — G8417 CALC BMI ABV UP PARAM F/U: HCPCS | Performed by: STUDENT IN AN ORGANIZED HEALTH CARE EDUCATION/TRAINING PROGRAM

## 2023-01-05 RX ORDER — FLUTICASONE PROPIONATE 44 MCG
AEROSOL WITH ADAPTER (GRAM) INHALATION
COMMUNITY
Start: 2023-01-04

## 2023-01-05 RX ORDER — MIRTAZAPINE 30 MG/1
TABLET, FILM COATED ORAL
COMMUNITY
Start: 2022-12-28

## 2023-01-05 RX ORDER — DULOXETIN HYDROCHLORIDE 30 MG/1
CAPSULE, DELAYED RELEASE ORAL
COMMUNITY
Start: 2023-01-03 | End: 2023-01-05

## 2023-01-05 NOTE — PATIENT INSTRUCTIONS
SURVEY:    You may be receiving a survey from [employer] regarding your visit today. Please complete the survey to enable us to provide the highest quality of care to you and your family. If you cannot score us a very good on any question, please call the office to discuss how we could have made your experience a very good one. Thank you.   [address], MD Arlan Claude, MD Sherryll Alcon, MD [facility],   Neida Dorothy, PM  RICHIE Tolentino, [address] [place], [place] [address]christian, [facility]

## 2023-01-05 NOTE — PROGRESS NOTES
Chronic Pain Clinic Note     Encounter Date: 1/5/2023     SUBJECTIVE:  Chief Complaint   Patient presents with    Back Pain       History of Present Illness:   Thegenoveva Linares is a 49 y.o. male who presents with lumbar/ right hip pain. Previous [facility] patient. Patient was on chronic Prednisone usage for for antiphospholipid syndrome- took for 25 years. Ate away the hip joint. Walks with quad cane.      Medication Refill: N/A    Current Complaints of Pain:   Location: lumbar, right hip, sacroilliac   Radiation: RLE  Severity: severe   Pain Numerical Score -    Average: 10     Highest: 10  Lowest: 8  Character/Quality: Complains of pain that is aching, burn, dull, throb  Timing: Constant, keeps awake at night   Associated symptoms: paresthesias- RLE  Numbness: RLE  Weakness: RLE  Exacerbating factors: humidity   Alleviating factors: medical marijuana   Length of time pain has been present: Started approx 15 years ago  Inciting event/injury: \"snapped\" his low back trying to stretch his sacroilliac joint- home remedy   Bowel/Bladder incontinence: no   Falls: multiple   Physical Therapy: yes    History of Interventions:   Surgery: No previous lumbar/cervical surgeries  Injections: Hip    Imaging:    No updated imaging    Past Medical History:   Diagnosis Date    Antiphospholipid antibody with hemorrhagic disorder ([facility]     Arthritis     Asthma     Bronchitis     Cerebral artery occlusion with cerebral infarction ([facility]     Disease of blood and blood forming organ     Hypertension     Long-term (current) use of anticoagulants, INR goal 2.5-3.5     Pleurisy     Pneumonia     Seizures (HCC)        Past Surgical History:   Procedure Laterality Date    BACK INJECTION Bilateral 8/5/2022    BILATERAL SI JOINT INJECTION performed by Pool Linares MD at [place] [address] Right 9/16/2022    SACROILIAC JOINT INJECTION RIGHT L5 MEDIAL BRANCH S1, S2, S3 LATERAL BRANCH performed by [facility] [employer] Ritu Rene MD at [address]         Family History   Problem Relation Age of Onset    Heart Disease Maternal Grandmother     Heart Disease Maternal Grandfather     Stroke Maternal Grandfather        Social History     Socioeconomic History    Marital status:      Spouse name: Not on file    Number of children: Not on file    Years of education: Not on file    Highest education level: Not on file   Occupational History    Not on file   Tobacco Use    Smoking status: Never    Smokeless tobacco: Current     Types: Chew   Vaping Use    Vaping Use: Never used   Substance and Sexual Activity    Alcohol use: Not Currently     Alcohol/week: 2.0 standard drinks     Types: 2 Cans of beer per week     Comment: occasional    Drug use: Yes    Sexual activity: Not Currently     Partners: Female   Other Topics Concern    Not on file   Social History Narrative    Not on file     Social Determinants of Health     Financial Resource Strain: Not on file   Food Insecurity: Not on file   Transportation Needs: Not on file   Physical Activity: Not on file   Stress: Not on file   Social Connections: Not on file   Intimate Partner Violence: Not on file   Housing Stability: Not on file       Medications & Allergies:   Current Outpatient Medications   Medication Instructions    acetaminophen (TYLENOL) 1,000 mg, Oral, DAILY PRN    atenolol (TENORMIN) 25 mg, Oral, DAILY    atorvastatin (LIPITOR) 10 mg, Oral, DAILY, Taking 5 mg QD    B-Complex-C TABS 1 tablet, Oral, DAILY    Cholecalciferol (VITAMIN D3) 50 MCG (2000 UT) CAPS 1 capsule, Oral, DAILY    Compression Stockings MISC Does not apply, Graduated compression    dicyclomine (BENTYL) 20 mg, EVERY 6 HOURS    DULoxetine (CYMBALTA) 60 mg, Oral, 2 TIMES DAILY    FLOVENT HFA 44 MCG/ACT inhaler No dose, route, or frequency recorded.     hydroxychloroquine (PLAQUENIL) 200 MG tablet 2 tablets, Oral, DAILY    lamoTRIgine (LAMICTAL) 100 mg, Oral, 2 TIMES DAILY    lidocaine (LIDODERM) 5 % 3 patches, TransDERmal, DAILY, 12 hours on, 12 hours off.     melatonin 5 mg, Oral, NIGHTLY    mirtazapine (REMERON) 30 MG tablet TAKE 1 TABLET BY MOUTH AT BEDTIME    pyridoxine (B-6) 100 mg, Oral, DAILY    warfarin (COUMADIN) 7.5 mg, Oral, DAILY, Take 3.75 mg on Tuesdays, Thursdays and Saturdays and take 7.5 mg all other days of the week as directed. Managed by [address] [place] [address] [address] Allergies   Allergen Reactions    Heparin Other (See Comments)     O.D. value 1.330 on 2/4/2020    unknown    Uncaria Tomentosa (Cats Claw) Other (See Comments)     itch,sneezing       Review of Systems:   Constitutional: negative for weight changes or fevers  Cardiovascular: negative for chest pain, palpitations, irregular heart beat  Respiratory: negative for dyspnea, cough, wheezing  Gastrointestinal: negative for constipation, diarrhea, nausea  Genitourinary: negative for bowel/bladder incontinence   Musculoskeletal: positive for low back pain, hip pain  Neurological: Positive for radicular leg pain, leg weakness or numbness/tingling  Behavioral/Psych: negative for anxiety/depression   Hematological: negative for abnormal bleeding, anticoagulation use or antiplatelet use  All other systems reviewed and are negative    OBJECTIVE:    Vitals:    01/05/23 1003   Pulse: 79   Resp: 19   SpO2: 98%       PHYSICAL EXAM    GENERAL: No acute distress, pleasant, well-appearing  HEENT: Normocephalic, atraumatic, Pupils equal and round  CARDIOVASCULAR: Well perfused, No peripheral cyanosis  PULMONARY: Good chest wall excursion, breathing unlabored  PSYCH: Appropriate affect and insight, non-pressured speech  SKIN: No rashes or lesions  MUSCULOSKELETAL:  Inspection: The back and extremities are symmetric and aligned. Muscle bulk is normal in appearance.   Palpation: There is tenderness to palpation along the lumbar paraspinal musculature bilaterally  Lumbar range of motion is full  NEUROMUSCULAR:  Patient ambulates with walker  Gait is antalgic  Sensation to light touch is intact lower extremities  Strength is full except giveaway weakness in proximal leg musculature  No ankle clonus    Special Tests:  Lumbar facet loading is positive bilaterally  Seated straight leg raise is positive bilaterally    DIAGNOSIS:    ICD-10-CM    1. Lumbar radiculopathy  M54.16 XR LUMBAR SPINE (2-3 VIEWS)     MRI LUMBAR SPINE WO CONTRAST      2. Hip pain  M25.559 XR HIP BILATERAL W AP PELVIS (2 VIEWS)      3. Lumbar degenerative disc disease  M51.36       4. Lumbar spondylosis  M47.816            ASSESSMENT:    Comfort Navarro is a 49 y.o.male who presents with chronic low back pain, leg pain and hip pain. To review, patient has had symptoms for the last 5 years. He denies any hip or spine surgeries. The patient's history and physical examination are consistent with lumbar spondylosis, lumbar radiculopathy and bilateral hip pain secondary osteoarthritis. I do not have updated imaging of the patient which will be imperative for further evaluation treatment therefore, I ordered lumbar spine x-rays, bilateral hip x-rays and a lumbar MRI. Neurologically, the patient ambulates with a walker. There is no evidence of myelopathy on examination. There are no red flags in the patient's history. The patient has failed conservative measures including greater than 3 medications for pain relief, a self-directed therapy program, as well as activity modification all within the last 6 weeks over 3 months. The patient's pain has been causing worsening quality of life and function. PLAN:  Medications: For nonopioid therapy, the following medications were prescribed:    -Continue medication prescribed by primary care physician    Opioid therapy:  -Nonnarcotic care plan.   Patient uses medical marijuana    Interventions:  -Consider lumbar medial branch blocks versus epidural steroid injection in future    Imaging:  -Ordered lumbar x-ray and bilateral hip x-rays  -Ordered lumbar MRI    Behavioral Therapies:  -Continue daily stretching and home exercise program    Referrals:  -Consider referral to orthopedic surgeon in future    Follow-up Plan:  -After imaging    Patient was offered intervention where appropriate. Multi-modal Pain Therapy: The patient was explicitly considered for multimodal and interdisciplinary therapy. Non-opioid and non-pharmacological opportunities to enhance analgesia and quality of life have been and will continue to be pursued.     Philomena Overall, DO  Interventional Pain Management/PM&R   [facility]    Orders Placed This Encounter    XR LUMBAR SPINE (2-3 VIEWS)     Standing Status:   Future     Number of Occurrences:   1     Standing Expiration Date:   1/5/2024    MRI LUMBAR SPINE WO CONTRAST     Standing Status:   Future     Standing Expiration Date:   1/5/2024    XR HIP BILATERAL W AP PELVIS (2 VIEWS)     Standing Status:   Future     Number of Occurrences:   1     Standing Expiration Date:   1/5/2024    DISCONTD: DULoxetine (CYMBALTA) 30 MG extended release capsule     Sig: TAKE 1 CAPSULE BY MOUTH TWICE DAILY    FLOVENT HFA 44 MCG/ACT inhaler    mirtazapine (REMERON) 30 MG tablet     Sig: TAKE 1 TABLET BY MOUTH AT BEDTIME

## 2023-01-06 NOTE — PROGRESS NOTES
[facility]/[place]  Medication Management  ANTICOAGULATION    Referring Provider: Dr Baldo Meeks INR: 2.5-3.5     TODAY'S INR: 3.7     WARFARIN Dosage: HOLD x 1, the resume 3.75mg TRSat, 7.5mg all other days    INR (no units)   Date Value   2023 3.7   12/15/2022 1.5   10/27/2022 2.9   10/07/2022 3.6   2022 3.6   2022 4.2   2022 2.2     INR,(POC) (no units)   Date Value   2022 1.9   2022 2.1       Medication changes:  Started using medical marijuana  Stopped Protonix, PEG, Lyrica and Bentyl    Notes:    Fingerstick INR drawn per clinic protocol. Patient states no visible blood in urine and no black tarry stool. Denies any missed or extra doses of warfarin. Started using medical marijuana (can increase INR). Stopped Protonix, PEG, Lyrica and Bentyl. Mother states that he does not eat during the day and then eats 'sugar' at night. No other changes in maintenance medications or in diet. Recommended to hold tomorrow morning's dose and continue current dosing thereafter but pt refuses any changes to dose. Will recheck INR in 4 weeks. Patient acknowledges working in consult agreement with pharmacist as referred by his/her physician.                   For Pharmacy Admin Tracking Only    Intervention Detail: Adherence Monitorin and Dose Adjustment: 1, reason: Therapy Optimization  Total # of Interventions Recommended: 2  Total # of Interventions Accepted: 2  Time Spent (min): 45    Indra Santos, PharmD 2023 9:10 AM

## 2023-01-10 ENCOUNTER — HOSPITAL ENCOUNTER (OUTPATIENT)
Dept: PHARMACY | Age: 48
Setting detail: THERAPIES SERIES
Discharge: HOME | End: 2023-01-10

## 2023-01-10 DIAGNOSIS — Z79.01 LONG TERM CURRENT USE OF ANTICOAGULANT THERAPY: ICD-10-CM

## 2023-01-10 DIAGNOSIS — D68.61 ANTIPHOSPHOLIPID ANTIBODY SYNDROME (HCC): Primary | ICD-10-CM

## 2023-01-10 LAB — INTERNATIONAL NORMALIZATION RATIO, POC: 3.6

## 2023-01-10 NOTE — PROGRESS NOTES
Received call from mother, Mahesh Mayes, who stated Dunia Riley noticed some 'red' blood on stool this morning. She said they were going to [facility] for some lab work and wanted to know if we could draw an INR while they were there. Order faxed to [facility] registration. Mother called with MyChart result of 3.6 today. No dosage adjustments at this time. Will keep next scheduled appt on 2/2/23. They were instructed to continue to monitor for new bleeding or worsening/continued current bleeding. No other issues.   Neville Brooks, PharmD 1/10/2023 10:58 AM

## 2023-02-02 ENCOUNTER — TELEPHONE (OUTPATIENT)
Dept: PAIN MANAGEMENT | Age: 48
End: 2023-02-02

## 2023-02-02 ENCOUNTER — HOSPITAL ENCOUNTER (OUTPATIENT)
Dept: PHARMACY | Age: 48
Setting detail: THERAPIES SERIES
Discharge: HOME | End: 2023-02-02
Payer: MEDICARE

## 2023-02-02 VITALS — BODY MASS INDEX: 39.2 KG/M2 | WEIGHT: 289 LBS

## 2023-02-02 DIAGNOSIS — Z79.01 LONG TERM CURRENT USE OF ANTICOAGULANT THERAPY: ICD-10-CM

## 2023-02-02 DIAGNOSIS — D68.61 ANTIPHOSPHOLIPID ANTIBODY SYNDROME (HCC): Primary | ICD-10-CM

## 2023-02-02 LAB — INR BLD: 1.9

## 2023-02-02 PROCEDURE — 85610 PROTHROMBIN TIME: CPT

## 2023-02-02 PROCEDURE — 99211 OFF/OP EST MAY X REQ PHY/QHP: CPT

## 2023-02-02 NOTE — TELEPHONE ENCOUNTER
Patient calling and requesting to be scheduled for injections. Returned call, mother answered. Patient has not had his MRI done, advised the mother to have his MRI done. Also, I cannot get him scheduled for anything that Dr Fani Hidalgo has not ordered and since the injections have not been ordered yet I cannot schedule him for injections. He can discuss injections at his follow up with Dr Fani Hidalgo. Advised to get the MRI done and then call for follow up.

## 2023-02-02 NOTE — PROGRESS NOTES
[facility]/[place]  Medication Management  ANTICOAGULATION    Referring Provider: Dr. Yashira Zuñiga INR: 2.5 - 3.5     TODAY'S INR: 1.9     WARFARIN Dosage: 11.25 mg x 1 dose, then resume 3.75mg TRSat, 7.5mg all other days    INR (no units)   Date Value   2023 3.7   12/15/2022 1.5   10/27/2022 2.9   10/07/2022 3.6   2022 3.6   2022 4.2   2022 2.2     INR,(POC) (no units)   Date Value   01/10/2023 3.6   2022 1.9   2022 2.1       Medication changes:  None    Notes:    Fingerstick INR drawn per clinic protocol. Patient states no visible blood in urine and no black tarry stool. Denies any missed doses of warfarin. As discussed at his last visit, Arielle Austin had started using medical marijuana, but admits that he has not been consistent with his intake. I have recommended that he try to maintain consistency with the medical marijuana on a daily basis to help with maintaining his INR in the therapeutic range (since there is an interaction between these 2 medications. No change in other maintenance medications or in diet. Since his INR is slightly sub-therapeutic today, I have recommended that he take 11.25 mg today x 1 booster dose, but then continue current weekly warfarin regimen thereafter as noted on his dosing calendar. We will recheck INR in 4 weeks. Patient acknowledges working in consult agreement with pharmacist as referred by his/her physician.                   For Pharmacy Admin Tracking Only    Intervention Detail: Adherence Monitorin and Dose Adjustment: 1, reason: Therapy Optimization  Total # of Interventions Recommended: 2  Total # of Interventions Accepted: 2  Time Spent (min): [address], [facility], PharmD

## 2023-02-08 ENCOUNTER — HOSPITAL ENCOUNTER (OUTPATIENT)
Dept: MRI IMAGING | Age: 48
Discharge: HOME | End: 2023-02-10
Payer: MEDICARE

## 2023-02-08 DIAGNOSIS — M54.16 LUMBAR RADICULOPATHY: ICD-10-CM

## 2023-02-08 PROCEDURE — 72148 MRI LUMBAR SPINE W/O DYE: CPT

## 2023-03-02 ENCOUNTER — HOSPITAL ENCOUNTER (OUTPATIENT)
Dept: PHARMACY | Age: 48
Setting detail: THERAPIES SERIES
Discharge: HOME | End: 2023-03-02
Payer: MEDICARE

## 2023-03-02 VITALS — DIASTOLIC BLOOD PRESSURE: 85 MMHG | SYSTOLIC BLOOD PRESSURE: 138 MMHG | HEART RATE: 79 BPM

## 2023-03-02 DIAGNOSIS — D68.61 ANTIPHOSPHOLIPID ANTIBODY SYNDROME (HCC): Primary | ICD-10-CM

## 2023-03-02 DIAGNOSIS — Z79.01 LONG TERM CURRENT USE OF ANTICOAGULANT THERAPY: ICD-10-CM

## 2023-03-02 LAB — INR BLD: 3.2

## 2023-03-02 PROCEDURE — 99211 OFF/OP EST MAY X REQ PHY/QHP: CPT

## 2023-03-02 PROCEDURE — 85610 PROTHROMBIN TIME: CPT

## 2023-03-02 NOTE — PROGRESS NOTES
[address]/[place]  Medication Management  ANTICOAGULATION    Referring Provider: Dr. Fuentes Fenton INR: 2.5 - 3.5     TODAY'S INR: 3.2     WARFARIN Dosage: 3.75mg TRSat, 7.5mg all other days    INR (no units)   Date Value   2023 3.2   2023 1.9   2023 3.7   12/15/2022 1.5   10/27/2022 2.9   10/07/2022 3.6   2022 3.6   2022 4.2     INR,(POC) (no units)   Date Value   01/10/2023 3.6       Medication changes:  None    Notes:    Fingerstick INR drawn per clinic protocol. Patient states no visible blood in urine and no black tarry stool. Denies any missed doses of warfarin. No change in other maintenance medications or in diet. He is still using medical marijuana PRN pain. Shayla Marquis says he will be having hip surgery per Dr. Malik Francois on 3/7/2023. He had a normal stress test and a pre-surgical consultation with Dr. Malik Francois on 23. He was given instructions by Dr. Malik Francois for bridging with Lovenox injections pre-operatively. He will stop warfarin for \"3 days\" prior to the procedure and will be on Lovenox 40 mg injections once daily until the day of surgery. He will resume his warfarin and bridge therapy with Lovenox injections per Dr. Aracelis Keith instruction until INR is therapeutic. He says he has been given the Rx for Lovenox injections by Dr. Malik Francois and has already picked them up from his local pharmacy. Thereafter, he will continue current weekly warfarin regimen and recheck INR in 6 weeks. Patient acknowledges working in consult agreement with pharmacist as referred by his/her physician.                   For Pharmacy Admin Tracking Only    Intervention Detail: Adherence Monitorin  Total # of Interventions Recommended: 1  Total # of Interventions Accepted: 1  Time Spent (min): [address] [facility], [facility], PharmD

## 2023-03-30 ENCOUNTER — HOSPITAL ENCOUNTER (OUTPATIENT)
Dept: PHARMACY | Age: 48
Setting detail: THERAPIES SERIES
Discharge: HOME | End: 2023-03-30
Payer: MEDICARE

## 2023-03-30 VITALS
SYSTOLIC BLOOD PRESSURE: 119 MMHG | BODY MASS INDEX: 38.57 KG/M2 | WEIGHT: 284.4 LBS | HEART RATE: 95 BPM | DIASTOLIC BLOOD PRESSURE: 88 MMHG

## 2023-03-30 DIAGNOSIS — Z79.01 LONG TERM CURRENT USE OF ANTICOAGULANT THERAPY: ICD-10-CM

## 2023-03-30 DIAGNOSIS — D68.61 ANTIPHOSPHOLIPID ANTIBODY SYNDROME (HCC): Primary | ICD-10-CM

## 2023-03-30 LAB — INR BLD: 2.2

## 2023-03-30 PROCEDURE — 85610 PROTHROMBIN TIME: CPT

## 2023-03-30 PROCEDURE — 99211 OFF/OP EST MAY X REQ PHY/QHP: CPT

## 2023-03-30 RX ORDER — CYCLOBENZAPRINE HCL 10 MG
10 TABLET ORAL 3 TIMES DAILY PRN
COMMUNITY
Start: 2023-03-23 | End: 2023-04-02

## 2023-03-30 NOTE — PROGRESS NOTES
For Pharmacy Admin Tracking Only    Intervention Detail: Adherence Monitorin  Total # of Interventions Recommended: 1  Total # of Interventions Accepted: 1  Time Spent (min): [address], [address] [place] [address], PharmD

## 2023-04-02 NOTE — PROGRESS NOTES
Pharmacy Note  Warfarin Consult follow-up      Recent Labs     06/14/19  0554   INR 1.3     Recent Labs     06/12/19  0216 06/13/19  0425 06/14/19  0554   HGB 10.6* 10.1* 10.2*   HCT 35.1* 32.5* 32.6*   * 74* 79*       Significant Drug-Drug Interactions:  New warfarin drug-drug interactions: Heparin  Discontinued drug-drug interactions: None  Current warfarin drug-drug interactions:  acetaminophen, heparin gtt, Zosyn    Date INR Dose   6/11/19 6.2 Held   6/12/19 9.9 Vitamin K 5mg IV given, held warfain   6/13/19 1.2 10 mg   6/14/19 1.3 10 mg       Notes: Continue with home dose of 10 mg today. Daily PT/INR while inpatient. Pt arrives to ED w/ c/o \"feeling off\" and \"slightly dizzy\" since last night. Pt took BP at home and was 107s over 100s, took AM meds. Pt denies CP/SOB, BEFAST neg. Pt a/o x 4, steady but slow gait in wr. EKG notified.

## 2023-05-11 ENCOUNTER — HOSPITAL ENCOUNTER (OUTPATIENT)
Dept: PHARMACY | Age: 48
Setting detail: THERAPIES SERIES
Discharge: HOME | End: 2023-05-11
Payer: MEDICARE

## 2023-05-11 VITALS
WEIGHT: 284.6 LBS | BODY MASS INDEX: 38.6 KG/M2 | DIASTOLIC BLOOD PRESSURE: 90 MMHG | SYSTOLIC BLOOD PRESSURE: 127 MMHG | HEART RATE: 70 BPM

## 2023-05-11 DIAGNOSIS — Z79.01 LONG TERM CURRENT USE OF ANTICOAGULANT THERAPY: ICD-10-CM

## 2023-05-11 DIAGNOSIS — D68.61 ANTIPHOSPHOLIPID ANTIBODY SYNDROME (HCC): Primary | ICD-10-CM

## 2023-05-11 LAB — INR BLD: 2.3

## 2023-05-11 PROCEDURE — 85610 PROTHROMBIN TIME: CPT

## 2023-05-11 PROCEDURE — 99211 OFF/OP EST MAY X REQ PHY/QHP: CPT

## 2023-05-11 NOTE — PROGRESS NOTES
[facility] [address] [place] [address]/[place]  Medication Management  ANTICOAGULATION    Referring Provider: Dr. Arpita Moreno INR: 2.5 - 3.5     TODAY'S INR: 2.3     WARFARIN Dosage: 3.75mg TRSat, 7.5mg all other days    INR (no units)   Date Value   2023 2.3   2023 2.2   2023 3.2   2023 1.9   2023 3.7   12/15/2022 1.5   10/27/2022 2.9     INR,(POC) (no units)   Date Value   01/10/2023 3.6       Hemoglobin   Date Value Ref Range Status   2023 12.9 (L) 13.5 - 17.5 g/dL Final     Hematocrit   Date Value Ref Range Status   2023 43.7 41 - 53 % Final     ALT   Date Value Ref Range Status   2022 14 5 - 41 U/L Final     AST   Date Value Ref Range Status   2022 17 <40 U/L Final       Medication changes:  None    Notes:    Fingerstick INR drawn per clinic protocol. Patient states no visible blood in urine and no black tarry stool. Denies any missed doses of warfarin. No change in other maintenance medications or in diet. Will recheck INR in 6 weeks. Patient acknowledges working in consult agreement with pharmacist as referred by his/her physician.                   For Pharmacy Admin Tracking Only    Intervention Detail: Adherence Monitorin  Total # of Interventions Recommended: 1  Total # of Interventions Accepted: 1  Time Spent (min): [address] [facility], [facility], PharmD

## 2023-06-22 ENCOUNTER — HOSPITAL ENCOUNTER (OUTPATIENT)
Dept: PHARMACY | Age: 48
Setting detail: THERAPIES SERIES
Discharge: HOME | End: 2023-06-22
Payer: MEDICARE

## 2023-06-22 VITALS — DIASTOLIC BLOOD PRESSURE: 96 MMHG | HEART RATE: 87 BPM | SYSTOLIC BLOOD PRESSURE: 143 MMHG

## 2023-06-22 DIAGNOSIS — D68.61 ANTIPHOSPHOLIPID ANTIBODY SYNDROME (HCC): Primary | ICD-10-CM

## 2023-06-22 DIAGNOSIS — Z79.01 LONG TERM CURRENT USE OF ANTICOAGULANT THERAPY: ICD-10-CM

## 2023-06-22 LAB — INR BLD: 3

## 2023-06-22 PROCEDURE — 85610 PROTHROMBIN TIME: CPT

## 2023-06-22 PROCEDURE — 99211 OFF/OP EST MAY X REQ PHY/QHP: CPT

## 2023-06-22 RX ORDER — TAMSULOSIN HYDROCHLORIDE 0.4 MG/1
0.4 CAPSULE ORAL DAILY
COMMUNITY
Start: 2023-05-24

## 2023-06-22 NOTE — PROGRESS NOTES
[address]/[place]  Medication Management  ANTICOAGULATION    Referring Provider: Dr. Akanksha Sigala INR: 2.5 - 3.5     TODAY'S INR: 3.0     WARFARIN Dosage: 3.75mg TRSat, 7.5mg all other days    INR (no units)   Date Value   2023 3.0   2023 2.3   2023 2.2   2023 3.2   2023 1.9   2023 3.7   12/15/2022 1.5   10/27/2022 2.9       Hemoglobin   Date Value Ref Range Status   2023 12.9 (L) 13.5 - 17.5 g/dL Final            Hematocrit   Date Value Ref Range Status   2023 43.7 41 - 53 % Final            ALT   Date Value Ref Range Status   2022 14 5 - 41 U/L Final            AST   Date Value Ref Range Status   2022 17 <40 U/L Final       Medication changes:  None    Notes:    Fingerstick INR drawn per clinic protocol. Patient states no visible blood in urine and no black tarry stool. Denies any missed doses of warfarin. No change in other maintenance medications or in diet. Will recheck INR in 6 weeks. Patient acknowledges working in consult agreement with pharmacist as referred by his/her physician.                   For Pharmacy Admin Tracking Only    Intervention Detail: Adherence Monitorin  Total # of Interventions Recommended: 1  Total # of Interventions Accepted: 1  Time Spent (min): [address] [facility], [facility], PharmD

## 2023-08-10 ENCOUNTER — HOSPITAL ENCOUNTER (OUTPATIENT)
Dept: PHARMACY | Age: 48
Setting detail: THERAPIES SERIES
Discharge: HOME | End: 2023-08-10
Payer: MEDICARE

## 2023-08-10 VITALS
BODY MASS INDEX: 39.2 KG/M2 | SYSTOLIC BLOOD PRESSURE: 120 MMHG | HEART RATE: 70 BPM | DIASTOLIC BLOOD PRESSURE: 76 MMHG | WEIGHT: 289 LBS

## 2023-08-10 DIAGNOSIS — Z79.01 LONG TERM CURRENT USE OF ANTICOAGULANT THERAPY: ICD-10-CM

## 2023-08-10 DIAGNOSIS — D68.61 ANTIPHOSPHOLIPID ANTIBODY SYNDROME (HCC): Primary | ICD-10-CM

## 2023-08-10 LAB — INR BLD: 2.3

## 2023-08-10 PROCEDURE — 99211 OFF/OP EST MAY X REQ PHY/QHP: CPT

## 2023-08-10 PROCEDURE — 85610 PROTHROMBIN TIME: CPT

## 2023-08-10 NOTE — PROGRESS NOTES
[facility]  [facility]  ANTICOAGULATION    Referring Provider: Dr. Constantine Gonzalez INR: 2.5 - 3.5     TODAY'S INR: 2.3     WARFARIN Dosage: 3.75 mg TRSat, 7.5 mg all other days    INR (no units)   Date Value   08/10/2023 2.3   2023 3   2023 2.3   2023 2.2   2023 3.2   2023 1.9   2023 3.7   12/15/2022 1.5     INR,(POC) (no units)   Date Value   01/10/2023 3.6       Hemoglobin   Date Value Ref Range Status   2023 15.6 13.5 - 17.5 g/dL Final     Hematocrit   Date Value Ref Range Status   2023 51.0 41 - 53 % Final     ALT   Date Value Ref Range Status   2023 24 5 - 41 U/L Final     AST   Date Value Ref Range Status   2023 23 <40 U/L Final       Medication changes:  None      Notes:    Fingerstick INR drawn per clinic protocol. Patient states no visible blood in urine and no black tarry stool. Denies any missed doses of warfarin. No change in other maintenance medications or in diet. Will recheck INR in 6 weeks. Patient acknowledges working in consult agreement with pharmacist as referred by his/her physician.                   For Pharmacy Admin Tracking Only    Intervention Detail: Adherence Monitorin  Total # of Interventions Recommended: 1  Total # of Interventions Accepted: 1  Time Spent (min): LEANN Meza [facility], PharmD

## 2023-09-22 ENCOUNTER — HOSPITAL ENCOUNTER (OUTPATIENT)
Dept: PHARMACY | Age: 48
Setting detail: THERAPIES SERIES
Discharge: HOME | End: 2023-09-22
Payer: MEDICARE

## 2023-09-22 VITALS
SYSTOLIC BLOOD PRESSURE: 102 MMHG | WEIGHT: 291 LBS | BODY MASS INDEX: 39.47 KG/M2 | HEART RATE: 75 BPM | DIASTOLIC BLOOD PRESSURE: 63 MMHG

## 2023-09-22 DIAGNOSIS — Z79.01 LONG TERM CURRENT USE OF ANTICOAGULANT THERAPY: ICD-10-CM

## 2023-09-22 DIAGNOSIS — D68.61 ANTIPHOSPHOLIPID ANTIBODY SYNDROME (HCC): Primary | ICD-10-CM

## 2023-09-22 LAB — INR BLD: 2.2

## 2023-09-22 PROCEDURE — 85610 PROTHROMBIN TIME: CPT

## 2023-09-22 PROCEDURE — 99211 OFF/OP EST MAY X REQ PHY/QHP: CPT

## 2023-09-22 NOTE — PROGRESS NOTES
[facility]  [facility]  ANTICOAGULATION    Referring Provider: Dr Scott Nolan INR: 2.5-3.5    TODAY'S INR: 2.2    WARFARIN Dosage: Continue 3.75 mg TTSa, 7.5 mg all other days    INR (no units)   Date Value   2023 2.2   08/10/2023 2.3   2023 3   2023 2.3   2023 2.2   2023 3.2   2023 1.9       Hemoglobin   Date Value Ref Range Status   12/10/2019 12.3 (L) 13.5 - 17.5 g/dL Final     Hematocrit   Date Value Ref Range Status   12/10/2019 40.6 (L) 41 - 53 % Final     ALT   Date Value Ref Range Status   12/10/2019 21 5 - 41 U/L Final     AST   Date Value Ref Range Status   12/10/2019 28 <40 U/L Final       Medication changes:  None     Notes:    Fingerstick INR drawn per clinic protocol. Patient states no visible blood in urine, black tarry stool, falls or ER visits and denies any missed or extra doses of warfarin. No change in medications or in diet. INR is slightly subtherapeutic today but will continue current dose of 3.75 mg TTSa, 7.5 mg all other days and will recheck INR in 6 weeks. Patient acknowledges working in consult agreement with pharmacist as referred by his/her physician.     For Pharmacy Admin Tracking Only    Intervention Detail: Adherence Monitorin  Total # of Interventions Recommended: 1  Total # of Interventions Accepted: 1  Time Spent (min): 45    Eliseo PerryD 2023 2:36 PM

## 2023-11-03 ENCOUNTER — HOSPITAL ENCOUNTER (OUTPATIENT)
Dept: PHARMACY | Age: 48
Setting detail: THERAPIES SERIES
Discharge: HOME | End: 2023-11-03
Payer: MEDICARE

## 2023-11-03 DIAGNOSIS — D68.61 ANTIPHOSPHOLIPID ANTIBODY SYNDROME (HCC): Primary | ICD-10-CM

## 2023-11-03 DIAGNOSIS — Z79.01 LONG TERM CURRENT USE OF ANTICOAGULANT THERAPY: ICD-10-CM

## 2023-11-03 LAB — INR BLD: 1.7

## 2023-11-03 PROCEDURE — 99211 OFF/OP EST MAY X REQ PHY/QHP: CPT

## 2023-11-03 PROCEDURE — 85610 PROTHROMBIN TIME: CPT

## 2023-11-03 RX ORDER — HYDROXYZINE HYDROCHLORIDE 25 MG/1
25 TABLET, FILM COATED ORAL EVERY 8 HOURS PRN
COMMUNITY

## 2023-11-03 NOTE — PROGRESS NOTES
[facility]  Medication Management  ANTICOAGULATION    Referring Provider: Dr Romain Shine INR: 2.5-3.5     TODAY'S INR: 1.7     WARFARIN Dosage: 15 mg x 1, then resume 3.75 mg TTSa, 7.5 mg all other days    INR (no units)   Date Value   2023 1.7   2023 2.2   08/10/2023 2.3   2023 3   2023 2.3   2023 2.2   2023 3.2       Hemoglobin   Date Value Ref Range Status   12/10/2019 12.3 (L) 13.5 - 17.5 g/dL Final     Hematocrit   Date Value Ref Range Status   12/10/2019 40.6 (L) 41 - 53 % Final     ALT   Date Value Ref Range Status   12/10/2019 21 5 - 41 U/L Final     AST   Date Value Ref Range Status   12/10/2019 28 <40 U/L Final       Medication changes:  Mirtazapine increased to 45 mg daily  Hydroxyzine 25 mg TID PRN anxiety started    Notes:    Fingerstick INR drawn per clinic protocol. Patient states no visible blood in urine, black tarry stool, falls or ER visits. States that he occasionally misses his dose but was unable to specify quantity and denies any extra doses of warfarin. Mirtazapine was increased to 45 mg daily recently and Hydroxyzine 25 mg TID PRN anxiety was started. He is also drinking 1-2 alcoholic drinks a night. No other changes in medications or diet. INR is subtherapeutic but patient adamantly refuses to make any adjustments to his maintenance dose, despite clinic warnings of being at an unsafe level, but will agree to a single boost. For now, will ask him to take 15 mg today and then resume 3.75 mg TTSa, 7.5 mg all other days and will recheck INR in 4 weeks. Patient acknowledges working in consult agreement with pharmacist as referred by his/her physician.     For Pharmacy Admin Tracking Only    Intervention Detail: Adherence Monitorin and Dose Adjustment: 1, reason: Therapy Optimization  Total # of Interventions Recommended: 2  Total # of Interventions Accepted: 2  Time Spent (min): 45    Madhu Khan PharmD

## 2023-11-07 DIAGNOSIS — Z79.01 LONG TERM CURRENT USE OF ANTICOAGULANT THERAPY: ICD-10-CM

## 2023-11-07 DIAGNOSIS — I63.50 CEREBRAL ARTERY OCCLUSION WITH CEREBRAL INFARCTION (HCC): ICD-10-CM

## 2023-11-07 DIAGNOSIS — D68.61 ANTIPHOSPHOLIPID ANTIBODY SYNDROME (HCC): Primary | ICD-10-CM

## 2023-11-08 ENCOUNTER — TELEPHONE (OUTPATIENT)
Dept: PHARMACY | Age: 48
End: 2023-11-08

## 2023-11-08 NOTE — TELEPHONE ENCOUNTER
Received a call from patient's mother, Ev Robbins, regarding anticoagulation therapy. He was going to [facility] lab for other labs and she requested an order be sent to [facility] to repeat the INR. Merrick's INR was 1.7 in our clinic and it was recommended that he take 15 mg warfarin (2 of the 7.5 mg tablets) x 1 booster dose before resuming his previously stable warfarin regimen. Lynn Bianca tells me that he did not take the booster dose that was recommended. Following the venipuncture INR result of 1.9 at [facility] lab on 11/7, I called and left a voice mail on Deonna's phone with the result and our repeated recommendation for him to take 15 mg warfarin (2 of the 7.5 mg tablet) x 1 booster dose and then resume current dose of 3.75 mg Tues/Thurs/Sat and 7.5 mg all other days of the week. We will repeat INR in our clinic on 11/30 as previously scheduled.

## 2023-11-30 ENCOUNTER — APPOINTMENT (OUTPATIENT)
Dept: PHARMACY | Age: 48
End: 2023-11-30
Payer: MEDICARE

## 2023-11-30 VITALS
BODY MASS INDEX: 39.6 KG/M2 | SYSTOLIC BLOOD PRESSURE: 145 MMHG | DIASTOLIC BLOOD PRESSURE: 94 MMHG | WEIGHT: 292 LBS | HEART RATE: 80 BPM

## 2023-11-30 DIAGNOSIS — Z79.01 LONG TERM CURRENT USE OF ANTICOAGULANT THERAPY: ICD-10-CM

## 2023-11-30 DIAGNOSIS — D68.61 ANTIPHOSPHOLIPID ANTIBODY SYNDROME (HCC): Primary | ICD-10-CM

## 2023-11-30 LAB — INR BLD: 2.5

## 2023-11-30 PROCEDURE — 85610 PROTHROMBIN TIME: CPT

## 2023-11-30 PROCEDURE — 99211 OFF/OP EST MAY X REQ PHY/QHP: CPT

## 2023-11-30 NOTE — PROGRESS NOTES
[facility]  [facility]ication Management  ANTICOAGULATION    Referring Provider: Dr Cony Del Cid INR: 2.5 - 3.5     TODAY'S INR: 2.5     WARFARIN Dosage: 3.75 mg  and  and 7.5 mg all other days of the week       INR (no units)   Date Value   2023 2.5   2023 1.7   2023 2.2   08/10/2023 2.3   2023 3   2023 2.3   2023 2.2       Hemoglobin   Date Value Ref Range Status   12/10/2019 12.3 (L) 13.5 - 17.5 g/dL Final     Hematocrit   Date Value Ref Range Status   12/10/2019 40.6 (L) 41 - 53 % Final     ALT   Date Value Ref Range Status   12/10/2019 21 5 - 41 U/L Final     AST   Date Value Ref Range Status   12/10/2019 28 <40 U/L Final       Medication changes:  None      Notes:    Fingerstick INR drawn per clinic protocol. Patient states no visible blood in urine and no black tarry stool. Lulú Robles says he was \"trying to get his blood thinner\" so he has been taking \"a whole tablet on  instead\". Upon clarification, he confirms that he has been taking \"1/2 tablet on  and \" and \"whole tablet the rest\" of the week. Denies any missed doses of warfarin. No change in other maintenance medications or in diet. Since his INR is therapeutic today, we will continue current weekly warfarin regimen and Will recheck INR in 6 weeks. Patient acknowledges working in consult agreement with pharmacist as referred by his/her physician.                   For Pharmacy Admin Tracking Only    Intervention Detail: Adherence Monitorin  Total # of Interventions Recommended: 1  Total # of Interventions Accepted: 1  Time Spent (min): LEANN Meza [facility], PharmD

## 2024-01-11 ENCOUNTER — HOSPITAL ENCOUNTER (OUTPATIENT)
Dept: PHARMACY | Age: 49
Setting detail: THERAPIES SERIES
Discharge: HOME | End: 2024-01-11
Payer: MEDICARE

## 2024-01-11 VITALS
DIASTOLIC BLOOD PRESSURE: 82 MMHG | SYSTOLIC BLOOD PRESSURE: 136 MMHG | HEART RATE: 76 BPM | BODY MASS INDEX: 39.87 KG/M2 | WEIGHT: 294 LBS

## 2024-01-11 DIAGNOSIS — Z79.01 LONG TERM CURRENT USE OF ANTICOAGULANT THERAPY: ICD-10-CM

## 2024-01-11 DIAGNOSIS — D68.61 ANTIPHOSPHOLIPID ANTIBODY SYNDROME (HCC): Primary | ICD-10-CM

## 2024-01-11 LAB — INR BLD: 1.4

## 2024-01-11 PROCEDURE — 85610 PROTHROMBIN TIME: CPT

## 2024-01-11 PROCEDURE — 99211 OFF/OP EST MAY X REQ PHY/QHP: CPT

## 2024-01-11 NOTE — PATIENT INSTRUCTIONS
Increase current dose of warfarin as instructed on dosing calendar provided.   Continue to monitor urine and stool for signs and symptoms of bleeding.   Please notify the clinic of any medication changes.   Please remember to bring all medications (both prescription and OTC) to your next visit. Kindly notify the clinic if you are unable to make to your next appointment.

## 2024-01-11 NOTE — PROGRESS NOTES
[facility]  Medication Management  ANTICOAGULATION    Referring Provider: Dr Mario Arriaga     GOAL INR: 2.5 - 3.5     TODAY'S INR: 1.4     WARFARIN Dosage: 11.25 mg x 2 doses, then resume 3.75 mg  and  and 7.5 mg all other days of the week    INR (no units)   Date Value   2023 2.5   2023 1.7   2023 2.2   08/10/2023 2.3   2023 3   2023 2.3   2023 2.2       Hemoglobin   Date Value Ref Range Status   12/10/2019 12.3 (L) 13.5 - 17.5 g/dL Final     Hematocrit   Date Value Ref Range Status   12/10/2019 40.6 (L) 41 - 53 % Final     ALT   Date Value Ref Range Status   12/10/2019 21 5 - 41 U/L Final     AST   Date Value Ref Range Status   12/10/2019 28 <40 U/L Final       Medication changes:  None      Notes:    Fingerstick INR drawn per clinic protocol. Patient states no visible blood in urine and no black tarry stool. Merrick says he \"probably\" missed \"more than one\" dose of his warfarin this past week, but cannot confirm which day(s) he may have missed. He also isn't \"sure\" if he has been taking his warfarin dose as instructed on the dosing calendar we had provided during his last visit to our clinic. No change in other maintenance medications. He denies having taken any vitamin-K containing supplements/foods, except he admits to drinking \"part of a Boost or Atkins\" protein drink recently. Since his INR is significantly more sub-therapeutic today, we will have him take 11.25 mg warfarin for the next 2 doses before resuming previously stable dose of 3.75 mg on  and  and 7.5 mg all other days of the week. We will recheck INR in 2 weeks. Patient acknowledges working in consult agreement with pharmacist as referred by his/her physician.                  For Pharmacy Admin Tracking Only    Intervention Detail: Adherence Monitorin and Dose Adjustment: 1, reason: Therapy Optimization  Total # of Interventions Recommended:

## 2024-01-12 DIAGNOSIS — Z79.01 LONG TERM CURRENT USE OF ANTICOAGULANT THERAPY: ICD-10-CM

## 2024-01-12 DIAGNOSIS — D68.61 ANTIPHOSPHOLIPID ANTIBODY SYNDROME (HCC): Primary | ICD-10-CM

## 2024-01-12 DIAGNOSIS — I63.50 CEREBRAL ARTERY OCCLUSION WITH CEREBRAL INFARCTION (HCC): ICD-10-CM

## 2024-01-25 ENCOUNTER — HOSPITAL ENCOUNTER (OUTPATIENT)
Dept: PHARMACY | Age: 49
Setting detail: THERAPIES SERIES
Discharge: HOME | End: 2024-01-25
Payer: MEDICARE

## 2024-01-25 DIAGNOSIS — Z79.01 LONG TERM CURRENT USE OF ANTICOAGULANT THERAPY: ICD-10-CM

## 2024-01-25 DIAGNOSIS — D68.61 ANTIPHOSPHOLIPID ANTIBODY SYNDROME (HCC): Primary | ICD-10-CM

## 2024-01-25 LAB — INTERNATIONAL NORMALIZATION RATIO, POC: 1.9

## 2024-01-25 NOTE — PROGRESS NOTES
[facility]  Medication Management  ANTICOAGULATION    Referring Provider: Dr Mario Arriaga     GOAL INR: 2.5 - 3.5     TODAY'S INR: 1.9     WARFARIN Dosage: 11.25 mg x 1 dose, then increase dose to 3.75 mg  and 7.5 mg all other days of the week      INR (no units)   Date Value   2024 1.9   2024 1.4   2023 2.5   2023 1.7   2023 2.2   08/10/2023 2.3   2023 3   2023 2.3     INR,(POC) (no units)   Date Value   2024 1.9       Hemoglobin   Date Value Ref Range Status   2023 15.6 13.5 - 17.5 g/dL Final     Hematocrit   Date Value Ref Range Status   2023 51 41 - 53 % Final     ALT   Date Value Ref Range Status   2023 24 5 - 41 U/L Final     AST   Date Value Ref Range Status   2023 23 <40 U/L Final       Medication changes:  None      Notes:    Fingerstick INR drawn per [facility] lab protocol and result obtained through EMR Care Everywhere. All communication is with the patient's mom, Deonna, via the phone. No reports of visible blood in urine and no black tarry stool. Denies any missed doses of warfarin. No change in other maintenance medications or in diet. Will have him take 11.25 mg warfarin tonight and increase weekly dosage to 3.75 mg on  and 7.5 mg all other days of the week. We will recheck INR in 3 weeks. Patient acknowledges working in consult agreement with pharmacist as referred by his/her physician.                  For Pharmacy Admin Tracking Only    Intervention Detail: Adherence Monitorin and Dose Adjustment: 1, reason: Therapy Optimization  Total # of Interventions Recommended: 2  Total # of Interventions Accepted: 2  Time Spent (min): 30      Yoni Oglesby [facility], PharmD

## 2024-01-26 RX ORDER — PANTOPRAZOLE SODIUM 20 MG/1
20 TABLET, DELAYED RELEASE ORAL 2 TIMES DAILY
COMMUNITY
Start: 2023-12-26

## 2024-02-08 ENCOUNTER — APPOINTMENT (OUTPATIENT)
Dept: PHARMACY | Age: 49
End: 2024-02-08
Payer: MEDICARE

## 2024-02-13 ENCOUNTER — HOSPITAL ENCOUNTER (OUTPATIENT)
Dept: PHARMACY | Age: 49
Setting detail: THERAPIES SERIES
Discharge: HOME | End: 2024-02-13
Payer: MEDICARE

## 2024-02-13 VITALS — BODY MASS INDEX: 40.71 KG/M2 | WEIGHT: 300.2 LBS

## 2024-02-13 DIAGNOSIS — D68.61 ANTIPHOSPHOLIPID ANTIBODY SYNDROME (HCC): Primary | ICD-10-CM

## 2024-02-13 DIAGNOSIS — Z79.01 LONG TERM CURRENT USE OF ANTICOAGULANT THERAPY: ICD-10-CM

## 2024-02-13 LAB — INR BLD: 2.6

## 2024-02-13 PROCEDURE — 99211 OFF/OP EST MAY X REQ PHY/QHP: CPT

## 2024-02-13 PROCEDURE — 85610 PROTHROMBIN TIME: CPT

## 2024-02-13 RX ORDER — WARFARIN SODIUM 5 MG/1
5 TABLET ORAL SEE ADMIN INSTRUCTIONS
COMMUNITY

## 2024-02-13 NOTE — PROGRESS NOTES
[facility]  Medication Management  ANTICOAGULATION    Referring Provider: Dr Mario Arriaga     GOAL INR: 2.5 - 3.5     TODAY'S INR: 2.6     WARFARIN Dosage: 2.5 mg  and 5 mg all other days of the week      INR (no units)   Date Value   2024 2.6   2024 1.4   2023 2.5   2023 1.7   2023 2.2   08/10/2023 2.3   2023 3   2023 2.3     INR,(POC) (no units)   Date Value   2024 1.9       Hemoglobin   Date Value Ref Range Status   12/10/2019 12.3 (L) 13.5 - 17.5 g/dL Final     Hematocrit   Date Value Ref Range Status   12/10/2019 40.6 (L) 41 - 53 % Final     ALT   Date Value Ref Range Status   12/10/2019 21 5 - 41 U/L Final     AST   Date Value Ref Range Status   12/10/2019 28 <40 U/L Final       Medication changes:  None      Notes:    Fingerstick INR drawn per clinic protocol. Patient states no visible blood in urine and no black tarry stool. Denies any missed doses of warfarin. No change in other maintenance medications or in diet. Merrick states that he has the \"peach-colored\" 5 mg warfarin tablets and has been taking \"1/2 tablet\" (2.5 mg) on  and \"1 whole tablet\" (5 mg) all other days of the week. We will have him continue current weekly warfarin regimen and then recheck INR in 6 weeks. Patient acknowledges working in consult agreement with pharmacist as referred by his/her physician.                  For Pharmacy Admin Tracking Only    Intervention Detail: Adherence Monitorin  Total # of Interventions Recommended: 1  Total # of Interventions Accepted: 1  Time Spent (min): 45      Yoni Oglesby [facility], PharmD

## 2024-02-13 NOTE — PATIENT INSTRUCTIONS
Continue current dose of warfarin as instructed on dosing calendar provided.   Continue to monitor urine and stool for signs and symptoms of bleeding.   Please notify the clinic of any medication changes.   Please remember to bring all medications (both prescription and OTC) to your next visit. Kindly notify the clinic if you are unable to make to your next appointment.

## 2024-02-22 ENCOUNTER — APPOINTMENT (OUTPATIENT)
Dept: PHARMACY | Age: 49
End: 2024-02-22
Payer: MEDICARE

## 2024-03-26 ENCOUNTER — HOSPITAL ENCOUNTER (OUTPATIENT)
Dept: PHARMACY | Age: 49
Setting detail: THERAPIES SERIES
Discharge: HOME | End: 2024-03-26
Payer: MEDICARE

## 2024-03-26 VITALS
HEART RATE: 82 BPM | WEIGHT: 299.6 LBS | DIASTOLIC BLOOD PRESSURE: 85 MMHG | SYSTOLIC BLOOD PRESSURE: 145 MMHG | BODY MASS INDEX: 40.63 KG/M2

## 2024-03-26 DIAGNOSIS — Z79.01 LONG TERM CURRENT USE OF ANTICOAGULANT THERAPY: ICD-10-CM

## 2024-03-26 DIAGNOSIS — D68.61 ANTIPHOSPHOLIPID ANTIBODY SYNDROME (HCC): Primary | ICD-10-CM

## 2024-03-26 LAB — INR BLD: 2.5

## 2024-03-26 PROCEDURE — 99211 OFF/OP EST MAY X REQ PHY/QHP: CPT

## 2024-03-26 PROCEDURE — 85610 PROTHROMBIN TIME: CPT

## 2024-03-26 RX ORDER — WARFARIN SODIUM 5 MG/1
TABLET ORAL
Qty: 90 TABLET | Refills: 1 | OUTPATIENT
Start: 2024-03-26

## 2024-03-26 NOTE — PROGRESS NOTES
[facility]  Medication Management  ANTICOAGULATION    Referring Provider: Dr Mario Arriaga     GOAL INR: 2.5 - 3.5     TODAY'S INR: 2.5     WARFARIN Dosage: 2.5 mg  and 5 mg all other days of the week      INR (no units)   Date Value   2024 2.5   2024 2.6   2024 1.4   2023 2.5   2023 1.7   2023 2.2   08/10/2023 2.3   2023 3     INR,(POC) (no units)   Date Value   2024 1.9       Hemoglobin   Date Value Ref Range Status   12/10/2019 12.3 (L) 13.5 - 17.5 g/dL Final     Hematocrit   Date Value Ref Range Status   12/10/2019 40.6 (L) 41 - 53 % Final     ALT   Date Value Ref Range Status   12/10/2019 21 5 - 41 U/L Final     AST   Date Value Ref Range Status   12/10/2019 28 <40 U/L Final       Medication changes:  None      Notes:    Fingerstick INR drawn per clinic protocol. Patient states no visible blood in urine and no black tarry stool. Denies any missed doses of warfarin. No change in other maintenance medications or in diet. Will continue current weekly warfarin and recheck INR in 6 weeks. Sent e-script for warfarin refill of 5 mg tablets, #90 plus 1 refill, to [facility] in [place] per patient request. Patient acknowledges working in consult agreement with pharmacist as referred by his/her physician.                  For Pharmacy Admin Tracking Only    Intervention Detail: Adherence Monitorin  Total # of Interventions Recommended: 1  Total # of Interventions Accepted: 1  Time Spent (min): 45      Yoni Oglesby [facility], PharmD

## 2024-04-12 ENCOUNTER — TELEPHONE (OUTPATIENT)
Dept: PHARMACY | Age: 49
End: 2024-04-12

## 2024-04-12 DIAGNOSIS — Z79.01 LONG TERM CURRENT USE OF ANTICOAGULANT THERAPY: ICD-10-CM

## 2024-04-12 DIAGNOSIS — I63.50 CEREBRAL ARTERY OCCLUSION WITH CEREBRAL INFARCTION (HCC): ICD-10-CM

## 2024-04-12 DIAGNOSIS — D68.61 ANTIPHOSPHOLIPID ANTIBODY SYNDROME (HCC): Primary | ICD-10-CM

## 2024-04-12 NOTE — TELEPHONE ENCOUNTER
Patient's mother, Deonna Knowles, called our Medication Management clinic to let us know that Merrick had been started on Bactrim DS for a bladder infection, which can potentially increase the INR, in some cases, to a significant degree, requiring possible warfarin dosing adjustments. I sent an order to [facility] for an venipuncture INR to be drawn earlier today and called for the result. According to the personnel I spoke with at [facility], CBC with diff and Iron panel were drawn, but the PT/ INR was never drawn.     I spoke with Deonna and requested that Merrick take only 2.5 mg warfarin (1/2 of the 5 mg tablet) tonight, 4/12 and Sunday, 4/14 and take normal 5 mg dose of warfarin on 4/13. I requested he go back to [facility] to have the INR drawn on Monday, 4/15 and we will reassess further warfarin dosing at that time.

## 2024-04-15 LAB — INTERNATIONAL NORMALIZATION RATIO, POC: 1.6

## 2024-04-16 ENCOUNTER — HOSPITAL ENCOUNTER (OUTPATIENT)
Dept: PHARMACY | Age: 49
Setting detail: THERAPIES SERIES
Discharge: HOME | End: 2024-04-16

## 2024-04-16 DIAGNOSIS — D68.61 ANTIPHOSPHOLIPID ANTIBODY SYNDROME (HCC): Primary | ICD-10-CM

## 2024-04-16 DIAGNOSIS — Z79.01 LONG TERM CURRENT USE OF ANTICOAGULANT THERAPY: ICD-10-CM

## 2024-04-16 RX ORDER — SULFAMETHOXAZOLE AND TRIMETHOPRIM 800; 160 MG/1; MG/1
1 TABLET ORAL 2 TIMES DAILY
COMMUNITY
Start: 2024-04-12 | End: 2024-04-21

## 2024-04-16 NOTE — PROGRESS NOTES
[facility]  Medication Management  ANTICOAGULATION    Referring Provider: Dr Mario Arriaga     GOAL INR: 2.5-3.5     TODAY'S INR: 1.6     WARFARIN Dosage: 7.5 mg x 1, 5 mg x 2, then resume 2.5 mg Thursdays and 5 mg all other days of the week    INR (no units)   Date Value   03/26/2024 2.5   02/13/2024 2.6   01/11/2024 1.4   11/30/2023 2.5   11/03/2023 1.7   09/22/2023 2.2   08/10/2023 2.3     INR,(POC) (no units)   Date Value   01/25/2024 1.9       Hemoglobin   Date Value Ref Range Status   12/10/2019 12.3 (L) 13.5 - 17.5 g/dL Final     Hematocrit   Date Value Ref Range Status   12/10/2019 40.6 (L) 41 - 53 % Final     ALT   Date Value Ref Range Status   12/10/2019 21 5 - 41 U/L Final     AST   Date Value Ref Range Status   12/10/2019 28 <40 U/L Final       Medication changes:  Started Bactrim DS 4/12    Notes:   Fingerstick INR drawn per mdINR self-testing protocol and result faxed to our clinic for warfarin therapy management. All communication is with the patient's mother, Deonna, who was contacted via phone. Merrick was started on Bactrim DS on 4/12 for 10 days. Patient was instructed to take a half dose on 4/12 and 4/14. His INR is subtherapeutic today so will order 7.5 mg x 1, 5 mg x 2, then resume 2.5 mg Thursdays and 5 mg all other days and will recheck INR in 1 week with a lab draw at the [facility]\"". Patient acknowledges working in consult agreement with pharmacist as referred by his/her physician.       We want to confirm that, for purposes of billing, this is a virtual visit with your provider for which we will submit a claim for reimbursement with your insurance company. You may be responsible for any copays, coinsurance amounts or other amounts not covered by your insurance company. If you do not accept this, unfortunately we will not be able to schedule a virtual visit with the provider.              For Pharmacy Admin Tracking Only    Intervention Detail: Adherence

## 2024-04-22 ENCOUNTER — HOSPITAL ENCOUNTER (OUTPATIENT)
Dept: PHARMACY | Age: 49
Setting detail: THERAPIES SERIES
Discharge: HOME | End: 2024-04-22

## 2024-04-22 DIAGNOSIS — Z79.01 LONG TERM CURRENT USE OF ANTICOAGULANT THERAPY: ICD-10-CM

## 2024-04-22 DIAGNOSIS — D68.61 ANTIPHOSPHOLIPID ANTIBODY SYNDROME (HCC): Primary | ICD-10-CM

## 2024-04-22 LAB — INTERNATIONAL NORMALIZATION RATIO, POC: 2

## 2024-04-22 NOTE — PROGRESS NOTES
[facility]  Medication Management  ANTICOAGULATION    Referring Provider: Dr Mario Arriaga     GOAL INR: 2.5 - 3.5     TODAY'S INR: 2.0     WARFARIN Dosage: 7.5 mg x 1, then resume 2.5 mg  and 5 mg all other days of the week      INR (no units)   Date Value   2024 2.5   2024 2.6   2024 1.4   2023 2.5   2023 1.7   2023 2.2   08/10/2023 2.3     INR,(POC) (no units)   Date Value   2024 2.0   04/15/2024 1.6   2024 1.9         Medication changes:  Completed 10-day course of Bactrim yesterday (started 2024)      Notes:    Venipuncture INR drawn per [facility] protocol and result faxed to our clinic for warfarin therapy management. All communication is with the patient's mother, Deonna, who was contacted via phone. As discussed previously, Merrick had started Bactrim DS on  for 10 days. We had empirically lowered his weekly warfarin dosage initially and then ordered a venipuncture INR to be drawn on 4/15/2024. Following his INR of 1.6 on 4/15/2024, Merrick was instructed to take 7.5 mg warfarin x 1 dose, then 5 mg warfarin x 2 doses, prior to resuming previously stable weekly warfarin regimen. He denies any missed doses of warfarin and says he completed the course of his Bactrim Rx yesterday. Since his INR is 2.0 today, we will have him take 7.5 mg warfarin x 1 dose tonight, but then resume 2.5 mg warfarin on  and 5 mg warfarin all other days of the week. We will recheck INR again in the clinic in 2 weeks and reassess further warfarin dosing at that time. Patient acknowledges working in consult agreement with pharmacist as referred by his/her physician.                   For Pharmacy Admin Tracking Only    Intervention Detail: Adherence Monitorin and Dose Adjustment: 1, reason: Therapy Optimization  Total # of Interventions Recommended: 2  Total # of Interventions Accepted: 2  Time Spent (min): 45      Yoni MCINTOSH

## 2024-05-07 ENCOUNTER — HOSPITAL ENCOUNTER (OUTPATIENT)
Dept: PHARMACY | Age: 49
Setting detail: THERAPIES SERIES
Discharge: HOME | End: 2024-05-07
Payer: MEDICARE

## 2024-05-07 VITALS
SYSTOLIC BLOOD PRESSURE: 144 MMHG | DIASTOLIC BLOOD PRESSURE: 87 MMHG | WEIGHT: 295.6 LBS | BODY MASS INDEX: 40.09 KG/M2 | HEART RATE: 69 BPM

## 2024-05-07 DIAGNOSIS — D68.61 ANTIPHOSPHOLIPID ANTIBODY SYNDROME (HCC): Primary | ICD-10-CM

## 2024-05-07 DIAGNOSIS — Z79.01 LONG TERM CURRENT USE OF ANTICOAGULANT THERAPY: ICD-10-CM

## 2024-05-07 LAB — INR BLD: 1.8

## 2024-05-07 PROCEDURE — 99211 OFF/OP EST MAY X REQ PHY/QHP: CPT

## 2024-05-07 PROCEDURE — 85610 PROTHROMBIN TIME: CPT

## 2024-05-07 NOTE — PROGRESS NOTES
[facility]  ANTICOAGULATION    Referring Provider: Dr Mario Arriaga     GOAL INR: 2.5 - 3.5     TODAY'S INR: 1.8     WARFARIN Dosage: 7.5 mg x 1 booster dose, then increase dosage to  5 mg EVERY DAY of the week      INR (no units)   Date Value   2024 2.5   2024 2.6   2024 1.4   2023 2.5   2023 1.7   2023 2.2   08/10/2023 2.3     INR,(POC) (no units)   Date Value   2024 2.0   04/15/2024 1.6   2024 1.9         Medication changes:  Taking Cymbalta 60 mg \"once a day\"  Taking Mirtazapine 30 mg nightly, instead of 45 mg as previously prescribed     Notes:    Fingerstick INR drawn per clinic protocol. Patient states no visible blood in urine and no black tarry stool. Denies any missed doses of warfarin. Merrick states that he has still been using medical marijuana \"CBD/ THC\" gummy \"once a day\" in the AM. Although cannabis can potentially increase the levels and the clinical effects of warfarin, his INR remains sub-therapeutic today. Upon reviewing his other medications, he says he has only been taking Cymbalta \"once a day\" instead of 60 mg BID and also states that he has been taking \"only 1\" tablet of Mirtazapine (30 mg) nightly instead of 1 and 1/2 tablets (45 mg) as previously prescribed. No change in other maintenance medications or in diet, although he does admit to having had some \"spinach\" in his omelet a few days ago. We will have him take 7.5 mg warfarin tonight x 1 booster dose, but then also increase weekly warfarin regimen (7.7%) to 5 mg daily as noted on his dosing calendar. We will recheck INR in 2 weeks and reassess further warfarin dosing at that time. Patient acknowledges working in consult agreement with pharmacist as referred by his/her physician.                  For Pharmacy Admin Tracking Only    Intervention Detail: Adherence Monitorin and Dose Adjustment: 1, reason: Therapy Optimization  Total #

## 2024-05-21 ENCOUNTER — APPOINTMENT (OUTPATIENT)
Dept: PHARMACY | Age: 49
End: 2024-05-21
Payer: MEDICARE

## 2024-05-21 VITALS
BODY MASS INDEX: 39.79 KG/M2 | WEIGHT: 293.4 LBS | SYSTOLIC BLOOD PRESSURE: 99 MMHG | HEART RATE: 78 BPM | DIASTOLIC BLOOD PRESSURE: 68 MMHG

## 2024-05-21 DIAGNOSIS — Z79.01 LONG TERM CURRENT USE OF ANTICOAGULANT THERAPY: ICD-10-CM

## 2024-05-21 DIAGNOSIS — D68.61 ANTIPHOSPHOLIPID ANTIBODY SYNDROME (HCC): Primary | ICD-10-CM

## 2024-05-21 LAB — INR BLD: 2.3

## 2024-05-21 PROCEDURE — 85610 PROTHROMBIN TIME: CPT

## 2024-05-21 PROCEDURE — 99211 OFF/OP EST MAY X REQ PHY/QHP: CPT

## 2024-05-21 NOTE — PROGRESS NOTES
[facility]  Medication Management  ANTICOAGULATION    Referring Provider: Dr Mario Arriaga     GOAL INR: 2.5 - 3.5     TODAY'S INR: 2.3     WARFARIN Dosage: continue current dosage of  5 mg EVERY DAY of the week      INR (no units)   Date Value   2024 2.3   2024 1.8   2024 2.5   2024 2.6   2024 1.4   2023 2.5   2023 1.7     INR,(POC) (no units)   Date Value   2024 2.0   04/15/2024 1.6   2024 1.9       Hemoglobin   Date Value Ref Range Status   12/10/2019 12.3 (L) 13.5 - 17.5 g/dL Final     Hematocrit   Date Value Ref Range Status   12/10/2019 40.6 (L) 41 - 53 % Final     ALT   Date Value Ref Range Status   12/10/2019 21 5 - 41 U/L Final     AST   Date Value Ref Range Status   12/10/2019 28 <40 U/L Final       Medication changes:  None      Notes:    Fingerstick INR drawn per clinic protocol. Patient states no visible blood in urine and no black tarry stool. Denies any missed doses of warfarin. No change in other maintenance medications or in diet. Will recheck INR in 6 weeks. Patient acknowledges working in consult agreement with pharmacist as referred by his/her physician.                  For Pharmacy Admin Tracking Only    Intervention Detail: Adherence Monitorin  Total # of Interventions Recommended: 1  Total # of Interventions Accepted: 1  Time Spent (min): 45      Yoni Oglesby [facility], PharmD

## 2024-07-02 ENCOUNTER — HOSPITAL ENCOUNTER (OUTPATIENT)
Dept: PHARMACY | Age: 49
Setting detail: THERAPIES SERIES
Discharge: HOME | End: 2024-07-02
Payer: MEDICARE

## 2024-07-02 VITALS
BODY MASS INDEX: 40.42 KG/M2 | HEART RATE: 74 BPM | DIASTOLIC BLOOD PRESSURE: 78 MMHG | WEIGHT: 298 LBS | SYSTOLIC BLOOD PRESSURE: 114 MMHG

## 2024-07-02 DIAGNOSIS — D68.61 ANTIPHOSPHOLIPID ANTIBODY SYNDROME (HCC): Primary | ICD-10-CM

## 2024-07-02 DIAGNOSIS — Z79.01 LONG TERM CURRENT USE OF ANTICOAGULANT THERAPY: ICD-10-CM

## 2024-07-02 LAB — INR BLD: 1.7

## 2024-07-02 PROCEDURE — 85610 PROTHROMBIN TIME: CPT

## 2024-07-02 PROCEDURE — 99211 OFF/OP EST MAY X REQ PHY/QHP: CPT

## 2024-07-02 RX ORDER — PRAZOSIN HYDROCHLORIDE 1 MG/1
1 CAPSULE ORAL NIGHTLY
COMMUNITY
Start: 2024-06-21 | End: 2024-08-20

## 2024-07-02 RX ORDER — QUETIAPINE FUMARATE 50 MG/1
100 TABLET, FILM COATED ORAL NIGHTLY
COMMUNITY
Start: 2024-06-21 | End: 2024-07-21

## 2024-07-02 NOTE — PROGRESS NOTES
[facility]  ANTICOAGULATION    Referring Provider: Dr Mario Arriaga     GOAL INR: 2.5 - 3.5     TODAY'S INR: 1.7     WARFARIN Dosage: 10 mg x 1 booster dose, then increase current dosage to 7.5 mg on Saturdays and 5 mg all other days of the week       INR (no units)   Date Value   05/21/2024 2.3   05/07/2024 1.8   03/26/2024 2.5   02/13/2024 2.6   01/11/2024 1.4   11/30/2023 2.5   11/03/2023 1.7     INR,(POC) (no units)   Date Value   04/22/2024 2.0   04/15/2024 1.6   01/25/2024 1.9       Hemoglobin   Date Value Ref Range Status   12/10/2019 12.3 (L) 13.5 - 17.5 g/dL Final     Hematocrit   Date Value Ref Range Status   12/10/2019 40.6 (L) 41 - 53 % Final     ALT   Date Value Ref Range Status   12/10/2019 21 5 - 41 U/L Final     AST   Date Value Ref Range Status   12/10/2019 28 <40 U/L Final       Medication changes:  None      Notes:    Fingerstick INR drawn per clinic protocol. Patient states no visible blood in urine and no black tarry stool. Merrick says he is \"pretty sure\" he has not missed any dose of warfarin since his last INR check. He confirms that he went to the [facility] and saw Apolinar Coburn CNP, psychiatric mental health nurse practitioner on 6/21/2024. He was prescribed Seroquel 50 mg nightly x 7 days, then increasing to 100 mg nightly x 23 days for mood and insomnia and was also prescribed Prazosin 1 mg nightly for nightmares associated with PTSD. He is supposed to follow up with DONTA Coburn CNP again in 4 weeks, although Merrick says he \"doesn't know\" if he has an appointment scheduled \"yet\". Although Seroquel has been documented to potentially increase the INR, his INR is actually sub-therapeutic today. No change in other maintenance medications or in diet, except he confirms that he has had a \"couple\" salads more recently. For now, we will have him take 10 mg warfarin tonight and then increase weekly warfarin regimen to 7.5 mg on

## 2024-07-17 ENCOUNTER — HOSPITAL ENCOUNTER (OUTPATIENT)
Dept: PHARMACY | Age: 49
Setting detail: THERAPIES SERIES
Discharge: HOME | End: 2024-07-17
Payer: MEDICARE

## 2024-07-17 DIAGNOSIS — Z79.01 LONG TERM CURRENT USE OF ANTICOAGULANT THERAPY: ICD-10-CM

## 2024-07-17 DIAGNOSIS — D68.61 ANTIPHOSPHOLIPID ANTIBODY SYNDROME (HCC): Primary | ICD-10-CM

## 2024-07-17 LAB — INR BLD: 2.1

## 2024-07-17 NOTE — PROGRESS NOTES
[facility]  [facility]  ANTICOAGULATION    Referring Provider: Dr Arriaga    GOAL INR: 2.5-3.5    TODAY'S INR: 2.1    WARFARIN Dosage: 10mg x1 booster then increase to 7.5mg TSat, 5mg all other days    INR (no units)   Date Value   2024 2.1   2024 1.7   2024 2.3   2024 1.8   2024 2.5   2024 2.6   2024 1.4     INR,(POC) (no units)   Date Value   2024 2.0   04/15/2024 1.6   2024 1.9       Hemoglobin   Date Value Ref Range Status   12/10/2019 12.3 (L) 13.5 - 17.5 g/dL Final     Hematocrit   Date Value Ref Range Status   12/10/2019 40.6 (L) 41 - 53 % Final     ALT   Date Value Ref Range Status   12/10/2019 21 5 - 41 U/L Final     AST   Date Value Ref Range Status   12/10/2019 28 <40 U/L Final       Medication changes:  No change    Notes:    Venipuncture INR draw per [facility] protocol yesterday with results available today.  Result retrieved via [facility] showing 2.1.  All conversation via telephone with mother, Deonna.  Deonna denies any changes and states she believes Merrick sees Apolinar Coburn CNP at [address] [place] [address] next month. Patient will take warfarin 10mg today then increase weekly dosage slightly to 7.5mg Tuesday and Saturday and 5mg all other days. Deonna states understanding.  Deonna states no visible blood in urine and no black tarry stool. Denies any missed doses of warfarin. No change in other maintenance medications or in diet. Will recheck INR in 2 weeks in person with Merrick Pharm D. Patient acknowledges working in consult agreement with pharmacist as referred by his/her physician.                  For Pharmacy Admin Tracking Only    Intervention Detail: Adherence Monitorin and Dose Adjustment: 2, reason: Therapy Optimization  Total # of Interventions Recommended: 3  Total # of Interventions Accepted: 3  Time Spent (min): 20    NATALY HdezPh., 2024,10:50 AM

## 2024-07-17 NOTE — PATIENT INSTRUCTIONS
Please take 10mg (2 tablets) warfarin today then increase your dosing to take warfarin 7.5mg (1 1/2 tablets) Tuesdays and Saturdays and 5mg (1 tablet) all other days.  Continue to monitor urine and stool for signs and symptoms of bleeding.   Please notify the clinic of any medication changes.   Please remember to bring all medications (both prescription and OTC) to your next visit.   Kindly notify the clinic if you are unable to make to your next appointment.   Follow warfarin dosing instructions on dosing calendar provided.

## 2024-07-30 ENCOUNTER — HOSPITAL ENCOUNTER (OUTPATIENT)
Dept: PHARMACY | Age: 49
Setting detail: THERAPIES SERIES
Discharge: HOME | End: 2024-07-30
Payer: MEDICARE

## 2024-07-30 VITALS
HEART RATE: 71 BPM | SYSTOLIC BLOOD PRESSURE: 144 MMHG | WEIGHT: 295.6 LBS | BODY MASS INDEX: 40.09 KG/M2 | DIASTOLIC BLOOD PRESSURE: 99 MMHG

## 2024-07-30 DIAGNOSIS — Z79.01 LONG TERM CURRENT USE OF ANTICOAGULANT THERAPY: ICD-10-CM

## 2024-07-30 DIAGNOSIS — D68.61 ANTIPHOSPHOLIPID ANTIBODY SYNDROME (HCC): Primary | ICD-10-CM

## 2024-07-30 LAB — INR BLD: 1.6

## 2024-07-30 PROCEDURE — 85610 PROTHROMBIN TIME: CPT

## 2024-07-30 PROCEDURE — 99211 OFF/OP EST MAY X REQ PHY/QHP: CPT

## 2024-07-30 NOTE — PROGRESS NOTES
[facility]  ANTICOAGULATION    Referring Provider: Dr Arriaga     GOAL INR: 2.5 - 3.5     TODAY'S INR: 1.6     WARFARIN Dosage: 12.5 mg x 1 booster dose, then increase to 7.5mg TSat, 5mg all other days      INR (no units)   Date Value   07/30/2024 1.6   07/17/2024 2.1   07/02/2024 1.7   05/21/2024 2.3   05/07/2024 1.8   03/26/2024 2.5   02/13/2024 2.6     INR,(POC) (no units)   Date Value   04/22/2024 2.0   04/15/2024 1.6       Medication changes:  Stopped taking Seroquel       Notes:    Fingerstick INR drawn per clinic protocol. Patient states no visible blood in urine and no black tarry stool. Merrick says he has been using a pill-box as recommended and denies any missed doses of warfarin since his last INR check. HOWEVER, he had been instructed to increase his warfarin dosage to 7.5 mg on Tues/Sat and 5 mg all other days of the week during his most recent encounter with our clinic, BUT says he decided NOT to do that because he \"thought it was the Seroquel\" making his blood \"thick\" and he \"stopped taking the Seroquel. He says he has been taking \"1 tablet\" (5 mg warfarin) daily. As discussed previously, he had been to the [facility] and saw Apolinar Coburn CNP, psychiatric mental health nurse practitioner on 6/21/2024. He was prescribed Seroquel 50 mg nightly x 7 days, then increasing to 100 mg nightly x 23 days for mood and insomnia. He was also prescribed Prazosin 1 mg nightly for nightmares associated with PTSD and confirms that he is still taking this medication and it has \"helped a lot\" (it was noted that a refill for the Prazosin was prescribed yesterday per Apolinar Coburn CNP). He was supposed to follow up with DONTA Coburn CNP after 4 weeks, although Merrick says he \"doesn't know\" if he has an appointment scheduled \"yet\". No change in other maintenance medications or in diet. For now, we will have him take 12.5 mg warfarin tonight and then increase weekly

## 2024-08-22 ENCOUNTER — ANTI-COAG VISIT (OUTPATIENT)
Age: 49
End: 2024-08-22
Payer: MEDICARE

## 2024-08-22 VITALS
DIASTOLIC BLOOD PRESSURE: 82 MMHG | WEIGHT: 295.4 LBS | SYSTOLIC BLOOD PRESSURE: 129 MMHG | BODY MASS INDEX: 40.06 KG/M2 | HEART RATE: 73 BPM

## 2024-08-22 DIAGNOSIS — Z79.01 LONG TERM CURRENT USE OF ANTICOAGULANT THERAPY: ICD-10-CM

## 2024-08-22 DIAGNOSIS — D68.61 ANTIPHOSPHOLIPID ANTIBODY SYNDROME (HCC): Primary | ICD-10-CM

## 2024-08-22 LAB
INTERNATIONAL NORMALIZATION RATIO, POC: 1.8
PROTHROMBIN TIME, POC: 0

## 2024-08-22 PROCEDURE — 99211 OFF/OP EST MAY X REQ PHY/QHP: CPT

## 2024-08-22 PROCEDURE — 85610 PROTHROMBIN TIME: CPT

## 2024-08-22 NOTE — PROGRESS NOTES
[facility]  ANTICOAGULATION    Referring Provider: Dr Arriaga     GOAL INR: 2.5 - 3.5     TODAY'S INR: 1.8     WARFARIN Dosage: 10 mg x 1 booster dose, then increase current dosage to 7.5mg TSat, 5mg all other days of the week as previously planned/instructed      INR (no units)   Date Value   2024 1.8   2024 1.6   2024 2.1   2024 1.7   2024 2.3   2024 1.8   2024 2.5   2024 2.6     INR,(POC) (no units)   Date Value   2024 2.0   04/15/2024 1.6         Medication changes:  None      Notes:    Fingerstick INR drawn per clinic protocol. Patient states no visible blood in urine and no black tarry stool. Upon reviewing his warfarin dosing, Merrick states that he's \"just been taking 1 (tablet) daily\" instead of 1 and 1/2 tablets (7.5 mg warfarin) on  and  and 1 tablet the other days of the week as previously instructed. He also admits to missing his warfarin dosage \"yesterday\". All other medications reviewed for accuracy. He states that he is no longer taking Seroquel, but still takes Prazosin 1 mg nightly for symptoms of PTSD per Apolinar Coburn CNP at [facility] in [place]. He says he still uses medical marijuana \"about the same\" as before. No change in other maintenance medications or in diet. Since his INR is sub-therapeutic today and he \"knows\" he missed his dosage last night, we will have him take 10 mg warfarin today and then increase current warfarin dosage to 7.5 mg on Tues/Sat and 5 mg all other days of the week as previously planned. We will recheck INR in 3 weeks. Patient acknowledges working in consult agreement with pharmacist as referred by his/her physician.                  For Pharmacy Admin Tracking Only    Intervention Detail: Adherence Monitorin and Dose Adjustment: 1, reason: Therapy Optimization  Total # of Interventions Recommended: 2  Total # of

## 2024-09-12 ENCOUNTER — ANTI-COAG VISIT (OUTPATIENT)
Age: 49
End: 2024-09-12
Payer: MEDICARE

## 2024-09-12 VITALS
BODY MASS INDEX: 39.95 KG/M2 | DIASTOLIC BLOOD PRESSURE: 90 MMHG | WEIGHT: 294.6 LBS | HEART RATE: 75 BPM | SYSTOLIC BLOOD PRESSURE: 131 MMHG

## 2024-09-12 DIAGNOSIS — D68.61 ANTIPHOSPHOLIPID ANTIBODY SYNDROME (HCC): Primary | ICD-10-CM

## 2024-09-12 DIAGNOSIS — Z79.01 LONG TERM CURRENT USE OF ANTICOAGULANT THERAPY: ICD-10-CM

## 2024-09-12 LAB
INTERNATIONAL NORMALIZATION RATIO, POC: 3
PROTHROMBIN TIME, POC: 0

## 2024-09-12 PROCEDURE — 99211 OFF/OP EST MAY X REQ PHY/QHP: CPT

## 2024-09-12 PROCEDURE — 85610 PROTHROMBIN TIME: CPT

## 2024-10-31 ENCOUNTER — ANTI-COAG VISIT (OUTPATIENT)
Age: 49
End: 2024-10-31
Payer: MEDICARE

## 2024-10-31 VITALS
WEIGHT: 299.2 LBS | DIASTOLIC BLOOD PRESSURE: 100 MMHG | SYSTOLIC BLOOD PRESSURE: 149 MMHG | BODY MASS INDEX: 40.58 KG/M2 | HEART RATE: 71 BPM

## 2024-10-31 DIAGNOSIS — Z79.01 LONG TERM CURRENT USE OF ANTICOAGULANT THERAPY: ICD-10-CM

## 2024-10-31 DIAGNOSIS — D68.61 ANTIPHOSPHOLIPID ANTIBODY SYNDROME (HCC): Primary | ICD-10-CM

## 2024-10-31 LAB
INTERNATIONAL NORMALIZATION RATIO, POC: 3.6
PROTHROMBIN TIME, POC: 0

## 2024-10-31 PROCEDURE — 99211 OFF/OP EST MAY X REQ PHY/QHP: CPT

## 2024-10-31 PROCEDURE — 85610 PROTHROMBIN TIME: CPT

## 2024-10-31 RX ORDER — VENLAFAXINE HYDROCHLORIDE 75 MG/1
75 CAPSULE, EXTENDED RELEASE ORAL DAILY
COMMUNITY
Start: 2024-10-22 | End: 2024-11-21

## 2024-10-31 RX ORDER — PRAZOSIN HYDROCHLORIDE 2 MG/1
2 CAPSULE ORAL NIGHTLY
COMMUNITY
Start: 2024-10-22

## 2024-10-31 NOTE — PROGRESS NOTES
other maintenance medications or in diet. Since his INR is 3.6 today, we will have him take 5 mg warfarin tonight and then continue current weekly warfarin regimen as noted on his dosing calendar. We will recheck INR in 6 weeks. Patient acknowledges working in consult agreement with pharmacist as referred by his/her physician.                  For Pharmacy Admin Tracking Only    Intervention Detail: Adherence Monitorin, Dose Adjustment: 1, reason: Therapy Optimization, and New Rx: 2, reason: Needs Additional Therapy  Total # of Interventions Recommended: 3  Total # of Interventions Accepted: 3  Time Spent (min): 45      Yoni Oglesby [facility], PharmD

## 2024-12-16 DIAGNOSIS — D68.61 ANTIPHOSPHOLIPID ANTIBODY SYNDROME (HCC): Primary | ICD-10-CM

## 2024-12-16 DIAGNOSIS — Z79.01 LONG TERM CURRENT USE OF ANTICOAGULANT THERAPY: ICD-10-CM

## 2024-12-17 ENCOUNTER — ANTI-COAG VISIT (OUTPATIENT)
Age: 49
End: 2024-12-17

## 2024-12-17 DIAGNOSIS — D68.61 ANTIPHOSPHOLIPID ANTIBODY SYNDROME (HCC): Primary | ICD-10-CM

## 2024-12-17 DIAGNOSIS — Z79.01 LONG TERM CURRENT USE OF ANTICOAGULANT THERAPY: ICD-10-CM

## 2024-12-18 LAB
INTERNATIONAL NORMALIZATION RATIO, POC: 1.6
PROTHROMBIN TIME, POC: 0

## 2024-12-18 NOTE — PROGRESS NOTES
[facility]  [facility]ication [facility]  ANTICOAGULATION    Referring Provider: Dr Arriaga     GOAL INR: 2.5 - 3.5     TODAY'S INR: 1.6     WARFARIN Dosage: 10 mg x 2 doses, then resume previous dosage to 7.5 mg Tuesdays and Saturdays and 5 mg all other days of the week      INR (no units)   Date Value   07/30/2024 1.6   07/17/2024 2.1   07/02/2024 1.7   05/21/2024 2.3   05/07/2024 1.8   03/26/2024 2.5   02/13/2024 2.6     INR,(POC) (no units)   Date Value   10/31/2024 3.6   09/12/2024 3.0   08/22/2024 1.8   04/22/2024 2.0   04/15/2024 1.6       Hemoglobin   Date Value Ref Range Status   12/10/2019 12.3 (L) 13.5 - 17.5 g/dL Final     Hematocrit   Date Value Ref Range Status   12/10/2019 40.6 (L) 41 - 53 % Final     ALT   Date Value Ref Range Status   12/10/2019 21 5 - 41 U/L Final     AST   Date Value Ref Range Status   12/10/2019 28 <40 U/L Final       Medication changes:  None      Notes:    Venipuncture INR drawn per protocol of [facility] care on [facility] in [place], [place]. All communication is with the patient and his mother, Deonna Knowles, via the phone. Deonna states that she and Merrick have relocated to the [facility] and are in the process of establishing care with providers in the area. As soon as he is established with new providers and we can ensure that his care is being managed elsewhere, we will discharge him from our Medication Management clinic. Patient states no visible blood in urine and no black tarry stool. Merrick states that he did miss \"a couple\" doses of his warfarin recently, but he cannot tell me when or how many doses he may have missed at this time. He and Deonna state that they are trying [place] get [place]\" and things \"have been crazy\" so he is \"sure\" that he has missed some doses of his warfarin. He says he has an appointment with a new PCP in January and will be trying to establish care with a \"coumadin clinic\" in the

## 2025-01-02 ENCOUNTER — ANTI-COAG VISIT (OUTPATIENT)
Age: 50
End: 2025-01-02

## 2025-01-02 DIAGNOSIS — D68.61 ANTIPHOSPHOLIPID ANTIBODY SYNDROME (HCC): Primary | ICD-10-CM

## 2025-01-02 DIAGNOSIS — Z79.01 LONG TERM CURRENT USE OF ANTICOAGULANT THERAPY: ICD-10-CM

## 2025-01-02 LAB
INTERNATIONAL NORMALIZATION RATIO, POC: 8.3
PROTHROMBIN TIME, POC: 0

## 2025-01-02 NOTE — PROGRESS NOTES
[facility]  Medication [facility]  ANTICOAGULATION    Referring Provider: Dr Arriaga     GOAL INR: 2.5-3.5     TODAY'S INR: 8.3     WARFARIN Dosage: HOLD x 3, then resume 7.5 mg Tu/Sa, 5 mg all other days of the week    INR (no units)   Date Value   07/30/2024 1.6   07/17/2024 2.1   07/02/2024 1.7   05/21/2024 2.3   05/07/2024 1.8   03/26/2024 2.5   02/13/2024 2.6     INR,(POC) (no units)   Date Value   01/02/2025 8.3   12/17/2024 1.6   10/31/2024 3.6   09/12/2024 3.0   08/22/2024 1.8   04/22/2024 2.0   04/15/2024 1.6       Hemoglobin   Date Value Ref Range Status   12/10/2019 12.3 (L) 13.5 - 17.5 g/dL Final     Hematocrit   Date Value Ref Range Status   12/10/2019 40.6 (L) 41 - 53 % Final     ALT   Date Value Ref Range Status   12/10/2019 21 5 - 41 U/L Final     AST   Date Value Ref Range Status   12/10/2019 28 <40 U/L Final       Medication changes:  None     Notes:   Venipuncture INR drawn per [facility] lab protocol and result called to our clinic by Dr Arriaga's office for warfarin therapy management. Received a call from DELROY Rivera, who said patient is not going to his office anymore but was notified that INR is 8.3 so she called our clinic. This clinic is still following patient remotely until he can get established with a new clinic in [place]. All communication is with the patient's mother who was contacted via phone. Deonna, mother, confirms he is having blood in urine and has several bruises on his body, possibly from a fall. Per Deonna, patient is \"drinking a lot\" which explains the elevated INR. Deonna knows he needs to go to the ER but patient refuses so she has family in the medical field coming over to talk with him. Denies any missed or extra doses of warfarin. No change in medications or diet. His INR is extremely supratherapeutic today so will hold his dose for 3 days before resuming 7.5 mg Tu/Sa, 5 mg all other days and will recheck INR in 1 week. Asked mother to call clinic

## 2025-01-08 ENCOUNTER — ANTI-COAG VISIT (OUTPATIENT)
Age: 50
End: 2025-01-08

## 2025-01-08 DIAGNOSIS — Z79.01 LONG TERM CURRENT USE OF ANTICOAGULANT THERAPY: ICD-10-CM

## 2025-01-08 DIAGNOSIS — D68.61 ANTIPHOSPHOLIPID ANTIBODY SYNDROME (HCC): Primary | ICD-10-CM

## 2025-01-08 LAB
INTERNATIONAL NORMALIZATION RATIO, POC: 2.9
PROTHROMBIN TIME, POC: 0

## 2025-01-08 NOTE — PROGRESS NOTES
[facility]  Medication Management  ANTICOAGULATION    Referring Provider: Dr Arriaga     GOAL INR: 2.5 - 3.5     TODAY'S INR: 2.9     WARFARIN Dosage: restart warfarin with dosage of 7.5 mg Saturdays and 5 mg all other days of the week      INR (no units)   Date Value   07/30/2024 1.6   07/17/2024 2.1   07/02/2024 1.7   05/21/2024 2.3   05/07/2024 1.8   03/26/2024 2.5   02/13/2024 2.6     INR,(POC) (no units)   Date Value   01/02/2025 8.3   12/17/2024 1.6   10/31/2024 3.6   09/12/2024 3.0   08/22/2024 1.8   04/22/2024 2.0   04/15/2024 1.6       Hemoglobin   Date Value Ref Range Status   12/10/2019 12.3 (L) 13.5 - 17.5 g/dL Final     Hematocrit   Date Value Ref Range Status   12/10/2019 40.6 (L) 41 - 53 % Final     ALT   Date Value Ref Range Status   12/10/2019 21 5 - 41 U/L Final     AST   Date Value Ref Range Status   12/10/2019 28 <40 U/L Final       Medication changes:  None      Notes:    Venipuncture INR drawn per protocol of [facility] care on [facility] in [place], [place]. All communication is with the patient and his mother, Deonna Knowles, via the phone. Deonna states that she and Merrick have relocated to the [place] area and are in the process of establishing care with providers in the area. As soon as he is established with new providers and we can ensure that his care is being managed elsewhere, we will discharge him from our Medication Management clinic. Patient states no visible blood in urine and no black tarry stool. Following his INR of 8.3 on 1/2/2025, Merrick's mother, Deonna, confirms that he skipped his warfarin for the past 6 days (1/2/25 through 1/7/2025) prior to this INR check today. He also reported to the ER as recommended and x-rays showed that he had sustained fractures to ribs 10 and 11 following his fall. CT of the head showed no acute findings or intracranial bleeding. Merrick had been drinking alcohol heavily prior to his fall and had

## 2025-01-16 LAB
INTERNATIONAL NORMALIZATION RATIO, POC: 3.3
PROTHROMBIN TIME, POC: 0

## 2025-01-17 ENCOUNTER — ANTI-COAG VISIT (OUTPATIENT)
Age: 50
End: 2025-01-17

## 2025-01-17 DIAGNOSIS — D68.61 ANTIPHOSPHOLIPID ANTIBODY SYNDROME (HCC): Primary | ICD-10-CM

## 2025-01-17 DIAGNOSIS — Z79.01 LONG TERM CURRENT USE OF ANTICOAGULANT THERAPY: ICD-10-CM

## 2025-01-17 RX ORDER — LIDOCAINE 50 MG/G
1 PATCH TOPICAL DAILY
COMMUNITY

## 2025-01-17 NOTE — PROGRESS NOTES
[facility]  [facility]  ANTICOAGULATION    Referring Provider: Dr Arriaga     GOAL INR: 2.5 - 3.5     TODAY'S INR: 3.3     WARFARIN Dosage: continue 7.5 mg Saturdays and 5 mg all other days of the week      INR (no units)   Date Value   07/30/2024 1.6   07/17/2024 2.1   07/02/2024 1.7   05/21/2024 2.3   05/07/2024 1.8   03/26/2024 2.5   02/13/2024 2.6     INR,(POC) (no units)   Date Value   01/07/2025 2.9   01/02/2025 8.3   12/17/2024 1.6   10/31/2024 3.6   09/12/2024 3.0   08/22/2024 1.8   04/22/2024 2.0       Hemoglobin   Date Value Ref Range Status   12/10/2019 12.3 (L) 13.5 - 17.5 g/dL Final     Hematocrit   Date Value Ref Range Status   12/10/2019 40.6 (L) 41 - 53 % Final     ALT   Date Value Ref Range Status   12/10/2019 21 5 - 41 U/L Final     AST   Date Value Ref Range Status   12/10/2019 28 <40 U/L Final       Medication changes:  Using Lidocaine 5% patches to his back daily as prescribed.      Notes:   Venipuncture INR drawn per protocol of [facility] care on [facility] in [place], [place]. All communication is with the patient and his mother, Deonna Knowles, via the phone. As discussed previously, Deonna and her son, Merrick, have relocated to the [facility] and are in the process of establishing care with providers in the area. As soon as he is established with new providers and we can ensure that his care is being managed elsewhere, we will discharge him from our Medication Management clinic. Patient states no visible blood in urine and no black tarry stool. Denies any missed doses of warfarin. Merrick's mom, Deonna, says that the bruising following his recent fall is \"much better\". He had sustained fractures to ribs 10 and 11 following his fall. Of note, Merrick had been drinking alcohol heavily prior to his fall and had agreed to \"cut back\" at the time. Deonna tells me that he is still drinking alcohol, but has continued to \"cut way back\" compared

## 2025-01-24 ENCOUNTER — ANTI-COAG VISIT (OUTPATIENT)
Age: 50
End: 2025-01-24

## 2025-01-24 DIAGNOSIS — D68.61 ANTIPHOSPHOLIPID ANTIBODY SYNDROME (HCC): Primary | ICD-10-CM

## 2025-01-24 DIAGNOSIS — Z79.01 LONG TERM CURRENT USE OF ANTICOAGULANT THERAPY: ICD-10-CM

## 2025-01-24 NOTE — PROGRESS NOTES
Patient has moved to [facility] and is now being monitored by his new PCP, Charles Tello DO.  Patient had INR drawn 1/16/25 at [facility].  [facility] from [facility] at this time.      Discharge letter prepared and sent at this time.    Monse Johnson, LUIS E.Ph., 1/24/2025,12:12 PM

## 2025-01-29 ENCOUNTER — ANTI-COAG VISIT (OUTPATIENT)
Dept: PHARMACY | Age: 50
End: 2025-01-29

## 2025-01-29 NOTE — PROGRESS NOTES
Received a call from provider, Carolina Gutierrez, PharmD, at the [facility]tico[facility] management service in [place], [place], regarding this patient, who had been enrolled in our Medication Management clinic prior to moving to the [place] area. Merrick Granda has established care with new PCP, KIESHA Islas, MILLER, in [facility] and was referred to the [facility] Antico[facility] management service in [place], [place]. He had an initial visit in their clinic on 1/22/2025 and will be continuing routine visits to their clinic for INR checks and warfarin therapy management at this time. We will discharge him from our clinic.

## (undated) DEVICE — CANNULA NSL AD TUBE L7FT END TDL CO2 SAMP STD CONN DISP

## (undated) DEVICE — NEEDLE SPINAL 22GA L3.5IN SPINOCAN

## (undated) DEVICE — SYRINGE, LUER LOCK, 10ML: Brand: MEDLINE

## (undated) DEVICE — TOWEL,OR,DSP,ST,BLUE,STD,4/PK,20PK/CS: Brand: MEDLINE

## (undated) DEVICE — BANDAGE ADH W1XL3IN NAT FAB WVN FLX DURABLE N ADH PD SEAL

## (undated) DEVICE — APPLICATOR MEDICATED 10.5 CC SOLUTION HI LT ORNG CHLORAPREP

## (undated) DEVICE — NEEDLE SPNL 22GA L35IN PNCL PNT ATRAUM TIP PENCAN

## (undated) DEVICE — NERVE BLOCK TRAY: Brand: MEDLINE INDUSTRIES, INC.

## (undated) DEVICE — TOWEL,OR,DSP,ST,NATURAL,DLX,4/PK,20PK/CS: Brand: MEDLINE

## (undated) DEVICE — SYRINGE MED 10ML LUERLOCK TIP W/O SFTY DISP

## (undated) DEVICE — NEEDLE HYPO 22GA L1.5IN BLK S STL HUB POLYPR SHLD REG BVL

## (undated) DEVICE — GLOVE SURG SZ 7 L12IN FNGR THK79MIL GRN LTX FREE

## (undated) DEVICE — NEEDLE SPNL 22GA L5IN BLK HUB S STL W/ QNCKE PNT W/OUT

## (undated) DEVICE — GLOVE SURG SZ 7 CRM LTX FREE POLYISOPRENE POLYMER BEAD ANTI

## (undated) DEVICE — CUSTOM PACK: Brand: UNBRANDED